# Patient Record
Sex: FEMALE | Race: WHITE | Employment: FULL TIME | URBAN - METROPOLITAN AREA
[De-identification: names, ages, dates, MRNs, and addresses within clinical notes are randomized per-mention and may not be internally consistent; named-entity substitution may affect disease eponyms.]

---

## 2017-10-09 ENCOUNTER — HOSPITAL ENCOUNTER (EMERGENCY)
Age: 37
Discharge: HOME | End: 2017-10-09
Payer: MEDICAID

## 2017-10-09 VITALS
HEART RATE: 68 BPM | TEMPERATURE: 99 F | SYSTOLIC BLOOD PRESSURE: 133 MMHG | RESPIRATION RATE: 14 BRPM | BODY MASS INDEX: 51.91 KG/M2 | HEIGHT: 63 IN | OXYGEN SATURATION: 98 % | DIASTOLIC BLOOD PRESSURE: 76 MMHG | WEIGHT: 293 LBS

## 2017-10-09 DIAGNOSIS — S83.91XA SPRAIN OF RIGHT KNEE, UNSPECIFIED LIGAMENT, INITIAL ENCOUNTER: Primary | ICD-10-CM

## 2017-10-09 DIAGNOSIS — S16.1XXA NECK STRAIN, INITIAL ENCOUNTER: ICD-10-CM

## 2017-10-09 DIAGNOSIS — G44.209 ACUTE NON INTRACTABLE TENSION-TYPE HEADACHE: ICD-10-CM

## 2017-10-09 PROCEDURE — 99283 EMERGENCY DEPT VISIT LOW MDM: CPT

## 2017-10-09 RX ORDER — IBUPROFEN 600 MG/1
600 TABLET ORAL ONCE
Status: COMPLETED | OUTPATIENT
Start: 2017-10-09 | End: 2017-10-09

## 2017-10-09 RX ORDER — CYCLOBENZAPRINE HCL 10 MG
10 TABLET ORAL 3 TIMES DAILY PRN
Qty: 15 TABLET | Refills: 0 | Status: SHIPPED | OUTPATIENT
Start: 2017-10-09 | End: 2017-10-19

## 2017-10-09 RX ORDER — IBUPROFEN 600 MG/1
600 TABLET ORAL EVERY 8 HOURS PRN
Qty: 10 TABLET | Refills: 0 | Status: ON HOLD | OUTPATIENT
Start: 2017-10-09 | End: 2020-04-17

## 2017-10-10 NOTE — ED PROVIDER NOTES
Patient Seen in: [facility] In [place]    History   Patient presents with:  Fall (musculoskeletal, neurologic)    Stated Complaint: slipped, fell 10/7, multi areas of pain to Rt side    HPI    Patient is a 42-year-old presented to the Systems    Positive for stated complaint: slipped, fell 10/7, multi areas of pain to Rt side  Other systems are as noted in HPI. Constitutional and vital signs reviewed. All other systems reviewed and negative except as noted above.     PSFH elements right knee. No tenderness over the proximal fibula on the right there is no tenderness with removal of the shoe over the right ankle, malleoli, calcaneus, or metatarsals including the fifth metatarsal.  Skin: Unremarkable without lesions or rash.   Normal Prescribed:  Discharge Medication List as of 10/9/2017 10:36 PM    START taking these medications    ibuprofen 600 MG Oral Tab  Take 1 tablet (600 mg total) by mouth every 8 (eight) hours as needed for Pain., Script printed, Disp-10 tablet, R-0    Cycloben

## 2017-10-10 NOTE — ED INITIAL ASSESSMENT (HPI)
Slipped and fell last Saturday. Now endorsing right sided pain with [place]. Didn't hit head during fall.

## 2017-12-13 ENCOUNTER — HOSPITAL ENCOUNTER (EMERGENCY)
Age: 37
Discharge: HOME | End: 2017-12-13
Attending: EMERGENCY MEDICINE
Payer: MEDICAID

## 2017-12-13 ENCOUNTER — APPOINTMENT (OUTPATIENT)
Dept: CT IMAGING | Age: 37
End: 2017-12-13
Attending: EMERGENCY MEDICINE
Payer: MEDICAID

## 2017-12-13 VITALS
HEIGHT: 64 IN | HEART RATE: 78 BPM | RESPIRATION RATE: 18 BRPM | DIASTOLIC BLOOD PRESSURE: 68 MMHG | BODY MASS INDEX: 50.02 KG/M2 | WEIGHT: 293 LBS | SYSTOLIC BLOOD PRESSURE: 148 MMHG | TEMPERATURE: 97 F | OXYGEN SATURATION: 97 %

## 2017-12-13 DIAGNOSIS — S43.401A SPRAIN OF RIGHT SHOULDER, UNSPECIFIED SHOULDER SPRAIN TYPE, INITIAL ENCOUNTER: Primary | ICD-10-CM

## 2017-12-13 DIAGNOSIS — S83.91XA SPRAIN OF RIGHT KNEE, UNSPECIFIED LIGAMENT, INITIAL ENCOUNTER: ICD-10-CM

## 2017-12-13 DIAGNOSIS — R09.81 NASAL CONGESTION: ICD-10-CM

## 2017-12-13 DIAGNOSIS — G44.209 TENSION HEADACHE: ICD-10-CM

## 2017-12-13 PROCEDURE — 99284 EMERGENCY DEPT VISIT MOD MDM: CPT

## 2017-12-13 PROCEDURE — 70450 CT HEAD/BRAIN W/O DYE: CPT | Performed by: EMERGENCY MEDICINE

## 2017-12-13 PROCEDURE — 81025 URINE PREGNANCY TEST: CPT

## 2017-12-13 RX ORDER — FLUTICASONE PROPIONATE 50 MCG
2 SPRAY, SUSPENSION (ML) NASAL DAILY
Qty: 16 G | Refills: 0 | Status: SHIPPED | OUTPATIENT
Start: 2017-12-13 | End: 2018-01-12

## 2017-12-13 RX ORDER — TRAMADOL HYDROCHLORIDE 50 MG/1
50 TABLET ORAL EVERY 6 HOURS PRN
Qty: 20 TABLET | Refills: 0 | Status: SHIPPED | OUTPATIENT
Start: 2017-12-13 | End: 2017-12-20

## 2017-12-13 RX ORDER — NAPROXEN 500 MG/1
500 TABLET ORAL 2 TIMES DAILY PRN
Qty: 60 TABLET | Refills: 0 | Status: SHIPPED | OUTPATIENT
Start: 2017-12-13 | End: 2018-01-12

## 2017-12-14 NOTE — ED PROVIDER NOTES
Patient Seen in: THE [facility] Emergency Department In [place]    History   Patient presents with:  Fall (musculoskeletal, neurologic)    Stated Complaint: FALL    HPI    Patient is a 19-year-old female comes emergency room for evaluation of multiple complai distress, Well appearing and non-toxic, Alert and oriented X 3   HEENT: Normocephalic, atraumatic. Moist mucous membranes. Pupils equal round reactive to light accommodation, extraocular motion is intact, sclerae white, conjunctiva is pink.   Oropharynx i acute intra-cranial hemorrhage. SINUSES:  The visualized paranasal sinuses are clear. MASTOIDS:  The mastoids are clear. SKULL:  No evidence for fracture or osseous abnormality. CONCLUSION:  No acute intracranial hemorrhage.    Dictated by: Erick Barroso MD  [facility]          [place]y [place], [address] [facility]  [phone]      As needed    Mirta Newman  [address] [place] [address] [phone]      As needed

## 2017-12-14 NOTE — ED NOTES
Pt states she fell one month ago and was seen in the ED, no xrays done. Pt states she has not followed up with pmd. Pt also arrived with her bf in the ER to have mold exposure. Pt also states her lmp was light and shorter than usual and may be pregnant.  No

## 2017-12-14 NOTE — ED INITIAL ASSESSMENT (HPI)
STS HAD A FALL IN October AND HAS PAIN IN RIGHT KNEE AND AND RIGHT SHOULDER SINCE. STS WAS SEEN AT THE PED INITIALLY AFTER THE INJURY AND HAS NOT FOLLOWED UP. STEADY GAIT.  PT ALSO STS HAS HAD GENERALIZED HEADACHES AND NASAL CONGESTION X 3 MONTHS SINCE JO

## 2018-01-13 ENCOUNTER — HOSPITAL ENCOUNTER (EMERGENCY)
Age: 38
Discharge: HOME | End: 2018-01-13
Attending: EMERGENCY MEDICINE
Payer: OTHER MISCELLANEOUS

## 2018-01-13 ENCOUNTER — APPOINTMENT (OUTPATIENT)
Dept: GENERAL RADIOLOGY | Age: 38
End: 2018-01-13
Attending: EMERGENCY MEDICINE
Payer: OTHER MISCELLANEOUS

## 2018-01-13 VITALS
SYSTOLIC BLOOD PRESSURE: 140 MMHG | HEIGHT: 63 IN | OXYGEN SATURATION: 98 % | TEMPERATURE: 98 F | BODY MASS INDEX: 51.91 KG/M2 | WEIGHT: 293 LBS | RESPIRATION RATE: 18 BRPM | DIASTOLIC BLOOD PRESSURE: 89 MMHG | HEART RATE: 72 BPM

## 2018-01-13 DIAGNOSIS — S63.502A SPRAIN OF LEFT WRIST, INITIAL ENCOUNTER: Primary | ICD-10-CM

## 2018-01-13 PROCEDURE — 73110 X-RAY EXAM OF WRIST: CPT | Performed by: EMERGENCY MEDICINE

## 2018-01-13 PROCEDURE — 99283 EMERGENCY DEPT VISIT LOW MDM: CPT

## 2018-01-13 RX ORDER — TRAZODONE HYDROCHLORIDE 50 MG/1
50 TABLET ORAL NIGHTLY
COMMUNITY
End: 2019-06-04

## 2018-01-13 RX ORDER — OMEPRAZOLE 40 MG/1
40 CAPSULE, DELAYED RELEASE ORAL
COMMUNITY
End: 2020-01-23

## 2018-01-13 RX ORDER — CHLORAL HYDRATE 500 MG
1000 CAPSULE ORAL 2 TIMES DAILY
COMMUNITY
End: 2019-06-04

## 2018-01-13 RX ORDER — IBUPROFEN 600 MG/1
600 TABLET ORAL ONCE
Status: COMPLETED | OUTPATIENT
Start: 2018-01-13 | End: 2018-01-13

## 2018-01-13 RX ORDER — IBUPROFEN 600 MG/1
600 TABLET ORAL EVERY 8 HOURS PRN
Qty: 30 TABLET | Refills: 0 | Status: SHIPPED | OUTPATIENT
Start: 2018-01-13 | End: 2018-01-23

## 2018-01-13 RX ORDER — PROPRANOLOL HYDROCHLORIDE 10 MG/1
10 TABLET ORAL 2 TIMES DAILY PRN
COMMUNITY
End: 2019-06-04

## 2018-01-13 NOTE — ED NOTES
Pt's place of work, [address] [place] [address], is not contracted through [facility].
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
## 2018-02-18 ENCOUNTER — HOSPITAL ENCOUNTER (EMERGENCY)
Age: 38
Discharge: HOME | End: 2018-02-18
Payer: MEDICAID

## 2018-02-18 VITALS
HEIGHT: 64 IN | WEIGHT: 293 LBS | DIASTOLIC BLOOD PRESSURE: 88 MMHG | RESPIRATION RATE: 20 BRPM | HEART RATE: 99 BPM | BODY MASS INDEX: 50.02 KG/M2 | TEMPERATURE: 98 F | OXYGEN SATURATION: 98 % | SYSTOLIC BLOOD PRESSURE: 161 MMHG

## 2018-02-18 DIAGNOSIS — S90.812A ABRASION OF LEFT FOOT, INITIAL ENCOUNTER: ICD-10-CM

## 2018-02-18 DIAGNOSIS — J06.9 VIRAL URI WITH COUGH: Primary | ICD-10-CM

## 2018-02-18 PROCEDURE — 99282 EMERGENCY DEPT VISIT SF MDM: CPT
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
## 2018-06-17 ENCOUNTER — HOSPITAL ENCOUNTER (EMERGENCY)
Age: 38
Discharge: HOME | End: 2018-06-17
Attending: EMERGENCY MEDICINE
Payer: MEDICAID

## 2018-06-17 VITALS
HEART RATE: 108 BPM | SYSTOLIC BLOOD PRESSURE: 172 MMHG | WEIGHT: 293 LBS | RESPIRATION RATE: 22 BRPM | DIASTOLIC BLOOD PRESSURE: 88 MMHG | BODY MASS INDEX: 51.91 KG/M2 | OXYGEN SATURATION: 99 % | HEIGHT: 63 IN | TEMPERATURE: 101 F

## 2018-06-17 DIAGNOSIS — J02.0 STREP PHARYNGITIS: Primary | ICD-10-CM

## 2018-06-17 PROCEDURE — 87430 STREP A AG IA: CPT | Performed by: EMERGENCY MEDICINE

## 2018-06-17 PROCEDURE — 99283 EMERGENCY DEPT VISIT LOW MDM: CPT

## 2018-06-17 RX ORDER — ONDANSETRON 4 MG/1
4 TABLET, ORALLY DISINTEGRATING ORAL EVERY 4 HOURS PRN
Qty: 10 TABLET | Refills: 0 | Status: SHIPPED | OUTPATIENT
Start: 2018-06-17 | End: 2018-06-24

## 2018-06-17 RX ORDER — IBUPROFEN 600 MG/1
600 TABLET ORAL ONCE
Status: COMPLETED | OUTPATIENT
Start: 2018-06-17 | End: 2018-06-17

## 2018-06-17 RX ORDER — ONDANSETRON 4 MG/1
4 TABLET, ORALLY DISINTEGRATING ORAL ONCE
Status: COMPLETED | OUTPATIENT
Start: 2018-06-17 | End: 2018-06-17

## 2018-06-17 RX ORDER — AZITHROMYCIN 250 MG/1
TABLET, FILM COATED ORAL
Qty: 1 PACKAGE | Refills: 0 | Status: SHIPPED | OUTPATIENT
Start: 2018-06-17 | End: 2018-06-22

## 2018-06-17 NOTE — ED PROVIDER NOTES
Patient Seen in: THE [facility] Emergency Department In [place]    History   Patient presents with:  Fever (infectious)    Stated Complaint: fever, sore throat, nausea, body aches x 2 days    HPI    51-year-old female presents to the emergency department for is nontender. Extremities are atraumatic. Skin: No rashes or lesions. The patient has some areas of pinkness consistent with sunburn. Neuro exam: Awake, conversive and moving all 4 extremities well.     ED Course     Labs Reviewed   RAPID STREP A SCREEN

## 2018-08-24 ENCOUNTER — APPOINTMENT (OUTPATIENT)
Dept: CT IMAGING | Age: 38
End: 2018-08-24
Attending: PHYSICIAN ASSISTANT
Payer: MEDICAID

## 2018-08-24 ENCOUNTER — APPOINTMENT (OUTPATIENT)
Dept: GENERAL RADIOLOGY | Age: 38
End: 2018-08-24
Attending: PHYSICIAN ASSISTANT
Payer: MEDICAID

## 2018-08-24 ENCOUNTER — HOSPITAL ENCOUNTER (EMERGENCY)
Age: 38
Discharge: HOME | End: 2018-08-24
Attending: EMERGENCY MEDICINE
Payer: MEDICAID

## 2018-08-24 ENCOUNTER — APPOINTMENT (OUTPATIENT)
Dept: CV DIAGNOSTICS | Age: 38
End: 2018-08-24
Attending: PHYSICIAN ASSISTANT
Payer: MEDICAID

## 2018-08-24 VITALS
TEMPERATURE: 98 F | HEART RATE: 90 BPM | WEIGHT: 293 LBS | OXYGEN SATURATION: 97 % | DIASTOLIC BLOOD PRESSURE: 101 MMHG | HEIGHT: 63 IN | SYSTOLIC BLOOD PRESSURE: 166 MMHG | RESPIRATION RATE: 20 BRPM | BODY MASS INDEX: 51.91 KG/M2

## 2018-08-24 DIAGNOSIS — R06.00 DYSPNEA ON EXERTION: Primary | ICD-10-CM

## 2018-08-24 LAB
ALBUMIN SERPL-MCNC: 3.3 G/DL (ref 3.5–4.8)
ALBUMIN/GLOB SERPL: 0.7 {RATIO} (ref 1–2)
ALP LIVER SERPL-CCNC: 113 U/L (ref 37–98)
ALT SERPL-CCNC: 25 U/L (ref 14–54)
ANION GAP SERPL CALC-SCNC: 7 MMOL/L (ref 0–18)
AST SERPL-CCNC: 22 U/L (ref 15–41)
ATRIAL RATE: 75 BPM
BASOPHILS # BLD AUTO: 0.03 X10(3) UL (ref 0–0.1)
BASOPHILS NFR BLD AUTO: 0.3 %
BILIRUB SERPL-MCNC: 0.4 MG/DL (ref 0.1–2)
BILIRUB UR QL STRIP.AUTO: NEGATIVE
BUN BLD-MCNC: 16 MG/DL (ref 8–20)
BUN/CREAT SERPL: 20.8 (ref 10–20)
CALCIUM BLD-MCNC: 9.6 MG/DL (ref 8.3–10.3)
CHLORIDE SERPL-SCNC: 105 MMOL/L (ref 101–111)
CLARITY UR REFRACT.AUTO: CLEAR
CO2 SERPL-SCNC: 27 MMOL/L (ref 22–32)
COLOR UR AUTO: YELLOW
CREAT BLD-MCNC: 0.77 MG/DL (ref 0.55–1.02)
CREAT SERPL-MCNC: 0.7 MG/DL (ref 0.55–1.02)
D-DIMER: 1.53 UG/ML FEU (ref 0–0.49)
EOSINOPHIL # BLD AUTO: 0.26 X10(3) UL (ref 0–0.3)
EOSINOPHIL NFR BLD AUTO: 2.7 %
ERYTHROCYTE [DISTWIDTH] IN BLOOD BY AUTOMATED COUNT: 14.5 % (ref 11.5–16)
GLOBULIN PLAS-MCNC: 4.8 G/DL (ref 2.5–4)
GLUCOSE BLD-MCNC: 104 MG/DL (ref 70–99)
GLUCOSE BLD-MCNC: 105 MG/DL (ref 65–99)
GLUCOSE BLD-MCNC: 99 MG/DL (ref 70–99)
GLUCOSE UR STRIP.AUTO-MCNC: NEGATIVE MG/DL
HCT VFR BLD AUTO: 35.5 % (ref 34–50)
HGB BLD-MCNC: 11.3 G/DL (ref 12–16)
IMMATURE GRANULOCYTE COUNT: 0.04 X10(3) UL (ref 0–1)
IMMATURE GRANULOCYTE RATIO %: 0.4 %
ISTAT BUN: 17 MG/DL (ref 8–20)
ISTAT CHLORIDE: 102 MMOL/L (ref 101–111)
ISTAT HEMATOCRIT: 33 % (ref 34–50)
ISTAT IONIZED CALCIUM: 1.21 MMOL/L
ISTAT POTASSIUM: 3.3 MMOL/L (ref 3.6–5.1)
ISTAT SODIUM: 140 MMOL/L (ref 136–144)
ISTAT TROPONIN: <0.1 NG/ML (ref ?–0.1)
KETONES UR STRIP.AUTO-MCNC: NEGATIVE MG/DL
LEUKOCYTE ESTERASE UR QL STRIP.AUTO: NEGATIVE
LYMPHOCYTES # BLD AUTO: 2.45 X10(3) UL (ref 0.9–4)
LYMPHOCYTES NFR BLD AUTO: 25.8 %
M PROTEIN MFR SERPL ELPH: 8.1 G/DL (ref 6.1–8.3)
MCH RBC QN AUTO: 27 PG (ref 27–33.2)
MCHC RBC AUTO-ENTMCNC: 31.8 G/DL (ref 31–37)
MCV RBC AUTO: 84.7 FL (ref 81–100)
MONOCYTES # BLD AUTO: 0.73 X10(3) UL (ref 0.1–1)
MONOCYTES NFR BLD AUTO: 7.7 %
NEUTROPHIL ABS PRELIM: 6 X10 (3) UL (ref 1.3–6.7)
NEUTROPHILS # BLD AUTO: 6 X10(3) UL (ref 1.3–6.7)
NEUTROPHILS NFR BLD AUTO: 63.1 %
NITRITE UR QL STRIP.AUTO: NEGATIVE
OSMOLALITY SERPL CALC.SUM OF ELEC: 289 MOSM/KG (ref 275–295)
P AXIS: 56 DEGREES
P-R INTERVAL: 140 MS
PH UR STRIP.AUTO: 5.5 [PH] (ref 4.5–8)
PLATELET # BLD AUTO: 320 10(3)UL (ref 150–450)
POCT LOT NUMBER: NORMAL
POCT URINE PREGNANCY: NEGATIVE
POTASSIUM SERPL-SCNC: 3.4 MMOL/L (ref 3.6–5.1)
PROCEDURE CONTROL: PRESENT
PROT UR STRIP.AUTO-MCNC: NEGATIVE MG/DL
Q-T INTERVAL: 370 MS
QRS DURATION: 80 MS
QTC CALCULATION (BEZET): 413 MS
R AXIS: 55 DEGREES
RBC # BLD AUTO: 4.19 X10(6)UL (ref 3.8–5.1)
RBC UR QL AUTO: NEGATIVE
RED CELL DISTRIBUTION WIDTH-SD: 44.5 FL (ref 35.1–46.3)
SODIUM SERPL-SCNC: 139 MMOL/L (ref 136–144)
SP GR UR STRIP.AUTO: 1.02 (ref 1–1.03)
T AXIS: 44 DEGREES
TROPONIN I SERPL-MCNC: <0.046 NG/ML (ref ?–0.05)
TSI SER-ACNC: 3.91 MIU/ML (ref 0.35–5.5)
UROBILINOGEN UR STRIP.AUTO-MCNC: 0.2 MG/DL
VENTRICULAR RATE: 75 BPM
WBC # BLD AUTO: 9.5 X10(3) UL (ref 4–13)

## 2018-08-24 PROCEDURE — 70450 CT HEAD/BRAIN W/O DYE: CPT | Performed by: PHYSICIAN ASSISTANT

## 2018-08-24 PROCEDURE — 85025 COMPLETE CBC W/AUTO DIFF WBC: CPT | Performed by: PHYSICIAN ASSISTANT

## 2018-08-24 PROCEDURE — 84484 ASSAY OF TROPONIN QUANT: CPT | Performed by: PHYSICIAN ASSISTANT

## 2018-08-24 PROCEDURE — 93005 ELECTROCARDIOGRAM TRACING: CPT

## 2018-08-24 PROCEDURE — 93350 STRESS TTE ONLY: CPT | Performed by: PHYSICIAN ASSISTANT

## 2018-08-24 PROCEDURE — 80047 BASIC METABLC PNL IONIZED CA: CPT

## 2018-08-24 PROCEDURE — 99285 EMERGENCY DEPT VISIT HI MDM: CPT

## 2018-08-24 PROCEDURE — 84484 ASSAY OF TROPONIN QUANT: CPT

## 2018-08-24 PROCEDURE — 96361 HYDRATE IV INFUSION ADD-ON: CPT

## 2018-08-24 PROCEDURE — 93017 CV STRESS TEST TRACING ONLY: CPT | Performed by: PHYSICIAN ASSISTANT

## 2018-08-24 PROCEDURE — 82962 GLUCOSE BLOOD TEST: CPT

## 2018-08-24 PROCEDURE — 71046 X-RAY EXAM CHEST 2 VIEWS: CPT | Performed by: PHYSICIAN ASSISTANT

## 2018-08-24 PROCEDURE — 93018 CV STRESS TEST I&R ONLY: CPT | Performed by: PHYSICIAN ASSISTANT

## 2018-08-24 PROCEDURE — 93010 ELECTROCARDIOGRAM REPORT: CPT

## 2018-08-24 PROCEDURE — 81025 URINE PREGNANCY TEST: CPT

## 2018-08-24 PROCEDURE — 81003 URINALYSIS AUTO W/O SCOPE: CPT | Performed by: PHYSICIAN ASSISTANT

## 2018-08-24 PROCEDURE — 85378 FIBRIN DEGRADE SEMIQUANT: CPT | Performed by: PHYSICIAN ASSISTANT

## 2018-08-24 PROCEDURE — 84443 ASSAY THYROID STIM HORMONE: CPT | Performed by: PHYSICIAN ASSISTANT

## 2018-08-24 PROCEDURE — 71275 CT ANGIOGRAPHY CHEST: CPT | Performed by: PHYSICIAN ASSISTANT

## 2018-08-24 PROCEDURE — 80053 COMPREHEN METABOLIC PANEL: CPT | Performed by: PHYSICIAN ASSISTANT

## 2018-08-24 PROCEDURE — 96360 HYDRATION IV INFUSION INIT: CPT

## 2018-08-24 RX ORDER — POTASSIUM CHLORIDE 20 MEQ/1
40 TABLET, EXTENDED RELEASE ORAL ONCE
Status: COMPLETED | OUTPATIENT
Start: 2018-08-24 | End: 2018-08-24

## 2018-08-24 RX ORDER — ASPIRIN 81 MG/1
324 TABLET, CHEWABLE ORAL ONCE
Status: COMPLETED | OUTPATIENT
Start: 2018-08-24 | End: 2018-08-24

## 2018-08-24 NOTE — ED INITIAL ASSESSMENT (HPI)
Patient has multiple complaints. C/o dizziness x 1-1 1/2 weeks    C/o urinary frequency with discomfort to LRQ. Denies dysuria, hematuria. C/o SOB with exertion. +headache and nauseous today.

## 2018-08-24 NOTE — ED PROVIDER NOTES
I reviewed that chart and discussed the case. I have examined the patient and noted this is a obese female who presents with complaint of dizziness for 1-1/2 weeks,, also complained mild headache intermittently, complaints of chest pain intermittently. Josiane Spar (cpt=71046)    Result Date: 8/24/2018  PROCEDURE:  XR CHEST PA + LAT CHEST (CPT=71046)  INDICATIONS:  dizzy, h/a, sob  COMPARISON:  None. TECHNIQUE:  PA and lateral chest radiographs were obtained.   PATIENT STATED HISTORY: (As transcribed by Technologist) 8/24/2018  PROCEDURE:  CT ANGIOGRAPHY, CHEST (CPT=71275)  COMPARISON:  [facility], CTA CHEST, ABD/PLV C SET, 4/04/2013, 23:28.   INDICATIONS:  dizzy, h/a, sob  TECHNIQUE:  IV contrast-enhanced multislice CT angiography is performed through the pulmonary art

## 2018-08-24 NOTE — ED PROVIDER NOTES
Patient Seen in: [facility] Emergency Department In [place]    History   Patient presents with:  Dizziness (neurologic)  Dyspnea HENNY SOB (respiratory)    Stated Complaint: dizzy, h/a, sob    30-year-old obese  female with a history of anxiety, el atraumatic. Nose: Nose normal.   Mouth/Throat: No oropharyngeal exudate, posterior oropharyngeal edema, posterior oropharyngeal erythema or tonsillar abscesses. Eyes: Conjunctivae and EOM are normal. Pupils are equal, round, and reactive to light.    Ne ug/mL (FEU). Proceed with caution from clinical presentation.    POCT GLUCOSE - Abnormal; Notable for the following:     POC Glucose 105 (*)     All other components within normal limits   POCT ISTAT CHEM8 CARTRIDGE - Abnormal; Notable for the following No focal consolidation.      Dictated by: Nomi Dale MD on 8/24/2018 at 14:06     Approved by: Nomi Dale MD            Ct Brain Or Head (95866)    Result Date: 8/24/2018  PROCEDURE:  CT BRAIN OR HEAD (16741)  [facility] (6334 shortness of breath x 2 days. Elevated D-Dimer. CONTRAST USED:  81cc of Omnipaque 350  FINDINGS:  VASCULATURE:  No visible pulmonary arterial thrombus or attenuation. THORACIC AORTA:  No aneurysm or visible dissection.   LUNGS:  No visible pulmonary dise

## 2018-08-28 NOTE — PROGRESS NOTES
I have never seen this patient.  If she is choosing Dr. Sandra Lawrence as a PCP she needs to make an appt

## 2019-05-24 ENCOUNTER — TELEPHONE (OUTPATIENT)
Dept: FAMILY MEDICINE CLINIC | Facility: CLINIC | Age: 39
End: 2019-05-24

## 2019-06-04 ENCOUNTER — OFFICE VISIT (OUTPATIENT)
Dept: FAMILY MEDICINE CLINIC | Facility: CLINIC | Age: 39
End: 2019-06-04
Payer: COMMERCIAL

## 2019-06-04 VITALS
HEIGHT: 62.5 IN | BODY MASS INDEX: 52.57 KG/M2 | RESPIRATION RATE: 18 BRPM | HEART RATE: 70 BPM | OXYGEN SATURATION: 96 % | WEIGHT: 293 LBS | TEMPERATURE: 98 F | DIASTOLIC BLOOD PRESSURE: 80 MMHG | SYSTOLIC BLOOD PRESSURE: 134 MMHG

## 2019-06-04 DIAGNOSIS — Z13.0 SCREENING FOR DEFICIENCY ANEMIA: ICD-10-CM

## 2019-06-04 DIAGNOSIS — G47.10 HYPERSOMNOLENCE: Primary | ICD-10-CM

## 2019-06-04 DIAGNOSIS — E03.9 ACQUIRED HYPOTHYROIDISM: ICD-10-CM

## 2019-06-04 DIAGNOSIS — G47.00 INSOMNIA, UNSPECIFIED TYPE: ICD-10-CM

## 2019-06-04 DIAGNOSIS — E78.2 MIXED HYPERLIPIDEMIA: ICD-10-CM

## 2019-06-04 DIAGNOSIS — E66.01 CLASS 3 SEVERE OBESITY WITHOUT SERIOUS COMORBIDITY WITH BODY MASS INDEX (BMI) OF 45.0 TO 49.9 IN ADULT, UNSPECIFIED OBESITY TYPE (HCC): ICD-10-CM

## 2019-06-04 DIAGNOSIS — R39.15 URINARY URGENCY: ICD-10-CM

## 2019-06-04 DIAGNOSIS — Z13.1 SCREENING FOR DIABETES MELLITUS: ICD-10-CM

## 2019-06-04 PROBLEM — E66.813 CLASS 3 SEVERE OBESITY DUE TO EXCESS CALORIES WITH BODY MASS INDEX (BMI) OF 60.0 TO 69.9 IN ADULT (HCC): Status: ACTIVE | Noted: 2019-06-04

## 2019-06-04 PROBLEM — M51.36 DDD (DEGENERATIVE DISC DISEASE), LUMBAR: Status: ACTIVE | Noted: 2019-06-04

## 2019-06-04 PROBLEM — M51.369 DDD (DEGENERATIVE DISC DISEASE), LUMBAR: Status: ACTIVE | Noted: 2019-06-04

## 2019-06-04 PROBLEM — E66.813 CLASS 3 SEVERE OBESITY DUE TO EXCESS CALORIES WITH BODY MASS INDEX (BMI) OF 60.0 TO 69.9 IN ADULT: Status: ACTIVE | Noted: 2019-06-04

## 2019-06-04 PROBLEM — G43.909 MIGRAINE WITHOUT STATUS MIGRAINOSUS, NOT INTRACTABLE: Status: ACTIVE | Noted: 2019-06-04

## 2019-06-04 PROCEDURE — 99203 OFFICE O/P NEW LOW 30 MIN: CPT | Performed by: FAMILY MEDICINE

## 2019-06-04 RX ORDER — ROSUVASTATIN CALCIUM 10 MG/1
10 TABLET, COATED ORAL NIGHTLY
Qty: 30 TABLET | Refills: 3 | Status: SHIPPED | OUTPATIENT
Start: 2019-06-04 | End: 2020-01-23

## 2019-06-04 RX ORDER — RIZATRIPTAN BENZOATE 10 MG/1
10 TABLET ORAL AS NEEDED
Qty: 6 TABLET | Refills: 1 | Status: SHIPPED | OUTPATIENT
Start: 2019-06-04 | End: 2019-08-01

## 2019-06-04 RX ORDER — LEVOTHYROXINE SODIUM 175 UG/1
175 TABLET ORAL DAILY
Qty: 30 TABLET | Refills: 1 | Status: SHIPPED | OUTPATIENT
Start: 2019-06-04 | End: 2019-08-27

## 2019-06-04 NOTE — PATIENT INSTRUCTIONS
Melatonin 3 mg at bedtime, if not better after 3 days can increase to 2 pills at bedtime. Follow up in 6 weeks after labs for thyroid, lipids, sugar liver kidneys & blood count.      Sleep study ordered     Start thyroid and cholesterol medicine (rosuva

## 2019-06-04 NOTE — PROGRESS NOTES
Rancho Sarabia IS A 42year old female [address] [place] Patient presents with:  Establish Care  Migraine       History of present illness:     Hx hypothyroid. Has not been on medicine due to loss of insurance.     High cholesterol in past, was on pravastatin 20 mg, by mouth nightly. Disp: 30 tablet Rfl: 3   Rizatriptan Benzoate 10 MG Oral Tab Take 1 tablet (10 mg total) by mouth as needed for Migraine.  Disp: 6 tablet Rfl: 1   Omeprazole 40 MG Oral Capsule Delayed Release Take 40 mg by mouth 2 (two) times daily before Forced sexual activity: Not on file    Other Topics      Concerns:        Caffeine Concern: Yes          1 caff.  drink daily         Exercise: Yes          Stretching 3-5x weekly 15-30mins, Yoga 2x weekly 30-60 mins        Seat Belt: Yes        Special Die A1C; Future    Screening for deficiency anemia  -     CBC WITH DIFFERENTIAL WITH PLATELET; Future    Other orders  -     Rizatriptan Benzoate 10 MG Oral Tab; Take 1 tablet (10 mg total) by mouth as needed for Migraine.       Phit n Phat seems to be helping

## 2019-07-23 ENCOUNTER — OFFICE VISIT (OUTPATIENT)
Dept: FAMILY MEDICINE CLINIC | Facility: CLINIC | Age: 39
End: 2019-07-23
Payer: COMMERCIAL

## 2019-07-23 VITALS
BODY MASS INDEX: 64 KG/M2 | WEIGHT: 293 LBS | TEMPERATURE: 98 F | HEART RATE: 84 BPM | DIASTOLIC BLOOD PRESSURE: 80 MMHG | SYSTOLIC BLOOD PRESSURE: 124 MMHG

## 2019-07-23 DIAGNOSIS — G47.9 SLEEP DISORDER: ICD-10-CM

## 2019-07-23 DIAGNOSIS — M25.50 ARTHRALGIA, UNSPECIFIED JOINT: ICD-10-CM

## 2019-07-23 DIAGNOSIS — J03.01 RECURRENT STREPTOCOCCAL TONSILLITIS: ICD-10-CM

## 2019-07-23 DIAGNOSIS — J06.9 RECURRENT URI (UPPER RESPIRATORY INFECTION): ICD-10-CM

## 2019-07-23 DIAGNOSIS — J35.1 TONSILLAR ENLARGEMENT: Primary | ICD-10-CM

## 2019-07-23 PROCEDURE — 99214 OFFICE O/P EST MOD 30 MIN: CPT | Performed by: FAMILY MEDICINE

## 2019-07-23 RX ORDER — TRAZODONE HYDROCHLORIDE 50 MG/1
50 TABLET ORAL NIGHTLY
Qty: 30 TABLET | Refills: 1 | Status: SHIPPED | OUTPATIENT
Start: 2019-07-23 | End: 2019-12-12

## 2019-07-23 NOTE — PATIENT INSTRUCTIONS
Continue albuterol as needed for cough, wheezing, shortness of breath. Schedule sleep study soon, ideally before ENT consult. Refer ENT     Refer Dr. Moni Velez, consult after labs for autoimmune disorders. See me for lab review afterward.      Trial of

## 2019-07-23 NOTE — PROGRESS NOTES
Juliana Gutierrez IS A 42year old female [address] [place] Patient presents with:   Other: upper resp infection, labs, \"increased inflamation in joints all over my body\"       History of present illness:     Tends to frequent URI & ST,  Was ill twice in last 6 weeks, 10 tablet Rfl: 0   Rosuvastatin Calcium 10 MG Oral Tab Take 1 tablet (10 mg total) by mouth nightly.  Disp: 30 tablet Rfl: 3       Allergy:        Penicillins             HIVES    Family history:       Family History   Problem Relation Age of Onset   • Hear Exercise: Yes          Stretching 3-5x weekly 15-30mins, Yoga 2x weekly 30-60 mins        Seat Belt: Yes        Special Diet: Not Asked        Stress Concern: Not Asked        Weight Concern: Not Asked    Social History Narrative      Works as LPN.

## 2019-08-02 RX ORDER — RIZATRIPTAN BENZOATE 10 MG/1
10 TABLET ORAL AS NEEDED
Qty: 6 TABLET | Refills: 1 | Status: ON HOLD | OUTPATIENT
Start: 2019-08-02 | End: 2020-06-22

## 2019-08-02 NOTE — TELEPHONE ENCOUNTER
LOV 7/19     LAST LAB 6/19    LAST RX   Rizatriptan Benzoate 10 MG Oral Tab 6 tablet 1 6/4/2019       Next OV Visit date not found      PROTOCOL  Please advise. No protocol. If filled. Please close.    Thank You

## 2019-08-20 ENCOUNTER — MED REC SCAN ONLY (OUTPATIENT)
Dept: FAMILY MEDICINE CLINIC | Facility: CLINIC | Age: 39
End: 2019-08-20

## 2019-08-23 ENCOUNTER — OFFICE VISIT (OUTPATIENT)
Dept: SLEEP CENTER | Age: 39
End: 2019-08-23
Attending: INTERNAL MEDICINE
Payer: COMMERCIAL

## 2019-08-23 DIAGNOSIS — G47.10 HYPERSOMNOLENCE: ICD-10-CM

## 2019-08-23 DIAGNOSIS — E66.01 CLASS 3 SEVERE OBESITY WITHOUT SERIOUS COMORBIDITY WITH BODY MASS INDEX (BMI) OF 45.0 TO 49.9 IN ADULT, UNSPECIFIED OBESITY TYPE (HCC): ICD-10-CM

## 2019-08-23 PROCEDURE — 95810 POLYSOM 6/> YRS 4/> PARAM: CPT

## 2019-08-27 DIAGNOSIS — E03.9 ACQUIRED HYPOTHYROIDISM: ICD-10-CM

## 2019-08-27 LAB
ANA SCREEN, IFA: POSITIVE
C-REACTIVE PROTEIN: 18.5 MG/L
CYCLIC CITRULLINATED$PEPTIDE (CCP) AB (IGG): <16 UNITS
DNA (DS) ANTIBODY: 22 IU/ML
RHEUMATOID FACTOR: <14 IU/ML
SED RATE BY MODIFIED$WESTERGREN: 46 MM/H

## 2019-08-27 RX ORDER — LEVOTHYROXINE SODIUM 175 UG/1
TABLET ORAL
Qty: 15 TABLET | Refills: 0 | Status: SHIPPED | OUTPATIENT
Start: 2019-08-27 | End: 2020-01-23

## 2019-08-27 NOTE — TELEPHONE ENCOUNTER
LOV 6/18 New acute later.      LAST LAB 8/18    LAST RX  7/26/2019   Levothyroxine Sodium 175 MCG Oral Tab 30 tablet 1 6/4/2019         Next OV Visit date not found      PROTOCOL    Thyroid Supplements Protocol Failed8/27 5:08 AM   TSH test in past 12 month

## 2019-08-30 NOTE — PROCEDURES
[address] [address],[facility] [address]       Accredited by the [facility] of Sleep Medicine (AASM)    PATIENT'S NAME:        Neita Seat  ATTENDING PHYSICIAN:   Kristina Del Toro M.D. REFERRING PHYSICIAN:   Ely Easley M.D.   PAT hypopneic episodes, yielding an overall apnea-hypopnea index of 19. Supine AHI was 46. Lateral AHI was 11. REM AHI was 57. Oxygen saturation heather was 71%, and patient spent 5% of sleep time with oxygen levels below 90%.     PERIODIC LIMB MOVEMENTS:  PL

## 2019-09-03 PROBLEM — F43.10 PTSD (POST-TRAUMATIC STRESS DISORDER): Status: ACTIVE | Noted: 2019-09-03

## 2019-09-03 PROBLEM — G47.33 SLEEP APNEA, OBSTRUCTIVE: Status: ACTIVE | Noted: 2019-09-03

## 2019-09-03 NOTE — PATIENT INSTRUCTIONS
Start sertraline 100 mg daily 1/2 tablet daily for 7 days then 1 a day. Continuation should be determined by psychiatry after consult. Referral to behavioral health therapist with [facility], they should call within 24 hours.      [phone] is main

## 2019-09-03 NOTE — PROGRESS NOTES
Zeke Reyes IS A 42year old female [address] [place] [address] Patient presents with:  Depression  Anxiety       History of present illness:     Increased anxiety, some depression. Only past diagnoses have been depression.      9 d ago, best friend who was staying with her tablets (50 mg total) by mouth daily for 7 days, THEN 1 tablet (100 mg total) daily for 23 days.  Disp: 30 tablet Rfl: 1   LEVOTHYROXINE SODIUM 175 MCG Oral Tab TAKE 1 TABLET BY MOUTH DAILY Disp: 15 tablet Rfl: 0   Rizatriptan Benzoate 10 MG Oral Tab Take 1 activity: Not on file    Lifestyle      Physical activity:        Days per week: Not on file        Minutes per session: Not on file      Stress: Not on file    Relationships      Social connections:        Talks on phone: Not on file        Gets together: for a long time). Test results: none      Assessment & Plan:   Marisa Hendricks was seen today for depression and anxiety.     Diagnoses and all orders for this visit:    PTSD (post-traumatic stress disorder)  -      NAVIGATOR    Other orders  -     Sertraline HCl

## 2019-09-05 ENCOUNTER — OFFICE VISIT (OUTPATIENT)
Dept: FAMILY MEDICINE CLINIC | Facility: CLINIC | Age: 39
End: 2019-09-05
Payer: COMMERCIAL

## 2019-09-05 VITALS
HEART RATE: 95 BPM | WEIGHT: 293 LBS | DIASTOLIC BLOOD PRESSURE: 74 MMHG | OXYGEN SATURATION: 96 % | SYSTOLIC BLOOD PRESSURE: 108 MMHG | BODY MASS INDEX: 64 KG/M2 | RESPIRATION RATE: 24 BRPM | TEMPERATURE: 99 F

## 2019-09-05 DIAGNOSIS — J03.90 TONSILLITIS: Primary | ICD-10-CM

## 2019-09-05 LAB
CONTROL LINE PRESENT WITH A CLEAR BACKGROUND (YES/NO): YES YES/NO
STREP GRP A CUL-SCR: NEGATIVE

## 2019-09-05 PROCEDURE — 87081 CULTURE SCREEN ONLY: CPT | Performed by: FAMILY MEDICINE

## 2019-09-05 PROCEDURE — 87880 STREP A ASSAY W/OPTIC: CPT | Performed by: FAMILY MEDICINE

## 2019-09-05 PROCEDURE — 99212 OFFICE O/P EST SF 10 MIN: CPT | Performed by: FAMILY MEDICINE

## 2019-09-05 PROCEDURE — 87147 CULTURE TYPE IMMUNOLOGIC: CPT | Performed by: FAMILY MEDICINE

## 2019-09-05 RX ORDER — AZITHROMYCIN 500 MG/1
500 TABLET, FILM COATED ORAL DAILY
Qty: 5 TABLET | Refills: 0 | Status: SHIPPED | OUTPATIENT
Start: 2019-09-05 | End: 2019-09-20 | Stop reason: ALTCHOICE

## 2019-09-05 RX ORDER — PREDNISONE 20 MG/1
20 TABLET ORAL 2 TIMES DAILY
Qty: 10 TABLET | Refills: 0 | Status: SHIPPED | OUTPATIENT
Start: 2019-09-05 | End: 2019-09-10

## 2019-09-05 NOTE — PROGRESS NOTES
Rissa Tran IS A 42year old female [address] [place] Patient presents with:  Strep Throat: pt c/o enlarged tonsils, and sore throat. ..onset tuesday morning       History of present illness:     Hx history frequent strep in past, has ENT appt for tonsils & ear.  H for Pain.  Disp: 10 tablet Rfl: 0       Allergy:        Penicillins             HIVES    Family history:       Family History   Problem Relation Age of Onset   • Heart Disease Mother    • High Cholesterol Mother    • Other (DDD) Mother    • Other (lymphoma) Seat Belt: Yes        Special Diet: Not Asked        Stress Concern: Not Asked        Weight Concern: Not Asked    Social History Narrative      Works as LPN.       Review of systems:     Symptoms have worsened since 2 days ago, she did not mention it

## 2019-09-05 NOTE — PATIENT INSTRUCTIONS
Azithromycin 500 mg daily for 5 days. For sore throat, chloraseptic spray every 2-3 hours can be used for relief. You can gargle with warm salt water or with 81 mg tablet of aspirin dissolved in warm water.  Acetaminophen 500 mg with ibuprofen 200 mg ma

## 2019-09-29 ENCOUNTER — OFFICE VISIT (OUTPATIENT)
Dept: SLEEP CENTER | Age: 39
End: 2019-09-29
Attending: INTERNAL MEDICINE
Payer: COMMERCIAL

## 2019-09-29 PROCEDURE — 95811 POLYSOM 6/>YRS CPAP 4/> PARM: CPT

## 2019-10-08 NOTE — PROCEDURES
[address]       Accredited by the [facility] of [facility] Medicine (AASM)    PATIENT'S NAME:        Lisa Alert  ATTENDING PHYSICIAN:   Emanuel Burrell M.D. REFERRING PHYSICIAN:   Emanuel Burrell M.D.   PATIENT 25%.    RESPIRATORY MEASURES:  The patient was initiated on CPAP at 6 cm of water pressure and titrated up to a final pressure of 10 cm of water.   On this setting, patient was able to achieve prolonged periods of stage REM sleep, however, no REM was [employer]

## 2019-10-12 ENCOUNTER — TELEPHONE (OUTPATIENT)
Dept: INTERNAL MEDICINE CLINIC | Facility: CLINIC | Age: 39
End: 2019-10-12

## 2019-10-12 NOTE — TELEPHONE ENCOUNTER
Per ezequiel reyes: YEISON informing patient that her appt she made through [facility] is for a follow up appt.  Stated that we need time to give her the care she deserves and to call back to make a new appt during normal business hours

## 2019-12-11 ENCOUNTER — TELEPHONE (OUTPATIENT)
Dept: FAMILY MEDICINE CLINIC | Facility: CLINIC | Age: 39
End: 2019-12-11

## 2019-12-11 NOTE — TELEPHONE ENCOUNTER
[facility] for patient. Acknowledged why we couldn't see her today. Dr. Fouzia Richards would not have the time to spend with her that she needs. Encouraged to keep appt tomorrow afternoon.   Advised if she has acute symptoms or feels the need for treatment right away

## 2019-12-12 ENCOUNTER — OFFICE VISIT (OUTPATIENT)
Dept: FAMILY MEDICINE CLINIC | Facility: CLINIC | Age: 39
End: 2019-12-12
Payer: COMMERCIAL

## 2019-12-12 VITALS
HEART RATE: 80 BPM | RESPIRATION RATE: 16 BRPM | WEIGHT: 293 LBS | TEMPERATURE: 98 F | SYSTOLIC BLOOD PRESSURE: 132 MMHG | HEIGHT: 62 IN | DIASTOLIC BLOOD PRESSURE: 70 MMHG | BODY MASS INDEX: 53.92 KG/M2 | OXYGEN SATURATION: 99 %

## 2019-12-12 DIAGNOSIS — Z23 NEED FOR VACCINATION: ICD-10-CM

## 2019-12-12 PROCEDURE — 99214 OFFICE O/P EST MOD 30 MIN: CPT | Performed by: FAMILY MEDICINE

## 2019-12-12 PROCEDURE — 90471 IMMUNIZATION ADMIN: CPT | Performed by: FAMILY MEDICINE

## 2019-12-12 PROCEDURE — 90686 IIV4 VACC NO PRSV 0.5 ML IM: CPT | Performed by: FAMILY MEDICINE

## 2019-12-12 RX ORDER — DEXTROMETHORPHAN POLISTIREX 30 MG/5ML
60 SUSPENSION ORAL
COMMUNITY
Start: 2019-12-11 | End: 2019-12-18

## 2019-12-12 RX ORDER — VENLAFAXINE HYDROCHLORIDE 150 MG/1
150 CAPSULE, EXTENDED RELEASE ORAL DAILY
Qty: 30 CAPSULE | Refills: 1 | Status: SHIPPED | OUTPATIENT
Start: 2019-12-12 | End: 2020-01-23

## 2019-12-12 RX ORDER — BUPROPION HYDROCHLORIDE 150 MG/1
150 TABLET ORAL EVERY MORNING
Qty: 30 TABLET | Refills: 1 | Status: SHIPPED | OUTPATIENT
Start: 2019-12-12 | End: 2020-01-23 | Stop reason: SINTOL

## 2019-12-12 RX ORDER — VENLAFAXINE HYDROCHLORIDE 37.5 MG/1
75 CAPSULE, EXTENDED RELEASE ORAL DAILY
COMMUNITY
End: 2019-12-12

## 2019-12-12 RX ORDER — GUAIFENESIN 600 MG
TABLET, EXTENDED RELEASE 12 HR ORAL
COMMUNITY
Start: 2019-12-11 | End: 2019-12-18

## 2019-12-12 RX ORDER — ACETAMINOPHEN 500 MG
TABLET ORAL
COMMUNITY
Start: 2019-12-11 | End: 2019-12-18

## 2019-12-12 RX ORDER — DOXYCYCLINE HYCLATE 100 MG
100 TABLET ORAL
COMMUNITY
Start: 2019-12-11 | End: 2019-12-18

## 2019-12-12 NOTE — TELEPHONE ENCOUNTER
Called patient and verified she is coming in to see Dr. Clarita Jade at 4:40 pm today. States she will be here. States is doing OK right now.

## 2019-12-12 NOTE — PROGRESS NOTES
Uri Beebe IS A 42year old female [address] [facility] Patient presents with:  Depression       History of present illness:     Sees counselor. Zoloft caused tremor so she changed to venlafaxine.      Is back in her apartment, a lot didn't bother her until recently total) by mouth every morning. 30 tablet 1   • Fexofenadine HCl 180 MG Oral Tab Take 180 mg by mouth daily.      • LEVOTHYROXINE SODIUM 175 MCG Oral Tab TAKE 1 TABLET BY MOUTH DAILY 15 tablet 0   • Rizatriptan Benzoate 10 MG Oral Tab Take 1 tablet (10 mg to Relationships      Social connections:        Talks on phone: Not on file        Gets together: Not on file        Attends [employer] service: Not on file        Active member of club or organization: Not on file        Attends meetings of clubs or [place] bupropion 150 mg in morning. (should help alertness, motivation, may decrease appetite, notify if excessively restless or worse anxiety.)

## 2020-01-01 ENCOUNTER — EXTERNAL RECORD (OUTPATIENT)
Dept: OTHER | Age: 40
End: 2020-01-01

## 2020-01-22 ENCOUNTER — OFFICE VISIT (OUTPATIENT)
Dept: FAMILY MEDICINE CLINIC | Facility: CLINIC | Age: 40
End: 2020-01-22
Payer: COMMERCIAL

## 2020-01-22 VITALS
TEMPERATURE: 97 F | OXYGEN SATURATION: 99 % | WEIGHT: 293 LBS | HEART RATE: 90 BPM | BODY MASS INDEX: 53.92 KG/M2 | RESPIRATION RATE: 16 BRPM | HEIGHT: 62 IN

## 2020-01-22 DIAGNOSIS — R53.83 FATIGUE, UNSPECIFIED TYPE: ICD-10-CM

## 2020-01-22 DIAGNOSIS — Z02.9 ENCOUNTERS FOR UNSPECIFIED ADMINISTRATIVE PURPOSE: Primary | ICD-10-CM

## 2020-01-22 RX ORDER — TRIAMCINOLONE ACETONIDE 55 UG/1
SPRAY, METERED NASAL DAILY PRN
COMMUNITY

## 2020-01-22 NOTE — PROGRESS NOTES
Patient presents to walk in clinic for evaluation of fatigue. Symptoms on going for months but recently worse prompting her concern today. Today she feel achy but no localizing cough or congestion, sore throat. She denies fevers, chills, sweats.   No sob

## 2020-01-23 ENCOUNTER — OFFICE VISIT (OUTPATIENT)
Dept: FAMILY MEDICINE CLINIC | Facility: CLINIC | Age: 40
End: 2020-01-23
Payer: COMMERCIAL

## 2020-01-23 VITALS
TEMPERATURE: 98 F | HEART RATE: 88 BPM | BODY MASS INDEX: 53.92 KG/M2 | HEIGHT: 62 IN | SYSTOLIC BLOOD PRESSURE: 114 MMHG | OXYGEN SATURATION: 97 % | DIASTOLIC BLOOD PRESSURE: 62 MMHG | WEIGHT: 293 LBS | RESPIRATION RATE: 16 BRPM

## 2020-01-23 DIAGNOSIS — E66.01 MORBID OBESITY (HCC): Primary | ICD-10-CM

## 2020-01-23 DIAGNOSIS — E03.9 ACQUIRED HYPOTHYROIDISM: ICD-10-CM

## 2020-01-23 PROCEDURE — 99213 OFFICE O/P EST LOW 20 MIN: CPT | Performed by: FAMILY MEDICINE

## 2020-01-23 RX ORDER — VENLAFAXINE HYDROCHLORIDE 150 MG/1
150 CAPSULE, EXTENDED RELEASE ORAL DAILY
Qty: 30 CAPSULE | Refills: 1 | Status: SHIPPED | OUTPATIENT
Start: 2020-01-23 | End: 2020-02-15

## 2020-01-23 RX ORDER — LORAZEPAM 0.5 MG/1
0.5 TABLET ORAL 2 TIMES DAILY PRN
Qty: 40 TABLET | Refills: 0 | Status: SHIPPED | OUTPATIENT
Start: 2020-01-23 | End: 2020-04-24

## 2020-01-23 RX ORDER — VENLAFAXINE HYDROCHLORIDE 75 MG/1
75 CAPSULE, EXTENDED RELEASE ORAL DAILY
Qty: 30 CAPSULE | Refills: 1 | Status: SHIPPED | OUTPATIENT
Start: 2020-01-23 | End: 2020-02-15 | Stop reason: DRUGHIGH

## 2020-01-23 RX ORDER — LEVOTHYROXINE SODIUM 175 UG/1
175 TABLET ORAL
Qty: 30 TABLET | Refills: 0 | Status: SHIPPED | OUTPATIENT
Start: 2020-01-23 | End: 2020-03-16

## 2020-01-23 RX ORDER — OMEPRAZOLE 40 MG/1
40 CAPSULE, DELAYED RELEASE ORAL
Qty: 180 CAPSULE | Refills: 1 | Status: SHIPPED | OUTPATIENT
Start: 2020-01-23 | End: 2020-07-24

## 2020-01-23 NOTE — PROGRESS NOTES
Ayleen Torres IS A 42year old female [address] [place] Patient presents with:  Fatigue  Anxiety: causing trouble to sleep       History of present illness:     Has sleep apnea, is using CPAP. She feels her insomnia is from anxiety, PTSD which is worse.  Seeing coun (0.5 mg total) by mouth 2 (two) times daily as needed for Anxiety. 40 tablet 0   • Venlafaxine HCl  MG Oral Capsule SR 24 Hr Take 1 capsule (150 mg total) by mouth daily.  30 capsule 1   • Triamcinolone Acetonide 55 MCG/ACT Nasal Aerosol by Nasal rout Relationships      Social connections:        Talks on phone: Not on file        Gets together: Not on file        Attends [employer] service: Not on file        Active member of club or organization: Not on file        Attends meetings of clubs or [place] and anxiety. Diagnoses and all orders for this visit:    Morbid obesity ([facility] [address]  -     BARIATRICS - INTERNAL  -     OP REFERRAL TO WEIGHT LOSS CLINIC    Acquired hypothyroidism  -     Levothyroxine Sodium 175 MCG Oral Tab;  Take 1 tablet (175 mcg total) by

## 2020-01-24 NOTE — PATIENT INSTRUCTIONS
Referral to [facility] clinic and THE [facility] weight loss clinic. I am not informed on other area weight loss programs. Continue with counselor, as weight loss demands a fair amount of dedication, support and motivation for success.  6 months supervised weight los

## 2020-02-15 ENCOUNTER — OFFICE VISIT (OUTPATIENT)
Dept: FAMILY MEDICINE CLINIC | Facility: CLINIC | Age: 40
End: 2020-02-15
Payer: COMMERCIAL

## 2020-02-15 VITALS
HEIGHT: 62 IN | RESPIRATION RATE: 16 BRPM | SYSTOLIC BLOOD PRESSURE: 124 MMHG | HEART RATE: 96 BPM | TEMPERATURE: 98 F | WEIGHT: 293 LBS | BODY MASS INDEX: 53.92 KG/M2 | OXYGEN SATURATION: 98 % | DIASTOLIC BLOOD PRESSURE: 70 MMHG

## 2020-02-15 DIAGNOSIS — R53.83 OTHER FATIGUE: ICD-10-CM

## 2020-02-15 DIAGNOSIS — R10.11 RUQ PAIN: ICD-10-CM

## 2020-02-15 DIAGNOSIS — E03.9 ACQUIRED HYPOTHYROIDISM: ICD-10-CM

## 2020-02-15 DIAGNOSIS — N92.0 POLYMENORRHEA: Primary | ICD-10-CM

## 2020-02-15 DIAGNOSIS — R10.31 RLQ ABDOMINAL PAIN: ICD-10-CM

## 2020-02-15 PROCEDURE — 99214 OFFICE O/P EST MOD 30 MIN: CPT | Performed by: FAMILY MEDICINE

## 2020-02-15 RX ORDER — VENLAFAXINE HYDROCHLORIDE 150 MG/1
150 CAPSULE, EXTENDED RELEASE ORAL DAILY
Qty: 90 CAPSULE | Refills: 1 | Status: SHIPPED | OUTPATIENT
Start: 2020-02-15 | End: 2020-12-11

## 2020-02-15 NOTE — PROGRESS NOTES
Kodi Poag IS A 42year old female [address] [facility] [place] Patient presents with:  Medication Follow-Up       History of present illness:     3 menses in 4-5 weeks, 1 week, off a week, 1 week again, off 2 weeks then again heavier for 7-8 days. 1/5-10, 1/16-23, 2/6-12. Fexofenadine HCl 180 MG Oral Tab Take 180 mg by mouth daily. • Rizatriptan Benzoate 10 MG Oral Tab Take 1 tablet (10 mg total) by mouth as needed for Migraine.  6 tablet 1   • VENTOLIN  (90 Base) MCG/ACT Inhalation Aero Soln INL 2 PFS PO TID QD P violence:        Fear of current or ex partner: Not on file        Emotionally abused: Not on file        Physically abused: Not on file        Forced sexual activity: Not on file    Other Topics      Concerns:        Caffeine Concern: Yes          1 caff. citrucel or benefiber can help constipation as fiber supplement, increase fluids with it for constipation. Miralax ([id number]) 1 cap daily if not helped by fiber.     Continue 150 mg daily venlafaxine

## 2020-02-15 NOTE — PATIENT INSTRUCTIONS
Metamucil or citrucel or benefiber can help constipation as fiber supplement, increase fluids with it for constipation. Miralax ([id number]) 1 cap daily if not helped by fiber.     Continue 150 mg daily venlafaxine

## 2020-02-17 ENCOUNTER — PATIENT MESSAGE (OUTPATIENT)
Dept: FAMILY MEDICINE CLINIC | Facility: CLINIC | Age: 40
End: 2020-02-17

## 2020-02-18 RX ORDER — VENLAFAXINE HYDROCHLORIDE 75 MG/1
75 CAPSULE, EXTENDED RELEASE ORAL DAILY
Qty: 90 CAPSULE | Refills: 1 | Status: SHIPPED | OUTPATIENT
Start: 2020-02-18 | End: 2020-07-13

## 2020-02-18 NOTE — TELEPHONE ENCOUNTER
From: Rissa Tran  To: Nicho Sandoval MD  Sent: 2/17/2020 1:26 PM CST  Subject: Prescription Question    Doctor pector refilled my prescription of venlafaxine for 90 days but I was taking 7mg and 150mg tablets once a day totaling 225 mg.  She only refi

## 2020-02-19 ENCOUNTER — OFFICE VISIT (OUTPATIENT)
Dept: INTERNAL MEDICINE CLINIC | Facility: CLINIC | Age: 40
End: 2020-02-19
Payer: COMMERCIAL

## 2020-02-19 VITALS
HEART RATE: 92 BPM | SYSTOLIC BLOOD PRESSURE: 122 MMHG | BODY MASS INDEX: 52.57 KG/M2 | DIASTOLIC BLOOD PRESSURE: 82 MMHG | TEMPERATURE: 99 F | RESPIRATION RATE: 24 BRPM | WEIGHT: 293 LBS | HEIGHT: 62.5 IN

## 2020-02-19 DIAGNOSIS — F50.81 BINGE EATING DISORDER: ICD-10-CM

## 2020-02-19 DIAGNOSIS — Z51.81 THERAPEUTIC DRUG MONITORING: Primary | ICD-10-CM

## 2020-02-19 DIAGNOSIS — M25.50 POLYARTHRALGIA: ICD-10-CM

## 2020-02-19 DIAGNOSIS — R53.83 OTHER FATIGUE: ICD-10-CM

## 2020-02-19 DIAGNOSIS — E66.2 MORBID OBESITY WITH ALVEOLAR HYPOVENTILATION (HCC): ICD-10-CM

## 2020-02-19 PROCEDURE — 93000 ELECTROCARDIOGRAM COMPLETE: CPT | Performed by: INTERNAL MEDICINE

## 2020-02-19 PROCEDURE — 99204 OFFICE O/P NEW MOD 45 MIN: CPT | Performed by: INTERNAL MEDICINE

## 2020-02-19 NOTE — PROGRESS NOTES
HISTORY OF PRESENT ILLNESS  Patient presents with:  Weight Problem: ref by PCP, was in weight loss program through [facility] for surgery, taking medication -- but cannot recall the name (2018)      Saravanan Prado is a 42year old female new to our office today currently , struggling more so with pain and muscle pain --> rheuamatology   Stress level: 6-7 /10  Sleep hours and integrity: CPAP , still struggles with sleep regulation and waking up at night     MEDICAL HISTORY  PMH reviewed:   Cardiac disorders:negati changes QTc : 429 bpm   GI: rotund, no masses, HSM or tenderness  MUSCULOSKELETAL: grossly intact   NEURO: Oriented times three, full ROM of bilateral UE/LE  PSYCH: pleasant, cooperative, normal mood and affect,     Lab Results   Component Value Date    WB 0  Saline (SODIUM CHLORIDE) 0.65 % Nasal Solution, 1 spray by Nasal route., Disp: , Rfl:   Fexofenadine HCl 180 MG Oral Tab, Take 180 mg by mouth daily. , Disp: , Rfl:   Rizatriptan Benzoate 10 MG Oral Tab, Take 1 tablet (10 mg total) by mouth as needed for total) by mouth daily. PLAN  · Medication use and side effects reviewed with patient.   Medication contraindications: n/a   · Reviewed baseline ecg  · Reviewed 2/2 causes of weight gain   · Needs to complete thyroid labs and vitamin labs  · Rule out

## 2020-02-20 ENCOUNTER — TELEPHONE (OUTPATIENT)
Dept: INTERNAL MEDICINE CLINIC | Facility: CLINIC | Age: 40
End: 2020-02-20

## 2020-02-20 NOTE — TELEPHONE ENCOUNTER
Claire Chow at [facility] called because I received a denial for coverage of Vyvanse on epic   BED F50.81  Approved until 5/20/20  [place] [id number]

## 2020-02-28 ENCOUNTER — PATIENT MESSAGE (OUTPATIENT)
Dept: FAMILY MEDICINE CLINIC | Facility: CLINIC | Age: 40
End: 2020-02-28

## 2020-03-02 NOTE — TELEPHONE ENCOUNTER
From: Pastora Gaines  To: Stella Rees MD  Sent: 2/28/2020 4:19 PM CST  Subject: Non-Urgent Medical Question    Would you be able to add an order for ultrasound to the right flank side for me as well cause I've also been having pain there now.  Its radi

## 2020-03-03 NOTE — TELEPHONE ENCOUNTER
Pelvic & abdominal ultrasound both ordered, they will check kidneys as part of the abdominal ultrasound and there aren't other organs near the flanks. Don't need a separate kidney and bladder ultrasound apart from the complete abdomen.  Flank or back pain I

## 2020-03-12 DIAGNOSIS — E03.9 ACQUIRED HYPOTHYROIDISM: ICD-10-CM

## 2020-03-15 LAB
ABSOLUTE BASOPHILS: 51 CELLS/UL (ref 0–200)
ABSOLUTE EOSINOPHILS: 153 CELLS/UL (ref 15–500)
ABSOLUTE LYMPHOCYTES: 2811 CELLS/UL (ref 850–3900)
ABSOLUTE MONOCYTES: 672 CELLS/UL (ref 200–950)
ABSOLUTE NEUTROPHILS: 3614 CELLS/UL (ref 1500–7800)
BASOPHILS: 0.7 %
EOSINOPHILS: 2.1 %
HEMATOCRIT: 36.4 % (ref 35–45)
HEMOGLOBIN: 12.1 G/DL (ref 11.7–15.5)
LYMPHOCYTES: 38.5 %
MCH: 27.3 PG (ref 27–33)
MCHC: 33.2 G/DL (ref 32–36)
MCV: 82.2 FL (ref 80–100)
MONOCYTES: 9.2 %
MPV: 10.2 FL (ref 7.5–12.5)
NEUTROPHILS: 49.5 %
PLATELET COUNT: 339 THOUSAND/UL (ref 140–400)
RDW: 14.3 % (ref 11–15)
RED BLOOD CELL COUNT: 4.43 MILLION/UL (ref 3.8–5.1)
TSH W/REFLEX TO FT4: 3.13 MIU/L
WHITE BLOOD CELL COUNT: 7.3 THOUSAND/UL (ref 3.8–10.8)

## 2020-03-16 ENCOUNTER — TELEPHONE (OUTPATIENT)
Dept: INTERNAL MEDICINE CLINIC | Facility: CLINIC | Age: 40
End: 2020-03-16

## 2020-03-16 LAB
ALBUMIN/GLOBULIN RATIO: 1 (CALC) (ref 1–2.5)
ALBUMIN: 3.6 G/DL (ref 3.6–5.1)
ALKALINE PHOSPHATASE: 118 U/L (ref 31–125)
ALT: 19 U/L (ref 6–29)
AST: 25 U/L (ref 10–30)
BILIRUBIN, TOTAL: 0.3 MG/DL (ref 0.2–1.2)
BUN: 10 MG/DL (ref 7–25)
CALCIUM: 9.2 MG/DL (ref 8.6–10.2)
CARBON DIOXIDE: 28 MMOL/L (ref 20–32)
CHLORIDE: 104 MMOL/L (ref 98–110)
CREATININE: 0.71 MG/DL (ref 0.5–1.1)
EGFR IF AFRICN AM: 126 ML/MIN/1.73M2
EGFR IF NONAFRICN AM: 109 ML/MIN/1.73M2
GLOBULIN: 3.6 G/DL (CALC) (ref 1.9–3.7)
GLUCOSE: 104 MG/DL (ref 65–99)
HEMOGLOBIN A1C: 5.7 % OF TOTAL HGB
POTASSIUM: 3.9 MMOL/L (ref 3.5–5.3)
PROTEIN, TOTAL: 7.2 G/DL (ref 6.1–8.1)
SODIUM: 141 MMOL/L (ref 135–146)
VITAMIN B12: 479 PG/ML (ref 200–1100)
VITAMIN D, 25-OH, TOTAL: 5 NG/ML (ref 30–100)

## 2020-03-16 NOTE — TELEPHONE ENCOUNTER
LOV 2/20    LAST LAB 3/20    LAST RX   Levothyroxine Sodium 175 MCG Oral Tab 30 tablet 0 1/23/2020    Sig:   Take 1 tablet (175 mcg total) by mouth once daily.            Next OV Visit date not found      PROTOCOL  Thyroid Supplements Protocol Failed3/12 5:

## 2020-03-16 NOTE — TELEPHONE ENCOUNTER
Patient called left vmail would like to know if she should keep her appt as she is sick with chest conjestion,sore throat ,headache and cough    Pt would like to know if she can have an over the phone visit

## 2020-03-17 RX ORDER — LEVOTHYROXINE SODIUM 175 UG/1
TABLET ORAL
Qty: 30 TABLET | Refills: 0 | Status: ON HOLD | OUTPATIENT
Start: 2020-03-17 | End: 2020-04-17

## 2020-03-17 NOTE — TELEPHONE ENCOUNTER
I called patient to tell her to cancel - got voicemail  LM that we want to cancel her appt and can do refills without the visit.

## 2020-03-17 NOTE — TELEPHONE ENCOUNTER
Requesting Vyvanse  LOV: 2/19/20  RTC: one month  Last Relevant Labs: na  Filled: 2/19/20 #30 with 0 refills    4/14/2020  2:40 PM Yung Clifton MD [facility] [address]   4/22/2020  1:30 PM Jaskaran Mart MD [facility] [address]     Last filled 2/20/20

## 2020-03-19 ENCOUNTER — TELEPHONE (OUTPATIENT)
Dept: FAMILY MEDICINE CLINIC | Facility: CLINIC | Age: 40
End: 2020-03-19

## 2020-03-19 NOTE — TELEPHONE ENCOUNTER
Virtual/Telephone Check-In    Gwen Moraes verbally consents to a Virtual/Telephone Check-In service on 03/19/20. Patient understands and accepts financial responsibility for any deductible, co-insurance and/or co-pays associated with this service.     Du

## 2020-03-19 NOTE — TELEPHONE ENCOUNTER
Patient put herself on the scheduled for Saturday , she has the following symptoms Head congestion, some chest congestion, sore throat, headaches,very fatigued, out of breath easily    Left message for her to call back and verify symptoms and do travel scr

## 2020-03-19 NOTE — TELEPHONE ENCOUNTER
Called patient back she said she has not traveled anywere and she does not think she has been around anyone sick . Symptoms has been going on since Sunday .

## 2020-03-24 ENCOUNTER — TELEPHONE (OUTPATIENT)
Dept: INTERNAL MEDICINE CLINIC | Facility: CLINIC | Age: 40
End: 2020-03-24

## 2020-03-24 NOTE — TELEPHONE ENCOUNTER
Patient states she is returning your call to reschedule appointment she has with you. She says mychart message or text message are the best methods of communications at this time.

## 2020-03-25 ENCOUNTER — PATIENT MESSAGE (OUTPATIENT)
Dept: INTERNAL MEDICINE CLINIC | Facility: CLINIC | Age: 40
End: 2020-03-25

## 2020-04-06 ENCOUNTER — HOSPITAL ENCOUNTER (OUTPATIENT)
Dept: ULTRASOUND IMAGING | Age: 40
Discharge: HOME | End: 2020-04-06
Attending: FAMILY MEDICINE
Payer: COMMERCIAL

## 2020-04-06 DIAGNOSIS — R10.11 RUQ PAIN: ICD-10-CM

## 2020-04-06 DIAGNOSIS — N92.0 POLYMENORRHEA: ICD-10-CM

## 2020-04-06 DIAGNOSIS — R10.31 RLQ ABDOMINAL PAIN: ICD-10-CM

## 2020-04-06 PROCEDURE — 76856 US EXAM PELVIC COMPLETE: CPT | Performed by: FAMILY MEDICINE

## 2020-04-06 PROCEDURE — 76830 TRANSVAGINAL US NON-OB: CPT | Performed by: FAMILY MEDICINE

## 2020-04-06 PROCEDURE — 76700 US EXAM ABDOM COMPLETE: CPT | Performed by: FAMILY MEDICINE

## 2020-04-14 ENCOUNTER — VIRTUAL PHONE E/M (OUTPATIENT)
Dept: INTERNAL MEDICINE CLINIC | Facility: CLINIC | Age: 40
End: 2020-04-14
Payer: COMMERCIAL

## 2020-04-14 DIAGNOSIS — E66.2 MORBID OBESITY WITH ALVEOLAR HYPOVENTILATION (HCC): ICD-10-CM

## 2020-04-14 DIAGNOSIS — Z51.81 THERAPEUTIC DRUG MONITORING: Primary | ICD-10-CM

## 2020-04-14 PROCEDURE — 99213 OFFICE O/P EST LOW 20 MIN: CPT | Performed by: INTERNAL MEDICINE

## 2020-04-14 NOTE — PROGRESS NOTES
[facility] check in/follow up encounter  Virtual/Telephone Check-In    Jan Donaldson verbally consents to a Virtual/Telephone Check-In service on 04/14/20      Patient understands and accepts financial responsibility for any deductib

## 2020-04-15 ENCOUNTER — APPOINTMENT (OUTPATIENT)
Dept: CT IMAGING | Age: 40
End: 2020-04-15
Attending: STUDENT IN AN ORGANIZED HEALTH CARE EDUCATION/TRAINING PROGRAM
Payer: COMMERCIAL

## 2020-04-15 ENCOUNTER — TELEMEDICINE (OUTPATIENT)
Dept: TELEHEALTH | Age: 40
End: 2020-04-15

## 2020-04-15 ENCOUNTER — APPOINTMENT (OUTPATIENT)
Dept: GENERAL RADIOLOGY | Age: 40
End: 2020-04-15
Attending: EMERGENCY MEDICINE
Payer: COMMERCIAL

## 2020-04-15 ENCOUNTER — HOSPITAL ENCOUNTER (OUTPATIENT)
Facility: HOSPITAL | Age: 40
Setting detail: OBSERVATION
Discharge: HOME | End: 2020-04-18
Attending: STUDENT IN AN ORGANIZED HEALTH CARE EDUCATION/TRAINING PROGRAM | Admitting: HOSPITALIST
Payer: COMMERCIAL

## 2020-04-15 ENCOUNTER — TELEPHONE (OUTPATIENT)
Dept: FAMILY MEDICINE CLINIC | Facility: CLINIC | Age: 40
End: 2020-04-15

## 2020-04-15 DIAGNOSIS — R00.0 SINUS TACHYCARDIA: ICD-10-CM

## 2020-04-15 DIAGNOSIS — M54.2 NECK PAIN: ICD-10-CM

## 2020-04-15 DIAGNOSIS — Z53.21 PATIENT LEFT WITHOUT BEING SEEN: ICD-10-CM

## 2020-04-15 DIAGNOSIS — R06.00 DYSPNEA, UNSPECIFIED TYPE: Primary | ICD-10-CM

## 2020-04-15 DIAGNOSIS — J06.9 UPPER RESPIRATORY TRACT INFECTION, UNSPECIFIED TYPE: Primary | ICD-10-CM

## 2020-04-15 DIAGNOSIS — Z20.822 SUSPECTED COVID-19 VIRUS INFECTION: Primary | ICD-10-CM

## 2020-04-15 DIAGNOSIS — Z20.822 SUSPECTED COVID-19 VIRUS INFECTION: ICD-10-CM

## 2020-04-15 PROCEDURE — 72125 CT NECK SPINE W/O DYE: CPT | Performed by: STUDENT IN AN ORGANIZED HEALTH CARE EDUCATION/TRAINING PROGRAM

## 2020-04-15 PROCEDURE — 71275 CT ANGIOGRAPHY CHEST: CPT | Performed by: STUDENT IN AN ORGANIZED HEALTH CARE EDUCATION/TRAINING PROGRAM

## 2020-04-15 PROCEDURE — 71045 X-RAY EXAM CHEST 1 VIEW: CPT | Performed by: EMERGENCY MEDICINE

## 2020-04-15 RX ORDER — LORAZEPAM 2 MG/ML
INJECTION INTRAMUSCULAR
Status: DISPENSED
Start: 2020-04-15 | End: 2020-04-16

## 2020-04-15 RX ORDER — ACETAMINOPHEN 500 MG
1000 TABLET ORAL ONCE
Status: COMPLETED | OUTPATIENT
Start: 2020-04-15 | End: 2020-04-15

## 2020-04-15 RX ORDER — LORAZEPAM 2 MG/ML
0.5 INJECTION INTRAMUSCULAR ONCE
Status: COMPLETED | OUTPATIENT
Start: 2020-04-15 | End: 2020-04-15

## 2020-04-15 NOTE — PROGRESS NOTES
Pt scheduled to see me in video pool, did not complete check in, upon chart review has been already seen by BARRIE Hall. Patient \"completed\" from pool. Patient left without being seen by provider.

## 2020-04-15 NOTE — TELEPHONE ENCOUNTER
Spoke to pt through Regina Costa- she is requesting testing. Reports dry cough, runny/itchy eyes, congestion, sore throat, stiff neck/body aches, mild HA. Symptom onset 2 days ago.   Thought it was just her allergies at first but now temp is 99.0F and sh

## 2020-04-15 NOTE — PROGRESS NOTES
Pt scheduled to see me in video pool, did not complete check in, upon chart review has been already seen by BARRIE Rogel. Patient \"completed\" from pool.

## 2020-04-16 ENCOUNTER — APPOINTMENT (OUTPATIENT)
Dept: ULTRASOUND IMAGING | Facility: HOSPITAL | Age: 40
End: 2020-04-16
Attending: HOSPITALIST
Payer: COMMERCIAL

## 2020-04-16 ENCOUNTER — APPOINTMENT (OUTPATIENT)
Dept: NUCLEAR MEDICINE | Facility: HOSPITAL | Age: 40
End: 2020-04-16
Attending: HOSPITALIST
Payer: COMMERCIAL

## 2020-04-16 PROBLEM — R06.00 DYSPNEA, UNSPECIFIED TYPE: Status: ACTIVE | Noted: 2020-04-16

## 2020-04-16 PROBLEM — Z20.822 SUSPECTED COVID-19 VIRUS INFECTION: Status: ACTIVE | Noted: 2020-04-16

## 2020-04-16 PROBLEM — R06.00 DYSPNEA: Status: ACTIVE | Noted: 2020-04-16

## 2020-04-16 PROBLEM — R00.0 SINUS TACHYCARDIA: Status: ACTIVE | Noted: 2020-04-16

## 2020-04-16 PROBLEM — M54.2 NECK PAIN: Status: ACTIVE | Noted: 2020-04-16

## 2020-04-16 PROCEDURE — 99220 INITIAL OBSERVATION CARE,LEVL III: CPT | Performed by: HOSPITALIST

## 2020-04-16 PROCEDURE — 78580 LUNG PERFUSION IMAGING: CPT | Performed by: HOSPITALIST

## 2020-04-16 PROCEDURE — 93970 EXTREMITY STUDY: CPT | Performed by: HOSPITALIST

## 2020-04-16 RX ORDER — HYDROCODONE BITARTRATE AND ACETAMINOPHEN 10; 325 MG/1; MG/1
1 TABLET ORAL EVERY 4 HOURS PRN
Status: DISCONTINUED | OUTPATIENT
Start: 2020-04-16 | End: 2020-04-16

## 2020-04-16 RX ORDER — TIZANIDINE 4 MG/1
4 TABLET ORAL EVERY 6 HOURS PRN
Status: DISCONTINUED | OUTPATIENT
Start: 2020-04-16 | End: 2020-04-18

## 2020-04-16 RX ORDER — POTASSIUM CHLORIDE 20 MEQ/1
40 TABLET, EXTENDED RELEASE ORAL EVERY 4 HOURS
Status: COMPLETED | OUTPATIENT
Start: 2020-04-16 | End: 2020-04-16

## 2020-04-16 RX ORDER — ENOXAPARIN SODIUM 100 MG/ML
0.5 INJECTION SUBCUTANEOUS DAILY
Status: DISCONTINUED | OUTPATIENT
Start: 2020-04-17 | End: 2020-04-18

## 2020-04-16 RX ORDER — ENOXAPARIN SODIUM 100 MG/ML
0.5 INJECTION SUBCUTANEOUS DAILY
Status: DISCONTINUED | OUTPATIENT
Start: 2020-04-17 | End: 2020-04-16

## 2020-04-16 RX ORDER — ACETAMINOPHEN 325 MG/1
650 TABLET ORAL EVERY 6 HOURS PRN
Status: DISCONTINUED | OUTPATIENT
Start: 2020-04-16 | End: 2020-04-18

## 2020-04-16 RX ORDER — ALBUTEROL SULFATE 90 UG/1
2 AEROSOL, METERED RESPIRATORY (INHALATION) 4 TIMES DAILY PRN
Status: DISCONTINUED | OUTPATIENT
Start: 2020-04-16 | End: 2020-04-18

## 2020-04-16 RX ORDER — HEPARIN SODIUM 5000 [USP'U]/ML
80 INJECTION INTRAVENOUS; SUBCUTANEOUS ONCE
Status: COMPLETED | OUTPATIENT
Start: 2020-04-16 | End: 2020-04-16

## 2020-04-16 RX ORDER — ENOXAPARIN SODIUM 100 MG/ML
40 INJECTION SUBCUTANEOUS DAILY
Status: DISCONTINUED | OUTPATIENT
Start: 2020-04-17 | End: 2020-04-16

## 2020-04-16 RX ORDER — HYDROCODONE BITARTRATE AND ACETAMINOPHEN 10; 325 MG/1; MG/1
1 TABLET ORAL EVERY 4 HOURS PRN
Status: DISCONTINUED | OUTPATIENT
Start: 2020-04-16 | End: 2020-04-18

## 2020-04-16 RX ORDER — ONDANSETRON 2 MG/ML
4 INJECTION INTRAMUSCULAR; INTRAVENOUS ONCE
Status: COMPLETED | OUTPATIENT
Start: 2020-04-16 | End: 2020-04-16

## 2020-04-16 RX ORDER — HYDROMORPHONE HYDROCHLORIDE 1 MG/ML
0.5 INJECTION, SOLUTION INTRAMUSCULAR; INTRAVENOUS; SUBCUTANEOUS EVERY 30 MIN PRN
Status: DISCONTINUED | OUTPATIENT
Start: 2020-04-16 | End: 2020-04-16

## 2020-04-16 RX ORDER — HYDROMORPHONE HYDROCHLORIDE 1 MG/ML
0.5 INJECTION, SOLUTION INTRAMUSCULAR; INTRAVENOUS; SUBCUTANEOUS EVERY 30 MIN PRN
Status: ACTIVE | OUTPATIENT
Start: 2020-04-16 | End: 2020-04-16

## 2020-04-16 RX ORDER — LEVOTHYROXINE SODIUM 0.2 MG/1
200 TABLET ORAL
Status: DISCONTINUED | OUTPATIENT
Start: 2020-04-17 | End: 2020-04-18

## 2020-04-16 RX ORDER — HEPARIN SODIUM AND DEXTROSE 10000; 5 [USP'U]/100ML; G/100ML
18 INJECTION INTRAVENOUS ONCE
Status: COMPLETED | OUTPATIENT
Start: 2020-04-16 | End: 2020-04-16

## 2020-04-16 RX ORDER — CETIRIZINE HYDROCHLORIDE 10 MG/1
10 TABLET ORAL DAILY
Status: DISCONTINUED | OUTPATIENT
Start: 2020-04-16 | End: 2020-04-18

## 2020-04-16 RX ORDER — ONDANSETRON 2 MG/ML
4 INJECTION INTRAMUSCULAR; INTRAVENOUS EVERY 4 HOURS PRN
Status: COMPLETED | OUTPATIENT
Start: 2020-04-16 | End: 2020-04-16

## 2020-04-16 RX ORDER — ROSUVASTATIN CALCIUM 10 MG/1
10 TABLET, COATED ORAL DAILY
COMMUNITY
Start: 2020-03-24 | End: 2020-05-26

## 2020-04-16 RX ORDER — LORAZEPAM 0.5 MG/1
0.5 TABLET ORAL 2 TIMES DAILY PRN
Status: DISCONTINUED | OUTPATIENT
Start: 2020-04-16 | End: 2020-04-18

## 2020-04-16 RX ORDER — HEPARIN SODIUM AND DEXTROSE 10000; 5 [USP'U]/100ML; G/100ML
INJECTION INTRAVENOUS CONTINUOUS
Status: DISCONTINUED | OUTPATIENT
Start: 2020-04-16 | End: 2020-04-16

## 2020-04-16 RX ORDER — PANTOPRAZOLE SODIUM 20 MG/1
20 TABLET, DELAYED RELEASE ORAL
Status: DISCONTINUED | OUTPATIENT
Start: 2020-04-16 | End: 2020-04-18

## 2020-04-16 RX ORDER — VENLAFAXINE HYDROCHLORIDE 75 MG/1
150 CAPSULE, EXTENDED RELEASE ORAL DAILY
Status: DISCONTINUED | OUTPATIENT
Start: 2020-04-16 | End: 2020-04-18

## 2020-04-16 RX ORDER — HYDROCODONE BITARTRATE AND ACETAMINOPHEN 10; 325 MG/1; MG/1
2 TABLET ORAL EVERY 4 HOURS PRN
Status: DISCONTINUED | OUTPATIENT
Start: 2020-04-16 | End: 2020-04-18

## 2020-04-16 RX ORDER — SODIUM CHLORIDE 9 MG/ML
INJECTION, SOLUTION INTRAVENOUS CONTINUOUS
Status: ACTIVE | OUTPATIENT
Start: 2020-04-16 | End: 2020-04-16

## 2020-04-16 RX ORDER — VENLAFAXINE HYDROCHLORIDE 75 MG/1
75 CAPSULE, EXTENDED RELEASE ORAL DAILY
Status: DISCONTINUED | OUTPATIENT
Start: 2020-04-16 | End: 2020-04-18

## 2020-04-16 NOTE — H&P
[facility]  History and Physical     Carlos Edwards Patient Status:  Observation    3/16/1980 MRN [id number]   [facility] 5NW-A Attending Yudelka Chadwick MD   Hosp Day # 0 PCP Reanna Amaya MD     Chief Complaint: fever and cou 0  ergocalciferol 1.25 MG (41060 UT) Oral Cap, Take 1 capsule (50,000 Units total) by mouth twice a week.  With food for 12 weeks total then get lab completed, Disp: 24 capsule, Rfl: 0  Albuterol Sulfate 108 (90 Base) MCG/ACT Inhalation Aerosol PowderOmer acute distress. Alert and oriented x 3. HEENT: Normocephalic atraumatic. Moist mucous membranes. EOM-I. PERRLA. Anicteric. Neck: No lymphadenopathy. No JVD. No carotid bruits. Respiratory: Clear to auscultation bilaterally. No wheezes. No rhonchi.   Card

## 2020-04-16 NOTE — ED NOTES
Report called to [facility] for room 501.  Ambulance called for transfer to the hospital.

## 2020-04-16 NOTE — ED INITIAL ASSESSMENT (HPI)
Patient presented to the ED with neck pain, started yesterday. Pt reports body aches, sob with exertion, low grade fever, runny nose, watery eyes, sore throat, and cough for the last three days.

## 2020-04-16 NOTE — ED PROVIDER NOTES
Patient Seen in: [address] [facility] In [place]      History   Patient presents with:  Fever    Stated Complaint: fever, cough, neck pain     HPI    Patient is a 42-year-old female who presents emergency department reporting neck pain and sore cough, neck pain   Other systems are as noted in HPI. Constitutional and vital signs reviewed. All other systems reviewed and negative except as noted above.     Physical Exam     ED Triage Vitals [04/15/20 2106]   BP (!) 161/100   Pulse 115   Resp 16 BUN/CREA Ratio 8.2 (*)     Alkaline Phosphatase 115 (*)     Total Protein 8.7 (*)     Globulin  5.2 (*)     A/G Ratio 0.7 (*)     All other components within normal limits   D-DIMER - Abnormal; Notable for the following components:    D-Dimer 1.66 (*) significant tachycardia. With minimal exertion even standing and taking a couple steps patient's pulse drops into the 130s. Patient also becomes very short of breath. Pain addressed with Dilaudid and Ativan for the muscle spasm component.     Patient's

## 2020-04-16 NOTE — PLAN OF CARE
Patient is A&O x4, c/o neck pain, lungs diminished. Patient c/o neck pain and SOB, found to have a PE. Currently on Heparin drip at 18units/hr. TEsted for Covid, will await results.

## 2020-04-17 ENCOUNTER — APPOINTMENT (OUTPATIENT)
Dept: MRI IMAGING | Facility: HOSPITAL | Age: 40
End: 2020-04-17
Attending: HOSPITALIST
Payer: COMMERCIAL

## 2020-04-17 PROBLEM — Z99.89 OSA ON CPAP: Status: ACTIVE | Noted: 2019-09-03

## 2020-04-17 PROBLEM — G47.33 OSA ON CPAP: Status: ACTIVE | Noted: 2019-09-03

## 2020-04-17 PROCEDURE — 72141 MRI NECK SPINE W/O DYE: CPT | Performed by: HOSPITALIST

## 2020-04-17 PROCEDURE — 99225 SUBSEQUENT OBSERVATION CARE: CPT | Performed by: HOSPITALIST

## 2020-04-17 RX ORDER — IPRATROPIUM BROMIDE AND ALBUTEROL SULFATE 2.5; .5 MG/3ML; MG/3ML
3 SOLUTION RESPIRATORY (INHALATION) EVERY 4 HOURS PRN
Status: DISCONTINUED | OUTPATIENT
Start: 2020-04-17 | End: 2020-04-18

## 2020-04-17 RX ORDER — DEXTROAMPHETAMINE SACCHARATE, AMPHETAMINE ASPARTATE, DEXTROAMPHETAMINE SULFATE AND AMPHETAMINE SULFATE 1.25; 1.25; 1.25; 1.25 MG/1; MG/1; MG/1; MG/1
5 TABLET ORAL
Status: DISCONTINUED | OUTPATIENT
Start: 2020-04-17 | End: 2020-04-18

## 2020-04-17 RX ORDER — POLYETHYLENE GLYCOL 3350 17 G/17G
17 POWDER, FOR SOLUTION ORAL DAILY PRN
Status: DISCONTINUED | OUTPATIENT
Start: 2020-04-17 | End: 2020-04-18

## 2020-04-17 RX ORDER — LEVOTHYROXINE SODIUM 0.2 MG/1
200 TABLET ORAL
Qty: 30 TABLET | Refills: 0 | Status: SHIPPED | OUTPATIENT
Start: 2020-04-18 | End: 2020-06-26 | Stop reason: CLARIF

## 2020-04-17 RX ORDER — ROSUVASTATIN CALCIUM 10 MG/1
10 TABLET, COATED ORAL DAILY
Status: DISCONTINUED | OUTPATIENT
Start: 2020-04-17 | End: 2020-04-17

## 2020-04-17 RX ORDER — DOCUSATE SODIUM 100 MG/1
100 CAPSULE, LIQUID FILLED ORAL 2 TIMES DAILY
Status: DISCONTINUED | OUTPATIENT
Start: 2020-04-17 | End: 2020-04-18

## 2020-04-17 RX ORDER — ROSUVASTATIN CALCIUM 10 MG/1
10 TABLET, COATED ORAL NIGHTLY
Status: DISCONTINUED | OUTPATIENT
Start: 2020-04-17 | End: 2020-04-18

## 2020-04-17 NOTE — PROGRESS NOTES
[facility] Note:  Anticoagulation Weight Dose Adjustment for enoxaparin (LOVENOX)    Jamey Ac is a 47year old female who has been prescribed enoxaparin (LOVENOX) 40 mg every 24 hours.       Estimated Creatinine Clearance: 79.4 mL/min (based on SCr o

## 2020-04-17 NOTE — PLAN OF CARE
Patient transfer from [address] [place] [address]. COVID negative. Alert and oriented X4. Vital signs stable. No fever. No cough. Dyspnea on exertion noted. Room air at day time. On BLESSING-CPAP per RT upon transfer on the floor. O2 sats 94-96%. Tele SNR.  PRN Norco given for neck pa

## 2020-04-17 NOTE — RESPIRATORY THERAPY NOTE
BLESSING : EQUIPMENT USE: DAILY SUMMARY                                            SET MODE: AUTO CPAP WITH CFLEX                                          USAGE IN HOURS: 2:07                                          90

## 2020-04-17 NOTE — PROGRESS NOTES
[facility]  Progress Note     Daja Moss Patient Status:  Observation    3/16/1980 MRN [id number]   [facility] 4NW-A Attending Uriel Chaidez MD   Hosp Day # 0 PCP Chelsey Barragan MD     Chief Complaint: neck pain, sob    S: Pa input(s): PTP, INR in the last 168 hours. Recent Labs   Lab 04/15/20  2121   TROP <0.045               Imaging: Imaging data reviewed in Epic.     Medications:   • Cetirizine HCl  10 mg Oral Daily   • Pantoprazole Sodium  20 mg Oral QAM AC   • Venl

## 2020-04-17 NOTE — PLAN OF CARE
Problem: PAIN - ADULT  Goal: Verbalizes/displays adequate comfort level or patient's stated pain goal  Description  INTERVENTIONS:  - Encourage pt to monitor pain and request assistance  - Assess pain using appropriate pain scale  - Administer analgesics b METABOLIC/FLUID AND ELECTROLYTES - ADULT  Goal: Electrolytes maintained within normal limits  Description  INTERVENTIONS:  - Monitor labs and rhythm and assess patient for signs and symptoms of electrolyte imbalances  - Administer electrolyte replacement a

## 2020-04-17 NOTE — PLAN OF CARE
Assumed care at 0700  Pt AxOx4, SR on tele, RA during day, 2L when sleeping. O2 sats > 93%. No cough. Some dyspnea w/ activity  C/o persistent neck pain with any movement or pressure.  Some relief with xanaflex and norco. Norco changed to 1 or 2 tabs for be

## 2020-04-17 NOTE — CM/SW NOTE
04/17/20 0900   CM/SW Referral Data   Referral Source Social Work (self-referral)   Reason for Referral Discharge planning   Informant Patient   Patient Info   Patient's Mental Status Alert;Oriented   Patient's Home Environment House   Patient lives wit

## 2020-04-17 NOTE — PLAN OF CARE
Assumed care of patient 4/16/20 1930. Pt w/fevers at home and increasing SOB, now w/new neck pain as well. COVID negative tonight. Upon assuming care patient is alert, oriented x4.  Flat affect, depressed, anxious regarding tests and current state of health

## 2020-04-17 NOTE — PROGRESS NOTES
MD notified of patient's COVID negative status. Endorsed to RN.  2429: Received call from MD, patient ok to transfer off [facility]. Endorsed to pateint's RN.

## 2020-04-18 VITALS
WEIGHT: 293 LBS | HEART RATE: 72 BPM | TEMPERATURE: 99 F | RESPIRATION RATE: 18 BRPM | SYSTOLIC BLOOD PRESSURE: 137 MMHG | DIASTOLIC BLOOD PRESSURE: 67 MMHG | HEIGHT: 62 IN | BODY MASS INDEX: 53.92 KG/M2 | OXYGEN SATURATION: 95 %

## 2020-04-18 PROCEDURE — 99217 OBSERVATION CARE DISCHARGE: CPT | Performed by: HOSPITALIST

## 2020-04-18 RX ORDER — TIZANIDINE 4 MG/1
4 TABLET ORAL EVERY 6 HOURS PRN
Qty: 24 TABLET | Refills: 0 | Status: SHIPPED | OUTPATIENT
Start: 2020-04-18 | End: 2020-05-05

## 2020-04-18 RX ORDER — HYDROCODONE BITARTRATE AND ACETAMINOPHEN 10; 325 MG/1; MG/1
1 TABLET ORAL EVERY 4 HOURS PRN
Qty: 20 TABLET | Refills: 0 | Status: ON HOLD | OUTPATIENT
Start: 2020-04-18 | End: 2020-06-22

## 2020-04-18 NOTE — PLAN OF CARE
1915 received patient in handoff. Resting eyes closed. 2130 HS meds. C/o neck pain 7/10. Requested 1 Norco-did not want 2. 0100 Zanaflex and 1 Norco tab given. Carie pack to neck. Up to bathroom with SBA. Is slow to get up OOB. CPAP at [facility].  Has not applied it ye

## 2020-04-18 NOTE — RESPIRATORY THERAPY NOTE
BLESSING - Equipment Use Daily Summary:  · Set Mode   · Usage in hours:   · 90% Pressure (EPAP) level:   · 90% Insp Pressure (IPAP):   · AHI:   · Supplemental Oxygen:  · Comments: DID NOT USE

## 2020-04-18 NOTE — PLAN OF CARE
Problem: PAIN - ADULT  Goal: Verbalizes/displays adequate comfort level or patient's stated pain goal  Description  INTERVENTIONS:  - Encourage pt to monitor pain and request assistance  - Assess pain using appropriate pain scale  - Administer analgesics nutrition restrictions as appropriate  Outcome: Progressing  Goal: Hemodynamic stability and optimal renal function maintained  Description  INTERVENTIONS:  - Monitor labs and assess for signs and symptoms of volume excess or deficit  - Monitor intake, out

## 2020-04-18 NOTE — PROGRESS NOTES
[facility]  Progress Note     Mariam Osullivan Patient Status:  Observation    3/16/1980 MRN [id number]   [facility] 4NW-A Attending Srinath Jordan MD   Hosp Day # 0 PCP Chhaya Daniel MD     Chief Complaint: neck pain, sob    S: Pa for input(s): PTP, INR in the last 168 hours. Recent Labs   Lab 04/15/20  2121   TROP <0.045               Imaging: Imaging data reviewed in Epic.     Medications:   • amphetamine-dextroamphetamine  5 mg Oral BID AC   • Rosuvastatin Calcium  10 mg

## 2020-04-20 ENCOUNTER — E-VISIT (OUTPATIENT)
Dept: FAMILY MEDICINE CLINIC | Facility: CLINIC | Age: 40
End: 2020-04-20

## 2020-04-20 DIAGNOSIS — M54.9 ACUTE UPPER BACK PAIN: Primary | ICD-10-CM

## 2020-04-20 PROCEDURE — 99422 OL DIG E/M SVC 11-20 MIN: CPT | Performed by: NURSE PRACTITIONER

## 2020-04-20 RX ORDER — CYCLOBENZAPRINE HCL 10 MG
TABLET ORAL
Qty: 30 TABLET | Refills: 0 | Status: SHIPPED | OUTPATIENT
Start: 2020-04-20 | End: 2020-04-20 | Stop reason: CLARIF

## 2020-04-20 RX ORDER — NAPROXEN 500 MG/1
500 TABLET ORAL 2 TIMES DAILY WITH MEALS
Qty: 30 TABLET | Refills: 0 | Status: SHIPPED | OUTPATIENT
Start: 2020-04-20 | End: 2020-04-24

## 2020-04-20 NOTE — PROGRESS NOTES
Uma Hernandez is a 47year old female. HPI:   See answers to questions above.      Current Outpatient Medications   Medication Sig Dispense Refill   • cyclobenzaprine 10 MG Oral Tab Take 1/2 to 1 tablet 3 times daily as needed for back pain 30 tablet 0 Rizatriptan Benzoate 10 MG Oral Tab Take 1 tablet (10 mg total) by mouth as needed for Migraine. 6 tablet 1   • VENTOLIN  (90 Base) MCG/ACT Inhalation Aero Soln Inhale 2 puffs into the lungs 3 (three) times daily as needed.     0      Past Medical Hi

## 2020-04-20 NOTE — DISCHARGE SUMMARY
[facility]  DISCHARGE SUMMARY     Rancho Sarabia Patient Status:  Observation    3/16/1980 MRN [id number]   [facility] 4NW-A Attending No att. providers found   Hosp Day # 0 PCP Andrade Daniels MD     Date of Admission: 4/15/2020 tablet (4 mg total) by mouth every 6 (six) hours as needed.    Quantity:  24 tablet  Refills:  0        CHANGE how you take these medications      Instructions Prescription details   Levothyroxine Sodium 200 MCG Tabs  Commonly known as:  SYNTHROID  What philipp capsule  Refills:  1     Venlafaxine HCl ER 75 MG Cp24  Commonly known as:  EFFEXOR-XR      Take 1 capsule (75 mg total) by mouth daily.    Quantity:  90 capsule  Refills:  1     Ventolin  (90 Base) MCG/ACT Aers  Generic drug:  Albuterol Sulfate HFA BARIATRIC INITIAL [3071] 90 min. [address], [facility], [place] Sirisha Painter Lynne Aran, [place]    Patient Instructions:                     5/13/2020  4:00 PM  MYCHART FOLLOW UP [5278] 20 min.  [facility], Weight [facility]

## 2020-04-21 NOTE — TELEPHONE ENCOUNTER
Please advise on refills  Pt was in ER 4/15    Somehow did evisit with on demand provider yesterday    Would you like to schedule follow up visit?

## 2020-04-21 NOTE — TELEPHONE ENCOUNTER
She was just in hospital last week for shortness of breath. PE was ruled out with V/Q scan. CTA of chest was nondiagnostic. (contrast given at wrong time)  Negative dopplers for DVT rule-out. Tested covid neg.   She probably needs in-person evaluation, cou

## 2020-04-22 NOTE — TELEPHONE ENCOUNTER
Spoke to patient she says her back pain is not getting any better.      Future Appointments   Date Time Provider Cole Parra   4/24/2020  2:00 PM Liss Carroll MD EM[facility] 21 [facility]   4/28/2020  3:45 PM Oxana Boss [facility] EMG Destini Lopez   5/13/20

## 2020-04-24 ENCOUNTER — TELEMEDICINE (OUTPATIENT)
Dept: FAMILY MEDICINE CLINIC | Facility: CLINIC | Age: 40
End: 2020-04-24

## 2020-04-24 VITALS — WEIGHT: 293 LBS | TEMPERATURE: 98 F | BODY MASS INDEX: 53.92 KG/M2 | HEIGHT: 62 IN

## 2020-04-24 DIAGNOSIS — D64.9 ANEMIA, UNSPECIFIED TYPE: ICD-10-CM

## 2020-04-24 DIAGNOSIS — F41.9 ANXIETY: ICD-10-CM

## 2020-04-24 DIAGNOSIS — Z20.822 SUSPECTED COVID-19 VIRUS INFECTION: ICD-10-CM

## 2020-04-24 DIAGNOSIS — E03.9 ACQUIRED HYPOTHYROIDISM: ICD-10-CM

## 2020-04-24 DIAGNOSIS — S13.9XXA NECK SPRAIN, INITIAL ENCOUNTER: ICD-10-CM

## 2020-04-24 DIAGNOSIS — M47.892 OTHER OSTEOARTHRITIS OF SPINE, CERVICAL REGION: Primary | ICD-10-CM

## 2020-04-24 PROBLEM — K20.90 ESOPHAGITIS DETERMINED BY ENDOSCOPY: Status: ACTIVE | Noted: 2020-04-24

## 2020-04-24 PROBLEM — R06.00 DYSPNEA: Status: RESOLVED | Noted: 2020-04-16 | Resolved: 2020-04-24

## 2020-04-24 PROCEDURE — 99214 OFFICE O/P EST MOD 30 MIN: CPT | Performed by: FAMILY MEDICINE

## 2020-04-24 RX ORDER — CYCLOBENZAPRINE HCL 10 MG
TABLET ORAL
COMMUNITY
Start: 2020-04-20 | End: 2020-04-24

## 2020-04-24 RX ORDER — LORAZEPAM 0.5 MG/1
TABLET ORAL
Qty: 40 TABLET | Refills: 0 | Status: SHIPPED | OUTPATIENT
Start: 2020-04-24 | End: 2020-09-11

## 2020-04-24 NOTE — PROGRESS NOTES
Uri Beebe is a 47year old female. Please note that the following visit was completed using two-way, real-time interactive audio communication.   This has been done in good greg to provide continuity of care in the best interest of the provider-oseas daily as needed. Also has not used Zanaflex for couple of days. Has good range of motion now. Neck is still painful at times but not as stiff. Neck pain does not radiate to shoulder or upper extremities. No upper extremity numbness/tingling/weakness. (four) times daily as needed. 1 each 0   • Venlafaxine HCl ER 75 MG Oral Capsule SR 24 Hr Take 1 capsule (75 mg total) by mouth daily. 90 capsule 1   • Venlafaxine HCl  MG Oral Capsule SR 24 Hr Take 1 capsule (150 mg total) by mouth daily.  90 capsule LMP 04/08/2020   BMI 68.04 kg/m²   Wt Readings from Last 6 Encounters:  04/24/20 : (!) 372 lb (168.7 kg)  04/17/20 : (!) 374 lb 1.6 oz (169.7 kg)  04/17/20 : (!) 374 lb 1.9 oz (169.7 kg)  02/19/20 : (!) 363 lb (164.7 kg)  02/15/20 : (!) 366 lb (166 kg)  01 NSAIDs. Albuterol MDI prn helping  Good handwashing, respiratory precautions and social distancing reviewed. Advised that she should not return to work on 4/27/2020 as scheduled.   Recommend off work through next week at least.  Discussed recommendations

## 2020-04-24 NOTE — TELEPHONE ENCOUNTER
Future Appointments   Date Time Provider Cole Aida   4/24/2020  2:00 PM Lance Blizzard, MD EMG 21 EMG [facility]   4/28/2020  3:45 PM Azra Shirley DO EMGRHEUM EMG [facility]   5/13/2020  8:30 AM Zacarias Cooper RD EMGWEI EMG [facility] 75[facility]   5/13/2020  4:00

## 2020-04-28 ENCOUNTER — TELEPHONE (OUTPATIENT)
Dept: SURGERY | Facility: CLINIC | Age: 40
End: 2020-04-28

## 2020-04-28 ENCOUNTER — TELEMEDICINE (OUTPATIENT)
Dept: RHEUMATOLOGY | Facility: CLINIC | Age: 40
End: 2020-04-28

## 2020-04-28 VITALS — WEIGHT: 293 LBS | HEIGHT: 62 IN | BODY MASS INDEX: 53.92 KG/M2

## 2020-04-28 DIAGNOSIS — R79.82 ELEVATED C-REACTIVE PROTEIN (CRP): ICD-10-CM

## 2020-04-28 DIAGNOSIS — R70.0 ELEVATED SED RATE: ICD-10-CM

## 2020-04-28 DIAGNOSIS — M79.10 MYALGIA: ICD-10-CM

## 2020-04-28 DIAGNOSIS — Z86.59 HISTORY OF DEPRESSION: ICD-10-CM

## 2020-04-28 DIAGNOSIS — E66.01 MORBID OBESITY (HCC): ICD-10-CM

## 2020-04-28 DIAGNOSIS — M25.50 POLYARTHRALGIA: Primary | ICD-10-CM

## 2020-04-28 PROCEDURE — 99245 OFF/OP CONSLTJ NEW/EST HI 55: CPT | Performed by: INTERNAL MEDICINE

## 2020-04-28 NOTE — PROGRESS NOTES
[facility] Rheumatology TeleHealth Audio and Visual Visit     This was an audio/video conversation using Epic/Trly Uniq in lieu of an in-person visit due to need to limit person to person contact during the coronavirus pandemic.  The patient was within the state of sleep apnea who is suffering from diffuse pain and fatigue. At this time, it is unclear the etiology of her symptoms, I suspect that she has fibromyalgia. She does have a history of elevations in her CRP.   However studies have shown that morbid obesity c LDH  -     ALDOLASE  -     MYOSITIS ANTIBODY COMPREHENSIVE PANEL;  Future  -     SED RATE, WESTERGREN (AUTOMATED)  -     C-REACTIVE PROTEIN    Elevated sed rate  -     MODE BY IFA WITH REFLEX  -     MODE BY IFA, IGG; Future  -     CK CREATINE KINASE (NOT CREA increased about 3 months. Denies overt swelling but states she has sensation of swelling with tightness/stiffness of her hands, neck and back.      Can get discoloration of fingertips to purple/blue with cold exposure     Was recently admitted to hospit Thinks father has some sort of inflammatory skin disease.        Past Medical History:  Past Medical History:   Diagnosis Date   • Anxiety    • High cholesterol high cholesterol   • Hyperlipidemia    • Hypothyroidism    • Migraines    • Obesity    • BLESSING ( lab completed, Disp: 24 capsule, Rfl: 0  Albuterol Sulfate 108 (90 Base) MCG/ACT Inhalation Aerosol Powder, Breath Activated, Inhale 2 puffs into the lungs 4 (four) times daily as needed. , Disp: 1 each, Rfl: 0  Venlafaxine HCl ER 75 MG Oral Capsule SR 24 H joint pain, myalgias and neck pain. Skin: Negative for itching and rash. Neurological: Positive for dizziness and headaches. Negative for tingling, sensory change and weakness. Endo/Heme/Allergies: Negative for polydipsia.  Does not bruise/bleed easil and vitals reviewed. ?  Radiology review:     PROCEDURE:  MRI SPINE CERVICAL (CPT=72141)     COMPARISON:  None. INDICATIONS:  Neck pain. TECHNIQUE:  Multiplanar T1 and T2 weighted images including fat suppression sequences.   Images acquired in s ([facility] of   Radiology) NRDR ([address]) which includes the Dose Index Registry.      PATIENT STATED HISTORY:(As transcribed by Technologist)  Elevated D-Dimer, cough fever and body aches      CONTRAST USED:  100cc of Omnipa INDICATIONS:  724.2 Lumbago     TECHNIQUE:  Multiplanar T1 and T2 weighted images including fat suppression sequences. Images acquired in sagittal and axial planes. FINDINGS:    There is normal lumbar lordosis with anatomic alignment.   Vertebral b 7.81 (H) 04/15/2020    NEUTABS 3,614 03/14/2020    LYMPHABS 2,811 03/14/2020    EOSABS 153 03/14/2020    BASABS 51 03/14/2020    NEUT 49.5 03/14/2020    LYMPH 38.5 03/14/2020    MON 9.2 03/14/2020    EOS 2.1 03/14/2020    BASO 0.7 03/14/2020    NEPERCENT 6

## 2020-04-29 ENCOUNTER — TELEPHONE (OUTPATIENT)
Dept: SURGERY | Facility: CLINIC | Age: 40
End: 2020-04-29

## 2020-05-04 ENCOUNTER — PATIENT MESSAGE (OUTPATIENT)
Dept: FAMILY MEDICINE CLINIC | Facility: CLINIC | Age: 40
End: 2020-05-04

## 2020-05-05 RX ORDER — TIZANIDINE 4 MG/1
4 TABLET ORAL EVERY 6 HOURS PRN
Qty: 24 TABLET | Refills: 0 | Status: SHIPPED | OUTPATIENT
Start: 2020-05-05 | End: 2021-11-01 | Stop reason: ALTCHOICE

## 2020-05-05 NOTE — TELEPHONE ENCOUNTER
Refilled, please advise patient is her symptoms are not better in the next 1 week needs to make a follow up appointment with

## 2020-05-05 NOTE — TELEPHONE ENCOUNTER
LOV 2/15/2020    LAST LAB     LAST RX   tiZANidine HCl 4 MG Oral Tab 24 tablet 0 4/18/2020    Sig:   Take 1 tablet (4 mg total) by mouth every 6 (six) hours as needed.            Next OV   Future Appointments   Date Time Provider Cole Parra   5/13/20

## 2020-05-05 NOTE — TELEPHONE ENCOUNTER
From: Rancho Sarabia  To: Andrade Daniels MD  Sent: 5/4/2020 12:39 PM CDT  Subject: Prescription Question    May I have a refill for the tizanidine 4mg? I am still having muscle spasms in my neck and back.

## 2020-05-11 ENCOUNTER — VIRTUAL PHONE E/M (OUTPATIENT)
Dept: INTERNAL MEDICINE CLINIC | Facility: CLINIC | Age: 40
End: 2020-05-11
Payer: COMMERCIAL

## 2020-05-11 DIAGNOSIS — E66.2 MORBID OBESITY WITH ALVEOLAR HYPOVENTILATION (HCC): ICD-10-CM

## 2020-05-11 DIAGNOSIS — Z51.81 THERAPEUTIC DRUG MONITORING: Primary | ICD-10-CM

## 2020-05-11 DIAGNOSIS — F50.81 BINGE EATING DISORDER: ICD-10-CM

## 2020-05-11 PROCEDURE — 99213 OFFICE O/P EST LOW 20 MIN: CPT | Performed by: INTERNAL MEDICINE

## 2020-05-11 NOTE — PROGRESS NOTES
[facility] check in/follow up encounter  Virtual/Telephone Check-In    Kodi Lai verbally consents to a Virtual/Telephone Check-In service on 05/11/20  Continue vyvanse  Overall feels like she is in a better place  Continues to f

## 2020-05-12 ENCOUNTER — TELEPHONE (OUTPATIENT)
Dept: SURGERY | Facility: CLINIC | Age: 40
End: 2020-05-12

## 2020-05-13 ENCOUNTER — TELEPHONE (OUTPATIENT)
Dept: SURGERY | Facility: CLINIC | Age: 40
End: 2020-05-13

## 2020-05-18 ENCOUNTER — APPOINTMENT (OUTPATIENT)
Dept: CT IMAGING | Facility: HOSPITAL | Age: 40
End: 2020-05-18
Attending: EMERGENCY MEDICINE
Payer: COMMERCIAL

## 2020-05-18 ENCOUNTER — APPOINTMENT (OUTPATIENT)
Dept: GENERAL RADIOLOGY | Facility: HOSPITAL | Age: 40
End: 2020-05-18
Attending: EMERGENCY MEDICINE
Payer: COMMERCIAL

## 2020-05-18 ENCOUNTER — HOSPITAL ENCOUNTER (EMERGENCY)
Facility: HOSPITAL | Age: 40
Discharge: HOME | End: 2020-05-18
Attending: EMERGENCY MEDICINE
Payer: COMMERCIAL

## 2020-05-18 VITALS
RESPIRATION RATE: 26 BRPM | DIASTOLIC BLOOD PRESSURE: 89 MMHG | BODY MASS INDEX: 53.92 KG/M2 | TEMPERATURE: 98 F | SYSTOLIC BLOOD PRESSURE: 141 MMHG | OXYGEN SATURATION: 100 % | WEIGHT: 293 LBS | HEART RATE: 77 BPM | HEIGHT: 62 IN

## 2020-05-18 DIAGNOSIS — R06.00 DYSPNEA, UNSPECIFIED TYPE: Primary | ICD-10-CM

## 2020-05-18 DIAGNOSIS — J18.9 COMMUNITY ACQUIRED PNEUMONIA, UNSPECIFIED LATERALITY: ICD-10-CM

## 2020-05-18 PROCEDURE — 81025 URINE PREGNANCY TEST: CPT

## 2020-05-18 PROCEDURE — 85379 FIBRIN DEGRADATION QUANT: CPT | Performed by: EMERGENCY MEDICINE

## 2020-05-18 PROCEDURE — 36415 COLL VENOUS BLD VENIPUNCTURE: CPT

## 2020-05-18 PROCEDURE — 85025 COMPLETE CBC W/AUTO DIFF WBC: CPT | Performed by: EMERGENCY MEDICINE

## 2020-05-18 PROCEDURE — 93005 ELECTROCARDIOGRAM TRACING: CPT

## 2020-05-18 PROCEDURE — 93010 ELECTROCARDIOGRAM REPORT: CPT

## 2020-05-18 PROCEDURE — 99285 EMERGENCY DEPT VISIT HI MDM: CPT

## 2020-05-18 PROCEDURE — 84484 ASSAY OF TROPONIN QUANT: CPT | Performed by: EMERGENCY MEDICINE

## 2020-05-18 PROCEDURE — 71045 X-RAY EXAM CHEST 1 VIEW: CPT | Performed by: EMERGENCY MEDICINE

## 2020-05-18 PROCEDURE — 80053 COMPREHEN METABOLIC PANEL: CPT | Performed by: EMERGENCY MEDICINE

## 2020-05-18 PROCEDURE — 71275 CT ANGIOGRAPHY CHEST: CPT | Performed by: EMERGENCY MEDICINE

## 2020-05-18 RX ORDER — POTASSIUM CHLORIDE 20 MEQ/1
40 TABLET, EXTENDED RELEASE ORAL ONCE
Status: COMPLETED | OUTPATIENT
Start: 2020-05-18 | End: 2020-05-18

## 2020-05-18 RX ORDER — ALBUTEROL SULFATE 90 UG/1
2 AEROSOL, METERED RESPIRATORY (INHALATION) EVERY 4 HOURS PRN
Qty: 1 INHALER | Refills: 0 | Status: SHIPPED | OUTPATIENT
Start: 2020-05-18 | End: 2020-06-17

## 2020-05-18 RX ORDER — AZITHROMYCIN 250 MG/1
TABLET, FILM COATED ORAL
Qty: 1 PACKAGE | Refills: 0 | Status: SHIPPED | OUTPATIENT
Start: 2020-05-18 | End: 2020-05-23

## 2020-05-18 NOTE — ED INITIAL ASSESSMENT (HPI)
Pt to ed with rpts of fatigue, slightly productive cough, denies fevers, SOB upon exertion. 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  O2 at triage is 99% on RA

## 2020-05-18 NOTE — ED PROVIDER NOTES
Patient Seen in: [facility] Emergency Department      History   Patient presents with:  Dyspnea HENNY SOB    Stated Complaint: SOB    HPI    This is a 72-year-old female who presents with complaints of slightly productive cough.   The patient states sh 88   Resp 26   Temp 97.9 °F (36.6 °C)   Temp src Temporal   SpO2 99 %   O2 Device None (Room air)       Current:/89   Pulse 77   Temp 97.9 °F (36.6 °C) (Temporal)   Resp 26   Ht 157.5 cm (5' 2\")   Wt (!) 165.6 kg   SpO2 100%   BMI 66.76 kg/m² -----------         ------                     CBC W/ DIFFERENTIAL[[id number]]          Abnormal            Final result                 Please view results for these tests on the individual orders.    POCT PREGNANCY, URINE   RAINBOW DRAW B cholecystectomy. BONES:    Degenerative changes in the spine OTHER:    None  CONCLUSION:   1 No CT evidence of acute pulmonary embolism or aortic dissection.   2. Subtle ground-glass opacity in the lower lobes and right upper lobe are concerning for changes are suggestive of more vital viral syndrome with cold, cough and some productive sputum. She has a little bit of chest pain when she coughs only. This is probably related to chest wall pain. She is not in any pain if she is not coughing.   But I discusse provider.

## 2020-05-19 ENCOUNTER — TELEPHONE (OUTPATIENT)
Dept: OBGYN CLINIC | Facility: CLINIC | Age: 40
End: 2020-05-19

## 2020-05-19 NOTE — TELEPHONE ENCOUNTER
PT rescheduled  Future Appointments   Date Time Provider Cole Parra   5/21/2020 10:00 AM Mihir Rain MD EMG 21 [facility]   5/26/2020 10:00 AM Tech, Sp Pulm SPPULM  SPAL   5/28/2020  8:40 AM [facility]   6/3/2020  9:00 AM Krystyna Shirley,  EMGRHEUM EMG Lovenida Rivera   6/4/2020 10:30 AM Drew Miller, RD [facility]   7/21/2020  3:30 PM Maged Olivo MD EMG OB/GYN O EMG [facility]

## 2020-05-19 NOTE — TELEPHONE ENCOUNTER
Patient had appt to f/u on u/s that she had at THE [facility]. She was referred by primary doc. We cancelled her appt. Is it possible to do a phone visit?

## 2020-05-20 NOTE — TELEPHONE ENCOUNTER
4/29/20 @ 9:16am Spoke to Adan Logan at [facility] , #[phone] , OIJ#[phone].  They verified that patient has following benefits for below services:   DX E66.01    E66.9      [facility] ([id number])/[id number] Benefits  • Bariatric Benefits –

## 2020-05-21 ENCOUNTER — TELEMEDICINE (OUTPATIENT)
Dept: FAMILY MEDICINE CLINIC | Facility: CLINIC | Age: 40
End: 2020-05-21

## 2020-05-21 VITALS — HEART RATE: 83 BPM | TEMPERATURE: 98 F | OXYGEN SATURATION: 93 %

## 2020-05-21 DIAGNOSIS — R06.00 DYSPNEA, UNSPECIFIED TYPE: Primary | ICD-10-CM

## 2020-05-21 DIAGNOSIS — J18.9 ATYPICAL PNEUMONIA: ICD-10-CM

## 2020-05-21 DIAGNOSIS — E66.2 MORBID OBESITY WITH ALVEOLAR HYPOVENTILATION (HCC): ICD-10-CM

## 2020-05-21 PROCEDURE — 99214 OFFICE O/P EST MOD 30 MIN: CPT | Performed by: FAMILY MEDICINE

## 2020-05-21 NOTE — PATIENT INSTRUCTIONS
Continue inhaler, incentive spirometer and oxygen monitoring. Please contact Dr. Jamila Stevenson office tomorrow if you don't hear anything, I will not be able to contact them personally other than through a message today.

## 2020-05-21 NOTE — PROGRESS NOTES
Angel Zamarripa IS A 47year old female evaluated via telehealth visit FOR Patient presents with:  Shortness Of Breath  Cough  Er F/u   due to current national emergency with SARS-CoV-2 pandemic.      History of present illness:   2-way communication joe bathroom was dyspneic 3 d ago. Yesterday making breakfast, walking in living room to kitchen was dyspneic. Hx asthma in past but not this severe. No fever, had chills before admission. Sweated a bit, chest tightness and pain right side.  IS now able to Oral Tab 500 mg once followed by 250 mg daily x 4 days 1 Package 0   • Lisdexamfetamine Dimesylate (VYVANSE) 40 MG Oral Cap Take 1 capsule (40 mg total) by mouth every morning.  30 capsule 0   • tiZANidine HCl 4 MG Oral Tab Take 1 tablet (4 mg total) by rayna History   Problem Relation Age of Onset   • Hypertension Father    • Heart Disease Mother    • High Cholesterol Mother    • Other (DDD) Mother    • Other (lymphoma) Maternal Grandmother    • Other (cancer) Paternal Grandmother         liver    • Prostate C Weight Concern: Not Asked         Service: Not Asked        Blood Transfusions: Not Asked        Occupational Exposure: Not Asked        Hobby Hazards: Not Asked        Sleep Concern: Not Asked        Back Care: Not Asked        Bike Helmet: Not As 05/18/2020 101    • GFR, -American 05/18/2020 117    • AST 05/18/2020 20    • ALT 05/18/2020 35    • Alkaline Phosphatase 05/18/2020 111*   • Bilirubin, Total 05/18/2020 0.5    • Total Protein 05/18/2020 8.8*   • Albumin 05/18/2020 3.4    • Globulin

## 2020-05-23 DIAGNOSIS — R73.01 IFG (IMPAIRED FASTING GLUCOSE): Primary | ICD-10-CM

## 2020-05-23 DIAGNOSIS — E78.2 MIXED HYPERLIPIDEMIA: ICD-10-CM

## 2020-05-26 ENCOUNTER — PATIENT MESSAGE (OUTPATIENT)
Dept: FAMILY MEDICINE CLINIC | Facility: CLINIC | Age: 40
End: 2020-05-26

## 2020-05-26 RX ORDER — ROSUVASTATIN CALCIUM 10 MG/1
TABLET, COATED ORAL
Qty: 30 TABLET | Refills: 0 | Status: ON HOLD | OUTPATIENT
Start: 2020-05-26 | End: 2020-06-22

## 2020-05-26 NOTE — TELEPHONE ENCOUNTER
LOV 2/15/2020    LAST LAB 6-4-19    LAST RX 3-24-20 external    Next OV   Future Appointments   Date Time Provider Cole Parra   6/3/2020  9:00 AM Willis Orozco DO [facility] EMG Jone Mckenzie   6/4/2020 10:30 AM Domonique Odell RD [address] [facility]

## 2020-05-26 NOTE — TELEPHONE ENCOUNTER
From: Kulwant Jc  To: Mayra Carrera MD  Sent: 5/26/2020 9:29 AM CDT  Subject: Non-Urgent Medical Question    I just spoke with Dr acosta's office. They want me to get antibody testing done. Then I'll reschedule appt. With him.

## 2020-05-26 NOTE — TELEPHONE ENCOUNTER
Patient needs labs, we have not prescribed this before. Also needs labs from rheum. I just saw her for video visit post hospital. So I can probably handle rx via mychart visit after labs back.

## 2020-05-26 NOTE — TELEPHONE ENCOUNTER
Thanks for updating me. I sent him a message last week and he thinks this is not Covid. So hopefully you can get testing and some answers and effective treatment for the shortness of breath soon.

## 2020-05-27 ENCOUNTER — APPOINTMENT (OUTPATIENT)
Dept: CT IMAGING | Age: 40
End: 2020-05-27
Attending: EMERGENCY MEDICINE
Payer: COMMERCIAL

## 2020-05-27 ENCOUNTER — HOSPITAL ENCOUNTER (EMERGENCY)
Age: 40
Discharge: HOME | End: 2020-05-27
Attending: EMERGENCY MEDICINE
Payer: COMMERCIAL

## 2020-05-27 ENCOUNTER — TELEPHONE (OUTPATIENT)
Dept: FAMILY MEDICINE CLINIC | Facility: CLINIC | Age: 40
End: 2020-05-27

## 2020-05-27 VITALS
OXYGEN SATURATION: 100 % | TEMPERATURE: 98 F | HEIGHT: 62 IN | WEIGHT: 293 LBS | RESPIRATION RATE: 22 BRPM | SYSTOLIC BLOOD PRESSURE: 154 MMHG | BODY MASS INDEX: 53.92 KG/M2 | HEART RATE: 84 BPM | DIASTOLIC BLOOD PRESSURE: 94 MMHG

## 2020-05-27 DIAGNOSIS — R09.1 PLEURISY: Primary | ICD-10-CM

## 2020-05-27 PROCEDURE — 93005 ELECTROCARDIOGRAM TRACING: CPT

## 2020-05-27 PROCEDURE — 85025 COMPLETE CBC W/AUTO DIFF WBC: CPT | Performed by: EMERGENCY MEDICINE

## 2020-05-27 PROCEDURE — 93010 ELECTROCARDIOGRAM REPORT: CPT

## 2020-05-27 PROCEDURE — 99285 EMERGENCY DEPT VISIT HI MDM: CPT

## 2020-05-27 PROCEDURE — 71275 CT ANGIOGRAPHY CHEST: CPT | Performed by: EMERGENCY MEDICINE

## 2020-05-27 PROCEDURE — 80053 COMPREHEN METABOLIC PANEL: CPT | Performed by: EMERGENCY MEDICINE

## 2020-05-27 PROCEDURE — 99284 EMERGENCY DEPT VISIT MOD MDM: CPT

## 2020-05-27 PROCEDURE — 83690 ASSAY OF LIPASE: CPT | Performed by: EMERGENCY MEDICINE

## 2020-05-27 PROCEDURE — 96361 HYDRATE IV INFUSION ADD-ON: CPT

## 2020-05-27 PROCEDURE — 84484 ASSAY OF TROPONIN QUANT: CPT | Performed by: EMERGENCY MEDICINE

## 2020-05-27 PROCEDURE — 96374 THER/PROPH/DIAG INJ IV PUSH: CPT

## 2020-05-27 RX ORDER — KETOROLAC TROMETHAMINE 10 MG/1
10 TABLET, FILM COATED ORAL EVERY 6 HOURS PRN
Qty: 20 TABLET | Refills: 0 | Status: SHIPPED | OUTPATIENT
Start: 2020-05-27 | End: 2020-07-09 | Stop reason: ALTCHOICE

## 2020-05-27 RX ORDER — KETOROLAC TROMETHAMINE 30 MG/ML
30 INJECTION, SOLUTION INTRAMUSCULAR; INTRAVENOUS ONCE
Status: COMPLETED | OUTPATIENT
Start: 2020-05-27 | End: 2020-05-27

## 2020-05-27 RX ORDER — SODIUM CHLORIDE 9 MG/ML
INJECTION, SOLUTION INTRAVENOUS CONTINUOUS
Status: DISCONTINUED | OUTPATIENT
Start: 2020-05-27 | End: 2020-05-27

## 2020-05-27 NOTE — ED PROVIDER NOTES
Patient Seen in: [facility] Emergency Department In [place]      History   Patient presents with:  Dyspnea HENNY SOB    Stated Complaint: sob,  left chest pain, known pneumonia, vomiting    HPI    Patient was to the emergency department on May 18.   She was negative except as noted above.     Physical Exam     ED Triage Vitals [05/27/20 1314]   BP (!) 161/67   Pulse 90   Resp 20   Temp 97.9 °F (36.6 °C)   Temp src Oral   SpO2 97 %   O2 Device None (Room air)       Current:BP (!) 154/94   Pulse 84   Temp 97.9 ° ---------                               -----------         ------                     CBC W/ DIFFERENTIAL[[id number]]          Abnormal            Final result                 Please view results for these tests on the individual orders.    NATE MERCADO Prescribed:  Current Discharge Medication List    START taking these medications    Ketorolac Tromethamine 10 MG Oral Tab  Take 1 tablet (10 mg total) by mouth every 6 (six) hours as needed for Pain.   Qty: 20 tablet Refills: 0

## 2020-05-27 NOTE — ED INITIAL ASSESSMENT (HPI)
Pt was dx w pneumonia 10 days ago w pneumonia started on azithromycin feeling sob and pain to chest and vomited once.

## 2020-05-27 NOTE — TELEPHONE ENCOUNTER
Pt lmsg on our vm wants to schedule e.r f/u for Pleurisy, I tried to call pt back to schedule no answer no  fast busy signal unable to lmsg

## 2020-05-28 ENCOUNTER — PATIENT MESSAGE (OUTPATIENT)
Dept: FAMILY MEDICINE CLINIC | Facility: CLINIC | Age: 40
End: 2020-05-28

## 2020-05-28 ENCOUNTER — LAB ENCOUNTER (OUTPATIENT)
Dept: LAB | Facility: HOSPITAL | Age: 40
End: 2020-05-28
Attending: INTERNAL MEDICINE
Payer: COMMERCIAL

## 2020-05-28 DIAGNOSIS — E03.9 ACQUIRED HYPOTHYROIDISM: ICD-10-CM

## 2020-05-28 DIAGNOSIS — E78.2 MIXED HYPERLIPIDEMIA: ICD-10-CM

## 2020-05-28 DIAGNOSIS — M25.50 PAIN IN JOINT, MULTIPLE SITES: ICD-10-CM

## 2020-05-28 DIAGNOSIS — J18.9 PNEUMONIA DUE TO INFECTIOUS ORGANISM, UNSPECIFIED LATERALITY, UNSPECIFIED PART OF LUNG: ICD-10-CM

## 2020-05-28 DIAGNOSIS — Z13.0 SCREENING FOR DEFICIENCY ANEMIA: ICD-10-CM

## 2020-05-28 DIAGNOSIS — R70.0 ELEVATED SEDIMENTATION RATE: ICD-10-CM

## 2020-05-28 DIAGNOSIS — Z13.1 SCREENING FOR DIABETES MELLITUS: ICD-10-CM

## 2020-05-28 DIAGNOSIS — E03.9 ACQUIRED HYPOTHYROIDISM: Primary | ICD-10-CM

## 2020-05-28 DIAGNOSIS — R70.0 ELEVATED SED RATE: ICD-10-CM

## 2020-05-28 DIAGNOSIS — J18.9 UNRESOLVED PNEUMONIA: ICD-10-CM

## 2020-05-28 DIAGNOSIS — M79.10 MYALGIA: ICD-10-CM

## 2020-05-28 DIAGNOSIS — M25.50 POLYARTHRALGIA: ICD-10-CM

## 2020-05-28 DIAGNOSIS — R79.82 ELEVATED C-REACTIVE PROTEIN (CRP): Primary | ICD-10-CM

## 2020-05-28 PROCEDURE — 82085 ASSAY OF ALDOLASE: CPT

## 2020-05-28 PROCEDURE — 86235 NUCLEAR ANTIGEN ANTIBODY: CPT | Performed by: INTERNAL MEDICINE

## 2020-05-28 PROCEDURE — 86140 C-REACTIVE PROTEIN: CPT | Performed by: INTERNAL MEDICINE

## 2020-05-28 PROCEDURE — 86225 DNA ANTIBODY NATIVE: CPT | Performed by: INTERNAL MEDICINE

## 2020-05-28 PROCEDURE — 36415 COLL VENOUS BLD VENIPUNCTURE: CPT | Performed by: INTERNAL MEDICINE

## 2020-05-28 PROCEDURE — 84443 ASSAY THYROID STIM HORMONE: CPT

## 2020-05-28 PROCEDURE — 83036 HEMOGLOBIN GLYCOSYLATED A1C: CPT

## 2020-05-28 PROCEDURE — 83615 LACTATE (LD) (LDH) ENZYME: CPT | Performed by: INTERNAL MEDICINE

## 2020-05-28 PROCEDURE — 83516 IMMUNOASSAY NONANTIBODY: CPT

## 2020-05-28 PROCEDURE — 86769 SARS-COV-2 COVID-19 ANTIBODY: CPT

## 2020-05-28 PROCEDURE — 85652 RBC SED RATE AUTOMATED: CPT | Performed by: INTERNAL MEDICINE

## 2020-05-28 PROCEDURE — 85025 COMPLETE CBC W/AUTO DIFF WBC: CPT

## 2020-05-28 PROCEDURE — 80053 COMPREHEN METABOLIC PANEL: CPT

## 2020-05-28 PROCEDURE — 82550 ASSAY OF CK (CPK): CPT | Performed by: INTERNAL MEDICINE

## 2020-05-28 PROCEDURE — 86038 ANTINUCLEAR ANTIBODIES: CPT | Performed by: INTERNAL MEDICINE

## 2020-05-28 PROCEDURE — 82306 VITAMIN D 25 HYDROXY: CPT | Performed by: INTERNAL MEDICINE

## 2020-05-28 PROCEDURE — 80061 LIPID PANEL: CPT

## 2020-05-28 PROCEDURE — 86235 NUCLEAR ANTIGEN ANTIBODY: CPT

## 2020-05-28 PROCEDURE — 86038 ANTINUCLEAR ANTIBODIES: CPT

## 2020-05-28 PROCEDURE — 84439 ASSAY OF FREE THYROXINE: CPT

## 2020-05-29 ENCOUNTER — TELEPHONE (OUTPATIENT)
Dept: FAMILY MEDICINE CLINIC | Facility: CLINIC | Age: 40
End: 2020-05-29

## 2020-05-29 NOTE — TELEPHONE ENCOUNTER
Spoke with the patient  She was questioning why her TSH was so off a few months ago when in the hospital and could it have been an error. I explained that it could have been something that was going on with her that caused it to go up.      Number is bett

## 2020-05-29 NOTE — TELEPHONE ENCOUNTER
From: Ayleen Torres  To: Pastora Mariee MD  Sent: 5/28/2020 3:43 PM CDT  Subject: Test Results Question    How low does it drop if not taken for a few days? I missed a few doses this week. Also, I was only taking 175 mcg.

## 2020-06-01 RX ORDER — LEVOTHYROXINE SODIUM 0.2 MG/1
200 TABLET ORAL
Qty: 30 TABLET | Refills: 1 | Status: SHIPPED | OUTPATIENT
Start: 2020-06-01 | End: 2020-09-10

## 2020-06-01 NOTE — TELEPHONE ENCOUNTER
I sent an rx for 30 days with a refill  to your pharmacy Try to take faithfully daily for 6 weeks then go for blood test. I recommend you call ahead (we have [facility] listed as your lab) for a scheduled appointment to limit your possible exposure to covid.

## 2020-06-03 ENCOUNTER — TELEMEDICINE (OUTPATIENT)
Dept: RHEUMATOLOGY | Facility: CLINIC | Age: 40
End: 2020-06-03
Payer: COMMERCIAL

## 2020-06-03 VITALS — WEIGHT: 293 LBS | BODY MASS INDEX: 53.92 KG/M2 | HEIGHT: 62 IN

## 2020-06-03 DIAGNOSIS — M25.50 POLYARTHRALGIA: ICD-10-CM

## 2020-06-03 DIAGNOSIS — M79.10 MYALGIA: ICD-10-CM

## 2020-06-03 DIAGNOSIS — R53.1 GENERALIZED WEAKNESS: ICD-10-CM

## 2020-06-03 DIAGNOSIS — R70.0 ELEVATED SED RATE: Primary | ICD-10-CM

## 2020-06-03 DIAGNOSIS — R79.82 ELEVATED C-REACTIVE PROTEIN (CRP): ICD-10-CM

## 2020-06-03 PROCEDURE — 99213 OFFICE O/P EST LOW 20 MIN: CPT | Performed by: INTERNAL MEDICINE

## 2020-06-03 RX ORDER — PREDNISONE 1 MG/1
TABLET ORAL
Qty: 30 TABLET | Refills: 0 | Status: SHIPPED | OUTPATIENT
Start: 2020-06-03 | End: 2020-06-26 | Stop reason: CLARIF

## 2020-06-03 NOTE — PROGRESS NOTES
[facility] TeleHealth Audio and Visual Visit     This was an audio/video conversation using Doximity in lieu of an in-person visit due to need to limit person to person contact during the coronavirus pandemic.  The patient was within the [address] (CRP)  Polyarthralgia  Myalgia  Generalized weakness    Discussion:  Ms. Carlos Edwards is a 46 yo woman with complicated past medical history of obesity, depression/anxiety/PTSD, sleep apnea who is suffering from diffuse pain and fatigue.   At this time, it Tab; Take 20 mg daily x3 days, then 15 mg daily x3 days, then 10 mg daily x3 days then 5 mg daily x3 days and stop    Generalized weakness        Return in about 4 weeks (around 7/1/2020). ?   HPI   Zeke Chatterjeetaylalibra is a 47year old female with the following Also has pain in her hips and knees. Right shoulder pain intermittent.      Feels like pain changes on a day to day basis.  Expresses that she feels like she's not functioning at a 100% due to feeling unwell in general.   Can have nausea, headaches intermit bloody stools, Achilles heel pain, psoriatic lesions, spooning or pitting of the nails but + ridges. There are no symptoms of severe dry eyes or dry mouth. .  No chills, night sweats, unexpected weight loss, easy bruising bleeding, or unexplained weakness. mouth before breakfast., Disp: 30 tablet, Rfl: 1  Albuterol Sulfate  (90 Base) MCG/ACT Inhalation Aero Soln, Inhale 2 puffs into the lungs every 4 (four) hours as needed for Wheezing., Disp: 1 Inhaler, Rfl: 0  Lisdexamfetamine Dimesylate (VYVANSE) 4 pain, sore throat and tinnitus. Eyes: Negative for blurred vision, double vision, photophobia and redness. Respiratory: Positive for cough, shortness of breath and wheezing. Cardiovascular: Positive for chest pain, palpitations and leg swelling. to some difficulty but physically able to perform)  Able to walk on tip toes as well as heels    Skin: Skin is warm and dry. No rash noted. She is not diaphoretic. No erythema.    No malar rash  Some flushed appearance of fingertips diffusely, hard to visua normal  Myositis antibody panel pending  ESR 72 elevated  CRP 2.24 elevated    CRP  04/18/2020 - 5.45  04/17/2020 - 3.68  04/16/2020 - 2.42  04/15/2020 - 2.55 (N<0.3)  08/24/2019 - 18.5 (N<8.0)     04/2020   normal  Ferritin normal   COVID19 negative

## 2020-06-04 ENCOUNTER — TELEMEDICINE (OUTPATIENT)
Dept: SURGERY | Facility: CLINIC | Age: 40
End: 2020-06-04

## 2020-06-04 VITALS — BODY MASS INDEX: 53.92 KG/M2 | WEIGHT: 293 LBS | HEIGHT: 62 IN

## 2020-06-04 DIAGNOSIS — E66.01 CLASS 3 SEVERE OBESITY DUE TO EXCESS CALORIES WITH SERIOUS COMORBIDITY AND BODY MASS INDEX (BMI) OF 60.0 TO 69.9 IN ADULT (HCC): Primary | ICD-10-CM

## 2020-06-04 PROCEDURE — 97803 MED NUTRITION INDIV SUBSEQ: CPT | Performed by: DIETITIAN, REGISTERED

## 2020-06-04 NOTE — PROGRESS NOTES
[facility]  [address] [place] [address] [address] [address]  Dept: [address]: [phone]    06/04/20    Bariatric Initial Nutrition Assessment    Consultation conducted via telehealth.      Pt richardson : MFP and Slim [employer], [employer], PNP Eek    Patient's most successful weight loss attempt was [employer]. Lost 50 lbs  and kept it off 6 months. Patient has tried these medications for weight loss:  Ionamin/Adipex/Phentermine and Vyvanse    Patient has received these 21. 4 (H) 05/28/2020    CREATSERUM 0.70 05/28/2020    ANIONGAP 5 05/28/2020    GFRNAA 110 05/28/2020    GFRAA 127 05/28/2020    CA 8.9 05/28/2020    OSMOCALC 291 05/28/2020    ALKPHO 105 (H) 05/28/2020    AST 16 05/28/2020    ALT 19 05/28/2020    BILT 0.3 0 tablet, Rfl: 1  •  ergocalciferol 1.25 MG (79617 UT) Oral Cap, Take 1 capsule (50,000 Units total) by mouth once a week.  With food for 6 months total (Patient not taking: Reported on 6/3/2020 ), Disp: 24 capsule, Rfl: 0  •  Ketorolac Tromethamine 10 MG Ora mouth daily. , Disp: 90 capsule, Rfl: 1  •  Venlafaxine HCl  MG Oral Capsule SR 24 Hr, Take 1 capsule (150 mg total) by mouth daily. , Disp: 90 capsule, Rfl: 1  •  Omeprazole 40 MG Oral Capsule Delayed Release, Take 1 capsule (40 mg total) by mouth 2 ( consists of 2-3 meals, 1 snacks and 0 protein supplements.     Total Kcal: 2500+ kcal per day  Excessive in: calories, fat, simple sugars, fast foods and restaurant foods, carbs  Inadequate in:  fruits, vegetables and fiber   Trigger Foods: Sweets  Patient Handout    Goals:   1. MFP- 60g protein, <130g CHO per day  2. Taper caffeine/carbonation, 64 oz of fluids per day  3. No skipping meals  4.  Yoga and afua 2-3 days per week or increase avg step count    Monitor/Evaluate     · Food/fluid intake/choices  ·

## 2020-06-15 DIAGNOSIS — R70.0 ELEVATED SED RATE: ICD-10-CM

## 2020-06-15 DIAGNOSIS — R79.82 ELEVATED C-REACTIVE PROTEIN (CRP): Primary | ICD-10-CM

## 2020-06-15 DIAGNOSIS — R53.1 GENERALIZED WEAKNESS: ICD-10-CM

## 2020-06-15 DIAGNOSIS — M54.9 CHRONIC MIDLINE BACK PAIN, UNSPECIFIED BACK LOCATION: ICD-10-CM

## 2020-06-15 DIAGNOSIS — G89.29 CHRONIC MIDLINE BACK PAIN, UNSPECIFIED BACK LOCATION: ICD-10-CM

## 2020-06-15 RX ORDER — ERGOCALCIFEROL 1.25 MG/1
CAPSULE ORAL
Qty: 24 CAPSULE | Refills: 0 | OUTPATIENT
Start: 2020-06-15

## 2020-06-15 NOTE — TELEPHONE ENCOUNTER
Requesting Vitamin D  LOV: 5/11/20  RTC: one month  Last Relevant Labs: 5/28/20  Filled: 5/29/20 #24 with 0 refills    Future Appointments   Date Time Provider Cole Parra   7/1/2020  4:00 PM Aracelis Grossman RD [facility]   7/21/2020  8:3

## 2020-06-17 ENCOUNTER — HOSPITAL ENCOUNTER (OUTPATIENT)
Dept: GENERAL RADIOLOGY | Facility: HOSPITAL | Age: 40
Discharge: HOME | End: 2020-06-17
Attending: INTERNAL MEDICINE
Payer: COMMERCIAL

## 2020-06-17 DIAGNOSIS — R53.1 GENERALIZED WEAKNESS: ICD-10-CM

## 2020-06-17 DIAGNOSIS — R70.0 ELEVATED SED RATE: ICD-10-CM

## 2020-06-17 DIAGNOSIS — M54.9 CHRONIC MIDLINE BACK PAIN, UNSPECIFIED BACK LOCATION: ICD-10-CM

## 2020-06-17 DIAGNOSIS — R79.82 ELEVATED C-REACTIVE PROTEIN (CRP): ICD-10-CM

## 2020-06-17 DIAGNOSIS — G89.29 CHRONIC MIDLINE BACK PAIN, UNSPECIFIED BACK LOCATION: ICD-10-CM

## 2020-06-17 PROCEDURE — 72110 X-RAY EXAM L-2 SPINE 4/>VWS: CPT | Performed by: INTERNAL MEDICINE

## 2020-06-17 PROCEDURE — 72072 X-RAY EXAM THORAC SPINE 3VWS: CPT | Performed by: INTERNAL MEDICINE

## 2020-06-17 PROCEDURE — 72202 X-RAY EXAM SI JOINTS 3/> VWS: CPT | Performed by: INTERNAL MEDICINE

## 2020-06-21 ENCOUNTER — HOSPITAL ENCOUNTER (INPATIENT)
Facility: HOSPITAL | Age: 40
LOS: 2 days | Discharge: HOME | DRG: 194 | End: 2020-06-24
Attending: EMERGENCY MEDICINE | Admitting: INTERNAL MEDICINE
Payer: COMMERCIAL

## 2020-06-21 ENCOUNTER — APPOINTMENT (OUTPATIENT)
Dept: GENERAL RADIOLOGY | Facility: HOSPITAL | Age: 40
DRG: 194 | End: 2020-06-21
Attending: EMERGENCY MEDICINE
Payer: COMMERCIAL

## 2020-06-21 ENCOUNTER — APPOINTMENT (OUTPATIENT)
Dept: CT IMAGING | Facility: HOSPITAL | Age: 40
DRG: 194 | End: 2020-06-21
Attending: EMERGENCY MEDICINE
Payer: COMMERCIAL

## 2020-06-21 DIAGNOSIS — J18.9 PNEUMONIA OF LEFT LOWER LOBE DUE TO INFECTIOUS ORGANISM: Primary | ICD-10-CM

## 2020-06-21 DIAGNOSIS — R06.00 DOE (DYSPNEA ON EXERTION): ICD-10-CM

## 2020-06-21 PROCEDURE — 71045 X-RAY EXAM CHEST 1 VIEW: CPT | Performed by: EMERGENCY MEDICINE

## 2020-06-22 ENCOUNTER — APPOINTMENT (OUTPATIENT)
Dept: CT IMAGING | Facility: HOSPITAL | Age: 40
DRG: 194 | End: 2020-06-22
Attending: EMERGENCY MEDICINE
Payer: COMMERCIAL

## 2020-06-22 DIAGNOSIS — E78.2 MIXED HYPERLIPIDEMIA: ICD-10-CM

## 2020-06-22 PROBLEM — J18.9 PNEUMONIA OF LEFT LOWER LOBE DUE TO INFECTIOUS ORGANISM: Status: ACTIVE | Noted: 2020-06-22

## 2020-06-22 PROBLEM — R06.09 DOE (DYSPNEA ON EXERTION): Status: ACTIVE | Noted: 2020-06-22

## 2020-06-22 PROBLEM — J18.9 PNA (PNEUMONIA): Status: ACTIVE | Noted: 2020-06-22

## 2020-06-22 PROBLEM — R06.00 DOE (DYSPNEA ON EXERTION): Status: ACTIVE | Noted: 2020-06-22

## 2020-06-22 PROCEDURE — 99223 1ST HOSP IP/OBS HIGH 75: CPT | Performed by: INTERNAL MEDICINE

## 2020-06-22 PROCEDURE — 71260 CT THORAX DX C+: CPT | Performed by: EMERGENCY MEDICINE

## 2020-06-22 RX ORDER — LORAZEPAM 0.5 MG/1
0.5 TABLET ORAL 2 TIMES DAILY PRN
Status: DISCONTINUED | OUTPATIENT
Start: 2020-06-22 | End: 2020-06-24

## 2020-06-22 RX ORDER — LEVOTHYROXINE SODIUM 0.2 MG/1
200 TABLET ORAL
Status: DISCONTINUED | OUTPATIENT
Start: 2020-06-22 | End: 2020-06-24

## 2020-06-22 RX ORDER — DEXTROAMPHETAMINE SACCHARATE, AMPHETAMINE ASPARTATE, DEXTROAMPHETAMINE SULFATE AND AMPHETAMINE SULFATE 1.25; 1.25; 1.25; 1.25 MG/1; MG/1; MG/1; MG/1
7.5 TABLET ORAL
Status: DISCONTINUED | OUTPATIENT
Start: 2020-06-22 | End: 2020-06-24

## 2020-06-22 RX ORDER — VENLAFAXINE HYDROCHLORIDE 75 MG/1
75 CAPSULE, EXTENDED RELEASE ORAL DAILY
Status: DISCONTINUED | OUTPATIENT
Start: 2020-06-22 | End: 2020-06-24

## 2020-06-22 RX ORDER — POTASSIUM CHLORIDE 20 MEQ/1
40 TABLET, EXTENDED RELEASE ORAL EVERY 4 HOURS
Status: COMPLETED | OUTPATIENT
Start: 2020-06-22 | End: 2020-06-22

## 2020-06-22 RX ORDER — ERGOCALCIFEROL 1.25 MG/1
50000 CAPSULE ORAL WEEKLY
Status: DISCONTINUED | OUTPATIENT
Start: 2020-06-22 | End: 2020-06-22

## 2020-06-22 RX ORDER — ENOXAPARIN SODIUM 100 MG/ML
0.5 INJECTION SUBCUTANEOUS DAILY
Status: DISCONTINUED | OUTPATIENT
Start: 2020-06-22 | End: 2020-06-24

## 2020-06-22 RX ORDER — TIZANIDINE 4 MG/1
4 TABLET ORAL EVERY 6 HOURS PRN
Status: DISCONTINUED | OUTPATIENT
Start: 2020-06-22 | End: 2020-06-24

## 2020-06-22 RX ORDER — FLUTICASONE PROPIONATE 50 MCG
2 SPRAY, SUSPENSION (ML) NASAL DAILY
Status: DISCONTINUED | OUTPATIENT
Start: 2020-06-22 | End: 2020-06-24

## 2020-06-22 RX ORDER — ONDANSETRON 2 MG/ML
INJECTION INTRAMUSCULAR; INTRAVENOUS
Status: COMPLETED
Start: 2020-06-22 | End: 2020-06-22

## 2020-06-22 RX ORDER — LEVOTHYROXINE SODIUM 0.2 MG/1
200 TABLET ORAL
Status: DISCONTINUED | OUTPATIENT
Start: 2020-06-22 | End: 2020-06-22

## 2020-06-22 RX ORDER — CETIRIZINE HYDROCHLORIDE 10 MG/1
10 TABLET ORAL DAILY
Status: DISCONTINUED | OUTPATIENT
Start: 2020-06-22 | End: 2020-06-24

## 2020-06-22 RX ORDER — ROSUVASTATIN CALCIUM 10 MG/1
TABLET, COATED ORAL
Qty: 90 TABLET | Refills: 1 | Status: SHIPPED | OUTPATIENT
Start: 2020-06-22 | End: 2020-12-16

## 2020-06-22 RX ORDER — ALBUTEROL SULFATE 90 UG/1
2 AEROSOL, METERED RESPIRATORY (INHALATION) EVERY 4 HOURS PRN
Status: DISCONTINUED | OUTPATIENT
Start: 2020-06-22 | End: 2020-06-24

## 2020-06-22 RX ORDER — PANTOPRAZOLE SODIUM 40 MG/1
40 TABLET, DELAYED RELEASE ORAL
Status: DISCONTINUED | OUTPATIENT
Start: 2020-06-22 | End: 2020-06-24

## 2020-06-22 RX ORDER — ACETAMINOPHEN 325 MG/1
650 TABLET ORAL EVERY 6 HOURS PRN
Status: DISCONTINUED | OUTPATIENT
Start: 2020-06-22 | End: 2020-06-24

## 2020-06-22 RX ORDER — VENLAFAXINE HYDROCHLORIDE 75 MG/1
150 CAPSULE, EXTENDED RELEASE ORAL DAILY
Status: DISCONTINUED | OUTPATIENT
Start: 2020-06-22 | End: 2020-06-24

## 2020-06-22 RX ORDER — ONDANSETRON 2 MG/ML
4 INJECTION INTRAMUSCULAR; INTRAVENOUS EVERY 6 HOURS PRN
Status: DISCONTINUED | OUTPATIENT
Start: 2020-06-22 | End: 2020-06-24

## 2020-06-22 NOTE — PROGRESS NOTES
[facility] Pharmacy Note: Antimicrobial Weight Based Dose Adjustment for: ceftriaxone (ROCEPHIN)    Cathalene Essex is a 47year old female who has been prescribed ceftriaxone (ROCEPHIN) 1 gm x 1 dose.     Estimated Creatinine Clearance: 74.5 mL/min (based on SCr of

## 2020-06-22 NOTE — PROGRESS NOTES
[facility] Pharmacy Note:  Anticoagulation Weight Dose Adjustment for enoxaparin (LOVENOX)    Jamey Ac is a 47year old female who has been prescribed enoxaparin (LOVENOX) 40 mg every 24 hours.       Estimated Creatinine Clearance: 74.5 mL/min (based on SCr o

## 2020-06-22 NOTE — PROGRESS NOTES
NURSING ADMISSION NOTE      Patient admitted via Cart  Oriented to room. Safety precautions initiated. Bed in low position. Call light in reach. Admission navigator complete. MD at bedside. Pt alert and orieneted x4. wdl on RA.  Hx of BLESSING w/ cpap

## 2020-06-22 NOTE — H&P
[facility]                                                               History & Physical         Kulwant Jc Patient Status:  Inpatient    3/16/1980 MRN [id number]   [facility] 5NW-A Attending Toshia Muse MD   [facility] Day # 0 P smokeless tobacco. She reports current alcohol use. She reports previous drug use.     Allergies:    Penicillins             HIVES    Home Medications:  predniSONE 5 MG Oral Tab, Take 20 mg daily x3 days, then 15 mg daily x3 days, then 10 mg daily x3 days t breakfast. (Patient not taking: Reported on 6/3/2020 ), Disp: 30 tablet, Rfl: 0, Not Taking  ergocalciferol 1.25 MG (36898 UT) Oral Cap, Take 1 capsule (50,000 Units total) by mouth twice a week.  With food for 12 weeks total then get lab completed, Disp: 2 mucous membranes. EOM-I. PERRL  Neck: No lymphadenopathy. No JVD. No carotid bruits. Respiratory: Clear to auscultation bilaterally. No wheezes. No rhonchi. Cardiovascular: S1, S2.  Regular rate and rhythm. No murmurs.  Equal pulses   Abdomen: Soft, no below  IMPRESSION:  There is good opacification of the pulmonary arteries and no evidence for pulmonary embolism. No thoracic aortic aneurysm or dissection.   New small area of groundglass opacity in the medial superior left lower lobe suggests a small are

## 2020-06-22 NOTE — ED PROVIDER NOTES
Patient Seen in: [facility] Emergency Department      History   Patient presents with:  Dyspnea HENNY SOB    Stated Complaint: shortness of breath, denies fever or known contact with COVID.  Recent pneumonia*    HPI    Patient is 30-year-old female com LMP 05/25/2020   SpO2 96%   BMI 67.31 kg/m²         Physical Exam  Vitals signs and nursing note reviewed. Constitutional:       General: She is not in acute distress. Appearance: She is well-developed. She is not toxic-appearing.    HENT:      Head: components:    WBC 13.9 (*)     HGB 11.3 (*)     Neutrophil Absolute Prelim 9.69 (*)     Neutrophil Absolute 9.69 (*)     All other components within normal limits   FERRITIN - Normal   PROCALCITONIN - Normal   PRO BETA NATRIURETIC PEPTIDE - Normal   CK CR previous medical record was reviewed for the patient. Tracing on cardiac monitor and pulse oximetry was reviewed by myself. Remains sinus on the monitor. Patient had a work-up here in the emergency department.   Metabolic findings noted above that sh

## 2020-06-22 NOTE — PROGRESS NOTES
Problem:  r/o COVID    Data: Alert and oriented. Anxious at times. 2L PRN. BLESSING but no CPAP here. Tele. Lovenox. Electrolyte protocol replaced. Rule out cdiff. Patient states she had one episode of diarrhea last night. Up with stand by. IV abx.  Saline edgard

## 2020-06-22 NOTE — PROGRESS NOTES
[facility]  Progress Note     Ally Gardner Patient Status:  Inpatient    3/16/1980 MRN [id number]   [facility] 5NW-A Attending Delfina Todd MD   Hosp Day # 0 PCP Rashida Goodwin MD     Chief Complaint: Dyspnea    S: Patient ad Pantoprazole Sodium  40 mg Oral QAM AC   • Venlafaxine HCl ER  150 mg Oral Daily   • Venlafaxine HCl ER  75 mg Oral Daily   • amphetamine-dextroamphetamine  7.5 mg Oral [email or web address]   • Levothyroxine Sodium  200 mcg Oral Before breakfast   • enoxaparin  0

## 2020-06-23 ENCOUNTER — APPOINTMENT (OUTPATIENT)
Dept: ULTRASOUND IMAGING | Facility: HOSPITAL | Age: 40
DRG: 194 | End: 2020-06-23
Attending: HOSPITALIST
Payer: COMMERCIAL

## 2020-06-23 PROCEDURE — 93970 EXTREMITY STUDY: CPT | Performed by: HOSPITALIST

## 2020-06-23 PROCEDURE — 99233 SBSQ HOSP IP/OBS HIGH 50: CPT | Performed by: HOSPITALIST

## 2020-06-23 RX ORDER — POTASSIUM CHLORIDE 20 MEQ/1
40 TABLET, EXTENDED RELEASE ORAL ONCE
Status: COMPLETED | OUTPATIENT
Start: 2020-06-23 | End: 2020-06-23

## 2020-06-23 NOTE — PROGRESS NOTES
[facility]  Progress Note     Gerber Dean Patient Status:  Inpatient    3/16/1980 MRN [id number]   [facility] 5NW-A Attending Gerard Hagen MD   Hosp Day # 1 PCP Noa Calle MD     Chief Complaint: Dyspnea    S: Patient on mg/dL). No results for input(s): PTP, INR in the last 168 hours. Recent Labs   Lab 06/21/20  2309 06/22/20  0142   TROP <0.045 <0.045   CK 92  --             Imaging: Imaging data reviewed in Epic.     Medications:   • Cetirizine HCl  10 mg Oral Daily

## 2020-06-23 NOTE — PLAN OF CARE
Patient is alert and oriented x4. Anxious; prn ativan administered. Wdl on RA. Hx of joselin with cpap use at home. IS use encouraged. Pt encouraged to lie on her side as she states she is unable to prone. NS on tele, vss. On lovenox. Up standby. Voids.  No BM analgesics based on type and severity of pain and evaluate response  - Implement non-pharmacological measures as appropriate and evaluate response  - Consider cultural and social influences on pain and pain management  - Manage/alleviate anxiety  - Utilize

## 2020-06-23 NOTE — CONSULTS
INFECTIOUS DISEASE CONSULTATION    Kedar Morgan Patient Status:  Inpatient    3/16/1980 MRN [id number]   [facility] 5NW-A Attending Alexia Curling, MD   [address] Day # 1 PCP Carmen Fallon, Facility-Administered Medications:   •  iohexol (OMNIPAQUE) 350 MG/ML injection 100 mL, 100 mL, Intravenous, ONCE PRN  •  cetirizine (ZYRTEC) tab 10 mg, 10 mg, Oral, Daily  •  Fluticasone Propionate (FLONASE) 50 MCG/ACT nasal spray 2 spray, 2 spray, Each N mg total) by mouth every 6 (six) hours as needed for Pain., Disp: 20 tablet, Rfl: 0  tiZANidine HCl 4 MG Oral Tab, Take 1 tablet (4 mg total) by mouth every 6 (six) hours as needed. , Disp: 24 tablet, Rfl: 0  LORAZEPAM 0.5 MG Oral Tab, TAKE 1 TABLET(0.5 MG) Disp: 90 capsule, Rfl: 1        Review of Systems:    A comprehensive 10 point review of systems was completed. Pertinent positives and negatives noted in the the HPI. Physical Exam:  obese  General: No acute distress. Alert and oriented x 3.   Vital Not Detected 05/18/2020       Pro-Calcitonin  Recent Labs   Lab 06/21/20 2309   PCT <0.05       Cardiac  Recent Labs   Lab 06/21/20 2309 06/22/20  0142   TROP <0.045 <0.045   PBNP 57  --        Creatinine Kinase  Recent Labs   Lab 06/21/20 2309   CK 92

## 2020-06-23 NOTE — PROGRESS NOTES
covid PCR () came back negative. Dr Aniket Hauser notified. 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

## 2020-06-23 NOTE — PROGRESS NOTES
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 alert oriented. on room air.02 sats>94% on room air. but pt uses 2L02 as needed. K3.8-replaced. COVID test neg,denies pain,sob,fever,n/v or diarrhea. pt is on iv CTX and iv iron,mild anxiety,ativan

## 2020-06-23 NOTE — PROGRESS NOTES
06/23/20 5385   Provider Notification   Reason for Communication New consult   Provider Name Other (comment)  (Dr Roslyn Lennox)   Method of Communication Page   Response Waiting for response   Notification Time 2064     Dr Roslyn Lennox paged for new consult.  RN Osorio Hopkins i

## 2020-06-24 ENCOUNTER — APPOINTMENT (OUTPATIENT)
Dept: CV DIAGNOSTICS | Facility: HOSPITAL | Age: 40
DRG: 194 | End: 2020-06-24
Attending: STUDENT IN AN ORGANIZED HEALTH CARE EDUCATION/TRAINING PROGRAM
Payer: COMMERCIAL

## 2020-06-24 VITALS
WEIGHT: 293 LBS | HEIGHT: 62 IN | RESPIRATION RATE: 16 BRPM | DIASTOLIC BLOOD PRESSURE: 67 MMHG | SYSTOLIC BLOOD PRESSURE: 135 MMHG | HEART RATE: 70 BPM | BODY MASS INDEX: 53.92 KG/M2 | OXYGEN SATURATION: 97 % | TEMPERATURE: 98 F

## 2020-06-24 PROCEDURE — 93306 TTE W/DOPPLER COMPLETE: CPT | Performed by: STUDENT IN AN ORGANIZED HEALTH CARE EDUCATION/TRAINING PROGRAM

## 2020-06-24 PROCEDURE — 99239 HOSP IP/OBS DSCHRG MGMT >30: CPT | Performed by: STUDENT IN AN ORGANIZED HEALTH CARE EDUCATION/TRAINING PROGRAM

## 2020-06-24 RX ORDER — SORBITOL SOLUTION 70 %
30 SOLUTION, ORAL MISCELLANEOUS ONCE
Status: DISCONTINUED | OUTPATIENT
Start: 2020-06-24 | End: 2020-06-24

## 2020-06-24 RX ORDER — POTASSIUM CHLORIDE 20 MEQ/1
40 TABLET, EXTENDED RELEASE ORAL EVERY 4 HOURS
Status: COMPLETED | OUTPATIENT
Start: 2020-06-24 | End: 2020-06-24

## 2020-06-24 RX ORDER — SENNOSIDES 8.6 MG
8.6 TABLET ORAL 2 TIMES DAILY
Status: DISCONTINUED | OUTPATIENT
Start: 2020-06-24 | End: 2020-06-24

## 2020-06-24 NOTE — PAYOR COMM NOTE
--------------  CONTINUED STAY REVIEW    Payor: [facility]  Subscriber #:  [id number]  Authorization Number: [id number]    Admit date: 6/22/20  Admit time: 0216    Admitting Physician: Marcella Dotson MD  Attending Physician:  Mayur Castillo MD  Primary Car 0.5  --    TP 8.0 7.2  --  7.8  --          Estimated Creatinine Clearance: 90 mL/min (based on SCr of 0.67 mg/dL).     No results for input(s): PTP, INR in the last 168 hours.          Recent Labs   Lab 06/21/20  2309 06/22/20  0142   TROP <0.045 <0.045 mg     Date Action Dose Route User    6/24/2020 0914 Given 80 mg Subcutaneous (Left Lower Abdomen) Jackelin Neff RN      Fluticasone Propionate (FLONASE) 50 MCG/ACT nasal spray 2 spray     Date Action Dose Route User    6/24/2020 0751 Given 2 spray Each Na

## 2020-06-24 NOTE — PROGRESS NOTES
NURSING DISCHARGE NOTE    Discharged Home via Wheelchair. Accompanied by Support staff  Belongings Taken by patient/family. Patient assessed and ready for discharge. IV dc'd. C/D/I. Discharge instructions and follow up gone over with patient.  Work

## 2020-06-24 NOTE — PLAN OF CARE
Problem: PNA, anemia  Data: Ax04. On telemetry, NSR. , placed on 1L NC for comfort care/ patient request. 02 sats 96-98%. Denied pain. No n/v/d overnight. Afebrile. Mild cough. Ambulatory and continent. WCTM. @0400 Placed on room air in the morning. Assess pain using appropriate pain scale  - Administer analgesics based on type and severity of pain and evaluate response  - Implement non-pharmacological measures as appropriate and evaluate response  - Consider cultural and social influences on pain and

## 2020-06-24 NOTE — DISCHARGE SUMMARY
[facility] [place] HOSPITALIST  DISCHARGE SUMMARY     Daja Moss Patient Status:  Inpatient    3/16/1980 MRN [id number]   [facility] 5NW-A Attending Freeman Mills MD   [facility] Day # 2 PCP Chelsey Barragan MD     Date of Admission: 2020  Date of CONCLUSION:  Recurrent ground-glass opacities in the lung bilateral, suspicious for atypical including viral pneumonia, with other etiologies possible. Negative for acute pulmonary embolism.        •     Incidental or significant findings and recommendat capsule (40 mg total) by mouth 2 (two) times daily before meals.    Quantity:  180 capsule  Refills:  1     predniSONE 5 MG Tabs  Commonly known as:  DELTASONE      Take 20 mg daily x3 days, then 15 mg daily x3 days, then 10 mg daily x3 days then 5 mg daily Tabs         [place]    Follow-up appointment:   No follow-up provider specified.   Appointments for Next 30 Days 6/24/2020 - 7/24/2020      Date Arrival Time Visit Type Length Department Provider     7/1/2020  4:00 PM  [facility] JOSE VIDEO VISIT [5642 guarding. Neurologic: No focal neurological deficits. Musculoskeletal: Moves all extremities. Extremities: No edema.   -----------------------------------------------------------------------------------------------  PATIENT DISCHARGE INSTRUCTIONS: See e

## 2020-06-24 NOTE — PAYOR COMM NOTE
--------------  CONTINUED STAY REVIEW    Payor: [facility]  Subscriber #:  [id number]  Authorization Number: [id number]    Admit date: 6/22/20  Admit time: 0216    Admitting Physician: Wayne Warren MD  Attending Physician:  Doris Moon MD  Primary Car 0.5  --    TP 8.0 7.2  --  7.8  --          Estimated Creatinine Clearance: 90 mL/min (based on SCr of 0.67 mg/dL).     No results for input(s): PTP, INR in the last 168 hours.          Recent Labs   Lab 06/21/20  2309 06/22/20  0142   TROP <0.045 <0.045 mg     Date Action Dose Route User    6/24/2020 0914 Given 80 mg Subcutaneous (Left Lower Abdomen) Edilia Keenan, RN      Fluticasone Propionate (FLONASE) 50 MCG/ACT nasal spray 2 spray     Date Action Dose Route User    6/24/2020 3390 Given 2 spray Each Na

## 2020-06-24 NOTE — PROGRESS NOTES
[facility]  Progress Note     Ayleen Torres Patient Status:  Inpatient    3/16/1980 MRN [id number]   [facility] 5NW-A Attending Ge Gutierrez MD   Hosp Day # 2 PCP Pastora Mariee MD     Chief Complaint: Dyspnea    S: Patient of mg/dL). No results for input(s): PTP, INR in the last 168 hours. Recent Labs   Lab 06/21/20  2309 06/22/20  0142   TROP <0.045 <0.045   CK 92  --             Imaging: Imaging data reviewed in Epic.     Medications:   • Potassium Chloride ER  40 mEq Or

## 2020-06-25 ENCOUNTER — PATIENT OUTREACH (OUTPATIENT)
Dept: CASE MANAGEMENT | Age: 40
End: 2020-06-25

## 2020-06-25 DIAGNOSIS — Z02.9 ENCOUNTERS FOR UNSPECIFIED ADMINISTRATIVE PURPOSE: ICD-10-CM

## 2020-06-25 NOTE — PROGRESS NOTES
ESHA LM requesting a call back to [phone] with a condition update.  [facility] also requested patient call [facility] clinic at [phone] to confirm appointment on 6/26/2020 at 1:00 pm.

## 2020-06-25 NOTE — PAYOR COMM NOTE
--------------  DISCHARGE REVIEW    Payor: [employer] [facility]  Subscriber #:  [id number]  Authorization Number: [id number]    Admit date: 6/22/20  Admit time:  0216  Discharge Date: 6/24/2020  4:25 PM     Admitting Physician: Kayla Jett MD  Attending Physician

## 2020-06-26 ENCOUNTER — TELEMEDICINE (OUTPATIENT)
Dept: INTERNAL MEDICINE CLINIC | Facility: CLINIC | Age: 40
End: 2020-06-26

## 2020-06-26 DIAGNOSIS — G47.33 OSA ON CPAP: ICD-10-CM

## 2020-06-26 DIAGNOSIS — Z99.89 OSA ON CPAP: ICD-10-CM

## 2020-06-26 DIAGNOSIS — R79.89 ELEVATED D-DIMER: ICD-10-CM

## 2020-06-26 DIAGNOSIS — E03.9 ACQUIRED HYPOTHYROIDISM: ICD-10-CM

## 2020-06-26 DIAGNOSIS — R06.00 DOE (DYSPNEA ON EXERTION): Primary | ICD-10-CM

## 2020-06-26 DIAGNOSIS — D50.9 IRON DEFICIENCY ANEMIA, UNSPECIFIED IRON DEFICIENCY ANEMIA TYPE: ICD-10-CM

## 2020-06-26 DIAGNOSIS — K58.8 OTHER IRRITABLE BOWEL SYNDROME: ICD-10-CM

## 2020-06-26 DIAGNOSIS — E78.2 MIXED HYPERLIPIDEMIA: ICD-10-CM

## 2020-06-26 PROCEDURE — 99495 TRANSJ CARE MGMT MOD F2F 14D: CPT | Performed by: CLINICAL NURSE SPECIALIST

## 2020-06-26 RX ORDER — MULTIVIT-MIN/IRON FUM/FOLIC AC 7.5 MG-4
1 TABLET ORAL DAILY
Status: ON HOLD | COMMUNITY
End: 2020-12-22

## 2020-06-26 NOTE — PROGRESS NOTES
TRANSITIONAL CARE CLINIC PHARMACIST MEDICATION RECONCILIATION        Kendall Thurston MRN [id number]    3/16/1980 PCP Nixon Doran MD       Comments: Medication history completed by the [facility] Pharmacist with the patient on the phone reports she is not taking any Ketorolac at this time but only takes her Tizanidine at night. Reviewed inhaler technique with the patient. She states she uses a spacer with the inhaler. No questions at this time.     Reviewed all medications in detail wit

## 2020-06-26 NOTE — PROGRESS NOTES
Attempted to contact the pt for TCM without a patient call back. The patient was seen for TCM-Hospital FU with the [facility] on 6/26/2020. NCM closing encounter.

## 2020-06-26 NOTE — PROGRESS NOTES
Video Visit  [address]      Please note that the following visit was completed using two-way, real-time interactive audio and/or video communication.   This has been done in good greg to provide continuity of care in the be 20 mg daily x3 days, then 15 mg daily x3 days, then 10 mg daily x3 days then 5 mg daily x3 days and stop, Disp: 30 tablet, Rfl: 0  Albuterol Sulfate HFA (PROAIR HFA) 108 (90 Base) MCG/ACT Inhalation Aero Soln, Inhale 2 puffs into the lungs every 4 (four) h 1  Omeprazole 40 MG Oral Capsule Delayed Release, Take 1 capsule (40 mg total) by mouth 2 (two) times daily before meals. , Disp: 180 capsule, Rfl: 1  Triamcinolone Acetonide 55 MCG/ACT Nasal Aerosol, by Nasal route daily. , Disp: , Rfl:   Saline (SODIUM CHL AM    .0 06/24/2020 06:59 AM    GLU 79 06/23/2020 06:51 AM     06/23/2020 06:51 AM    K 3.5 06/24/2020 06:59 AM     06/23/2020 06:51 AM    CO2 24.0 06/23/2020 06:51 AM    BUN 7 06/23/2020 06:51 AM    CREATSERUM 0.67 06/23/2020 06:51 AM Visit:  No outpatient medications have been marked as taking for the 6/26/20 encounter (Appointment) with WILLIE Harris.     Requested Prescriptions      No prescriptions requested or ordered in this encounter         Health Maintenance:  Annual [address] [facility] ([facility] IM/FM [place])    You have been scheduled for a Virtual Telephone visit with your provider.  Please be available at your phone so that your physician can contact you, and be prepared with any questi [address] [facility] IM/ [facility], [facility] [facility] [phone]  [facility],  [address], [address] [facility]

## 2020-06-29 ENCOUNTER — TELEPHONE (OUTPATIENT)
Dept: FAMILY MEDICINE CLINIC | Facility: CLINIC | Age: 40
End: 2020-06-29

## 2020-06-29 NOTE — TELEPHONE ENCOUNTER
Aryan Grey from [facility] called wanting some information on when the patient was last here for a physical/ pap, I dont see she has been in for a while .

## 2020-06-29 NOTE — TELEPHONE ENCOUNTER
Please ask them to fax us something with what they are requesting. I am not comfortable sharing medical info to someone over the phone that I cannot confirm who they are. Thanks!

## 2020-07-01 ENCOUNTER — VIRTUAL PHONE E/M (OUTPATIENT)
Dept: FAMILY MEDICINE CLINIC | Facility: CLINIC | Age: 40
End: 2020-07-01
Payer: COMMERCIAL

## 2020-07-01 ENCOUNTER — TELEMEDICINE (OUTPATIENT)
Dept: INTERNAL MEDICINE CLINIC | Facility: CLINIC | Age: 40
End: 2020-07-01

## 2020-07-01 VITALS — HEART RATE: 82 BPM | WEIGHT: 293 LBS | BODY MASS INDEX: 66 KG/M2

## 2020-07-01 VITALS — BODY MASS INDEX: 52.57 KG/M2 | WEIGHT: 293 LBS | HEIGHT: 62.5 IN

## 2020-07-01 DIAGNOSIS — R06.00 DYSPNEA ON EXERTION: ICD-10-CM

## 2020-07-01 DIAGNOSIS — E03.9 ACQUIRED HYPOTHYROIDISM: ICD-10-CM

## 2020-07-01 DIAGNOSIS — J18.9 RECURRENT PNEUMONIA: ICD-10-CM

## 2020-07-01 DIAGNOSIS — Z99.89 OSA ON CPAP: ICD-10-CM

## 2020-07-01 DIAGNOSIS — R53.1 WEAKNESS: Primary | ICD-10-CM

## 2020-07-01 DIAGNOSIS — E66.01 CLASS 3 SEVERE OBESITY DUE TO EXCESS CALORIES WITH SERIOUS COMORBIDITY AND BODY MASS INDEX (BMI) OF 60.0 TO 69.9 IN ADULT (HCC): Primary | ICD-10-CM

## 2020-07-01 DIAGNOSIS — J18.9 ATYPICAL PNEUMONIA: ICD-10-CM

## 2020-07-01 DIAGNOSIS — F43.10 PTSD (POST-TRAUMATIC STRESS DISORDER): ICD-10-CM

## 2020-07-01 DIAGNOSIS — R79.82 ELEVATED C-REACTIVE PROTEIN (CRP): ICD-10-CM

## 2020-07-01 DIAGNOSIS — M79.10 MYALGIA: ICD-10-CM

## 2020-07-01 DIAGNOSIS — E66.2 MORBID OBESITY WITH ALVEOLAR HYPOVENTILATION (HCC): ICD-10-CM

## 2020-07-01 DIAGNOSIS — G47.33 OSA ON CPAP: ICD-10-CM

## 2020-07-01 PROCEDURE — 97803 MED NUTRITION INDIV SUBSEQ: CPT | Performed by: DIETITIAN, REGISTERED

## 2020-07-01 PROCEDURE — 99214 OFFICE O/P EST MOD 30 MIN: CPT | Performed by: FAMILY MEDICINE

## 2020-07-01 PROCEDURE — 1111F DSCHRG MED/CURRENT MED MERGE: CPT | Performed by: FAMILY MEDICINE

## 2020-07-01 NOTE — PROGRESS NOTES
Cathalene Essex IS A 47year old female HERE FOR No chief complaint on file. History of present illness:     6/21 to 6/24, hospitalized at [facility], Sunday evening until Wednesday. Has seen Dr. Merry Marie for CPAP and BLESSING.  Has not seen him for this specific levothyroxine to 200 mcg daily June 1. Needs repeat TSH mid-July. Psych: Feels ok mostly with anxiety, albuterol increases it some. (jittery). Venlafaxine working well. Only took lorazepam a few times.      Past history:     Past Medical History:   Diag capsule 0   • Venlafaxine HCl ER 75 MG Oral Capsule SR 24 Hr Take 1 capsule (75 mg total) by mouth daily. 90 capsule 1   • Venlafaxine HCl  MG Oral Capsule SR 24 Hr Take 1 capsule (150 mg total) by mouth daily.  90 capsule 1   • Omeprazole 40 MG Oral Frequency: Monthly or less        Comment: Socially       Drug use: Not Currently      Sexual activity: Not on file    Lifestyle      Physical activity:        Days per week: Not on file        Minutes per session: Not on file      Stress: Not on file past pulmonary and recent rheum consults. Echocardiogram normal, EF 70-75%  hgb 11.7    Assessment & Plan:   Diagnoses and all orders for this visit:    Weakness--generalized worsening, high CRP  & sed rate.  Rheum has not found specific etiology, recommend

## 2020-07-01 NOTE — PATIENT INSTRUCTIONS
Suggest followup with Dr. Daryn Avalos and Dr. Gage Rodriguez for pulmonary and weight loss respectively. Refer Dr. Babita Ortiz for neurology.       Mid-July TSH test.

## 2020-07-01 NOTE — PROGRESS NOTES
Anni [address]  [address] [address]  Dept: [phone]  Loc: [phone]    07/01/20      Bariatric Follow-up Nutrition Session    Consultation conducted via telehealth.      Pt ve 06/24/2020    .0 06/23/2020    .0 06/22/2020       Diabetes:    Lab Results   Component Value Date     05/28/2020    A1C 5.5 05/28/2020       Lipid Panel:  Lab Results   Component Value Date    CHOLEST 306 (H) 05/28/2020    TRIG 118 05 DAILY AS NEEDED FOR ANXIETY, Disp: 40 tablet, Rfl: 0  •  ergocalciferol 1.25 MG (15869 UT) Oral Cap, Take 1 capsule (50,000 Units total) by mouth twice a week.  With food for 12 weeks total then get lab completed, Disp: 24 capsule, Rfl: 0  •  Venlafaxine HC diet: yes, has taper down FF/dining out, stopped snacking, and has eliminated coffee from her diet  Food intolerances: None reported  Vitamin/mineral supplements: Vit D 50,000 IU/wk  Protein supplements:  Premier Protein     Activity Level: Minimal, counts

## 2020-07-09 ENCOUNTER — OFFICE VISIT (OUTPATIENT)
Dept: NEUROLOGY | Facility: CLINIC | Age: 40
End: 2020-07-09
Payer: COMMERCIAL

## 2020-07-09 VITALS
SYSTOLIC BLOOD PRESSURE: 130 MMHG | BODY MASS INDEX: 53.92 KG/M2 | HEART RATE: 82 BPM | WEIGHT: 293 LBS | RESPIRATION RATE: 16 BRPM | TEMPERATURE: 98 F | HEIGHT: 62 IN | DIASTOLIC BLOOD PRESSURE: 78 MMHG

## 2020-07-09 DIAGNOSIS — R53.83 FATIGUE, UNSPECIFIED TYPE: ICD-10-CM

## 2020-07-09 DIAGNOSIS — R53.1 WEAKNESS GENERALIZED: ICD-10-CM

## 2020-07-09 DIAGNOSIS — M79.10 MYALGIA: Primary | ICD-10-CM

## 2020-07-09 DIAGNOSIS — R06.02 SHORTNESS OF BREATH: ICD-10-CM

## 2020-07-09 PROCEDURE — 99244 OFF/OP CNSLTJ NEW/EST MOD 40: CPT | Performed by: OTHER

## 2020-07-09 NOTE — PATIENT INSTRUCTIONS
Refill policies:    • Allow 2-3 business days for refills; controlled substances may take longer.   • Contact your pharmacy at least 5 days prior to running out of medication and have them send an electronic request or submit request through the “request re Depending on your insurance carrier, approval may take 3-10 days. It is highly recommended patients contact their insurance carrier directly to determine coverage.   If test is done without insurance authorization, patient may be responsible for the entire affect the results of the test, so please read through the instructions carefully:    1. Do not put lotion on the arms or legs that are going to be examined.   If you are not sure what side or limbs are to be tested, avoid putting lotion near the spine and

## 2020-07-09 NOTE — H&P
[facility] New Patient / Consult Visit    Juliana Gutierrez is a 47year old female.                          Referring MD: Kimberly Rivera    Patient presents with:  Pain: C/O of muscle pain all over      HPI:    Juliana Gutierrez is a 47 year • Depression    • Disorder of thyroid    • High cholesterol high cholesterol   • Hyperlipidemia    • Hypothyroidism    • IBS (irritable bowel syndrome)    • Migraines    • Obesity    • BLESSING (obstructive sleep apnea) 8/23/19 PSG    AHI 19 REM AHI 57 Supine TWICE DAILY AS NEEDED FOR ANXIETY 40 tablet 0   • ergocalciferol 1.25 MG (85569 UT) Oral Cap Take 1 capsule (50,000 Units total) by mouth twice a week.  With food for 12 weeks total then get lab completed 24 capsule 0   • Venlafaxine HCl ER 75 MG Oral Capsu Pupils: equally round and reactive to light with direct and consensual responses, normal accomodation   Visual acuity: Normal              Visual fields: Normal  Oculomotor/Trochlear/Abducens:    Eye Movements: EOMI without nystagmus  Trigeminal:   Faci Gamma) Ab      Negative  Negative   Ku Antibody      Negative  Negative   EJ (Glycyl - tRNA Synthetase) Ab      Negative  Negative   SRP (Single Recognition Particle) Ab      Negative  Negative   OJ (Isoleucyl-tRNA Synthetase) Ab      Negative  Negative Synthetase) Ab      Negative     SRP (Single Recognition Particle) Ab      Negative     OJ (Isoleucyl-tRNA Synthetase) Ab      Negative     SAE1 (SUMO activating enzyme) Antibody      Negative     NXP-2 (Nuclear matrix protein-2) Ab      Negative     MDA5 Negative    TIF1-gamma Antibody      Negative    Myositis Panel Interpretive Data          Fibrillarin (U3 RNP) Ab, IgG      Negative    PM-Scl 100 Antibody, IgG      Negative    MODE PATTERN       HOMOGENEOUS (A)   MODE TITER       1:40 (H)   MODE Interpreti with other etiologies possible. Negative for acute pulmonary embolism.       IMPRESSION AND PLAN:   Ayleen Torres is a 47year old female with past medical history significant for hypertension, hyperlipidemia, hypothyroidism, morbid obesity and BLESSING, who pr ([address]),         CK CREATINE KINASE (NOT CREATININE), ALDOLASE,         MYASTHENIA GRAVIS REFLEX PANEL, ACHR BINDING         ABS, SERUM, ACHR BLOCKING ABS, SERUM,         ACETYLCHOLINE MODULATING AB        As noted above

## 2020-07-13 RX ORDER — VENLAFAXINE HYDROCHLORIDE 75 MG/1
CAPSULE, EXTENDED RELEASE ORAL
Qty: 90 CAPSULE | Refills: 1 | Status: SHIPPED | OUTPATIENT
Start: 2020-07-13 | End: 2021-12-22 | Stop reason: DRUGHIGH

## 2020-07-13 NOTE — TELEPHONE ENCOUNTER
LOV  7/1/20    LAST LAB 6/24/20    LAST RX   Venlafaxine HCl ER 75 MG Oral Capsule SR 24 Hr 90 capsule 1 2/18/2020     Sig - Route:  Take 1 capsule (75 mg total) by mouth daily. - Oral    Sent to pharmacy as: Venlafaxine HCl ER 75 MG Oral Capsule Extended

## 2020-07-15 ENCOUNTER — OFFICE VISIT (OUTPATIENT)
Dept: INTERNAL MEDICINE CLINIC | Facility: CLINIC | Age: 40
End: 2020-07-15
Payer: COMMERCIAL

## 2020-07-15 VITALS
DIASTOLIC BLOOD PRESSURE: 92 MMHG | BODY MASS INDEX: 52.57 KG/M2 | SYSTOLIC BLOOD PRESSURE: 148 MMHG | TEMPERATURE: 98 F | HEIGHT: 62.5 IN | WEIGHT: 293 LBS | RESPIRATION RATE: 18 BRPM | HEART RATE: 91 BPM

## 2020-07-15 DIAGNOSIS — E78.2 MIXED HYPERLIPIDEMIA: ICD-10-CM

## 2020-07-15 DIAGNOSIS — E66.01 CLASS 3 SEVERE OBESITY DUE TO EXCESS CALORIES WITH SERIOUS COMORBIDITY AND BODY MASS INDEX (BMI) OF 60.0 TO 69.9 IN ADULT (HCC): ICD-10-CM

## 2020-07-15 DIAGNOSIS — F50.81 BINGE EATING DISORDER: ICD-10-CM

## 2020-07-15 DIAGNOSIS — G47.33 OSA ON CPAP: ICD-10-CM

## 2020-07-15 DIAGNOSIS — Z51.81 THERAPEUTIC DRUG MONITORING: Primary | ICD-10-CM

## 2020-07-15 DIAGNOSIS — Z99.89 OSA ON CPAP: ICD-10-CM

## 2020-07-15 DIAGNOSIS — E55.9 VITAMIN D DEFICIENCY: ICD-10-CM

## 2020-07-15 DIAGNOSIS — E03.9 ACQUIRED HYPOTHYROIDISM: ICD-10-CM

## 2020-07-15 DIAGNOSIS — R03.0 ELEVATED BLOOD PRESSURE READING: ICD-10-CM

## 2020-07-15 PROCEDURE — 3077F SYST BP >= 140 MM HG: CPT | Performed by: NURSE PRACTITIONER

## 2020-07-15 PROCEDURE — 3008F BODY MASS INDEX DOCD: CPT | Performed by: NURSE PRACTITIONER

## 2020-07-15 PROCEDURE — 3080F DIAST BP >= 90 MM HG: CPT | Performed by: NURSE PRACTITIONER

## 2020-07-15 PROCEDURE — 99214 OFFICE O/P EST MOD 30 MIN: CPT | Performed by: NURSE PRACTITIONER

## 2020-07-15 NOTE — PROGRESS NOTES
HISTORY OF PRESENT ILLNESS  Patient presents with:  Weight Check: 7 pound weight gain    Shant Rain is a 47year old female here for follow up with medical weight loss program for the treatment of overweight, obesity, or morbid obesity.  Patient has gain (166.9 kg)  07/01/20 : (!) 362 lb (164.2 kg)  07/01/20 : (!) 362 lb (164.2 kg)  06/24/20 : (!) 357 lb 2.3 oz (162 kg)       REVIEW OF SYSTEMS  GENERAL: feels well otherwise  LUNGS: denies shortness of breath with exertion, no apnea  CARDIOVASCULAR: denies 250 (H) 05/28/2020    VLDL 24 05/28/2020    NONHDLC 274 (H) 05/28/2020     Lab Results   Component Value Date    VITB12 479 03/14/2020     Lab Results   Component Value Date    VITD 21.8 (L) 05/28/2020    VITD25 5 (L) 03/14/2020       VENLAFAXINE HCL ER 75 daily., Disp: , Rfl:   [DISCONTINUED] Venlafaxine HCl ER 75 MG Oral Capsule SR 24 Hr, Take 1 capsule (75 mg total) by mouth daily. , Disp: 90 capsule, Rfl: 1    No current facility-administered medications on file prior to visit.        ASSESSMENT/PLAN  (Z51

## 2020-07-16 ENCOUNTER — OFFICE VISIT (OUTPATIENT)
Dept: SURGERY | Facility: CLINIC | Age: 40
End: 2020-07-16
Payer: COMMERCIAL

## 2020-07-16 ENCOUNTER — TELEPHONE (OUTPATIENT)
Dept: FAMILY MEDICINE CLINIC | Facility: CLINIC | Age: 40
End: 2020-07-16

## 2020-07-16 ENCOUNTER — DOCUMENTATION ONLY (OUTPATIENT)
Dept: SURGERY | Facility: CLINIC | Age: 40
End: 2020-07-16

## 2020-07-16 VITALS
BODY MASS INDEX: 52.57 KG/M2 | SYSTOLIC BLOOD PRESSURE: 136 MMHG | HEIGHT: 62.5 IN | WEIGHT: 293 LBS | HEART RATE: 76 BPM | DIASTOLIC BLOOD PRESSURE: 84 MMHG

## 2020-07-16 DIAGNOSIS — E66.01 CLASS 3 SEVERE OBESITY DUE TO EXCESS CALORIES WITH BODY MASS INDEX (BMI) OF 60.0 TO 69.9 IN ADULT, UNSPECIFIED WHETHER SERIOUS COMORBIDITY PRESENT (HCC): ICD-10-CM

## 2020-07-16 DIAGNOSIS — E66.01 MORBID OBESITY DUE TO EXCESS CALORIES (HCC): Primary | ICD-10-CM

## 2020-07-16 RX ORDER — IBUPROFEN 400 MG/1
400 TABLET ORAL EVERY 6 HOURS PRN
COMMUNITY
End: 2020-10-22

## 2020-07-16 NOTE — H&P
[address], [address]  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 ROS:    Gen: No Fevers, chills, recent weight changes, night sweats  Pulm: Recent SOB and PNA - 3 hospitalizations in past few months. Slightly improved.   Last hospitalizati Monthly or less        Comment: Socially       Drug use: Not Currently    Other Topics      Concerns:        Caffeine Concern: Yes          1 caff.  drink daily         Exercise: Yes          Stretching 3-5x weekly 15-30mins, Yoga 2x weekly 30-60 mins MCG/ACT Nasal Aerosol, by Nasal route daily. , Disp: , Rfl:   •  Saline (SODIUM CHLORIDE) 0.65 % Nasal Solution, 1 spray by Nasal route., Disp: , Rfl:   •  Fexofenadine HCl 180 MG Oral Tab, Take 180 mg by mouth daily. , Disp: , Rfl:      Allergies:    Penici and I quoted about a 0.1-0.2% risk of mortality from surgery.     I discussed the long term effects of bariatric surgery with all options and stressed the importance of long term followup with our program or at least some specialty bariatric surgery program MD  7/16/2020

## 2020-07-16 NOTE — PATIENT INSTRUCTIONS
We are here to support you with weight loss, but please remember that you still need your primary care provider for your routine health maintenance.       PLAN:  Check with insurance on coverage for GLP-1   Follow up with Dr. Juju Blount tomorrow   Schedule follow ** Daily INPUT> Look at nutrition section-- \"nutrients\" and it will break down your macros for the day (ie. Protein, carbs, fibers, sugars and fats). Try to stay within these numbers daily     2.  \"7 minute workout\" to help with exercise/a

## 2020-07-16 NOTE — PROGRESS NOTES
Oriented pt to the bariatric program; provided/reviewed bariatric packet of info, time line, referrals, etc; pt agreed and verbalized understanding; pt attended seminar on 4/13/20.
N/A

## 2020-07-17 ENCOUNTER — APPOINTMENT (OUTPATIENT)
Dept: CT IMAGING | Facility: HOSPITAL | Age: 40
End: 2020-07-17
Attending: EMERGENCY MEDICINE
Payer: COMMERCIAL

## 2020-07-17 ENCOUNTER — HOSPITAL ENCOUNTER (EMERGENCY)
Facility: HOSPITAL | Age: 40
Discharge: HOME | End: 2020-07-18
Attending: EMERGENCY MEDICINE
Payer: COMMERCIAL

## 2020-07-17 ENCOUNTER — HOSPITAL ENCOUNTER (OUTPATIENT)
Age: 40
Discharge: ANOTHER HOSPITAL | End: 2020-07-17
Attending: EMERGENCY MEDICINE
Payer: COMMERCIAL

## 2020-07-17 VITALS
BODY MASS INDEX: 53.92 KG/M2 | WEIGHT: 293 LBS | HEIGHT: 62 IN | SYSTOLIC BLOOD PRESSURE: 157 MMHG | TEMPERATURE: 98 F | HEART RATE: 88 BPM | RESPIRATION RATE: 19 BRPM | DIASTOLIC BLOOD PRESSURE: 77 MMHG | OXYGEN SATURATION: 96 %

## 2020-07-17 VITALS
SYSTOLIC BLOOD PRESSURE: 169 MMHG | TEMPERATURE: 99 F | HEART RATE: 76 BPM | RESPIRATION RATE: 16 BRPM | OXYGEN SATURATION: 97 % | DIASTOLIC BLOOD PRESSURE: 104 MMHG

## 2020-07-17 DIAGNOSIS — R51.9 NONINTRACTABLE HEADACHE, UNSPECIFIED CHRONICITY PATTERN, UNSPECIFIED HEADACHE TYPE: Primary | ICD-10-CM

## 2020-07-17 DIAGNOSIS — R51.9 HEADACHE DISORDER: Primary | ICD-10-CM

## 2020-07-17 LAB — SARS-COV-2 RNA RESP QL NAA+PROBE: NOT DETECTED

## 2020-07-17 PROCEDURE — 96374 THER/PROPH/DIAG INJ IV PUSH: CPT

## 2020-07-17 PROCEDURE — 70450 CT HEAD/BRAIN W/O DYE: CPT | Performed by: EMERGENCY MEDICINE

## 2020-07-17 PROCEDURE — 99284 EMERGENCY DEPT VISIT MOD MDM: CPT

## 2020-07-17 PROCEDURE — 96361 HYDRATE IV INFUSION ADD-ON: CPT

## 2020-07-17 PROCEDURE — 99212 OFFICE O/P EST SF 10 MIN: CPT

## 2020-07-17 PROCEDURE — 96375 TX/PRO/DX INJ NEW DRUG ADDON: CPT

## 2020-07-17 RX ORDER — METOCLOPRAMIDE HYDROCHLORIDE 5 MG/ML
10 INJECTION INTRAMUSCULAR; INTRAVENOUS ONCE
Status: COMPLETED | OUTPATIENT
Start: 2020-07-17 | End: 2020-07-17

## 2020-07-17 RX ORDER — DIPHENHYDRAMINE HYDROCHLORIDE 50 MG/ML
25 INJECTION INTRAMUSCULAR; INTRAVENOUS ONCE
Status: COMPLETED | OUTPATIENT
Start: 2020-07-17 | End: 2020-07-17

## 2020-07-17 RX ORDER — ONDANSETRON 2 MG/ML
4 INJECTION INTRAMUSCULAR; INTRAVENOUS ONCE
Status: COMPLETED | OUTPATIENT
Start: 2020-07-17 | End: 2020-07-17

## 2020-07-17 RX ORDER — ACETAMINOPHEN 325 MG/1
650 TABLET ORAL ONCE
Status: COMPLETED | OUTPATIENT
Start: 2020-07-17 | End: 2020-07-17

## 2020-07-18 RX ORDER — BUTALBITAL, ACETAMINOPHEN AND CAFFEINE 50; 325; 40 MG/1; MG/1; MG/1
1-2 TABLET ORAL EVERY 6 HOURS PRN
Qty: 10 TABLET | Refills: 0 | Status: SHIPPED | OUTPATIENT
Start: 2020-07-18 | End: 2020-07-25

## 2020-07-18 NOTE — ED PROVIDER NOTES
Patient Seen in: [facility]      History   Patient presents with:  Headache    Stated Complaint: Headache/ Nausea    HPI    44-year-old white female who presents to the immediate care today for complaint of headache.   Patient st Systems    Positive for stated complaint: Headache/ Nausea  Other systems are as noted in HPI. Constitutional and vital signs reviewed. All other systems reviewed and negative except as noted above.     Physical Exam     ED Triage Vitals [07/17/20 195 see if it still remains high and if his blood pressures remains high she may need medications for intervention of hypertension. This was explained to patient she is in agreement to go to the main emergency room for further evaluation.   Patient does not wi

## 2020-07-18 NOTE — ED PROVIDER NOTES
Patient Seen in: [facility] Emergency Department      History   Patient presents with:  Headache    Stated Complaint: headache all day, elevated blood pressure at immediate care.      HPI    45-year-old female past medical history of hypertension hyp above.    Physical Exam     ED Triage Vitals [07/17/20 2058]   BP (!) 139/93   Pulse 85   Resp 18   Temp 98.2 °F (36.8 °C)   Temp src Temporal   SpO2 99 %   O2 Device None (Room air)       Current:/77   Pulse 88   Temp 98.4 °F (36.9 °C) (Temporal) result by Jeanie Montoya MD (07/17/20 22:15:08)                Narrative:    PROCEDURE:  CT BRAIN OR HEAD ([id number])     COMPARISON:  [place], CT, CT BRAIN OR HEAD ([id number]), 8/24/2018, 1:54 PM.     INDICATIONS:  head pain or injury     TECHNIQUE:  Nonco chronicity pattern, unspecified headache type  (primary encounter diagnosis)    Disposition:  Discharge  7/18/2020 12:33 am    Follow-up:  Artemio Sierra MD  [address] [facility] [address] [facility] [id number] [phone]    Call in 2 days      [facility]

## 2020-07-20 ENCOUNTER — TELEPHONE (OUTPATIENT)
Dept: FAMILY MEDICINE CLINIC | Facility: CLINIC | Age: 40
End: 2020-07-20

## 2020-07-20 ENCOUNTER — TELEPHONE (OUTPATIENT)
Dept: SURGERY | Facility: CLINIC | Age: 40
End: 2020-07-20

## 2020-07-20 ENCOUNTER — TELEPHONE (OUTPATIENT)
Dept: NEUROLOGY | Facility: CLINIC | Age: 40
End: 2020-07-20

## 2020-07-20 NOTE — TELEPHONE ENCOUNTER
[facility] is not an option as a resulting agency for these three orders. Patient can still take these orders to [facility] to have blood drawn, and they will be able to send the specimen to the appropriate lab to have the analysis done.     Orders printed and left

## 2020-07-20 NOTE — TELEPHONE ENCOUNTER
Pt will be completing labs at [address] [place] [address], tomorrow after her EMG with Dr. Ramiro Ricci. Asking if she can  copies of orders at time of visit tomorrow.   Please re-order tests that are not assigned to [facility].

## 2020-07-20 NOTE — TELEPHONE ENCOUNTER
Pt has lots of labs ordered by other doctors at [address] [place] [address], should complete those. I ordered a blood count and thyroid which are also in the other doctor's orders (Dr Alysha Wright).

## 2020-07-20 NOTE — TELEPHONE ENCOUNTER
Patient is scheduled to come in on 08/11/20 for a follow up , she was instructed by dr to get lab work done , but there is no orders placed for patient ????

## 2020-07-20 NOTE — TELEPHONE ENCOUNTER
Phone visit 7/1/20  See TE 7/16/20- [employer] message sent to pt to schedule 3 week f/u.   Future Appointments   Date Time Provider Cole Parra   8/11/2020  5:40 PM Ariane Manzanares MD [facility]     DR- would you like pt to complete any labs vickey

## 2020-07-21 ENCOUNTER — PROCEDURE VISIT (OUTPATIENT)
Dept: NEUROLOGY | Facility: CLINIC | Age: 40
End: 2020-07-21
Payer: COMMERCIAL

## 2020-07-21 ENCOUNTER — OFFICE VISIT (OUTPATIENT)
Dept: OBGYN CLINIC | Facility: CLINIC | Age: 40
End: 2020-07-21
Payer: COMMERCIAL

## 2020-07-21 ENCOUNTER — OFFICE VISIT (OUTPATIENT)
Dept: RHEUMATOLOGY | Facility: CLINIC | Age: 40
End: 2020-07-21
Payer: COMMERCIAL

## 2020-07-21 VITALS
DIASTOLIC BLOOD PRESSURE: 78 MMHG | WEIGHT: 293 LBS | TEMPERATURE: 98 F | BODY MASS INDEX: 52.57 KG/M2 | RESPIRATION RATE: 20 BRPM | HEART RATE: 84 BPM | HEIGHT: 62.5 IN | SYSTOLIC BLOOD PRESSURE: 130 MMHG

## 2020-07-21 VITALS
DIASTOLIC BLOOD PRESSURE: 92 MMHG | TEMPERATURE: 98 F | HEIGHT: 62.25 IN | SYSTOLIC BLOOD PRESSURE: 138 MMHG | BODY MASS INDEX: 53.24 KG/M2 | WEIGHT: 293 LBS

## 2020-07-21 VITALS — TEMPERATURE: 98 F

## 2020-07-21 DIAGNOSIS — N93.9 ABNORMAL UTERINE BLEEDING (AUB): ICD-10-CM

## 2020-07-21 DIAGNOSIS — R70.0 ELEVATED SED RATE: ICD-10-CM

## 2020-07-21 DIAGNOSIS — M79.7 FIBROMYALGIA: ICD-10-CM

## 2020-07-21 DIAGNOSIS — Z86.59 HISTORY OF DEPRESSION: ICD-10-CM

## 2020-07-21 DIAGNOSIS — R10.2 PELVIC PAIN: Primary | ICD-10-CM

## 2020-07-21 DIAGNOSIS — M79.10 MYALGIA: ICD-10-CM

## 2020-07-21 DIAGNOSIS — M25.50 POLYARTHRALGIA: Primary | ICD-10-CM

## 2020-07-21 DIAGNOSIS — N83.201 RIGHT OVARIAN CYST: ICD-10-CM

## 2020-07-21 DIAGNOSIS — E66.01 MORBID OBESITY (HCC): ICD-10-CM

## 2020-07-21 DIAGNOSIS — R76.8 POSITIVE ANA (ANTINUCLEAR ANTIBODY): ICD-10-CM

## 2020-07-21 DIAGNOSIS — R53.1 WEAKNESS GENERALIZED: Primary | ICD-10-CM

## 2020-07-21 DIAGNOSIS — R79.82 ELEVATED C-REACTIVE PROTEIN (CRP): ICD-10-CM

## 2020-07-21 PROCEDURE — 3075F SYST BP GE 130 - 139MM HG: CPT | Performed by: OBSTETRICS & GYNECOLOGY

## 2020-07-21 PROCEDURE — 3008F BODY MASS INDEX DOCD: CPT | Performed by: INTERNAL MEDICINE

## 2020-07-21 PROCEDURE — 99203 OFFICE O/P NEW LOW 30 MIN: CPT | Performed by: OBSTETRICS & GYNECOLOGY

## 2020-07-21 PROCEDURE — 3008F BODY MASS INDEX DOCD: CPT | Performed by: OBSTETRICS & GYNECOLOGY

## 2020-07-21 PROCEDURE — 3080F DIAST BP >= 90 MM HG: CPT | Performed by: OBSTETRICS & GYNECOLOGY

## 2020-07-21 PROCEDURE — 3075F SYST BP GE 130 - 139MM HG: CPT | Performed by: INTERNAL MEDICINE

## 2020-07-21 PROCEDURE — 95886 MUSC TEST DONE W/N TEST COMP: CPT | Performed by: OTHER

## 2020-07-21 PROCEDURE — 3078F DIAST BP <80 MM HG: CPT | Performed by: INTERNAL MEDICINE

## 2020-07-21 PROCEDURE — 99214 OFFICE O/P EST MOD 30 MIN: CPT | Performed by: INTERNAL MEDICINE

## 2020-07-21 PROCEDURE — 95910 NRV CNDJ TEST 7-8 STUDIES: CPT | Performed by: OTHER

## 2020-07-21 NOTE — PROCEDURES
[facility]  [address] [facility] [place], [address] [facility]  Ph: [phone]  FAX: [phone]        Full Name: Hi Juarez Gender: Female  Patient ID: [id number] YOB: 1980      Visit Date: 7/21/2020 11:57  Age Ankle EDB 3.96 4.0 100 6.30 14.1 Ankle - EDB 8        Fib head EDB 10.26 3.7 91.8 7.08 13.0 Fib head - Ankle 30.5 6.30 48   R Tibial - AH      Ankle AH 4.79 10.3 100 5.36 23.6 Ankle - AH 8         F  Wave      Nerve F Lat M Lat F-M Lat    ms ms ms   R P 8.  Right ulnar motor response was normal; F wave response was normal.    EMG (needle exam):  Concentric needle EMG was performed on selected muscles listed above in the table, with the following findings:  All muscles tested were normal.    Of note, evalua

## 2020-07-21 NOTE — PROGRESS NOTES
?  RHEUMATOLOGY Follow up   Date of visit: 7/21/2020  ? Patient presents with: Follow - Up: est pt, - fu - Was started on prednisone taper. LOV, symptoms seems to feel better. Still co pain to thighs and hips. Joint pain to fingers and toes.     ?  ASS spent discussing potential etiology to symptoms, treatment options and coordinating care.      ?  Plan:  Diagnoses and all orders for this visit:    Polyarthralgia  -     MODE BY IFA, IGG; Future  -     SED RATE, WESTERGREN (AUTOMATED)  -     C-REACTIVE PROT stiffness and swelling. Since that time, the toe discomfort has resolved but the finger complaints still present. Also was evaluated by Dr. Sandra De León for bariatric surgery, however pt is undecided which type of surgery she would consider.  Has plans to continu that she had early signs of arthritis of her neck. Had PE ruled out as well.      + reflux, improved with medication   + intermittent constipation but thought related to medication   + hx of plantar fasciitis, with difficulty walking in the morning.  Does w • Migraines    • Obesity    • BLESSING (obstructive sleep apnea) 8/23/19 PSG    AHI 19 REM AHI 57 Supine AHI 46 non-supine AHI 11 [place] 71%   • Pneumonia due to organism    • Shortness of breath    • Sleep apnea    • Thyroid disease      Past Surgical H Rfl: 0  LORAZEPAM 0.5 MG Oral Tab, TAKE 1 TABLET(0.5 MG) BY MOUTH TWICE DAILY AS NEEDED FOR ANXIETY, Disp: 40 tablet, Rfl: 0  ergocalciferol 1.25 MG (95796 UT) Oral Cap, Take 1 capsule (50,000 Units total) by mouth twice a week.  With food for 12 weeks tota nervous/anxious and has insomnia. PHYSICAL EXAM   Today's Vitals:  Temperature Blood Pressure Heart Rate Resp Rate SpO2   Temp: 98.1 °F (36.7 °C) BP: 130/78 Pulse: 84 Resp: 20     ?   Current Weight Height BMI BSA Pain   Wt Readings from Last 1 Encount weakness (subjective weakness reported)  Able to get up from seated position with arms crossed (did get short of breath and admits to some difficulty but physically able to perform)  Able to walk on tip toes as well as heels    Skin: Skin is warm and dry. enlarged nodes. CARDIAC:  Unremarkable. CHEST WALL:  Unremarkable. LIMITED ABDOMEN:  No acute process seen. BONES:  No acute process seen. OTHER:  None.         CONCLUSION:  Recurrent ground-glass opacities in the lung bilateral, suspici CREATSERUM 0.67 06/23/2020    ANIONGAP 7 06/23/2020    GFRNAA 112 06/23/2020    GFRAA 129 06/23/2020    CA 8.8 06/23/2020    OSMOCALC 281 06/23/2020    ALKPHO 97 06/23/2020    AST 16 06/23/2020    ALT 23 06/23/2020    BILT 0.5 06/23/2020    TP 7.8 06/23/20

## 2020-07-21 NOTE — PROGRESS NOTES
[facility]  Obstetrics and Gynecology Referral  History & Physical    CC: Patient is a new patient and referred for lower abdominal pain    Subjective:     HPI: Manju Rodrigues is a 47year old [place] female referred for lower abdominal pain.  Kristi 1 tablet by mouth daily. , Disp: , Rfl:   ROSUVASTATIN CALCIUM 10 MG Oral Tab, TAKE 1 TABLET BY MOUTH EVERY NIGHT, Disp: 90 tablet, Rfl: 1  Albuterol Sulfate HFA (PROAIR HFA) 108 (90 Base) MCG/ACT Inhalation Aero Soln, Inhale 2 puffs into the lungs every 4 Shortness of breath    • Sleep apnea    • Thyroid disease        PSH:  Past Surgical History:   Procedure Laterality Date   • REMOVAL GALLBLADDER  2013    [place]   • WISDOM TEETH REMOVED  2013       Social History:  Social History    Socioeconomic History Asked        Blood Transfusions: Not Asked        Occupational Exposure: Not Asked        Hobby Hazards: Not Asked        Sleep Concern: Not Asked        Back Care: Not Asked        Bike Helmet: Not Asked        Self-Exams: Yes    Social History Narrative 4.63 cm x 2.67 cm x 3.34 cm    Probable hemorrhagic functional cyst in the right ovary measuring 24 x 20 x 16 mm  LEFT OVARY:  4.25 cm x 2.96 cm x 2.88 cm    The left ovary appears normal in size, shape, and echogenicity.  No significant masses are identifi obesity  - pt has had consultation for gastric bypass surgery   - advised to continue weight loss management     All of the findings and plan were discussed with the patient. She notes understanding and agrees with the plan of care.   All questions were an

## 2020-07-22 LAB
CANDIDA:: NOT DETECTED
CHLAMYDIA TRACHOMATIS$RNA, TMA: NOT DETECTED
GARDNERELLA:: DETECTED
NEISSERIA GONORRHOEAE$RNA, TMA: NOT DETECTED
TRICHOMONAS:: NOT DETECTED

## 2020-07-22 RX ORDER — BUTALBITAL, ACETAMINOPHEN AND CAFFEINE 50; 325; 40 MG/1; MG/1; MG/1
1-2 TABLET ORAL PRN
COMMUNITY
Start: 2020-07-18 | End: 2020-07-25

## 2020-07-22 RX ORDER — ALBUTEROL SULFATE 90 UG/1
2 AEROSOL, METERED RESPIRATORY (INHALATION) PRN
COMMUNITY
Start: 2020-06-02

## 2020-07-22 RX ORDER — VENLAFAXINE HYDROCHLORIDE 150 MG/1
150 CAPSULE, EXTENDED RELEASE ORAL DAILY
COMMUNITY
Start: 2020-02-15

## 2020-07-22 RX ORDER — IBUPROFEN 400 MG/1
400 TABLET ORAL PRN
COMMUNITY
End: 2020-11-02

## 2020-07-22 RX ORDER — LEVOTHYROXINE SODIUM 0.2 MG/1
200 TABLET ORAL DAILY
COMMUNITY

## 2020-07-22 RX ORDER — TIZANIDINE 4 MG/1
4 TABLET ORAL PRN
COMMUNITY
Start: 2020-05-05

## 2020-07-22 RX ORDER — LORAZEPAM 0.5 MG/1
TABLET ORAL
COMMUNITY
Start: 2020-04-24

## 2020-07-22 RX ORDER — OMEPRAZOLE 40 MG/1
40 CAPSULE, DELAYED RELEASE ORAL 2 TIMES DAILY
COMMUNITY
Start: 2020-01-23

## 2020-07-22 RX ORDER — FEXOFENADINE HCL 180 MG/1
180 TABLET ORAL DAILY
COMMUNITY

## 2020-07-22 RX ORDER — VENLAFAXINE HYDROCHLORIDE 75 MG/1
CAPSULE, EXTENDED RELEASE ORAL
COMMUNITY
Start: 2020-07-13

## 2020-07-22 RX ORDER — ROSUVASTATIN CALCIUM 10 MG/1
TABLET, COATED ORAL
COMMUNITY
Start: 2019-06-04

## 2020-07-22 RX ORDER — MULTIVIT-MIN/IRON FUM/FOLIC AC 7.5 MG-4
1 TABLET ORAL DAILY
COMMUNITY

## 2020-07-22 RX ORDER — ERGOCALCIFEROL 1.25 MG/1
50000 CAPSULE ORAL
COMMUNITY
Start: 2020-03-23

## 2020-07-23 ENCOUNTER — OFFICE VISIT (OUTPATIENT)
Dept: CARDIOLOGY | Age: 40
End: 2020-07-23

## 2020-07-23 VITALS
HEIGHT: 62 IN | WEIGHT: 293 LBS | DIASTOLIC BLOOD PRESSURE: 80 MMHG | SYSTOLIC BLOOD PRESSURE: 130 MMHG | HEART RATE: 92 BPM | BODY MASS INDEX: 53.92 KG/M2

## 2020-07-23 DIAGNOSIS — R06.09 DOE (DYSPNEA ON EXERTION): Primary | ICD-10-CM

## 2020-07-23 DIAGNOSIS — E03.9 ACQUIRED HYPOTHYROIDISM: ICD-10-CM

## 2020-07-23 DIAGNOSIS — Z01.818 PREOPERATIVE CLEARANCE: ICD-10-CM

## 2020-07-23 DIAGNOSIS — G47.33 OSA ON CPAP: ICD-10-CM

## 2020-07-23 DIAGNOSIS — R00.0 SINUS TACHYCARDIA: ICD-10-CM

## 2020-07-23 DIAGNOSIS — E78.2 MIXED HYPERLIPIDEMIA: ICD-10-CM

## 2020-07-23 DIAGNOSIS — D64.9 ANEMIA, UNSPECIFIED TYPE: ICD-10-CM

## 2020-07-23 PROBLEM — E66.01 CLASS 3 SEVERE OBESITY DUE TO EXCESS CALORIES WITH BODY MASS INDEX (BMI) OF 60.0 TO 69.9 IN ADULT (CMD): Status: ACTIVE | Noted: 2019-06-04

## 2020-07-23 PROCEDURE — 93000 ELECTROCARDIOGRAM COMPLETE: CPT | Performed by: INTERNAL MEDICINE

## 2020-07-23 PROCEDURE — 99245 OFF/OP CONSLTJ NEW/EST HI 55: CPT | Performed by: INTERNAL MEDICINE

## 2020-07-23 RX ORDER — METRONIDAZOLE 500 MG/1
500 TABLET ORAL 2 TIMES DAILY
Qty: 14 TABLET | Refills: 0 | Status: SHIPPED | OUTPATIENT
Start: 2020-07-23 | End: 2020-07-30

## 2020-07-23 RX ORDER — EZETIMIBE 10 MG/1
10 TABLET ORAL DAILY
Qty: 90 TABLET | Refills: 3 | Status: SHIPPED | OUTPATIENT
Start: 2020-07-23

## 2020-07-23 SDOH — HEALTH STABILITY: PHYSICAL HEALTH: ON AVERAGE, HOW MANY MINUTES DO YOU ENGAGE IN EXERCISE AT THIS LEVEL?: 0 MIN

## 2020-07-23 SDOH — HEALTH STABILITY: PHYSICAL HEALTH: ON AVERAGE, HOW MANY DAYS PER WEEK DO YOU ENGAGE IN MODERATE TO STRENUOUS EXERCISE (LIKE A BRISK WALK)?: 0 DAYS

## 2020-07-23 ASSESSMENT — ENCOUNTER SYMPTOMS
HEMATOCHEZIA: 0
COUGH: 0
HEMOPTYSIS: 0
FEVER: 0
SUSPICIOUS LESIONS: 0
WEIGHT LOSS: 0
CHILLS: 0
ALLERGIC/IMMUNOLOGIC COMMENTS: NO NEW FOOD ALLERGIES
BRUISES/BLEEDS EASILY: 0
WEIGHT GAIN: 0

## 2020-07-23 ASSESSMENT — PATIENT HEALTH QUESTIONNAIRE - PHQ9
CLINICAL INTERPRETATION OF PHQ9 SCORE: NO FURTHER SCREENING NEEDED
SUM OF ALL RESPONSES TO PHQ9 QUESTIONS 1 AND 2: 0
CLINICAL INTERPRETATION OF PHQ2 SCORE: NO FURTHER SCREENING NEEDED
1. LITTLE INTEREST OR PLEASURE IN DOING THINGS: NOT AT ALL
SUM OF ALL RESPONSES TO PHQ9 QUESTIONS 1 AND 2: 0
2. FEELING DOWN, DEPRESSED OR HOPELESS: NOT AT ALL

## 2020-07-23 NOTE — PROGRESS NOTES
Please inform of neg GC/Chl  vaginitis panel noted for BV  Please provide Rx for flagyl PO per protocol

## 2020-07-23 NOTE — TELEPHONE ENCOUNTER
S/w patient to inform her to get labs done before OV on 8/11. She stated she would go this week or next and get them done.

## 2020-07-24 RX ORDER — OMEPRAZOLE 40 MG/1
CAPSULE, DELAYED RELEASE ORAL
Qty: 180 CAPSULE | Refills: 1 | Status: ON HOLD | OUTPATIENT
Start: 2020-07-24 | End: 2020-12-22

## 2020-07-24 NOTE — TELEPHONE ENCOUNTER
LOV 7/1/2020    LAST LAB 6.24.20    LAST RX   Omeprazole 40 MG Oral Capsule Delayed Release 180 capsule 1 1/23/2020         Next OV   Future Appointments   Date Time Provider Cole Parra   7/28/2020  7:20 AM Karley Sylvester

## 2020-07-27 ENCOUNTER — TELEPHONE (OUTPATIENT)
Dept: NEUROLOGY | Facility: CLINIC | Age: 40
End: 2020-07-27

## 2020-07-27 NOTE — TELEPHONE ENCOUNTER
[employer] message sent to patient with the below results.     ----- Message from Siva Rubio MD sent at 7/26/2020 12:40 AM CDT -----  Results noted, EMG shows mild R carpal tunnel syndrome but otherwise normal; no muscle damage / myopathy or generalized n

## 2020-07-30 ENCOUNTER — ORDER TRANSCRIPTION (OUTPATIENT)
Dept: ADMINISTRATIVE | Facility: HOSPITAL | Age: 40
End: 2020-07-30

## 2020-07-30 DIAGNOSIS — Z01.818 OTHER SPECIFIED PRE-OPERATIVE EXAMINATION: Primary | ICD-10-CM

## 2020-07-30 DIAGNOSIS — Z11.59 ENCOUNTER FOR SCREENING FOR OTHER VIRAL DISEASES: ICD-10-CM

## 2020-08-03 PROBLEM — R06.09 DOE (DYSPNEA ON EXERTION): Status: ACTIVE | Noted: 2020-04-16

## 2020-08-03 PROBLEM — R06.00 DOE (DYSPNEA ON EXERTION): Status: ACTIVE | Noted: 2020-04-16

## 2020-08-03 PROBLEM — Z01.818 PREOPERATIVE CLEARANCE: Status: ACTIVE | Noted: 2020-07-23

## 2020-08-03 NOTE — H&P (VIEW-ONLY)
Gastroenterology outpatient H&P Note    Referring physician: Dr. 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 ROS:    Gastrointestinal Conditions:  . Non-Gastrointestinal Conditions: .   GI Procedure History:  .   positive test for blood in stool.  Previous evaluation wit Appropriate mood and congruent affect; no suicidal ideation        Labs: Collected:  6/24/2020  6:59 AM Status:  Final result   Component  Ref Range & Units 6/24/20  6:59 AM   WBC  4.0 - 11.0 x10(3) uL 9.7    RBC  3.80 - 5.30 x10(6)uL 4.33    HGB  12.0 - 1 Sodium  136 - 145 mmol/L 136    Potassium  3.5 - 5.1 mmol/L 3.6    Chloride  98 - 112 mmol/L 106    CO2  21.0 - 32.0 mmol/L 25.0    Anion Gap  0 - 18 mmol/L 5    BUN  7 - 18 mg/dL 8    Creatinine  0.55 - 1.02 mg/dL 0.81    BUN/CREA Ratio  10.0 - 20.0 9.9 10:43 AM       Finalized by: Bhavani Maher MD on 4/06/2020 at 10:44 AM         Impression: This is patient with severe obesity, and who is undergoing evaluation for bariatric surgery.   She is presenting with chronic heartburn, and also mild iron deficien

## 2020-08-04 ENCOUNTER — OFFICE VISIT (OUTPATIENT)
Dept: INTERNAL MEDICINE CLINIC | Facility: CLINIC | Age: 40
End: 2020-08-04
Payer: COMMERCIAL

## 2020-08-04 VITALS
HEIGHT: 62.5 IN | BODY MASS INDEX: 52.57 KG/M2 | HEART RATE: 80 BPM | DIASTOLIC BLOOD PRESSURE: 80 MMHG | WEIGHT: 293 LBS | RESPIRATION RATE: 16 BRPM | TEMPERATURE: 97 F | SYSTOLIC BLOOD PRESSURE: 130 MMHG

## 2020-08-04 DIAGNOSIS — R73.03 PREDIABETES: ICD-10-CM

## 2020-08-04 DIAGNOSIS — E55.9 VITAMIN D DEFICIENCY: ICD-10-CM

## 2020-08-04 DIAGNOSIS — E78.2 MIXED HYPERLIPIDEMIA: ICD-10-CM

## 2020-08-04 DIAGNOSIS — F50.81 BINGE EATING DISORDER: ICD-10-CM

## 2020-08-04 DIAGNOSIS — E03.9 ACQUIRED HYPOTHYROIDISM: ICD-10-CM

## 2020-08-04 DIAGNOSIS — Z51.81 THERAPEUTIC DRUG MONITORING: Primary | ICD-10-CM

## 2020-08-04 DIAGNOSIS — E66.01 CLASS 3 SEVERE OBESITY DUE TO EXCESS CALORIES WITH SERIOUS COMORBIDITY AND BODY MASS INDEX (BMI) OF 60.0 TO 69.9 IN ADULT (HCC): ICD-10-CM

## 2020-08-04 DIAGNOSIS — G47.33 OSA ON CPAP: ICD-10-CM

## 2020-08-04 DIAGNOSIS — Z99.89 OSA ON CPAP: ICD-10-CM

## 2020-08-04 PROCEDURE — 99214 OFFICE O/P EST MOD 30 MIN: CPT | Performed by: PHYSICIAN ASSISTANT

## 2020-08-04 PROCEDURE — 3008F BODY MASS INDEX DOCD: CPT | Performed by: PHYSICIAN ASSISTANT

## 2020-08-04 PROCEDURE — 3079F DIAST BP 80-89 MM HG: CPT | Performed by: PHYSICIAN ASSISTANT

## 2020-08-04 PROCEDURE — 3075F SYST BP GE 130 - 139MM HG: CPT | Performed by: PHYSICIAN ASSISTANT

## 2020-08-04 RX ORDER — ERGOCALCIFEROL 1.25 MG/1
50000 CAPSULE ORAL WEEKLY
Qty: 4 CAPSULE | Refills: 3 | Status: SHIPPED | OUTPATIENT
Start: 2020-08-04 | End: 2020-08-11 | Stop reason: DRUGHIGH

## 2020-08-04 RX ORDER — PEN NEEDLE, DIABETIC 30 GX3/16"
1 NEEDLE, DISPOSABLE MISCELLANEOUS
Qty: 30 EACH | Refills: 0 | Status: SHIPPED | OUTPATIENT
Start: 2020-08-04 | End: 2020-10-22

## 2020-08-04 NOTE — PROGRESS NOTES
HISTORY OF PRESENT ILLNESS  Patient presents with:  Weight Check: down 5 lb, need Vit D refill still low in May       Juliana Gutierrez is a 47year old female here for follow up in medical weight loss program.   Down 5lbs  Vit D refill,   Has been tracking wi normocephalic, OP-clear, PERRL  NECK: supple,no adenopathy  LUNGS: clear to auscultation bilaterally   CARDIO: RRR without murmur  GI: good BS's,NT/ND, no masses or HSM  EXTREMITIES: no cyanosis, no clubbing, no edema    Lab Results   Component Value Date Disp: , Rfl:   VENLAFAXINE HCL ER 75 MG Oral Capsule SR 24 Hr, TAKE 1 CAPSULE(75 MG) BY MOUTH DAILY, Disp: 90 capsule, Rfl: 1  Phenylephrine-APAP-guaiFENesin (MUCINEX SINUS-MAX CONGESTION OR), Take 1 tablet by mouth 2 (two) times daily as needed (congestio disorder    Acquired hypothyroidism    Mixed hyperlipidemia    Vitamin D deficiency    BLESSING on CPAP    Prediabetes    Other orders  -     Semaglutide,0.25 or 0.5MG/DOS, (OZEMPIC, 0.25 OR 0.5 MG/DOSE,) 2 MG/1.5ML Subcutaneous Solution Pen-injector; Inject 0.

## 2020-08-05 ENCOUNTER — LAB ENCOUNTER (OUTPATIENT)
Dept: LAB | Facility: HOSPITAL | Age: 40
End: 2020-08-05
Attending: INTERNAL MEDICINE
Payer: COMMERCIAL

## 2020-08-05 DIAGNOSIS — Z01.818 OTHER SPECIFIED PRE-OPERATIVE EXAMINATION: ICD-10-CM

## 2020-08-05 DIAGNOSIS — Z11.59 ENCOUNTER FOR SCREENING FOR OTHER VIRAL DISEASES: ICD-10-CM

## 2020-08-05 RX ORDER — ERGOCALCIFEROL 1.25 MG/1
50000 CAPSULE ORAL
Qty: 24 CAPSULE | Refills: 0 | Status: CANCELLED | OUTPATIENT
Start: 2020-08-06

## 2020-08-06 ENCOUNTER — TELEPHONE (OUTPATIENT)
Dept: PHYSICAL THERAPY | Age: 40
End: 2020-08-06

## 2020-08-06 LAB
SARS-COV-2 RNA RESP QL NAA+PROBE: NOT DETECTED
SARS-COV-2 RNA SPEC QL NAA+PROBE: NOT DETECTED
SPECIMEN SOURCE: NORMAL

## 2020-08-06 NOTE — TELEPHONE ENCOUNTER
Requesting Vitamin D  LOV: 8/4/20  RTC: not noted  Last Relevant Labs: 5/28/20  Filled: 8/4/20 #4 with 3 refills    9/9/2020  6:30 PM Perla Grey, PT [facility] PHYS TH [place]   9/10/2020  2:20 PM Danielle Robles PA-C [facility]

## 2020-08-08 ENCOUNTER — HOSPITAL ENCOUNTER (OUTPATIENT)
Dept: CV DIAGNOSTICS | Facility: HOSPITAL | Age: 40
Discharge: HOME | End: 2020-08-08
Attending: INTERNAL MEDICINE
Payer: COMMERCIAL

## 2020-08-08 ENCOUNTER — HOSPITAL ENCOUNTER (OUTPATIENT)
Dept: ULTRASOUND IMAGING | Age: 40
Discharge: HOME | End: 2020-08-08
Attending: OBSTETRICS & GYNECOLOGY
Payer: COMMERCIAL

## 2020-08-08 DIAGNOSIS — R06.00 DOE (DYSPNEA ON EXERTION): ICD-10-CM

## 2020-08-08 DIAGNOSIS — Z99.89 OSA ON CPAP: ICD-10-CM

## 2020-08-08 DIAGNOSIS — D64.9 ANEMIA, UNSPECIFIED TYPE: ICD-10-CM

## 2020-08-08 DIAGNOSIS — N83.201 RIGHT OVARIAN CYST: ICD-10-CM

## 2020-08-08 DIAGNOSIS — N93.9 ABNORMAL UTERINE BLEEDING (AUB): ICD-10-CM

## 2020-08-08 DIAGNOSIS — R10.2 PELVIC PAIN: ICD-10-CM

## 2020-08-08 DIAGNOSIS — E78.2 MIXED HYPERLIPIDEMIA: ICD-10-CM

## 2020-08-08 DIAGNOSIS — R00.0 SINUS TACHYCARDIA: ICD-10-CM

## 2020-08-08 DIAGNOSIS — Z01.818 PREOPERATIVE CLEARANCE: ICD-10-CM

## 2020-08-08 DIAGNOSIS — E03.9 ACQUIRED HYPOTHYROIDISM: ICD-10-CM

## 2020-08-08 DIAGNOSIS — G47.33 OSA ON CPAP: ICD-10-CM

## 2020-08-08 PROCEDURE — 93018 CV STRESS TEST I&R ONLY: CPT | Performed by: INTERNAL MEDICINE

## 2020-08-08 PROCEDURE — 78452 HT MUSCLE IMAGE SPECT MULT: CPT | Performed by: INTERNAL MEDICINE

## 2020-08-08 PROCEDURE — 93017 CV STRESS TEST TRACING ONLY: CPT | Performed by: INTERNAL MEDICINE

## 2020-08-08 PROCEDURE — 76856 US EXAM PELVIC COMPLETE: CPT | Performed by: OBSTETRICS & GYNECOLOGY

## 2020-08-08 PROCEDURE — 76830 TRANSVAGINAL US NON-OB: CPT | Performed by: OBSTETRICS & GYNECOLOGY

## 2020-08-08 PROCEDURE — 93975 VASCULAR STUDY: CPT | Performed by: OBSTETRICS & GYNECOLOGY

## 2020-08-10 ENCOUNTER — TELEMEDICINE (OUTPATIENT)
Dept: SURGERY | Facility: CLINIC | Age: 40
End: 2020-08-10

## 2020-08-10 VITALS — HEIGHT: 62.5 IN | WEIGHT: 293 LBS | BODY MASS INDEX: 52.57 KG/M2

## 2020-08-10 DIAGNOSIS — E66.01 CLASS 3 SEVERE OBESITY DUE TO EXCESS CALORIES WITH SERIOUS COMORBIDITY AND BODY MASS INDEX (BMI) OF 60.0 TO 69.9 IN ADULT (HCC): Primary | ICD-10-CM

## 2020-08-10 PROCEDURE — 97803 MED NUTRITION INDIV SUBSEQ: CPT | Performed by: DIETITIAN, REGISTERED

## 2020-08-10 PROCEDURE — 3008F BODY MASS INDEX DOCD: CPT | Performed by: DIETITIAN, REGISTERED

## 2020-08-11 ENCOUNTER — TELEPHONE (OUTPATIENT)
Dept: RHEUMATOLOGY | Facility: CLINIC | Age: 40
End: 2020-08-11

## 2020-08-11 LAB
ANACHOICE(R) SCREEN: POSITIVE
C-REACTIVE PROTEIN: 20.7 MG/L
CREATINE KINASE, TOTAL: 57 U/L (ref 29–143)
DNA (DS) ANTIBODY: 19 IU/ML
LD: 202 U/L (ref 100–200)
SED RATE BY MODIFIED$WESTERGREN: 41 MM/H

## 2020-08-11 NOTE — TELEPHONE ENCOUNTER
Called pt with test results in detail. I discussed that I am worried about potentially lupus given the MODE positivity as well as now dsDNA positivity. Previous RN P positivity is negative.   I did discuss potentially starting Plaquenil therapy, however p Surgicel 1npg6td and [id number] given to sterile field    LOT [id number]  EXP 07/31/2023    LOT [id number]  EXP 02/29/2024 manifestations from mild disease to life threatening end organ damage, and close follow up is paramount.  -Patients with SLE also have increased risk of cardiovascular events relative to the general population, and reduction of cardiovascular risk factors

## 2020-08-11 NOTE — PROGRESS NOTES
Jamey Ac IS A 47year old female [address] [facility] Patient presents with:  Lab Results: review       History of present illness:     Had Trip [place] Saturday, no chest discomfort but had dyspnea. Arms felt heavy afterward. not yet informed of results.     Dr. Jorge Cormier for 28 days. 1 pen 0   • Insulin Pen Needle (PEN NEEDLES) 32G X 4 MM Does not apply Misc 1 each by Does not apply route every 7 days. 30 each 0   • traZODone HCl 50 MG Oral Tab      • ezetimibe 10 MG Oral Tab Take 10 mg by mouth daily.      • OMEPRAZOLE 40 Allergy:        Penicillins             HIVES    Family history:       Family History   Problem Relation Age of Onset   • Hypertension Father    • Heart Disease Mother    • High Cholesterol Mother    • Other (DDD) Mother    • Other (lymphoma) Materna Yoga 2x weekly 30-60 mins        Seat Belt: Yes        Special Diet: Not Asked        Stress Concern: Not Asked        Weight Concern: Not Asked         Service: Not Asked        Blood Transfusions: Not Asked        Occupational Exposure: Not Asked

## 2020-08-12 ENCOUNTER — TELEPHONE (OUTPATIENT)
Dept: CARDIOLOGY | Age: 40
End: 2020-08-12

## 2020-08-12 DIAGNOSIS — R94.39 ABNORMAL STRESS TEST: ICD-10-CM

## 2020-08-12 DIAGNOSIS — R06.09 DOE (DYSPNEA ON EXERTION): Primary | ICD-10-CM

## 2020-08-12 NOTE — PATIENT INSTRUCTIONS
Continue current dose of venlafaxine, consider duloxetine if venlafaxine is not recommended with hydroxychloroquine of no contraindication for duloxetine. Follow up with specialists, see me again for anxiety/update on status in 3 months.

## 2020-08-13 ENCOUNTER — TELEPHONE (OUTPATIENT)
Dept: SURGERY | Facility: CLINIC | Age: 40
End: 2020-08-13

## 2020-08-13 ENCOUNTER — OFFICE VISIT (OUTPATIENT)
Dept: SURGERY | Facility: CLINIC | Age: 40
End: 2020-08-13
Payer: COMMERCIAL

## 2020-08-13 ENCOUNTER — ORDER TRANSCRIPTION (OUTPATIENT)
Dept: ADMINISTRATIVE | Facility: HOSPITAL | Age: 40
End: 2020-08-13

## 2020-08-13 VITALS
WEIGHT: 293 LBS | DIASTOLIC BLOOD PRESSURE: 80 MMHG | BODY MASS INDEX: 52.57 KG/M2 | SYSTOLIC BLOOD PRESSURE: 130 MMHG | HEIGHT: 62.5 IN | HEART RATE: 80 BPM | OXYGEN SATURATION: 97 %

## 2020-08-13 DIAGNOSIS — R06.00 DOE (DYSPNEA ON EXERTION): Primary | ICD-10-CM

## 2020-08-13 DIAGNOSIS — E66.2 MORBID OBESITY WITH ALVEOLAR HYPOVENTILATION (HCC): Primary | ICD-10-CM

## 2020-08-13 DIAGNOSIS — R94.39 ABNORMAL STRESS TEST: ICD-10-CM

## 2020-08-13 DIAGNOSIS — E66.01 CLASS 3 SEVERE OBESITY DUE TO EXCESS CALORIES WITH BODY MASS INDEX (BMI) OF 60.0 TO 69.9 IN ADULT, UNSPECIFIED WHETHER SERIOUS COMORBIDITY PRESENT (HCC): ICD-10-CM

## 2020-08-13 LAB
% SATURATION: 14 % (CALC) (ref 16–45)
ALBUMIN/GLOBULIN RATIO: 1.2 (CALC) (ref 1–2.5)
ALBUMIN: 3.7 G/DL (ref 3.6–5.1)
ALKALINE PHOSPHATASE: 88 U/L (ref 31–125)
ALT: 16 U/L (ref 6–29)
AST: 16 U/L (ref 10–30)
BILIRUBIN, TOTAL: 0.4 MG/DL (ref 0.2–1.2)
BUN: 15 MG/DL (ref 7–25)
CALCIUM: 9.5 MG/DL (ref 8.6–10.2)
CARBON DIOXIDE: 30 MMOL/L (ref 20–32)
CHLORIDE: 104 MMOL/L (ref 98–110)
CHOL/HDLC RATIO: 3.5 (CALC)
CHOLESTEROL, TOTAL: 145 MG/DL
CREATININE: 0.71 MG/DL (ref 0.5–1.1)
EGFR IF AFRICN AM: 125 ML/MIN/1.73M2
EGFR IF NONAFRICN AM: 108 ML/MIN/1.73M2
FERRITIN: 47 NG/ML (ref 16–154)
FOLATE, SERUM: 10.1 NG/ML
GLOBULIN: 3.1 G/DL (CALC) (ref 1.9–3.7)
GLUCOSE: 109 MG/DL (ref 65–99)
HDL CHOLESTEROL: 41 MG/DL
HEMATOCRIT: 38.6 % (ref 35–45)
HEMOGLOBIN A1C: 5.3 % OF TOTAL HGB
HEMOGLOBIN: 12.4 G/DL (ref 11.7–15.5)
IRON BINDING CAPACITY: 316 MCG/DL (CALC) (ref 250–450)
IRON, TOTAL: 43 MCG/DL (ref 40–190)
LDL-CHOLESTEROL: 88 MG/DL (CALC)
MCH: 27.4 PG (ref 27–33)
MCHC: 32.1 G/DL (ref 32–36)
MCV: 85.2 FL (ref 80–100)
MPV: 10.1 FL (ref 7.5–12.5)
NON-HDL CHOLESTEROL: 104 MG/DL (CALC)
PLATELET COUNT: 303 THOUSAND/UL (ref 140–400)
POTASSIUM: 3.7 MMOL/L (ref 3.5–5.3)
PROTEIN, TOTAL: 6.8 G/DL (ref 6.1–8.1)
RDW: 14.9 % (ref 11–15)
RED BLOOD CELL COUNT: 4.53 MILLION/UL (ref 3.8–5.1)
SODIUM: 140 MMOL/L (ref 135–146)
TRIGLYCERIDES: 70 MG/DL
TSH W/REFLEX TO FT4: 0.91 MIU/L
VITAMIN B1 (THIAMINE),$BLOOD: 133 NMOL/L (ref 78–185)
VITAMIN B12: 480 PG/ML (ref 200–1100)
VITAMIN D, 25-OH, TOTAL: 78 NG/ML (ref 30–100)
WHITE BLOOD CELL COUNT: 7.5 THOUSAND/UL (ref 3.8–10.8)

## 2020-08-13 RX ORDER — MISOPROSTOL 100 UG/1
TABLET ORAL
Qty: 2 TABLET | Refills: 0 | Status: SHIPPED | OUTPATIENT
Start: 2020-08-13 | End: 2020-11-03 | Stop reason: ALTCHOICE

## 2020-08-13 RX ORDER — METOPROLOL TARTRATE 100 MG/1
TABLET ORAL
Qty: 2 TABLET | Refills: 0 | Status: SHIPPED | OUTPATIENT
Start: 2020-08-13 | End: 2020-09-04 | Stop reason: SDUPTHER

## 2020-08-13 NOTE — PROGRESS NOTES
Please inform patient that pelvic US overall reassuring   Normal endometrial lining   No adnexal masses   Recommendation for EMB due to history of AUB and risk factors for endometrial hyperplasia   Please advise patient to schedule appointment for EMB  She

## 2020-08-13 NOTE — PROGRESS NOTES
[address]  Dept: [phone]    8/13/2020    BARIATRIC EXISTING PATIENT/FOLLOW UP    HPI:  PCP: Dr. Margarito Longoria: Dr. Kristie Goodman    7 activity level.  8/11/20 - elminated coffee, stopped night snacking.  FF down to 1x/week.  Keeping food records.  Meeting protein goals.  Activity limited due to Fibromyalgia.   ok for LPD    Bar: 8/4/20 - Radha Marquez - start Ozempic.      Rheumatology Dr. Maria kg/m².     General: Alert & Oriented x3, NAD  HEENT: anicteric, EOMI, oropharynx clear  Neck: no palpable masses, trachea midlien  Chest; clear BS b/l  CV: RR  Abd:   obese, nondistended, soft, nontender, no HSM      ASSESSMENT:  Kendallcaren Thurston is a 47 year

## 2020-08-13 NOTE — PROGRESS NOTES
Patient notified of all results and recommendations. Patient verbalized understanding. Cytotec ordered. Pt states she will call back to schedule appt.

## 2020-08-19 ENCOUNTER — OFFICE VISIT (OUTPATIENT)
Dept: PHYSICAL THERAPY | Age: 40
End: 2020-08-19
Attending: INTERNAL MEDICINE
Payer: COMMERCIAL

## 2020-08-19 ENCOUNTER — PATIENT MESSAGE (OUTPATIENT)
Dept: FAMILY MEDICINE CLINIC | Facility: CLINIC | Age: 40
End: 2020-08-19

## 2020-08-19 DIAGNOSIS — M79.7 FIBROMYALGIA: ICD-10-CM

## 2020-08-19 PROCEDURE — 97110 THERAPEUTIC EXERCISES: CPT

## 2020-08-19 PROCEDURE — 97161 PT EVAL LOW COMPLEX 20 MIN: CPT

## 2020-08-19 NOTE — TELEPHONE ENCOUNTER
From: Daja Moss  To: Chelsey Barragan MD  Sent: 8/19/2020 3:48 PM CDT  Subject: Test Results Question    Should I be taking Iron supplement? If so how often and how much?

## 2020-08-19 NOTE — PROGRESS NOTES
PHYSICAL THERAPY EVALUATION:   Referring Physician: Dr. Chula Storm  Diagnosis: fibromyalgia     Date of Service: 8/19/2020     PATIENT SUMMARY   Ally Gardner is a 47year old female who presents to therapy today with recent diagnosis of fibromyalgia and wide cholesterol (high cholesterol), Hyperlipidemia, Hypothyroidism, IBS (irritable bowel syndrome), Migraines, Obesity, BLESSING (obstructive sleep apnea) (8/23/19 PSG), Pneumonia due to organism, Shortness of breath,    ASSESSMENT  Yuryvíctor Toya presents to physical therap prognosis. Pt was also provided recommendations for trying to increase steps/day by 100.    Therapeutic Neuroscience Education (TNE) provided on:  · Spinal and nerve anatomy with explanation of exit from foramen and path through body  · 3 things nerves need transfer at work    Frequency / Duration: Patient will be seen for 2 x/week or a total of 12 visits over a 90 day period.   Treatment will include: Manual Therapy, Neuromuscular Re-education, Therapeutic Exercise and Home Exercise Program instruction    Edu

## 2020-08-20 ENCOUNTER — TELEPHONE (OUTPATIENT)
Dept: SURGERY | Facility: CLINIC | Age: 40
End: 2020-08-20

## 2020-08-24 ENCOUNTER — TELEPHONE (OUTPATIENT)
Dept: PHYSICAL THERAPY | Age: 40
End: 2020-08-24

## 2020-08-24 ENCOUNTER — APPOINTMENT (OUTPATIENT)
Dept: PHYSICAL THERAPY | Age: 40
End: 2020-08-24
Attending: INTERNAL MEDICINE
Payer: COMMERCIAL

## 2020-08-25 ENCOUNTER — LAB ENCOUNTER (OUTPATIENT)
Dept: LAB | Facility: HOSPITAL | Age: 40
End: 2020-08-25
Attending: INTERNAL MEDICINE
Payer: COMMERCIAL

## 2020-08-25 ENCOUNTER — APPOINTMENT (OUTPATIENT)
Dept: LAB | Age: 40
End: 2020-08-25
Attending: INTERNAL MEDICINE
Payer: COMMERCIAL

## 2020-08-25 DIAGNOSIS — D89.89 RADIATION CHIMERA (HCC): Primary | ICD-10-CM

## 2020-08-25 DIAGNOSIS — R12 HEARTBURN: ICD-10-CM

## 2020-08-25 PROCEDURE — 36415 COLL VENOUS BLD VENIPUNCTURE: CPT

## 2020-08-26 ENCOUNTER — TELEPHONE (OUTPATIENT)
Dept: PHYSICAL THERAPY | Age: 40
End: 2020-08-26

## 2020-08-26 ENCOUNTER — OFFICE VISIT (OUTPATIENT)
Dept: PHYSICAL THERAPY | Age: 40
End: 2020-08-26
Attending: INTERNAL MEDICINE
Payer: COMMERCIAL

## 2020-08-26 NOTE — PATIENT INSTRUCTIONS
Understanding Pain 101:    1. Each muscle, joint, and ligament in your body is attached to a nerve  2. Nerves send electrical signals/messages to your brain including a “danger” message when something is not right  3.  The brain responds by sending chemical surrounding tissues  • Movement  - special exercises can help glide the nerves to make them less sensitive and overall activity improves nerve and joint/muscle mobility   • Blood - regular exercise (walking, bike riding, etc) increases blood flow to your w steps/day, and that 10,000 steps/day is a reasonable target for healthy adults  • The average American walks 3,000 to 4,000 steps a day, or roughly 1.5 to 2 miles. It's a good idea to find out how many steps a day you walk now, as your own baseline.  Then y

## 2020-08-27 LAB — SARS-COV-2 RNA RESP QL NAA+PROBE: NOT DETECTED

## 2020-08-28 ENCOUNTER — HOSPITAL ENCOUNTER (OUTPATIENT)
Facility: HOSPITAL | Age: 40
Setting detail: HOSPITAL OUTPATIENT SURGERY
Discharge: HOME | End: 2020-08-28
Attending: INTERNAL MEDICINE | Admitting: INTERNAL MEDICINE
Payer: COMMERCIAL

## 2020-08-28 ENCOUNTER — ANESTHESIA (OUTPATIENT)
Dept: ENDOSCOPY | Facility: HOSPITAL | Age: 40
End: 2020-08-28
Payer: COMMERCIAL

## 2020-08-28 ENCOUNTER — ANESTHESIA EVENT (OUTPATIENT)
Dept: ENDOSCOPY | Facility: HOSPITAL | Age: 40
End: 2020-08-28
Payer: COMMERCIAL

## 2020-08-28 VITALS
DIASTOLIC BLOOD PRESSURE: 87 MMHG | SYSTOLIC BLOOD PRESSURE: 101 MMHG | BODY MASS INDEX: 53.92 KG/M2 | WEIGHT: 293 LBS | HEIGHT: 62 IN | HEART RATE: 85 BPM | RESPIRATION RATE: 20 BRPM | TEMPERATURE: 97 F | OXYGEN SATURATION: 100 %

## 2020-08-28 DIAGNOSIS — D50.9 IRON DEFICIENCY ANEMIA, UNSPECIFIED IRON DEFICIENCY ANEMIA TYPE: ICD-10-CM

## 2020-08-28 DIAGNOSIS — R12 HEARTBURN: Primary | ICD-10-CM

## 2020-08-28 LAB
POCT URINE PREGNANCY: NEGATIVE
PROCEDURE CONTROL: YES

## 2020-08-28 PROCEDURE — 0DB68ZX EXCISION OF STOMACH, VIA NATURAL OR ARTIFICIAL OPENING ENDOSCOPIC, DIAGNOSTIC: ICD-10-PCS | Performed by: INTERNAL MEDICINE

## 2020-08-28 PROCEDURE — 88305 TISSUE EXAM BY PATHOLOGIST: CPT | Performed by: INTERNAL MEDICINE

## 2020-08-28 PROCEDURE — 0DB98ZX EXCISION OF DUODENUM, VIA NATURAL OR ARTIFICIAL OPENING ENDOSCOPIC, DIAGNOSTIC: ICD-10-PCS | Performed by: INTERNAL MEDICINE

## 2020-08-28 PROCEDURE — 0DJD8ZZ INSPECTION OF LOWER INTESTINAL TRACT, VIA NATURAL OR ARTIFICIAL OPENING ENDOSCOPIC: ICD-10-PCS | Performed by: INTERNAL MEDICINE

## 2020-08-28 PROCEDURE — 81025 URINE PREGNANCY TEST: CPT | Performed by: INTERNAL MEDICINE

## 2020-08-28 RX ORDER — LIDOCAINE HYDROCHLORIDE 10 MG/ML
INJECTION, SOLUTION EPIDURAL; INFILTRATION; INTRACAUDAL; PERINEURAL AS NEEDED
Status: DISCONTINUED | OUTPATIENT
Start: 2020-08-28 | End: 2020-08-28 | Stop reason: SURG

## 2020-08-28 RX ORDER — SODIUM CHLORIDE, SODIUM LACTATE, POTASSIUM CHLORIDE, CALCIUM CHLORIDE 600; 310; 30; 20 MG/100ML; MG/100ML; MG/100ML; MG/100ML
INJECTION, SOLUTION INTRAVENOUS CONTINUOUS
Status: DISCONTINUED | OUTPATIENT
Start: 2020-08-28 | End: 2020-08-28

## 2020-08-28 RX ORDER — HYDROMORPHONE HYDROCHLORIDE 1 MG/ML
0.4 INJECTION, SOLUTION INTRAMUSCULAR; INTRAVENOUS; SUBCUTANEOUS EVERY 5 MIN PRN
Status: DISCONTINUED | OUTPATIENT
Start: 2020-08-28 | End: 2020-08-28

## 2020-08-28 RX ORDER — NALOXONE HYDROCHLORIDE 0.4 MG/ML
80 INJECTION, SOLUTION INTRAMUSCULAR; INTRAVENOUS; SUBCUTANEOUS AS NEEDED
Status: DISCONTINUED | OUTPATIENT
Start: 2020-08-28 | End: 2020-08-28

## 2020-08-28 RX ADMIN — LIDOCAINE HYDROCHLORIDE 50 MG: 10 INJECTION, SOLUTION EPIDURAL; INFILTRATION; INTRACAUDAL; PERINEURAL at 09:17:00

## 2020-08-28 RX ADMIN — SODIUM CHLORIDE, SODIUM LACTATE, POTASSIUM CHLORIDE, CALCIUM CHLORIDE: 600; 310; 30; 20 INJECTION, SOLUTION INTRAVENOUS at 09:44:00

## 2020-08-28 NOTE — OPERATIVE REPORT
Colon operative report  Patient Name: Ayleen Torres  Procedure: Colonoscopy   Date of procedure: 8/28/2020    Indication: Iron deficiency anemia  Date of last colonoscopy: No prior examination  Attending: Yoseph Mathew M.D.   Consent: The risks, benefit righted, and air was suctioned from the colon to the best of my ability, as it was during withdrawn from the colon. The endoscope was then removed from the patient. The patient tolerated the procedure without apparent procedural complications.   The patie

## 2020-08-28 NOTE — OPERATIVE REPORT
EGD operative report  Patient Name: Garrett Cordova  Procedure: Esophagogastroduodenoscopy with cold forceps biopsy   Date of procedure: 8/28/2020    Indication: Chronic heartburn, Iron deficiency anemia  Attending: Christiano George M.D.   Consent:  The ris gastric body, antrum, fundus, cardia, and angularis were normal, without masses, polyps, ulcers, erosions, diverticula, or varices. Cold forceps biopsies were obtained from the antrum, body, and fundus, to evaluate for H.pylori.    Duodenum: The duodenal b

## 2020-08-28 NOTE — INTERVAL H&P NOTE
Pre-op Diagnosis: Heartburn [R12]  Iron deficiency anemia, unspecified iron deficiency anemia type [D50.9]    The above referenced H&P was reviewed by Margarette Alba MD on 8/28/2020, the patient was examined and no significant changes have occurred in th

## 2020-08-28 NOTE — ANESTHESIA PREPROCEDURE EVALUATION
PRE-OP EVALUATION    Patient Name: Kendall Thurston    Pre-op Diagnosis: Heartburn [R12]  Iron deficiency anemia, unspecified iron deficiency anemia type [D50.9]    Procedure(s):  BRAVO ESOPHAGOGASTRODUODENOSCOPY, COLONOSCOPY      Surgeon(s) and Role:     * K tablet, Rfl: 0  LORAZEPAM 0.5 MG Oral Tab, TAKE 1 TABLET(0.5 MG) BY MOUTH TWICE DAILY AS NEEDED FOR ANXIETY, Disp: 40 tablet, Rfl: 0  ergocalciferol 1.25 MG (17682 UT) Oral Cap, Take 1 capsule (50,000 Units total) by mouth twice a week.  With food for 12 we RDW 14.9 08/08/2020     08/08/2020     Lab Results   Component Value Date     08/08/2020    K 3.7 08/08/2020     08/08/2020    CO2 30 08/08/2020    BUN 15 08/08/2020    CREATSERUM 0.71 08/08/2020     (H) 08/08/2020    CA 9.5 08/08

## 2020-08-28 NOTE — ANESTHESIA POSTPROCEDURE EVALUATION
[address] [facility] Patient Status:  Hospital Outpatient Surgery   Age/Gender 47year old female MRN [id number]   Location [address] [place] [address]. Attending Katherine Norris MD   Hosp Day # 0 PCP Nixon Doran MD       Anesthesia Post-op

## 2020-08-31 ENCOUNTER — TELEPHONE (OUTPATIENT)
Dept: RHEUMATOLOGY | Facility: CLINIC | Age: 40
End: 2020-08-31

## 2020-08-31 ENCOUNTER — TELEPHONE (OUTPATIENT)
Dept: PHYSICAL THERAPY | Age: 40
End: 2020-08-31

## 2020-08-31 ENCOUNTER — APPOINTMENT (OUTPATIENT)
Dept: PHYSICAL THERAPY | Age: 40
End: 2020-08-31
Attending: INTERNAL MEDICINE
Payer: COMMERCIAL

## 2020-08-31 DIAGNOSIS — R79.82 CRP ELEVATED: ICD-10-CM

## 2020-08-31 DIAGNOSIS — R76.8 POSITIVE ANA (ANTINUCLEAR ANTIBODY): ICD-10-CM

## 2020-08-31 DIAGNOSIS — M32.9 SYSTEMIC LUPUS ERYTHEMATOSUS, UNSPECIFIED SLE TYPE, UNSPECIFIED ORGAN INVOLVEMENT STATUS (HCC): Primary | ICD-10-CM

## 2020-08-31 DIAGNOSIS — R76.8 DS DNA ANTIBODY POSITIVE: ICD-10-CM

## 2020-08-31 NOTE — TELEPHONE ENCOUNTER
Pt called to cancel therapy today. Had a small procedure last week and her parents are staying with her one more night. Has not been doing the HEP yet; was very fatigued all week after trying them 1x in PT office last week.  Has been trying to do a little m

## 2020-09-01 RX ORDER — HYDROXYCHLOROQUINE SULFATE 200 MG/1
TABLET, FILM COATED ORAL
Qty: 160 TABLET | Refills: 0 | Status: SHIPPED | OUTPATIENT
Start: 2020-09-01 | End: 2020-12-11

## 2020-09-01 NOTE — TELEPHONE ENCOUNTER
Called pt and discussed AVISE  MODE positive and dsDNA positive. Expressed by concern again for lupus contributing to symptoms. Recommended again that she consider starting plaquenil. She is willing to start at this time.   Would like me to discuss further w

## 2020-09-02 ENCOUNTER — TELEPHONE (OUTPATIENT)
Dept: PHYSICAL THERAPY | Age: 40
End: 2020-09-02

## 2020-09-02 ENCOUNTER — TELEPHONE (OUTPATIENT)
Dept: CARDIOLOGY | Age: 40
End: 2020-09-02

## 2020-09-02 ENCOUNTER — APPOINTMENT (OUTPATIENT)
Dept: PHYSICAL THERAPY | Age: 40
End: 2020-09-02
Attending: INTERNAL MEDICINE
Payer: COMMERCIAL

## 2020-09-02 NOTE — TELEPHONE ENCOUNTER
Pt called to cancel today's appointment because she is not feeling well. Pt reports some of it is pain, some is just generally under the weather and not sure if she is getting sick. Thinks it best if she stay home today and see how she feels in a few days.

## 2020-09-04 ENCOUNTER — TELEPHONE (OUTPATIENT)
Dept: CARDIOLOGY | Age: 40
End: 2020-09-04

## 2020-09-04 ENCOUNTER — HOSPITAL ENCOUNTER (OUTPATIENT)
Dept: CT IMAGING | Facility: HOSPITAL | Age: 40
Discharge: HOME | End: 2020-09-04
Attending: INTERNAL MEDICINE
Payer: COMMERCIAL

## 2020-09-04 VITALS — SYSTOLIC BLOOD PRESSURE: 110 MMHG | HEART RATE: 66 BPM | DIASTOLIC BLOOD PRESSURE: 87 MMHG

## 2020-09-04 DIAGNOSIS — R94.39 ABNORMAL STRESS TEST: ICD-10-CM

## 2020-09-04 DIAGNOSIS — R06.00 DOE (DYSPNEA ON EXERTION): ICD-10-CM

## 2020-09-04 PROCEDURE — 36410 VNPNXR 3YR/> PHY/QHP DX/THER: CPT

## 2020-09-04 PROCEDURE — 76937 US GUIDE VASCULAR ACCESS: CPT

## 2020-09-04 PROCEDURE — 75574 CT ANGIO HRT W/3D IMAGE: CPT | Performed by: INTERNAL MEDICINE

## 2020-09-04 RX ORDER — METOPROLOL TARTRATE 100 MG/1
TABLET ORAL
Qty: 1 TABLET | Refills: 0 | Status: SHIPPED | OUTPATIENT
Start: 2020-09-04 | End: 2020-11-02

## 2020-09-04 RX ORDER — METOPROLOL TARTRATE 5 MG/5ML
5 INJECTION INTRAVENOUS
Status: DISCONTINUED | OUTPATIENT
Start: 2020-09-04 | End: 2020-09-06

## 2020-09-04 RX ORDER — METOPROLOL TARTRATE 5 MG/5ML
INJECTION INTRAVENOUS
Status: DISCONTINUED
Start: 2020-09-04 | End: 2020-09-05

## 2020-09-04 RX ORDER — NITROGLYCERIN 0.4 MG/1
TABLET SUBLINGUAL
Status: DISCONTINUED
Start: 2020-09-04 | End: 2020-09-05

## 2020-09-04 RX ORDER — NITROGLYCERIN 0.4 MG/1
0.4 TABLET SUBLINGUAL ONCE
Status: COMPLETED | OUTPATIENT
Start: 2020-09-04 | End: 2020-09-04

## 2020-09-04 RX ORDER — METOPROLOL TARTRATE 5 MG/5ML
INJECTION INTRAVENOUS
Status: DISCONTINUED
Start: 2020-09-04 | End: 2020-09-04 | Stop reason: WASHOUT

## 2020-09-04 RX ADMIN — METOPROLOL TARTRATE 5 MG: 5 INJECTION INTRAVENOUS at 15:08:00

## 2020-09-04 RX ADMIN — NITROGLYCERIN 0.4 MG: 0.4 TABLET SUBLINGUAL at 15:25:00

## 2020-09-04 NOTE — IMAGING NOTE
Pt monitored x 30 min post CTA Gated, vss,  IV d/c'd cath intact, pressure applied and bleeding controlled. Pt ambulated to changing w/o difficulty.

## 2020-09-04 NOTE — IMAGING NOTE
Avg HR for CTA Gated = 65  Pt tolerated CTA Gated study well. Pt ambulated to holding for 30 min post-gated monitoring.

## 2020-09-07 ENCOUNTER — APPOINTMENT (OUTPATIENT)
Dept: PHYSICAL THERAPY | Age: 40
End: 2020-09-07
Attending: INTERNAL MEDICINE
Payer: COMMERCIAL

## 2020-09-09 ENCOUNTER — OFFICE VISIT (OUTPATIENT)
Dept: PHYSICAL THERAPY | Age: 40
End: 2020-09-09
Attending: INTERNAL MEDICINE
Payer: COMMERCIAL

## 2020-09-09 ENCOUNTER — TELEPHONE (OUTPATIENT)
Dept: CARDIOLOGY | Age: 40
End: 2020-09-09

## 2020-09-09 PROCEDURE — 97140 MANUAL THERAPY 1/> REGIONS: CPT

## 2020-09-09 PROCEDURE — 97112 NEUROMUSCULAR REEDUCATION: CPT

## 2020-09-09 PROCEDURE — 97110 THERAPEUTIC EXERCISES: CPT

## 2020-09-09 NOTE — PROGRESS NOTES
Dx: fibromyalgia (worst R shld, back)         Insurance (Authorized # of Visits):  PPO           Authorizing Physician: Dr. 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

## 2020-09-10 ENCOUNTER — E-ADVICE (OUTPATIENT)
Dept: CARDIOLOGY | Age: 40
End: 2020-09-10

## 2020-09-10 ENCOUNTER — OFFICE VISIT (OUTPATIENT)
Dept: INTERNAL MEDICINE CLINIC | Facility: CLINIC | Age: 40
End: 2020-09-10
Payer: COMMERCIAL

## 2020-09-10 DIAGNOSIS — Z53.29 NO-SHOW FOR APPOINTMENT: Primary | ICD-10-CM

## 2020-09-10 RX ORDER — LEVOTHYROXINE SODIUM 0.2 MG/1
TABLET ORAL
Qty: 30 TABLET | Refills: 1 | Status: SHIPPED | OUTPATIENT
Start: 2020-09-10 | End: 2020-10-26

## 2020-09-10 NOTE — PATIENT INSTRUCTIONS
Home/Work Mobility 101:    1. For most people, movement is no longer a daily requirement or choice. People sit at a desk all day, then come home to relax and watch TV  2.  Cardiovascular disease is one of the leading causes of premature death in first world 3. The brain responds by sending chemicals and sensitizing agents to the area to help tell you there has been an injury, start the healing process, and clean up the tissue  4.  An unhealthy nervous system can send too much signal to the brain, keeping unhea ? Blood - regular exercise (walking, bike riding, etc) increases blood flow to your whole body  ? Your Brain is also the most powerful source of pain relievers (endorphins, enkephalins, serotonin).  These are opiate-like chemicals that create a feeling of p

## 2020-09-11 RX ORDER — LORAZEPAM 0.5 MG/1
TABLET ORAL
Qty: 40 TABLET | Refills: 0 | Status: SHIPPED | OUTPATIENT
Start: 2020-09-11 | End: 2022-08-08

## 2020-09-11 NOTE — TELEPHONE ENCOUNTER
Thyroid Supplements Protocol Passed9/10 11:28 AM   TSH test in past 12 months    TSH value between 0.350 and 5.500 IU/ml    Appointment in past 12 or next 3 months     LOV 8/11/20     LAST LAB 8/8/20    LAST RX 6/1/20 30 with 1 refills     Next OV   Future

## 2020-09-12 RX ORDER — SEMAGLUTIDE 1.34 MG/ML
0.5 INJECTION, SOLUTION SUBCUTANEOUS WEEKLY
Qty: 1 PEN | Refills: 0 | Status: SHIPPED | OUTPATIENT
Start: 2020-09-12 | End: 2020-10-06

## 2020-09-14 ENCOUNTER — APPOINTMENT (OUTPATIENT)
Dept: PHYSICAL THERAPY | Age: 40
End: 2020-09-14
Attending: INTERNAL MEDICINE
Payer: COMMERCIAL

## 2020-09-16 ENCOUNTER — OFFICE VISIT (OUTPATIENT)
Dept: PHYSICAL THERAPY | Age: 40
End: 2020-09-16
Attending: INTERNAL MEDICINE
Payer: COMMERCIAL

## 2020-09-16 PROCEDURE — 97112 NEUROMUSCULAR REEDUCATION: CPT

## 2020-09-16 PROCEDURE — 97110 THERAPEUTIC EXERCISES: CPT

## 2020-09-16 PROCEDURE — 97140 MANUAL THERAPY 1/> REGIONS: CPT

## 2020-09-16 NOTE — PROGRESS NOTES
Dx: fibromyalgia (worst R shld, back)         Insurance (Authorized # of Visits):  PPO           Authorizing Physician: Dr. Ashley Kevin  Next MD visit: none scheduled  Fall Risk: standard         Precautions: n/a             Subjective: R arm and R leg have bee initiate lift squat to lift training  Date: 8/26/2020  TX#: 2/12 Date: 9/16/2020     TX#: 3/12 Date:                 TX#: 4/ Date:                 TX#: 5/ Date:    Tx#: 6/   Therex  6 MWT (970 ft)  seated floor to ceiling stretch x4  seated side to side str

## 2020-09-17 ENCOUNTER — TELEPHONE (OUTPATIENT)
Dept: SURGERY | Facility: CLINIC | Age: 40
End: 2020-09-17

## 2020-09-17 NOTE — TELEPHONE ENCOUNTER
Patient called,cancel appointment for today with . She is ill,i did advice for to call [facility] she is ready to reschedule.

## 2020-09-21 ENCOUNTER — OFFICE VISIT (OUTPATIENT)
Dept: PHYSICAL THERAPY | Age: 40
End: 2020-09-21
Attending: INTERNAL MEDICINE
Payer: COMMERCIAL

## 2020-09-21 PROCEDURE — 97112 NEUROMUSCULAR REEDUCATION: CPT

## 2020-09-21 PROCEDURE — 97140 MANUAL THERAPY 1/> REGIONS: CPT

## 2020-09-21 PROCEDURE — 97110 THERAPEUTIC EXERCISES: CPT

## 2020-09-21 NOTE — PROGRESS NOTES
Dx: fibromyalgia (worst R shld, back)         Insurance (Authorized # of Visits):  PPO           Authorizing Physician: Dr. Joel Rodney  Next MD visit: none scheduled  Fall Risk: standard         Precautions: n/a             Subjective: Pt reports her low back progress scapular re-ed and initiate shoulder stabilization training to address R shoulder pain  Date: 8/26/2020  TX#: 2/12 Date: 9/16/2020     TX#: 3/12 Date: 9/21/2020                TX#: 4/12 Date:                 TX#: 5/ Date:    Tx#: 6/ Therex  6 MWT stretch, seated side to side stretch, sit<>stands, heel raise with bicep curl, forward step with horizontal ABD; side step with UE horizontal ABD, retro step with retro reach    Charges: therex x1, manual x1, neuro re-ed x1       Total Timed Treatment: 50

## 2020-09-23 ENCOUNTER — OFFICE VISIT (OUTPATIENT)
Dept: PHYSICAL THERAPY | Age: 40
End: 2020-09-23
Attending: INTERNAL MEDICINE
Payer: COMMERCIAL

## 2020-09-23 PROCEDURE — 97110 THERAPEUTIC EXERCISES: CPT

## 2020-09-23 PROCEDURE — 97140 MANUAL THERAPY 1/> REGIONS: CPT

## 2020-09-23 NOTE — PROGRESS NOTES
Dx: fibromyalgia (worst R shld, back)         Insurance (Authorized # of Visits):  PPO           Authorizing Physician: Dr. Donte Child  Next MD visit: none scheduled  Fall Risk: standard         Precautions: n/a             Subjective: Felt ok the night after 9/23/2020                 TX#: 5/12 Date:    Tx#: 6/   Therex  6 MWT (970 ft)  seated floor to ceiling stretch x4  seated side to side stretch x3  Supine hip flexor stretch x2 (sore in back)  Supine piriformis stretch (unable)  Standing ITB stretch at table distraction with inferior glides x3 min  Manual Therapy  Short sitting tibiofemoral distraction 4x30s ea  Prone calf and achilles STM x5 min ea  Supine R shoulder PROM and GH distraction with inferior glides x5 min            HEP:  seated floor to ceiling

## 2020-09-24 ENCOUNTER — OFFICE VISIT (OUTPATIENT)
Dept: SURGERY | Facility: CLINIC | Age: 40
End: 2020-09-24
Payer: COMMERCIAL

## 2020-09-24 VITALS — HEIGHT: 62 IN | BODY MASS INDEX: 53.92 KG/M2 | WEIGHT: 293 LBS

## 2020-09-24 DIAGNOSIS — E66.01 CLASS 3 SEVERE OBESITY DUE TO EXCESS CALORIES WITH SERIOUS COMORBIDITY AND BODY MASS INDEX (BMI) OF 60.0 TO 69.9 IN ADULT (HCC): Primary | ICD-10-CM

## 2020-09-24 PROCEDURE — 97803 MED NUTRITION INDIV SUBSEQ: CPT | Performed by: DIETITIAN, REGISTERED

## 2020-09-24 PROCEDURE — 3008F BODY MASS INDEX DOCD: CPT | Performed by: DIETITIAN, REGISTERED

## 2020-09-24 NOTE — PROGRESS NOTES
[address] [place] [address] AND WEIGHT LOSS CLINIC  [address] [place] [id number]  Dept: [address]: [phone]    09/24/20    Bariatric Liquid Protein Nutrition Session    Pastora Gaines is a 47year old female    Asse WBC 9.7 06/24/2020    WBC 9.4 06/23/2020     Lab Results   Component Value Date    HGB 12.4 08/08/2020    HGB 11.7 (L) 06/24/2020    HGB 10.9 (L) 06/23/2020      Lab Results   Component Value Date     08/08/2020    .0 06/24/2020    .0 ezetimibe 10 MG Oral Tab, Take 10 mg by mouth daily. , Disp: , Rfl:   •  OMEPRAZOLE 40 MG Oral Capsule Delayed Release, TAKE 1 CAPSULE(40 MG) BY MOUTH TWICE DAILY BEFORE MEALS, Disp: 180 capsule, Rfl: 1  •  ibuprofen 400 MG Oral Tab, Take 400 mg by mouth ev BSN protein powder      Banana + balanced break  12 oz chicken gyro meat + 4 tbsp tzatziki      Total Kcal: ~1500 calories/day avg per MFP  Protein intake: 104 grams/day, 61g CHO per day avg  Fluid intake:  64 ounces/day    Excessive in: nothing  [place] measurements, Food/fluid intake/choices, Food intolerances, Activity level, Vitamin/mineral supplementation, Reinforce goals and Calorie/protein intake  Patient understands protein requirements?  Yes  Patient understand fluid requirements (amount and method

## 2020-09-25 ENCOUNTER — HOSPITAL ENCOUNTER (OUTPATIENT)
Dept: CT IMAGING | Facility: HOSPITAL | Age: 40
Discharge: HOME | End: 2020-09-25
Attending: INTERNAL MEDICINE
Payer: COMMERCIAL

## 2020-09-25 DIAGNOSIS — D50.9 IRON DEFICIENCY ANEMIA, UNSPECIFIED IRON DEFICIENCY ANEMIA TYPE: ICD-10-CM

## 2020-09-25 PROCEDURE — 82565 ASSAY OF CREATININE: CPT

## 2020-09-25 PROCEDURE — 74177 CT ABD & PELVIS W/CONTRAST: CPT | Performed by: INTERNAL MEDICINE

## 2020-09-26 LAB — CREAT BLD-MCNC: 0.7 MG/DL (ref 0.55–1.02)

## 2020-09-28 ENCOUNTER — APPOINTMENT (OUTPATIENT)
Dept: PHYSICAL THERAPY | Age: 40
End: 2020-09-28
Attending: INTERNAL MEDICINE
Payer: COMMERCIAL

## 2020-09-28 ENCOUNTER — TELEPHONE (OUTPATIENT)
Dept: PHYSICAL THERAPY | Age: 40
End: 2020-09-28

## 2020-09-29 ENCOUNTER — TELEPHONE (OUTPATIENT)
Dept: PHYSICAL THERAPY | Age: 40
End: 2020-09-29

## 2020-09-29 NOTE — TELEPHONE ENCOUNTER
Called to follow-up. Pt cancelled visit yesterday due to stomach issues. In total, 6 late cancellations, 1 No show, and only 5 attended visits.  Discussed clinic attendance policy and suggested decreasing frequency to 1x/week to make it more manageable for

## 2020-09-30 ENCOUNTER — APPOINTMENT (OUTPATIENT)
Dept: PHYSICAL THERAPY | Age: 40
End: 2020-09-30
Attending: INTERNAL MEDICINE
Payer: COMMERCIAL

## 2020-10-02 ENCOUNTER — TELEPHONE (OUTPATIENT)
Dept: FAMILY MEDICINE CLINIC | Facility: CLINIC | Age: 40
End: 2020-10-02

## 2020-10-02 NOTE — TELEPHONE ENCOUNTER
Called patient who states symptoms started on Wednesday. Had frequent stools, but not watery. Had bad chills yesterday, no thermometer. Beginning to have right earache HA and slight sore throat. Denies SOB. No known covid exposure or travel.  Has had mult

## 2020-10-05 ENCOUNTER — APPOINTMENT (OUTPATIENT)
Dept: PHYSICAL THERAPY | Age: 40
End: 2020-10-05
Attending: INTERNAL MEDICINE
Payer: COMMERCIAL

## 2020-10-06 RX ORDER — SEMAGLUTIDE 1.34 MG/ML
INJECTION, SOLUTION SUBCUTANEOUS
Qty: 1.5 ML | Refills: 0 | Status: SHIPPED | OUTPATIENT
Start: 2020-10-06 | End: 2020-10-22

## 2020-10-06 NOTE — TELEPHONE ENCOUNTER
Requesting ozempic 0.25/0.5 mg  LOV: 8/4/20  RTC: not noted  Last Relevant Labs: 8/8/20  Filled: 9/12/20 #1 pen with 00 refills    10/22/2020  1:40 PM Papi Brumfield MD [facility]     pended

## 2020-10-07 ENCOUNTER — OFFICE VISIT (OUTPATIENT)
Dept: PHYSICAL THERAPY | Age: 40
End: 2020-10-07
Attending: INTERNAL MEDICINE
Payer: COMMERCIAL

## 2020-10-07 PROCEDURE — 97112 NEUROMUSCULAR REEDUCATION: CPT

## 2020-10-07 PROCEDURE — 97140 MANUAL THERAPY 1/> REGIONS: CPT

## 2020-10-07 PROCEDURE — 97110 THERAPEUTIC EXERCISES: CPT

## 2020-10-07 NOTE — PROGRESS NOTES
Dx: fibromyalgia (worst R shld, back)         Insurance (Authorized # of Visits):  PPO           Authorizing Physician: Dr. Marni Sheth  Next MD visit: none scheduled  Fall Risk: standard         Precautions: n/a             Subjective: Pt reports she did not f machine without difficulty -progress  Pt will be able to carry 25 lbs >50 ft to be able to take out her garbage and improved tolerance with helping her pt transfer at work    Plan: review steps/day and lifting mechanics; progress shoulder stability with wa stance with RTB rows 2x10   Tandem on airex x30s ea (fair) Neuro Re-ed  Rock and reach FWD x10 ea (good)  SLS with cone tap x10 ea (good) Neuro Re-ed  R GH R/S 3x20s in supine 90 deg  Hooklying TA 5s x8  -with march x14  -with BKFO x10  Ball on wall R/S R

## 2020-10-08 ENCOUNTER — APPOINTMENT (OUTPATIENT)
Dept: PHYSICAL THERAPY | Age: 40
End: 2020-10-08
Attending: INTERNAL MEDICINE
Payer: COMMERCIAL

## 2020-10-12 ENCOUNTER — APPOINTMENT (OUTPATIENT)
Dept: PHYSICAL THERAPY | Age: 40
End: 2020-10-12
Attending: INTERNAL MEDICINE
Payer: COMMERCIAL

## 2020-10-14 ENCOUNTER — OFFICE VISIT (OUTPATIENT)
Dept: PHYSICAL THERAPY | Age: 40
End: 2020-10-14
Attending: INTERNAL MEDICINE
Payer: COMMERCIAL

## 2020-10-14 PROCEDURE — 97140 MANUAL THERAPY 1/> REGIONS: CPT

## 2020-10-14 PROCEDURE — 97110 THERAPEUTIC EXERCISES: CPT

## 2020-10-14 PROCEDURE — 97112 NEUROMUSCULAR REEDUCATION: CPT

## 2020-10-14 NOTE — PROGRESS NOTES
Dx: fibromyalgia (worst R shld, back)         Insurance (Authorized # of Visits):  PPO           Authorizing Physician: Dr. Mandi Doyle  Next MD visit: none scheduled  Fall Risk: standard         Precautions: n/a             Subjective: Pt reports she is really session  Date: 8/26/2020  TX#: 2/12 Date: 9/16/2020     TX#: 3/12 Date: 9/21/2020        TX#: 4/12 Date: 9/23/2020           TX#: 5/12 Date: 10/7/2020  Tx#: 6/12 Date: 10/14/2020  Tx#: 7/12   Therex  6 MWT (970 ft)  seated floor to ceiling stretch x4  seat march x14  -with BKFO x10  Ball on wall R/S R shld -frontal 2x20s  -Sagittal 2x20s Neuro Re-ed  Hooklying TA 5s x8  -with march x10  -with BKFO x12   Manual Therapy  STM prone lumbar paraspinals and QL x8 min  Supine R shoulder PROM and GH distraction with

## 2020-10-22 ENCOUNTER — DOCUMENTATION (OUTPATIENT)
Dept: CARDIOLOGY | Age: 40
End: 2020-10-22

## 2020-10-22 ENCOUNTER — OFFICE VISIT (OUTPATIENT)
Dept: INTERNAL MEDICINE CLINIC | Facility: CLINIC | Age: 40
End: 2020-10-22
Payer: COMMERCIAL

## 2020-10-22 ENCOUNTER — TELEPHONE (OUTPATIENT)
Dept: SURGERY | Facility: CLINIC | Age: 40
End: 2020-10-22

## 2020-10-22 VITALS
WEIGHT: 293 LBS | SYSTOLIC BLOOD PRESSURE: 122 MMHG | HEART RATE: 84 BPM | DIASTOLIC BLOOD PRESSURE: 78 MMHG | BODY MASS INDEX: 52.57 KG/M2 | HEIGHT: 62.5 IN | RESPIRATION RATE: 16 BRPM

## 2020-10-22 DIAGNOSIS — R73.03 PREDIABETES: ICD-10-CM

## 2020-10-22 DIAGNOSIS — E78.2 MIXED HYPERLIPIDEMIA: ICD-10-CM

## 2020-10-22 DIAGNOSIS — E03.9 ACQUIRED HYPOTHYROIDISM: ICD-10-CM

## 2020-10-22 DIAGNOSIS — Z99.89 OSA ON CPAP: ICD-10-CM

## 2020-10-22 DIAGNOSIS — F50.81 BINGE EATING DISORDER: ICD-10-CM

## 2020-10-22 DIAGNOSIS — Z51.81 THERAPEUTIC DRUG MONITORING: Primary | ICD-10-CM

## 2020-10-22 DIAGNOSIS — E55.9 VITAMIN D DEFICIENCY: ICD-10-CM

## 2020-10-22 DIAGNOSIS — E66.01 CLASS 3 SEVERE OBESITY DUE TO EXCESS CALORIES WITH SERIOUS COMORBIDITY AND BODY MASS INDEX (BMI) OF 60.0 TO 69.9 IN ADULT (HCC): ICD-10-CM

## 2020-10-22 DIAGNOSIS — G47.33 OSA ON CPAP: ICD-10-CM

## 2020-10-22 PROCEDURE — 3008F BODY MASS INDEX DOCD: CPT | Performed by: INTERNAL MEDICINE

## 2020-10-22 PROCEDURE — 3074F SYST BP LT 130 MM HG: CPT | Performed by: INTERNAL MEDICINE

## 2020-10-22 PROCEDURE — 3078F DIAST BP <80 MM HG: CPT | Performed by: INTERNAL MEDICINE

## 2020-10-22 PROCEDURE — 99214 OFFICE O/P EST MOD 30 MIN: CPT | Performed by: INTERNAL MEDICINE

## 2020-10-22 RX ORDER — SEMAGLUTIDE 1.34 MG/ML
1 INJECTION, SOLUTION SUBCUTANEOUS
Qty: 2 PEN | Refills: 1 | Status: SHIPPED | OUTPATIENT
Start: 2020-10-22 | End: 2021-01-12

## 2020-10-22 RX ORDER — PEN NEEDLE, DIABETIC 30 GX3/16"
1 NEEDLE, DISPOSABLE MISCELLANEOUS
Qty: 30 EACH | Refills: 1 | Status: SHIPPED | OUTPATIENT
Start: 2020-10-22 | End: 2021-06-22

## 2020-10-22 NOTE — PATIENT INSTRUCTIONS
Outpatient Encounter Medications as of 10/22/2020   Medication Sig Note   • Fexofenadine HCl (ALLEGRA ALLERGY CHILDRENS) 30 MG Oral Tablet Dispersible Take 2 tablets (60 mg total) by mouth 2 (two) times a day.     • OZEMPIC, 0.25 OR 0.5 MG/DOSE, 2 MG/1.5ML Postop: no change needed   • Albuterol Sulfate HFA (PROAIR HFA) 108 (90 Base) MCG/ACT Inhalation Aero Soln Inhale 2 puffs into the lungs every 4 (four) hours as needed for Wheezing or Shortness of Breath.  10/22/2020: NCN    • tiZANidine HCl 4 MG Oral Tab T

## 2020-10-22 NOTE — TELEPHONE ENCOUNTER
sdpoke to patient over phone - doing well, following protocol. Missed appt today, fell aslweep while waiting in car. Has followup wschculed for 12/3. Will tentatively schedule SLEEVE GASTRECTOMY for 12/21.      Will schedule televisit in November

## 2020-10-22 NOTE — PROGRESS NOTES
HISTORY OF PRESENT ILLNESS  Patient presents with:  Weight Check: pre-op, down 5 lb, sx 12/21/20      Christina Londono is a 47year old female here for follow up in medical weight loss program.   Down 5lbs  Vit D refill,   Has been tracking with MFP  Has been 38.6 08/08/2020    MCV 85.2 08/08/2020    MCH 27.4 08/08/2020    MCHC 32.1 08/08/2020    RDW 14.9 08/08/2020     08/08/2020    MPV 10.7 01/17/2013     Lab Results   Component Value Date     (H) 08/08/2020    BUN 15 08/08/2020    BUNCREA 10.4 tablet vaginally the morning before the procedure., Disp: 2 tablet, Rfl: 0    •  Insulin Pen Needle (PEN NEEDLES) 32G X 4 MM Does not apply Misc, 1 each by Does not apply route every 7 days. , Disp: 30 each, Rfl: 0    •  traZODone HCl 50 MG Oral Tab, as nee daily., Disp: , Rfl:     No current facility-administered medications on file prior to visit.        ASSESSMENT  Analyzed weight data:       Diagnoses and all orders for this visit:    Therapeutic drug monitoring    Class 3 severe obesity due to excess chris

## 2020-10-23 ENCOUNTER — TELEPHONE (OUTPATIENT)
Dept: OBGYN CLINIC | Facility: CLINIC | Age: 40
End: 2020-10-23

## 2020-10-23 RX ORDER — MISOPROSTOL 100 UG/1
TABLET ORAL
Qty: 2 TABLET | Refills: 0 | Status: SHIPPED | OUTPATIENT
Start: 2020-10-23 | End: 2020-11-03 | Stop reason: ALTCHOICE

## 2020-10-23 NOTE — TELEPHONE ENCOUNTER
Patient had to R/S her emb appt today with Dr. Shahana Stock because she did not take the medication. Her appt is on Nov 25th at 1:30 pm with Dr. Shahana Stock in [facility] and she need the Misoprostol 100 mcg called in to her pharmacy. Pt wants to be called when done.

## 2020-10-26 ENCOUNTER — APPOINTMENT (OUTPATIENT)
Dept: CARDIOLOGY | Age: 40
End: 2020-10-26

## 2020-10-26 RX ORDER — LEVOTHYROXINE SODIUM 0.2 MG/1
TABLET ORAL
Qty: 30 TABLET | Refills: 1 | Status: SHIPPED | OUTPATIENT
Start: 2020-10-26 | End: 2021-02-04

## 2020-10-26 NOTE — TELEPHONE ENCOUNTER
LOV     LAST LAB 8/8/20    LAST RX   LEVOTHYROXINE SODIUM 200 MCG Oral Tab 30 tablet 1 9/10/2020    Sig: Mary Perry 1 TABLET(200 MCG) BY MOUTH DAILY BEFORE BREAKFAST           Next OV   Future Appointments   Date Time Provider Cole Parra   10/28/2020  6

## 2020-10-28 ENCOUNTER — APPOINTMENT (OUTPATIENT)
Dept: PHYSICAL THERAPY | Age: 40
End: 2020-10-28
Attending: INTERNAL MEDICINE
Payer: COMMERCIAL

## 2020-10-28 ENCOUNTER — TELEPHONE (OUTPATIENT)
Dept: PHYSICAL THERAPY | Age: 40
End: 2020-10-28

## 2020-10-29 ENCOUNTER — E-ADVICE (OUTPATIENT)
Dept: CARDIOLOGY | Age: 40
End: 2020-10-29

## 2020-11-02 ENCOUNTER — V-VISIT (OUTPATIENT)
Dept: CARDIOLOGY | Age: 40
End: 2020-11-02

## 2020-11-02 VITALS — HEIGHT: 62 IN | WEIGHT: 293 LBS | BODY MASS INDEX: 53.92 KG/M2

## 2020-11-02 DIAGNOSIS — R00.0 SINUS TACHYCARDIA: ICD-10-CM

## 2020-11-02 DIAGNOSIS — R06.09 DOE (DYSPNEA ON EXERTION): ICD-10-CM

## 2020-11-02 DIAGNOSIS — Z01.818 PREOPERATIVE CLEARANCE: Primary | ICD-10-CM

## 2020-11-02 DIAGNOSIS — G47.33 OSA ON CPAP: ICD-10-CM

## 2020-11-02 DIAGNOSIS — E03.9 ACQUIRED HYPOTHYROIDISM: ICD-10-CM

## 2020-11-02 DIAGNOSIS — E78.2 MIXED HYPERLIPIDEMIA: ICD-10-CM

## 2020-11-02 PROCEDURE — 99214 OFFICE O/P EST MOD 30 MIN: CPT | Performed by: INTERNAL MEDICINE

## 2020-11-02 RX ORDER — HYDROXYCHLOROQUINE SULFATE 200 MG/1
TABLET, FILM COATED ORAL
COMMUNITY
Start: 2020-09-01

## 2020-11-02 RX ORDER — SEMAGLUTIDE 1.34 MG/ML
1 INJECTION, SOLUTION SUBCUTANEOUS
COMMUNITY
Start: 2020-10-22

## 2020-11-02 SDOH — HEALTH STABILITY: PHYSICAL HEALTH: ON AVERAGE, HOW MANY DAYS PER WEEK DO YOU ENGAGE IN MODERATE TO STRENUOUS EXERCISE (LIKE A BRISK WALK)?: 0 DAYS

## 2020-11-02 SDOH — HEALTH STABILITY: PHYSICAL HEALTH: ON AVERAGE, HOW MANY MINUTES DO YOU ENGAGE IN EXERCISE AT THIS LEVEL?: 0 MIN

## 2020-11-02 ASSESSMENT — ENCOUNTER SYMPTOMS
CHILLS: 0
ALLERGIC/IMMUNOLOGIC COMMENTS: NO NEW FOOD ALLERGIES
WEIGHT LOSS: 0
SUSPICIOUS LESIONS: 0
COUGH: 0
FEVER: 0
HEMATOCHEZIA: 0
HEMOPTYSIS: 0
WEIGHT GAIN: 0
BRUISES/BLEEDS EASILY: 0

## 2020-11-02 ASSESSMENT — PATIENT HEALTH QUESTIONNAIRE - PHQ9
CLINICAL INTERPRETATION OF PHQ9 SCORE: NO FURTHER SCREENING NEEDED
2. FEELING DOWN, DEPRESSED OR HOPELESS: NOT AT ALL
1. LITTLE INTEREST OR PLEASURE IN DOING THINGS: NOT AT ALL
CLINICAL INTERPRETATION OF PHQ2 SCORE: NO FURTHER SCREENING NEEDED
SUM OF ALL RESPONSES TO PHQ9 QUESTIONS 1 AND 2: 0
SUM OF ALL RESPONSES TO PHQ9 QUESTIONS 1 AND 2: 0

## 2020-11-03 ENCOUNTER — VIRTUAL CHECK-IN (OUTPATIENT)
Dept: SURGERY | Facility: CLINIC | Age: 40
End: 2020-11-03

## 2020-11-03 ENCOUNTER — OFFICE VISIT (OUTPATIENT)
Dept: RHEUMATOLOGY | Facility: CLINIC | Age: 40
End: 2020-11-03
Payer: COMMERCIAL

## 2020-11-03 VITALS
SYSTOLIC BLOOD PRESSURE: 114 MMHG | TEMPERATURE: 98 F | HEIGHT: 62 IN | BODY MASS INDEX: 53.92 KG/M2 | RESPIRATION RATE: 16 BRPM | DIASTOLIC BLOOD PRESSURE: 82 MMHG | HEART RATE: 60 BPM | WEIGHT: 293 LBS

## 2020-11-03 DIAGNOSIS — R79.82 ELEVATED C-REACTIVE PROTEIN (CRP): ICD-10-CM

## 2020-11-03 DIAGNOSIS — R79.82 CRP ELEVATED: ICD-10-CM

## 2020-11-03 DIAGNOSIS — Z86.39 HISTORY OF THYROID DISEASE: ICD-10-CM

## 2020-11-03 DIAGNOSIS — M79.7 FIBROMYALGIA: ICD-10-CM

## 2020-11-03 DIAGNOSIS — R70.0 ELEVATED SED RATE: ICD-10-CM

## 2020-11-03 DIAGNOSIS — E66.2 MORBID OBESITY WITH ALVEOLAR HYPOVENTILATION (HCC): Primary | ICD-10-CM

## 2020-11-03 DIAGNOSIS — R76.8 DS DNA ANTIBODY POSITIVE: ICD-10-CM

## 2020-11-03 DIAGNOSIS — M32.9 SYSTEMIC LUPUS ERYTHEMATOSUS, UNSPECIFIED SLE TYPE, UNSPECIFIED ORGAN INVOLVEMENT STATUS (HCC): Primary | ICD-10-CM

## 2020-11-03 DIAGNOSIS — M25.50 POLYARTHRALGIA: ICD-10-CM

## 2020-11-03 DIAGNOSIS — R76.8 POSITIVE ANA (ANTINUCLEAR ANTIBODY): ICD-10-CM

## 2020-11-03 DIAGNOSIS — R53.82 CHRONIC FATIGUE: ICD-10-CM

## 2020-11-03 PROCEDURE — 3079F DIAST BP 80-89 MM HG: CPT | Performed by: INTERNAL MEDICINE

## 2020-11-03 PROCEDURE — 99072 ADDL SUPL MATRL&STAF TM PHE: CPT | Performed by: INTERNAL MEDICINE

## 2020-11-03 PROCEDURE — 3074F SYST BP LT 130 MM HG: CPT | Performed by: INTERNAL MEDICINE

## 2020-11-03 PROCEDURE — 99214 OFFICE O/P EST MOD 30 MIN: CPT | Performed by: INTERNAL MEDICINE

## 2020-11-03 PROCEDURE — 3008F BODY MASS INDEX DOCD: CPT | Performed by: INTERNAL MEDICINE

## 2020-11-03 NOTE — PROGRESS NOTES
?  RHEUMATOLOGY Follow up   Date of visit: 11/3/2020  ? Patient presents with:  Joint Pain: 3 month f/u. Feeling about the same. Having a lot of fatigue and achy and sore all over.      ?  ASSESSMENT, DISCUSSION & PLAN   Assessment:  Systemic lupus eryth has already lost 10 pounds and I do recommend that she continue on her weight loss journey. She will follow-up in another 3 months or sooner as needed. Encourage patient to keep me updated with symptoms.   I will call her with the test results when av skin irritation in between the fingers itself. Has followed with surgeon and is going to move forward with gastric sleeve with plans for surgery in December.      HPI from initial consultation  referred for rheumatologic evaluation due to diffuse pain and to medication   + hx of plantar fasciitis, with difficulty walking in the morning. Does wear inserts in her shoes. No formal workup for this.    + fevers per pt more often than should be usual (as high as 99.9 or less; baseline temp per pt is 96-97)  + feel [place] 71%   • Pneumonia due to organism    • Shortness of breath    • Sleep apnea     cpap   • Thyroid disease      Past Surgical History:  Past Surgical History:   Procedure Laterality Date   • COLONOSCOPY N/A 8/28/2020    Performed by Blair Phillips daily., Disp: , Rfl:     •  OMEPRAZOLE 40 MG Oral Capsule Delayed Release, TAKE 1 CAPSULE(40 MG) BY MOUTH TWICE DAILY BEFORE MEALS, Disp: 180 capsule, Rfl: 1    •  Multiple Vitamins-Minerals (MULTI-VITAMIN/MINERALS) Oral Tab, Take 1 tablet by mouth daily. , for cough, shortness of breath and wheezing. Cardiovascular: Positive for leg swelling. Negative for chest pain and palpitations. Gastrointestinal: Positive for heartburn. Negative for abdominal pain and nausea.    Genitourinary: Negative for frequency DIPs, PIPs, MCPs, wrists, elbows, ankles, or joints of the feet. Bilateral shoulders with full ROM. Bilateral knees with medial joint line tenderness, no crepitus, no effusion.     (Previous exam)  Posterior Tender Point Exam  Occiput -/-  Trapezius +/+ provided by radiologist from [address] [place] [address]. VASCULATURE:  Negative for acute pulmonary embolism. No filling defects are seen centrally or peripherally within the pulmonary arterial system.      LUNGS/PLEURA:  Ground-glass opacities are present in t MCHC 32.1 08/08/2020    RDW 14.9 08/08/2020    NEPRELIM 6.42 06/24/2020    NEUTABS 3,614 03/14/2020    LYMPHABS 2,811 03/14/2020    EOSABS 153 03/14/2020    BASABS 51 03/14/2020    NEUT 49.5 03/14/2020    LYMPH 38.5 03/14/2020    MON 9.2 03/14/2020    EOS

## 2020-11-03 NOTE — TELEPHONE ENCOUNTER
[address] [facility]  Charles Strong  Dept: [phone]    11/3/2020    BARIATRIC EXISTING PATIENT/FOLLOW UP    HPI:  PCP: Dr. Peraza Bile: Dr. Dori Schuler    protein and meeting goals.  Minimal fast food and focusing more on cooking fresh at home.      Breakfast: protein shake + banana, protein bar. Lunch: hard boiled eggs (2), cheese snack w fruit.    Dinner: lean cuisine meals, sometimes home cooked meal - eg Hypothyroidism  HLD  GERD - BID  Vit D Deficiency  Anxiety/depression - sees therapist once/week since last August stemming from a traumatic event.    BLESSING on CPAP     Surg:   Lap leyla  [place] teeth     All: PCN - rash/hives     FHx: mother - heart dz    leave her options open for revisions in the future. This is in [place] to understanding that overall weight loss may be better with a gastric bypass.   In addition there are some concerns about her potential need for nonsteroidals other rheumatologic medi

## 2020-11-04 ENCOUNTER — OFFICE VISIT (OUTPATIENT)
Dept: PHYSICAL THERAPY | Age: 40
End: 2020-11-04
Attending: INTERNAL MEDICINE
Payer: COMMERCIAL

## 2020-11-04 PROCEDURE — 97535 SELF CARE MNGMENT TRAINING: CPT

## 2020-11-04 PROCEDURE — 97140 MANUAL THERAPY 1/> REGIONS: CPT

## 2020-11-05 NOTE — PROGRESS NOTES
Dx: fibromyalgia (worst R shld, back)         Insurance (Authorized # of Visits):  PPO           Authorizing Physician: Dr. Josie Proctor  Next MD visit: none scheduled  Fall Risk: standard         Precautions: n/a             Subjective: Cancelled therapy last w tenderness in CINTIA lateral deltoid, but points to this area as location of pain with reaching and lifting; pain and weakness with CINTIA shoulder ER and ABD - suggestive of rotator cuff involvement    Assessment: Increased time spent discussing her current fat Date: 9/16/2020     TX#: 3/12 Date: 9/21/2020        TX#: 4/12 Date: 9/23/2020           TX#: 5/12 Date: 10/7/2020  Tx#: 6/12 Date: 10/14/2020  Tx#: 7/12 Date: 11/4/2020  Tx#: 8/12   Therex  TM 1.4 mph x5 min (2 UE)   seated floor to ceiling stretch x4 distraction 3x30s ea  STM prone lumbar paraspinals and QL x5 min  Supine R shoulder PROM and GH distraction with inferior glides x3 min  Manual Therapy  Short sitting tibiofemoral distraction 4x30s ea  Prone calf and achilles STM x5 min ea  Supine R should

## 2020-11-05 NOTE — PATIENT INSTRUCTIONS
Sleep 101:     1. The amount of sleep you need is partially determined by your age. Adults need at least 7-9 hours/night. 1518 year olds require 8-10 hours/night. 113 year olds require 10-12 hours/night.    2. Proper sleep improves:  • Productivity  • Emo taking medication for sleep     For more information, questions, or help - call your PCP or visit [facility]  Do you think you may have a sleep disorder?  Talk with your doctor, and contact us at [phone] Yonathan or Joni Frost

## 2020-11-06 ENCOUNTER — TELEPHONE (OUTPATIENT)
Dept: FAMILY MEDICINE CLINIC | Facility: CLINIC | Age: 40
End: 2020-11-06

## 2020-11-06 DIAGNOSIS — Z20.822 CLOSE EXPOSURE TO COVID-19 VIRUS: Primary | ICD-10-CM

## 2020-11-06 NOTE — TELEPHONE ENCOUNTER
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 Last interaction with family member was last Friday. Patient wears mask but family member does not. States only symptom is runny nose.  Denies fever

## 2020-11-06 NOTE — TELEPHONE ENCOUNTER
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 She would like to know if Dr. Tia Cedeno would order a covid test for her.

## 2020-11-09 NOTE — TELEPHONE ENCOUNTER
Called patient and informed order for covid test was signed on Friday. Central Scheduling number given to call for test time/location. Advised we cannot order rapid tests through THE [facility]. States her symptoms started 11/5/2020.   Informed to

## 2020-11-09 NOTE — TELEPHONE ENCOUNTER
Patient called again asking if Dr. Antonio Quinonez would order the covid test.  Patient asked if test could be the rapid test as she needs if for work.

## 2020-11-10 ENCOUNTER — APPOINTMENT (OUTPATIENT)
Dept: LAB | Age: 40
End: 2020-11-10
Attending: FAMILY MEDICINE
Payer: COMMERCIAL

## 2020-11-10 DIAGNOSIS — Z20.822 CLOSE EXPOSURE TO COVID-19 VIRUS: ICD-10-CM

## 2020-11-11 ENCOUNTER — APPOINTMENT (OUTPATIENT)
Dept: PHYSICAL THERAPY | Age: 40
End: 2020-11-11
Attending: INTERNAL MEDICINE
Payer: COMMERCIAL

## 2020-11-11 ENCOUNTER — TELEPHONE (OUTPATIENT)
Dept: PHYSICAL THERAPY | Age: 40
End: 2020-11-11

## 2020-11-11 NOTE — TELEPHONE ENCOUNTER
Pt cancelled PT appointment tonight because she was exposed at work to a COVID-positive individual. 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  She was tested today but does not have the results

## 2020-11-12 ENCOUNTER — PATIENT MESSAGE (OUTPATIENT)
Dept: FAMILY MEDICINE CLINIC | Facility: CLINIC | Age: 40
End: 2020-11-12

## 2020-11-12 NOTE — TELEPHONE ENCOUNTER
From: Jalyn Fallon  To: Devan Torres MD  Sent: 11/12/2020 2:09 PM CST  Subject: Test Results Question    I have a question about SARS-CoV-2 BY PCR (COVID-19) resulted on 11/12/20 at 1:13 PM.    Nato Olvera, my work/supervisor has told me I can return once m

## 2020-11-12 NOTE — TELEPHONE ENCOUNTER
Still recommending visit to discuss? Viewed by Manju Rodrigues on 11/12/2020  2:03 PM  Written by Remigio Brasher MD on 11/12/2020  1:42 PM  covid test was negative.  THis was about 5 days after symptoms started so there is still some chance this is

## 2020-11-13 ENCOUNTER — PATIENT MESSAGE (OUTPATIENT)
Dept: FAMILY MEDICINE CLINIC | Facility: CLINIC | Age: 40
End: 2020-11-13

## 2020-11-13 NOTE — TELEPHONE ENCOUNTER
Sent to pt: IF you had possible exposure with your symptoms, please convert to video visit. We really are trying to keep anyone ill with respiratory infectious symptoms out of office for the safety of other patients and staff. Thanks for asking!     Front o

## 2020-11-17 ENCOUNTER — TELEMEDICINE (OUTPATIENT)
Dept: FAMILY MEDICINE CLINIC | Facility: CLINIC | Age: 40
End: 2020-11-17
Payer: COMMERCIAL

## 2020-11-17 DIAGNOSIS — R51.9 ACUTE NONINTRACTABLE HEADACHE, UNSPECIFIED HEADACHE TYPE: ICD-10-CM

## 2020-11-17 DIAGNOSIS — R19.7 DIARRHEA, UNSPECIFIED TYPE: ICD-10-CM

## 2020-11-17 DIAGNOSIS — R11.0 NAUSEA: Primary | ICD-10-CM

## 2020-11-17 PROCEDURE — 99213 OFFICE O/P EST LOW 20 MIN: CPT | Performed by: FAMILY MEDICINE

## 2020-11-17 NOTE — PROGRESS NOTES
Uir Beebe IS A 47year old female evaluated via telehealth visit FOR Patient presents with:  Test Results: covid negative   Diarrhea  Nausea   due to current national emergency with SARS-CoV-2 pandemic.      History of present illness:   2-way communica increased ozempic to 1 mg weekly at beginning of NOvember.      Past history:     Past Medical History:   Diagnosis Date   • Anxiety    • Back problem     DDD   • Blood disorder     anemia   • Depression    • Disorder of thyroid    • High cholesterol high c MG) BY MOUTH TWICE DAILY BEFORE MEALS 180 capsule 1   • VENLAFAXINE HCL ER 75 MG Oral Capsule SR 24 Hr TAKE 1 CAPSULE(75 MG) BY MOUTH DAILY 90 capsule 1   • Multiple Vitamins-Minerals (MULTI-VITAMIN/MINERALS) Oral Tab Take 1 tablet by mouth daily.      • RO on file        Inability: Not on file      Transportation needs        Medical: Not on file        Non-medical: Not on file    Tobacco Use      Smoking status: Never Smoker      Smokeless tobacco: Never Used    Substance and Sexual Activity      Alcohol us hydroxychloroquine & ozempic to ask for a trial of 1-2 days off or decreased dose to see if nausea could be caused by those. Test results reviewed:   Covid test negative last week Thursday.     Assessment & Plan:   Efrain Nunes was seen today for test results,

## 2020-11-18 NOTE — PATIENT INSTRUCTIONS
I entered the order for Covid testing again.  Call Central Scheduling at [phone] to set up this test.     Also for the nausea you are having, please check with the doctors prescribing the hydroxchloroquine and the ozempic to see if you can lower dose o

## 2020-11-24 ENCOUNTER — TELEPHONE (OUTPATIENT)
Dept: OBGYN CLINIC | Facility: CLINIC | Age: 40
End: 2020-11-24

## 2020-11-24 NOTE — TELEPHONE ENCOUNTER
Patient has an appt tomorrow at 1:30 for emb with Dr. Natalie Sanchez in [facility] and nurse advise that we should R/S her appt because of covid symptoms on 11/17, pt insist to speak to a nurse and didn't to cancel her appt tomorrow

## 2020-11-30 NOTE — TELEPHONE ENCOUNTER
From: Radha Bright RD  To: Ally Gardner  Sent: 3/25/2020 9:39 AM CDT  Subject: Reschedule    The earliest I can do is Wednesday May 13th at 8:30 am. I know you have an appt with Dr. Lindy Kirby later that day, so let me know if you'd like a different day or

## 2020-11-30 NOTE — PROGRESS NOTES
Leopoldo Highman / Cassandra Milton / Lorena De La Cruz / Renate Torres / Poppy Garcia / [facility] - [phone]  Answering Service [phone]      HPI:  PCP: Dr. Heath Galeano: [facility]. [facility]     7/16/20    [phone] [facility]  8/13/2 surgery. logging on a regular basis - usually between 1200-1400kcal/day.   Focusing more on protein and meeting goals.  Minimal fast food and focusing more on cooking fresh at home.       Breakfast: protein shake + banana, protein bar. Lunch: hard boiled e Bx.    + for BV, on course of Flagyl.     Pulm: Dr. Daryn Avalos 8/4/20 - ok for surgery.    Cards: Dr. Jillian Plata 7/27/20 - needs stress test, if normal, ok for surgery     8/8/20 - Stress mild anterior reversible defect.   8/13/20 - needs CTA of coronary arteries postoperatively. I discussed the nature of the laparoscopic sleeve gastrectomy. The patient has been counseled about the different area surgery operations and has chosen the sleeve gastrectomy as their procedure of choice.   I discussed the laparoscopic a consider therapeutic chemoprophylaxis.

## 2020-12-01 ENCOUNTER — PATIENT MESSAGE (OUTPATIENT)
Dept: INTERNAL MEDICINE CLINIC | Facility: CLINIC | Age: 40
End: 2020-12-01

## 2020-12-02 ENCOUNTER — PATIENT MESSAGE (OUTPATIENT)
Dept: INTERNAL MEDICINE CLINIC | Facility: CLINIC | Age: 40
End: 2020-12-02

## 2020-12-02 ENCOUNTER — TELEPHONE (OUTPATIENT)
Dept: PHYSICAL THERAPY | Age: 40
End: 2020-12-02

## 2020-12-03 ENCOUNTER — TELEMEDICINE (OUTPATIENT)
Dept: SURGERY | Facility: CLINIC | Age: 40
End: 2020-12-03

## 2020-12-03 ENCOUNTER — DOCUMENTATION ONLY (OUTPATIENT)
Dept: SURGERY | Facility: CLINIC | Age: 40
End: 2020-12-03

## 2020-12-03 ENCOUNTER — OFFICE VISIT (OUTPATIENT)
Dept: SURGERY | Facility: CLINIC | Age: 40
End: 2020-12-03
Payer: COMMERCIAL

## 2020-12-03 VITALS
OXYGEN SATURATION: 100 % | WEIGHT: 293 LBS | HEART RATE: 84 BPM | BODY MASS INDEX: 52.57 KG/M2 | HEIGHT: 62.5 IN | DIASTOLIC BLOOD PRESSURE: 80 MMHG | SYSTOLIC BLOOD PRESSURE: 132 MMHG

## 2020-12-03 VITALS — BODY MASS INDEX: 52.57 KG/M2 | WEIGHT: 293 LBS | HEIGHT: 62.5 IN

## 2020-12-03 DIAGNOSIS — E66.01 CLASS 3 SEVERE OBESITY DUE TO EXCESS CALORIES WITH BODY MASS INDEX (BMI) OF 60.0 TO 69.9 IN ADULT, UNSPECIFIED WHETHER SERIOUS COMORBIDITY PRESENT (HCC): Primary | ICD-10-CM

## 2020-12-03 DIAGNOSIS — E66.01 CLASS 3 SEVERE OBESITY DUE TO EXCESS CALORIES WITH SERIOUS COMORBIDITY AND BODY MASS INDEX (BMI) OF 60.0 TO 69.9 IN ADULT (HCC): Primary | ICD-10-CM

## 2020-12-03 PROCEDURE — 3008F BODY MASS INDEX DOCD: CPT | Performed by: DIETITIAN, REGISTERED

## 2020-12-03 PROCEDURE — 97803 MED NUTRITION INDIV SUBSEQ: CPT | Performed by: DIETITIAN, REGISTERED

## 2020-12-03 NOTE — PROGRESS NOTES
Provided/reviewed ERAS protocol; instructed pt on CHG bath and Ensure Pre-surgery Clear drink; pt agreed and verbalized understanding.

## 2020-12-03 NOTE — PROGRESS NOTES
Anni [address] [address]  Dept: [phone]  Loc: [phone]    12/03/20      Bariatric Follow-up Nutrition Session    Consultation conducted via telehealth.      Pt ve 06/24/2020    HGB 10.9 (L) 06/23/2020      Lab Results   Component Value Date     08/08/2020    .0 06/24/2020    .0 06/23/2020       Diabetes:    Lab Results   Component Value Date     05/28/2020    A1C 5.3 08/08/2020       Lipi MG Oral Capsule SR 24 Hr, TAKE 1 CAPSULE(75 MG) BY MOUTH DAILY, Disp: 90 capsule, Rfl: 1  •  Multiple Vitamins-Minerals (MULTI-VITAMIN/MINERALS) Oral Tab, Take 1 tablet by mouth daily. , Disp: , Rfl:   •  ROSUVASTATIN CALCIUM 10 MG Oral Tab, TAKE 1 TABLET B cheese roll up with pickles, tuna packets w/ Whisp crackers  Roast beef or turkey deli meat w/ cheese wraps and pickles w/ steamed veg or mashed potatoes or Lean Cuisine or Evol meals or Eating Well meals, taco meat w/ riced cauliflower + [employer] chips

## 2020-12-03 NOTE — TELEPHONE ENCOUNTER
Left message to confirm PT appointment for 12/7/20.  Requested call if she is unable to make that appointment and/or wants to discuss rehab POC

## 2020-12-07 ENCOUNTER — TELEPHONE (OUTPATIENT)
Dept: PHYSICAL THERAPY | Age: 40
End: 2020-12-07

## 2020-12-07 ENCOUNTER — APPOINTMENT (OUTPATIENT)
Dept: PHYSICAL THERAPY | Age: 40
End: 2020-12-07
Attending: INTERNAL MEDICINE
Payer: COMMERCIAL

## 2020-12-07 NOTE — TELEPHONE ENCOUNTER
Pt called to discuss today's PT visit. Notes that she is on a liquid only diet prior to an upcoming procedure. Reports that she will not have much energy for tonight's therapy appointment.  Wondering if she should hold therapy until after her procedure on 1

## 2020-12-09 ENCOUNTER — OFFICE VISIT (OUTPATIENT)
Dept: OBGYN CLINIC | Facility: CLINIC | Age: 40
End: 2020-12-09
Payer: COMMERCIAL

## 2020-12-09 VITALS
HEIGHT: 62.5 IN | BODY MASS INDEX: 52.57 KG/M2 | WEIGHT: 293 LBS | DIASTOLIC BLOOD PRESSURE: 80 MMHG | HEART RATE: 76 BPM | SYSTOLIC BLOOD PRESSURE: 134 MMHG

## 2020-12-09 DIAGNOSIS — Z01.818 PRE-PROCEDURAL EXAMINATION: Primary | ICD-10-CM

## 2020-12-09 DIAGNOSIS — R93.89 THICKENED ENDOMETRIUM: ICD-10-CM

## 2020-12-09 DIAGNOSIS — N92.0 MENORRHAGIA WITH REGULAR CYCLE: ICD-10-CM

## 2020-12-09 PROCEDURE — 88305 TISSUE EXAM BY PATHOLOGIST: CPT | Performed by: OBSTETRICS & GYNECOLOGY

## 2020-12-09 PROCEDURE — 58100 BIOPSY OF UTERUS LINING: CPT | Performed by: OBSTETRICS & GYNECOLOGY

## 2020-12-09 PROCEDURE — 3079F DIAST BP 80-89 MM HG: CPT | Performed by: OBSTETRICS & GYNECOLOGY

## 2020-12-09 PROCEDURE — 81025 URINE PREGNANCY TEST: CPT | Performed by: OBSTETRICS & GYNECOLOGY

## 2020-12-09 PROCEDURE — 3008F BODY MASS INDEX DOCD: CPT | Performed by: OBSTETRICS & GYNECOLOGY

## 2020-12-09 PROCEDURE — 3075F SYST BP GE 130 - 139MM HG: CPT | Performed by: OBSTETRICS & GYNECOLOGY

## 2020-12-09 NOTE — PROCEDURES
Endometrial Biopsy Procedure Note    Indication and Diagnosis: Menorrhagia, thickened endometrium    Procedure Details    Urine pregnancy test negative.   The risks (including infection, bleeding, pain, and uterine perforation) and benefits of the procedure

## 2020-12-10 DIAGNOSIS — R76.8 POSITIVE ANA (ANTINUCLEAR ANTIBODY): ICD-10-CM

## 2020-12-10 DIAGNOSIS — R79.82 CRP ELEVATED: ICD-10-CM

## 2020-12-10 DIAGNOSIS — R76.8 DS DNA ANTIBODY POSITIVE: ICD-10-CM

## 2020-12-10 DIAGNOSIS — M32.9 SYSTEMIC LUPUS ERYTHEMATOSUS, UNSPECIFIED SLE TYPE, UNSPECIFIED ORGAN INVOLVEMENT STATUS (HCC): ICD-10-CM

## 2020-12-11 RX ORDER — HYDROXYCHLOROQUINE SULFATE 200 MG/1
TABLET, FILM COATED ORAL
Qty: 160 TABLET | Refills: 0 | Status: SHIPPED | OUTPATIENT
Start: 2020-12-11 | End: 2021-02-24

## 2020-12-11 RX ORDER — VENLAFAXINE HYDROCHLORIDE 150 MG/1
CAPSULE, EXTENDED RELEASE ORAL
Qty: 30 CAPSULE | Refills: 0 | Status: ON HOLD | OUTPATIENT
Start: 2020-12-11 | End: 2020-12-22

## 2020-12-11 NOTE — TELEPHONE ENCOUNTER
Name from pharmacy: VENLAFAXINE 150MG ER CAPSULES          Will file in chart as: VENLAFAXINE HCL  MG Oral Capsule SR 24 Hr    Sig: TAKE 1 CAPSULE(150 MG) BY MOUTH DAILY    Disp:  30 capsule    Refills:  0 (Pharmacy requested: Not specified)    Start

## 2020-12-16 DIAGNOSIS — E78.2 MIXED HYPERLIPIDEMIA: ICD-10-CM

## 2020-12-16 RX ORDER — ROSUVASTATIN CALCIUM 10 MG/1
TABLET, COATED ORAL
Qty: 90 TABLET | Refills: 0 | Status: SHIPPED | OUTPATIENT
Start: 2020-12-16 | End: 2021-03-08

## 2020-12-16 NOTE — TELEPHONE ENCOUNTER
LOV 11/17/2020    LAST LAB 8/8/2020    LAST RX   ROSUVASTATIN CALCIUM 10 MG Oral Tab 90 tablet 1 6/22/2020     Sig: TAKE 1 TABLET BY MOUTH EVERY NIGHT          Next OV   Future Appointments   Date Time Provider Cole Parra   12/19/2020 12:00 PM MARIANGEL ROPER

## 2020-12-19 ENCOUNTER — LAB ENCOUNTER (OUTPATIENT)
Dept: LAB | Age: 40
End: 2020-12-19
Attending: SURGERY
Payer: COMMERCIAL

## 2020-12-19 DIAGNOSIS — R76.8 POSITIVE ANA (ANTINUCLEAR ANTIBODY): ICD-10-CM

## 2020-12-19 DIAGNOSIS — M32.9 SYSTEMIC LUPUS ERYTHEMATOSUS, UNSPECIFIED SLE TYPE, UNSPECIFIED ORGAN INVOLVEMENT STATUS (HCC): ICD-10-CM

## 2020-12-19 DIAGNOSIS — R53.82 CHRONIC FATIGUE: ICD-10-CM

## 2020-12-19 DIAGNOSIS — R79.82 CRP ELEVATED: ICD-10-CM

## 2020-12-19 DIAGNOSIS — Z86.39 HISTORY OF THYROID DISEASE: ICD-10-CM

## 2020-12-19 DIAGNOSIS — Z01.818 PREOP TESTING: ICD-10-CM

## 2020-12-19 DIAGNOSIS — R76.8 DS DNA ANTIBODY POSITIVE: ICD-10-CM

## 2020-12-19 PROCEDURE — 86901 BLOOD TYPING SEROLOGIC RH(D): CPT

## 2020-12-19 PROCEDURE — 81001 URINALYSIS AUTO W/SCOPE: CPT | Performed by: INTERNAL MEDICINE

## 2020-12-19 PROCEDURE — 84443 ASSAY THYROID STIM HORMONE: CPT

## 2020-12-19 PROCEDURE — 86256 FLUORESCENT ANTIBODY TITER: CPT

## 2020-12-19 PROCEDURE — 86038 ANTINUCLEAR ANTIBODIES: CPT

## 2020-12-19 PROCEDURE — 86850 RBC ANTIBODY SCREEN: CPT

## 2020-12-19 PROCEDURE — 86900 BLOOD TYPING SEROLOGIC ABO: CPT

## 2020-12-19 PROCEDURE — 86160 COMPLEMENT ANTIGEN: CPT | Performed by: INTERNAL MEDICINE

## 2020-12-19 PROCEDURE — 84439 ASSAY OF FREE THYROXINE: CPT

## 2020-12-19 PROCEDURE — 85652 RBC SED RATE AUTOMATED: CPT | Performed by: INTERNAL MEDICINE

## 2020-12-19 PROCEDURE — 86140 C-REACTIVE PROTEIN: CPT | Performed by: INTERNAL MEDICINE

## 2020-12-19 PROCEDURE — 36415 COLL VENOUS BLD VENIPUNCTURE: CPT | Performed by: INTERNAL MEDICINE

## 2020-12-19 PROCEDURE — 80048 BASIC METABOLIC PNL TOTAL CA: CPT

## 2020-12-19 NOTE — PAT NURSING NOTE
Pt. Missed noon appt. For lab and covid test.  Pt. Notified and states she forgot. [facility] notified and states pt. Can still come today and she needs to get there before they close at 1545. Pt.  Informed and states she would leave right away to

## 2020-12-21 ENCOUNTER — ANESTHESIA EVENT (OUTPATIENT)
Dept: SURGERY | Facility: HOSPITAL | Age: 40
DRG: 621 | End: 2020-12-21
Payer: COMMERCIAL

## 2020-12-21 ENCOUNTER — DOCUMENTATION ONLY (OUTPATIENT)
Dept: SURGERY | Facility: CLINIC | Age: 40
End: 2020-12-21

## 2020-12-21 ENCOUNTER — HOSPITAL ENCOUNTER (INPATIENT)
Facility: HOSPITAL | Age: 40
LOS: 1 days | Discharge: HOME | DRG: 621 | End: 2020-12-22
Attending: SURGERY | Admitting: SURGERY
Payer: COMMERCIAL

## 2020-12-21 ENCOUNTER — ANESTHESIA (OUTPATIENT)
Dept: SURGERY | Facility: HOSPITAL | Age: 40
DRG: 621 | End: 2020-12-21
Payer: COMMERCIAL

## 2020-12-21 DIAGNOSIS — G47.33 OSA ON CPAP: ICD-10-CM

## 2020-12-21 DIAGNOSIS — E78.00 ELEVATED CHOLESTEROL: ICD-10-CM

## 2020-12-21 DIAGNOSIS — Z99.89 OSA ON CPAP: ICD-10-CM

## 2020-12-21 DIAGNOSIS — Z01.818 PREOP TESTING: Primary | ICD-10-CM

## 2020-12-21 DIAGNOSIS — E66.01 CLASS 3 SEVERE OBESITY DUE TO EXCESS CALORIES WITH BODY MASS INDEX (BMI) OF 60.0 TO 69.9 IN ADULT, UNSPECIFIED WHETHER SERIOUS COMORBIDITY PRESENT (HCC): ICD-10-CM

## 2020-12-21 PROBLEM — J45.909 ASTHMA (HCC): Chronic | Status: ACTIVE | Noted: 2020-12-21

## 2020-12-21 PROBLEM — J45.909 ASTHMA: Chronic | Status: ACTIVE | Noted: 2020-12-21

## 2020-12-21 PROCEDURE — 0DB64Z3 EXCISION OF STOMACH, PERCUTANEOUS ENDOSCOPIC APPROACH, VERTICAL: ICD-10-PCS | Performed by: SURGERY

## 2020-12-21 PROCEDURE — 99232 SBSQ HOSP IP/OBS MODERATE 35: CPT | Performed by: HOSPITALIST

## 2020-12-21 PROCEDURE — 3E0T3BZ INTRODUCTION OF ANESTHETIC AGENT INTO PERIPHERAL NERVES AND PLEXI, PERCUTANEOUS APPROACH: ICD-10-PCS | Performed by: SURGERY

## 2020-12-21 RX ORDER — FAMOTIDINE 20 MG/1
20 TABLET ORAL ONCE
Status: COMPLETED | OUTPATIENT
Start: 2020-12-21 | End: 2020-12-21

## 2020-12-21 RX ORDER — DEXAMETHASONE SODIUM PHOSPHATE 4 MG/ML
VIAL (ML) INJECTION AS NEEDED
Status: DISCONTINUED | OUTPATIENT
Start: 2020-12-21 | End: 2020-12-21 | Stop reason: SURG

## 2020-12-21 RX ORDER — SODIUM CHLORIDE, SODIUM LACTATE, POTASSIUM CHLORIDE, CALCIUM CHLORIDE 600; 310; 30; 20 MG/100ML; MG/100ML; MG/100ML; MG/100ML
INJECTION, SOLUTION INTRAVENOUS CONTINUOUS
Status: DISCONTINUED | OUTPATIENT
Start: 2020-12-21 | End: 2020-12-22

## 2020-12-21 RX ORDER — ALBUTEROL SULFATE 90 UG/1
2 AEROSOL, METERED RESPIRATORY (INHALATION) EVERY 4 HOURS PRN
Status: DISCONTINUED | OUTPATIENT
Start: 2020-12-21 | End: 2020-12-22

## 2020-12-21 RX ORDER — ONDANSETRON 2 MG/ML
4 INJECTION INTRAMUSCULAR; INTRAVENOUS EVERY 6 HOURS PRN
Status: DISCONTINUED | OUTPATIENT
Start: 2020-12-21 | End: 2020-12-22

## 2020-12-21 RX ORDER — EZETIMIBE 10 MG/1
10 TABLET ORAL DAILY
Status: DISCONTINUED | OUTPATIENT
Start: 2020-12-22 | End: 2020-12-22

## 2020-12-21 RX ORDER — PANTOPRAZOLE SODIUM 40 MG/1
40 TABLET, DELAYED RELEASE ORAL
Status: DISCONTINUED | OUTPATIENT
Start: 2020-12-22 | End: 2020-12-21

## 2020-12-21 RX ORDER — HYDROMORPHONE HYDROCHLORIDE 1 MG/ML
0.2 INJECTION, SOLUTION INTRAMUSCULAR; INTRAVENOUS; SUBCUTANEOUS EVERY 5 MIN PRN
Status: DISCONTINUED | OUTPATIENT
Start: 2020-12-21 | End: 2020-12-21 | Stop reason: HOSPADM

## 2020-12-21 RX ORDER — VENLAFAXINE HYDROCHLORIDE 150 MG/1
150 CAPSULE, EXTENDED RELEASE ORAL DAILY
Status: DISCONTINUED | OUTPATIENT
Start: 2020-12-22 | End: 2020-12-21

## 2020-12-21 RX ORDER — GABAPENTIN 300 MG/1
300 CAPSULE ORAL ONCE
Status: COMPLETED | OUTPATIENT
Start: 2020-12-21 | End: 2020-12-21

## 2020-12-21 RX ORDER — CELECOXIB 200 MG/1
400 CAPSULE ORAL ONCE
Status: COMPLETED | OUTPATIENT
Start: 2020-12-21 | End: 2020-12-21

## 2020-12-21 RX ORDER — HALOPERIDOL 5 MG/ML
0.25 INJECTION INTRAMUSCULAR ONCE AS NEEDED
Status: DISCONTINUED | OUTPATIENT
Start: 2020-12-21 | End: 2020-12-21 | Stop reason: HOSPADM

## 2020-12-21 RX ORDER — ACETAMINOPHEN 500 MG
1000 TABLET ORAL ONCE
Status: DISCONTINUED | OUTPATIENT
Start: 2020-12-21 | End: 2020-12-21

## 2020-12-21 RX ORDER — ONDANSETRON 2 MG/ML
4 INJECTION INTRAMUSCULAR; INTRAVENOUS ONCE AS NEEDED
Status: COMPLETED | OUTPATIENT
Start: 2020-12-21 | End: 2020-12-21

## 2020-12-21 RX ORDER — HYDROMORPHONE HYDROCHLORIDE 1 MG/ML
0.6 INJECTION, SOLUTION INTRAMUSCULAR; INTRAVENOUS; SUBCUTANEOUS EVERY 5 MIN PRN
Status: DISCONTINUED | OUTPATIENT
Start: 2020-12-21 | End: 2020-12-21 | Stop reason: HOSPADM

## 2020-12-21 RX ORDER — HYDROXYCHLOROQUINE SULFATE 200 MG/1
200 TABLET, FILM COATED ORAL 2 TIMES DAILY
Status: DISCONTINUED | OUTPATIENT
Start: 2020-12-22 | End: 2020-12-22

## 2020-12-21 RX ORDER — HYDROMORPHONE HYDROCHLORIDE 1 MG/ML
0.4 INJECTION, SOLUTION INTRAMUSCULAR; INTRAVENOUS; SUBCUTANEOUS EVERY 5 MIN PRN
Status: DISCONTINUED | OUTPATIENT
Start: 2020-12-21 | End: 2020-12-21 | Stop reason: HOSPADM

## 2020-12-21 RX ORDER — HEPARIN SODIUM 5000 [USP'U]/ML
5000 INJECTION, SOLUTION INTRAVENOUS; SUBCUTANEOUS ONCE
Status: COMPLETED | OUTPATIENT
Start: 2020-12-21 | End: 2020-12-21

## 2020-12-21 RX ORDER — LIDOCAINE HYDROCHLORIDE 10 MG/ML
INJECTION, SOLUTION EPIDURAL; INFILTRATION; INTRACAUDAL; PERINEURAL AS NEEDED
Status: DISCONTINUED | OUTPATIENT
Start: 2020-12-21 | End: 2020-12-21 | Stop reason: SURG

## 2020-12-21 RX ORDER — GLYCOPYRROLATE 0.2 MG/ML
INJECTION, SOLUTION INTRAMUSCULAR; INTRAVENOUS AS NEEDED
Status: DISCONTINUED | OUTPATIENT
Start: 2020-12-21 | End: 2020-12-21 | Stop reason: SURG

## 2020-12-21 RX ORDER — BUPIVACAINE HYDROCHLORIDE 2.5 MG/ML
INJECTION, SOLUTION EPIDURAL; INFILTRATION; INTRACAUDAL AS NEEDED
Status: DISCONTINUED | OUTPATIENT
Start: 2020-12-21 | End: 2020-12-21 | Stop reason: HOSPADM

## 2020-12-21 RX ORDER — HYDROCODONE BITARTRATE AND ACETAMINOPHEN 5; 325 MG/1; MG/1
2 TABLET ORAL AS NEEDED
Status: DISCONTINUED | OUTPATIENT
Start: 2020-12-21 | End: 2020-12-21 | Stop reason: HOSPADM

## 2020-12-21 RX ORDER — KETOROLAC TROMETHAMINE 30 MG/ML
30 INJECTION, SOLUTION INTRAMUSCULAR; INTRAVENOUS EVERY 6 HOURS
Status: DISCONTINUED | OUTPATIENT
Start: 2020-12-21 | End: 2020-12-22

## 2020-12-21 RX ORDER — PHENYLEPHRINE HCL 10 MG/ML
VIAL (ML) INJECTION AS NEEDED
Status: DISCONTINUED | OUTPATIENT
Start: 2020-12-21 | End: 2020-12-21 | Stop reason: SURG

## 2020-12-21 RX ORDER — LORAZEPAM 0.5 MG/1
0.5 TABLET ORAL 2 TIMES DAILY PRN
Status: DISCONTINUED | OUTPATIENT
Start: 2020-12-22 | End: 2020-12-22

## 2020-12-21 RX ORDER — SCOLOPAMINE TRANSDERMAL SYSTEM 1 MG/1
1 PATCH, EXTENDED RELEASE TRANSDERMAL
Status: DISCONTINUED | OUTPATIENT
Start: 2020-12-21 | End: 2020-12-24 | Stop reason: HOSPADM

## 2020-12-21 RX ORDER — METOCLOPRAMIDE 10 MG/1
10 TABLET ORAL ONCE
Status: DISCONTINUED | OUTPATIENT
Start: 2020-12-21 | End: 2020-12-21 | Stop reason: HOSPADM

## 2020-12-21 RX ORDER — TIZANIDINE 4 MG/1
4 TABLET ORAL EVERY 6 HOURS PRN
Status: DISCONTINUED | OUTPATIENT
Start: 2020-12-22 | End: 2020-12-22

## 2020-12-21 RX ORDER — HYDROCODONE BITARTRATE AND ACETAMINOPHEN 5; 325 MG/1; MG/1
1 TABLET ORAL AS NEEDED
Status: DISCONTINUED | OUTPATIENT
Start: 2020-12-21 | End: 2020-12-21 | Stop reason: HOSPADM

## 2020-12-21 RX ORDER — VENLAFAXINE HYDROCHLORIDE 37.5 MG/1
75 CAPSULE, EXTENDED RELEASE ORAL DAILY
Status: DISCONTINUED | OUTPATIENT
Start: 2020-12-22 | End: 2020-12-21

## 2020-12-21 RX ORDER — PROCHLORPERAZINE EDISYLATE 5 MG/ML
5 INJECTION INTRAMUSCULAR; INTRAVENOUS ONCE AS NEEDED
Status: COMPLETED | OUTPATIENT
Start: 2020-12-21 | End: 2020-12-21

## 2020-12-21 RX ORDER — CLINDAMYCIN PHOSPHATE 150 MG/ML
INJECTION, SOLUTION INTRAVENOUS AS NEEDED
Status: DISCONTINUED | OUTPATIENT
Start: 2020-12-21 | End: 2020-12-21 | Stop reason: SURG

## 2020-12-21 RX ORDER — ROSUVASTATIN CALCIUM 10 MG/1
10 TABLET, COATED ORAL NIGHTLY
Status: DISCONTINUED | OUTPATIENT
Start: 2020-12-22 | End: 2020-12-22

## 2020-12-21 RX ORDER — SODIUM CHLORIDE, SODIUM LACTATE, POTASSIUM CHLORIDE, CALCIUM CHLORIDE 600; 310; 30; 20 MG/100ML; MG/100ML; MG/100ML; MG/100ML
INJECTION, SOLUTION INTRAVENOUS CONTINUOUS
Status: DISCONTINUED | OUTPATIENT
Start: 2020-12-21 | End: 2020-12-21 | Stop reason: HOSPADM

## 2020-12-21 RX ORDER — ROCURONIUM BROMIDE 10 MG/ML
INJECTION, SOLUTION INTRAVENOUS AS NEEDED
Status: DISCONTINUED | OUTPATIENT
Start: 2020-12-21 | End: 2020-12-21 | Stop reason: SURG

## 2020-12-21 RX ORDER — LEVOTHYROXINE SODIUM 0.1 MG/1
200 TABLET ORAL
Status: DISCONTINUED | OUTPATIENT
Start: 2020-12-22 | End: 2020-12-22

## 2020-12-21 RX ORDER — NALOXONE HYDROCHLORIDE 0.4 MG/ML
80 INJECTION, SOLUTION INTRAMUSCULAR; INTRAVENOUS; SUBCUTANEOUS AS NEEDED
Status: DISCONTINUED | OUTPATIENT
Start: 2020-12-21 | End: 2020-12-21 | Stop reason: HOSPADM

## 2020-12-21 RX ORDER — CLINDAMYCIN PHOSPHATE 900 MG/50ML
900 INJECTION INTRAVENOUS ONCE
Status: DISCONTINUED | OUTPATIENT
Start: 2020-12-21 | End: 2020-12-21 | Stop reason: HOSPADM

## 2020-12-21 RX ORDER — PANTOPRAZOLE SODIUM 40 MG/1
40 TABLET, DELAYED RELEASE ORAL
Status: DISCONTINUED | OUTPATIENT
Start: 2020-12-22 | End: 2020-12-22

## 2020-12-21 RX ORDER — MORPHINE SULFATE 4 MG/ML
2 INJECTION, SOLUTION INTRAMUSCULAR; INTRAVENOUS EVERY 10 MIN PRN
Status: DISCONTINUED | OUTPATIENT
Start: 2020-12-21 | End: 2020-12-21 | Stop reason: HOSPADM

## 2020-12-21 RX ORDER — ONDANSETRON 2 MG/ML
INJECTION INTRAMUSCULAR; INTRAVENOUS AS NEEDED
Status: DISCONTINUED | OUTPATIENT
Start: 2020-12-21 | End: 2020-12-21 | Stop reason: SURG

## 2020-12-21 RX ORDER — MORPHINE SULFATE 10 MG/ML
6 INJECTION, SOLUTION INTRAMUSCULAR; INTRAVENOUS EVERY 10 MIN PRN
Status: DISCONTINUED | OUTPATIENT
Start: 2020-12-21 | End: 2020-12-21 | Stop reason: HOSPADM

## 2020-12-21 RX ORDER — MORPHINE SULFATE 4 MG/ML
4 INJECTION, SOLUTION INTRAMUSCULAR; INTRAVENOUS EVERY 10 MIN PRN
Status: DISCONTINUED | OUTPATIENT
Start: 2020-12-21 | End: 2020-12-21 | Stop reason: HOSPADM

## 2020-12-21 RX ORDER — KETOROLAC TROMETHAMINE 15 MG/ML
15 INJECTION, SOLUTION INTRAMUSCULAR; INTRAVENOUS EVERY 6 HOURS
Status: DISCONTINUED | OUTPATIENT
Start: 2020-12-21 | End: 2020-12-22

## 2020-12-21 RX ORDER — HEPARIN SODIUM 5000 [USP'U]/ML
5000 INJECTION, SOLUTION INTRAVENOUS; SUBCUTANEOUS EVERY 8 HOURS SCHEDULED
Status: DISCONTINUED | OUTPATIENT
Start: 2020-12-21 | End: 2020-12-22

## 2020-12-21 RX ORDER — ACETAMINOPHEN 500 MG
1000 TABLET ORAL ONCE
Status: COMPLETED | OUTPATIENT
Start: 2020-12-21 | End: 2020-12-21

## 2020-12-21 RX ADMIN — DEXAMETHASONE SODIUM PHOSPHATE 4 MG: 4 MG/ML VIAL (ML) INJECTION at 08:53:00

## 2020-12-21 RX ADMIN — SODIUM CHLORIDE, SODIUM LACTATE, POTASSIUM CHLORIDE, CALCIUM CHLORIDE: 600; 310; 30; 20 INJECTION, SOLUTION INTRAVENOUS at 09:33:00

## 2020-12-21 RX ADMIN — GLYCOPYRROLATE 0.2 MG: 0.2 INJECTION, SOLUTION INTRAMUSCULAR; INTRAVENOUS at 08:51:00

## 2020-12-21 RX ADMIN — ONDANSETRON 4 MG: 2 INJECTION INTRAMUSCULAR; INTRAVENOUS at 08:53:00

## 2020-12-21 RX ADMIN — LIDOCAINE HYDROCHLORIDE 25 MG: 10 INJECTION, SOLUTION EPIDURAL; INFILTRATION; INTRACAUDAL; PERINEURAL at 08:57:00

## 2020-12-21 RX ADMIN — CLINDAMYCIN PHOSPHATE 900 MG: 150 INJECTION, SOLUTION INTRAVENOUS at 08:51:00

## 2020-12-21 RX ADMIN — LIDOCAINE HYDROCHLORIDE 25 MG: 10 INJECTION, SOLUTION EPIDURAL; INFILTRATION; INTRACAUDAL; PERINEURAL at 08:53:00

## 2020-12-21 RX ADMIN — ROCURONIUM BROMIDE 5 MG: 10 INJECTION, SOLUTION INTRAVENOUS at 08:57:00

## 2020-12-21 RX ADMIN — SODIUM CHLORIDE, SODIUM LACTATE, POTASSIUM CHLORIDE, CALCIUM CHLORIDE: 600; 310; 30; 20 INJECTION, SOLUTION INTRAVENOUS at 09:41:00

## 2020-12-21 RX ADMIN — ROCURONIUM BROMIDE 25 MG: 10 INJECTION, SOLUTION INTRAVENOUS at 09:03:00

## 2020-12-21 RX ADMIN — ROCURONIUM BROMIDE 5 MG: 10 INJECTION, SOLUTION INTRAVENOUS at 09:25:00

## 2020-12-21 RX ADMIN — PHENYLEPHRINE HCL 100 MCG: 10 MG/ML VIAL (ML) INJECTION at 09:16:00

## 2020-12-21 RX ADMIN — PHENYLEPHRINE HCL 100 MCG: 10 MG/ML VIAL (ML) INJECTION at 09:28:00

## 2020-12-21 NOTE — ANESTHESIA PROCEDURE NOTES
Airway  Urgency: Elective      General Information and Staff    Patient location during procedure: OR  Anesthesiologist: Messi Reza MD  Resident/CRNA: Saúl Albarran CRNA  Performed: CRNA     Indications and Patient Condition  Indications for

## 2020-12-21 NOTE — ANESTHESIA POSTPROCEDURE EVALUATION
Patient: Gerber Moran    Procedure Summary     Date: 12/21/20 Room / Location: [address] [place] [address] MAIN OR 01 / [facility] 2[facility] MAIN OR    Anesthesia Start: 7377 Anesthesia Stop: 5898    Procedure: BARIATRIC SLEEVE GASTRECTOMY LAPAROSCOPIC (N/A ) Diagnosis: ( )    Surgeons: Twin Andres

## 2020-12-21 NOTE — PROGRESS NOTES
Provided pt with bariatric discharge instructions; reinforced the importance of fluids with a goal of 48-64 ozs/day. Instructed pt on how to care for incisions, activities allowed/avoid; meds to take/avoid.  Recommended pt wait to take Bariatric Chewable Vi

## 2020-12-21 NOTE — PLAN OF CARE
Problem: Patient Centered Care  Goal: Patient preferences are identified and integrated in the patient's plan of care  Description: Interventions:  - What would you like us to know as we care for you?   - Provide timely, complete, and accurate informatio oral hygiene regimen  - Implement oral medicated treatments as ordered  Outcome: Progressing     Problem: MUSCULOSKELETAL - ADULT  Goal: Return mobility to safest level of function  Description: INTERVENTIONS:  - Assess patient stability and activity cathleen request assistance  - Assess pain using appropriate pain scale  - Administer analgesics based on type and severity of pain and evaluate response  - Implement non-pharmacological measures as appropriate and evaluate response  - Consider cultural and social of patient/family/discharge partner  - Complete POLST form as appropriate  - Assess patient's ability to be responsible for managing their own health  - Refer to Case Management Department for coordinating discharge planning if the patient needs post-hospi

## 2020-12-21 NOTE — RESPIRATORY THERAPY NOTE
Hasmukh Alexander BLESSING ASSESSMENT:    Pt does have a previous diagnosis of BLESSING. Pt does routinely use a CPAP device at home.  This pt is suspected to be at high risk for BLESSING a  CPAP INITIATION:    Pt to be placed on CPAP: yes  Pt refused: no  CPAP settings: CPAP at 5 cm H2O/

## 2020-12-21 NOTE — H&P
Please see office visit from 12/3 as current H&P. Doing well otherwise - tolerated preop liquid diet well. No issues w abdominal pain. Endometrial bx completed - path benign. To OR for sleeve gastrectomy.

## 2020-12-21 NOTE — ANESTHESIA PREPROCEDURE EVALUATION
Anesthesia PreOp Note    HPI:     Pastora Gaines is a 47year old female who presents for preoperative consultation requested by: Carol Hector MD    Date of Surgery: 12/21/2020    Procedure(s):  BARIATRIC SLEEVE GASTRECTOMY LAPAROSCOPIC  Indication: morbid lumbar         Date Noted: 06/04/2019      Acquired hypothyroidism         Date Noted: 06/04/2019      Class 3 severe obesity due to excess calories with body mass index (BMI) of 60.0 to 69.9 in adult [facility])         Date Noted: 06/04/2019      Migraine with Dispersible, Take 2 tablets (60 mg total) by mouth 2 (two) times a day., Disp: 120 tablet, Rfl: 2, 12/20/2020 at Unknown time    •  LORAZEPAM 0.5 MG Oral Tab, TAKE 1 TABLET(0.5 MG) BY MOUTH TWICE DAILY AS NEEDED FOR ANXIETY, Disp: 40 tablet, Rfl: 0, Past W at Unknown time    •  Triamcinolone Acetonide 55 MCG/ACT Nasal Aerosol, by Nasal route daily. , Disp: , Rfl:         •  lactated ringers infusion, , Intravenous, Continuous, Shawnee Vazquez MD, Last Rate: 20 mL/hr at 12/21/20 0800    •  Metoclopramide HCl (RE Relationship status: Not on file      Intimate partner violence        Fear of current or ex partner: Not on file        Emotionally abused: Not on file        Physically abused: Not on file        Forced sexual activity: Not on file    Other Topics      C attacks,  depression,       GI/Hepatic/Renal    (+) GERD,     Endo/Other    Abdominal  - normal exam               Anesthesia Plan:   ASA:  3  Plan:   General  Airway:  ETT  Post-op Pain Management: IV analgesics  Informed Consent Plan and Risks Discussed

## 2020-12-21 NOTE — PROGRESS NOTES
[facility]    Progress Note    Leonila Dillon Patient Status:  Inpatient    3/16/1980 MRN [id number]   Location [address] UNIT Attending Monika Reynoso MD   Hosp Day # 0 PCP Seema Jimenez MD        Sub HEMODYNAMIC STATUS. Hyperlipidemia  CONT HOME MEDS. BLESSING (obstructive sleep apnea)  BLESSING PROTOCOL, MONITOR. Asthma  CONT HOME MEDS.              Results:     Lab Results   Component Value Date    WBC 7.5 08/08/2020    HGB 12.4 08/08/2020

## 2020-12-21 NOTE — BH NUTRITION NOTE
Bariatric Inpatient Nutrition Note      Angel Zamarripa is a 47year old female.     Procedure: Sleeve gastrectomy  Surgery Date: 12/21/2020  Medical Diagnosis: Morbid obesity    Height:  Ht Readings from Last 1 Encounters:  12/17/20 : 5' 2\" (1.575 m) (Porcine) 5000 UNIT/ML injection 5,000 Units, 5,000 Units, Subcutaneous, Once    •  [COMPLETED] gabapentin (NEURONTIN) cap 300 mg, 300 mg, Oral, Once    •  [COMPLETED] celecoxib (CeleBREX) cap 400 mg, 400 mg, Oral, Once    •  [COMPLETED] ondansetron HCl (Z Recommendations/Goals: Reviewed bariatric diet stages, fluid intake goals and chart, Continue bariatric diet stage 1 upon discharge from hospital, Follow discharge instructions , Progress through bariatric diet stages as directed and as tolerated, Star

## 2020-12-21 NOTE — OPERATIVE REPORT
Betty Watkins / Marielos Cuevas / Francisco Chatterjee / Pebbles Valadez / Carrie Lema / Akhil Liner - [phone]  Answering Service [phone]        Date: 12/21/20  Preop Diagnosis: Morbid Obesity secondary to excess calories   Postop Teri Hutchison proceed. Operative Summary: Patient was brought to the operating room and placed under general anesthesia. Perioperative antibiotics and heparin were given and was secured to the operating room table. Prepped and draped in a sterile fashion.   An Optiv Again we started about 6 cm proximal to the pylorus. Along our sleeve, we ensured that we did not narrow the sleeve at the incisura and that we did not cause any twisting or corkscrewing of the sleeve along its path.   We completed our sleeve with 2 fires

## 2020-12-22 VITALS
DIASTOLIC BLOOD PRESSURE: 86 MMHG | HEIGHT: 62 IN | HEART RATE: 55 BPM | SYSTOLIC BLOOD PRESSURE: 144 MMHG | OXYGEN SATURATION: 99 % | WEIGHT: 293 LBS | RESPIRATION RATE: 17 BRPM | TEMPERATURE: 98 F | BODY MASS INDEX: 53.92 KG/M2

## 2020-12-22 PROCEDURE — 99239 HOSP IP/OBS DSCHRG MGMT >30: CPT | Performed by: HOSPITALIST

## 2020-12-22 RX ORDER — PANTOPRAZOLE SODIUM 40 MG/1
40 TABLET, DELAYED RELEASE ORAL
Qty: 90 TABLET | Refills: 0 | Status: SHIPPED | OUTPATIENT
Start: 2020-12-23 | End: 2021-03-23 | Stop reason: ALTCHOICE

## 2020-12-22 NOTE — DISCHARGE SUMMARY
[facility]  Discharge Summary     Rancho Sarabia  : 3/16/1980    Status: Inpatient  Day #: 1    Attending: Carroll Bhardwaj MD  PCP: Andrade Daniels MD     Date of Admission:  2020  Date of Discharge:  2020     Hospital Discharge D December 23, 2020      Take 1 tablet (40 mg total) by mouth every morning before breakfast.   Quantity: 90 tablet  Refills: 0        CHANGE how you take these medications      Instructions Prescription details   Venlafaxine HCl ER 75 MG Cp24  Commonly know Quantity: 24 tablet  Refills: 0     traZODone HCl 50 MG Tabs  Commonly known as: DESYREL      as needed. Refills: 0     Triamcinolone Acetonide 55 MCG/ACT Aero  Commonly known as: NASACORT      by Nasal route daily.    Refills: 0        STOP taking these

## 2020-12-22 NOTE — PLAN OF CARE
Problem: Patient Centered Care  Goal: Patient preferences are identified and integrated in the patient's plan of care  Description: Interventions:  - What would you like us to know as we care for you?   - Provide timely, complete, and accurate informatio mucosa and hygiene practices  - Implement preventative oral hygiene regimen  - Implement oral medicated treatments as ordered  Outcome: Adequate for Discharge     Problem: MUSCULOSKELETAL - ADULT  Goal: Return mobility to safest level of function  Descript comfort level or patient's stated pain goal  Description: INTERVENTIONS:  - Encourage pt to monitor pain and request assistance  - Assess pain using appropriate pain scale  - Administer analgesics based on type and severity of pain and evaluate response  - discharge learning needs (meds, wound care, etc)  - Arrange for interpreters to assist at discharge as needed  - Consider post-discharge preferences of patient/family/discharge partner  - Complete POLST form as appropriate  - Assess patient's ability to be

## 2020-12-22 NOTE — PROGRESS NOTES
Leeanne Barr / Juve Mckinnon / Mohini Ramos / Edis Reyna / Prosper Denney / [facility] - [phone]  [facility] Service [phone]      Progress Note    Daja Moss Patient Status:  Inpatient    3/16/1980 MRN [id number] Calcium (CRESTOR) tab 10 mg, 10 mg, Oral, Nightly    •  tiZANidine HCl (ZANAFLEX) tab 4 mg, 4 mg, Oral, Q6H PRN    •  influenza vaccine split quad (FLULAVAL) ages 6 months to 64 years inj 0.5ml, 0.5 mL, Intramuscular, Prior to discharge    •  Venlafaxine H

## 2020-12-22 NOTE — PLAN OF CARE
Problem: SKIN/TISSUE INTEGRITY - ADULT  Goal: Skin integrity remains intact  Description: INTERVENTIONS  - Assess and document risk factors for pressure ulcer development  - Assess and document skin integrity  - Monitor for areas of redness and/or skin b Problem: Impaired Functional Mobility  Goal: Achieve highest/safest level of mobility/gait  Description: Interventions:  - Assess patient's functional ability and stability  - Promote increasing activity/tolerance for mobility and gait  - Educate and eng discharge resources and transportation as appropriate  - Identify discharge learning needs (meds, wound care, etc)  - Arrange for interpreters to assist at discharge as needed  - Consider post-discharge preferences of patient/family/discharge partner  - Co

## 2020-12-23 ENCOUNTER — TELEPHONE (OUTPATIENT)
Dept: SURGERY | Facility: CLINIC | Age: 40
End: 2020-12-23

## 2020-12-28 PROBLEM — Z98.84 S/P LAPAROSCOPIC SLEEVE GASTRECTOMY: Status: ACTIVE | Noted: 2020-12-28

## 2020-12-28 NOTE — PROGRESS NOTES
[address], [facility] [address]  [address] [facility] [address]  Dept: [phone]    12/28/2020    BARIATRIC SURGERY PATIENT/FOLLOW UP    HPI:  Ayleen Brian is a 47year old-year old female controlled and improving. Fluid intake a bit low - encouraged to scale back on pureed and vitamins as she gets a better handle on drinking closer to 60-70oz/day and more consistency. Should start logging on a regular basis.    Advance diet per protocol

## 2020-12-31 ENCOUNTER — DOCUMENTATION ONLY (OUTPATIENT)
Dept: SURGERY | Facility: CLINIC | Age: 40
End: 2020-12-31

## 2020-12-31 ENCOUNTER — OFFICE VISIT (OUTPATIENT)
Dept: SURGERY | Facility: CLINIC | Age: 40
End: 2020-12-31
Payer: COMMERCIAL

## 2020-12-31 VITALS
DIASTOLIC BLOOD PRESSURE: 91 MMHG | RESPIRATION RATE: 16 BRPM | BODY MASS INDEX: 53.92 KG/M2 | SYSTOLIC BLOOD PRESSURE: 135 MMHG | OXYGEN SATURATION: 99 % | WEIGHT: 293 LBS | HEART RATE: 110 BPM | HEIGHT: 62 IN

## 2020-12-31 DIAGNOSIS — Z98.84 S/P LAPAROSCOPIC SLEEVE GASTRECTOMY: Primary | ICD-10-CM

## 2020-12-31 NOTE — PROGRESS NOTES
Provided/reviewed bariatric post-op orientation and HELP card; instructed pt on importance of fluids, aim for 64 ozs/day; vits, taking Bariatric Choice 4x/day; meds to take/avoid; activities allowed/avoid; signs and symptoms of concern; instructed pt to ke

## 2021-01-07 ENCOUNTER — TELEPHONE (OUTPATIENT)
Dept: PHYSICAL THERAPY | Age: 41
End: 2021-01-07

## 2021-01-07 NOTE — TELEPHONE ENCOUNTER
Called to confirm appointment on 1/11/20 to resume skilled PT; pt confirms she is still interested and will keep the appointment

## 2021-01-11 ENCOUNTER — OFFICE VISIT (OUTPATIENT)
Dept: PHYSICAL THERAPY | Age: 41
End: 2021-01-11
Attending: INTERNAL MEDICINE
Payer: COMMERCIAL

## 2021-01-11 PROCEDURE — 97140 MANUAL THERAPY 1/> REGIONS: CPT

## 2021-01-11 PROCEDURE — 97112 NEUROMUSCULAR REEDUCATION: CPT

## 2021-01-11 PROCEDURE — 97110 THERAPEUTIC EXERCISES: CPT

## 2021-01-11 NOTE — PROGRESS NOTES
PT RE-EVALUATION   Dx: fibromyalgia (worst R shld, back)         Insurance (Authorized # of Visits):  PPO           Authorizing Physician: Dr. Godfrey Aldridge       Fall Risk: standard         Precautions: n/a             Subjective: [place] was last seen in PT on 1 4+/5*  Abduction: R 4+/5*; L 4+/5*  ER: R 5/5; L 4+/5  IR: R 5/5; L 5/5 Rhomboids: R4/5*, L 4/5*  Lats: R 4-/5*; L 4-/5   Low trap: R 3/5*; L 3/5* Flexion: R 5/5; L 5/5  Extension: R 4/5; L 4/5  Abduction: R 4/5; L 4/5  ER: R 4+/5; L 5/5       Palpation: m options and has agreed to actively participate in planning and for this course of care. Thank you for your referral. If you have any questions, please contact me at Dept: [phone].     Sincerely,  Electronically signed by therapist: Sulaiman Harman glides x15 ea  Radial N glides x15 ea  Slump flossing 2x10 ea with DF  BOSU mini-lunges no UE   -FWD x10 ea  airex TA with slow march no UE x20 (good)  airex stance with RTB rows 2x10   Tandem on airex x30s ea (fair) Neuro Re-ed  Rock and reach FWD x10 ea stretch,    Charges: manual x1, therex x1, neuro re-ed x1 Total Timed Treatment: 45 min  Total Treatment Time: 45 min

## 2021-01-12 ENCOUNTER — OFFICE VISIT (OUTPATIENT)
Dept: INTERNAL MEDICINE CLINIC | Facility: CLINIC | Age: 41
End: 2021-01-12
Payer: COMMERCIAL

## 2021-01-12 VITALS
BODY MASS INDEX: 52.57 KG/M2 | RESPIRATION RATE: 16 BRPM | SYSTOLIC BLOOD PRESSURE: 122 MMHG | HEIGHT: 62.5 IN | WEIGHT: 293 LBS | HEART RATE: 80 BPM | DIASTOLIC BLOOD PRESSURE: 78 MMHG

## 2021-01-12 DIAGNOSIS — F50.81 BINGE EATING DISORDER: ICD-10-CM

## 2021-01-12 DIAGNOSIS — E66.01 CLASS 3 SEVERE OBESITY DUE TO EXCESS CALORIES WITH SERIOUS COMORBIDITY AND BODY MASS INDEX (BMI) OF 60.0 TO 69.9 IN ADULT (HCC): ICD-10-CM

## 2021-01-12 DIAGNOSIS — G47.33 OSA (OBSTRUCTIVE SLEEP APNEA): ICD-10-CM

## 2021-01-12 DIAGNOSIS — Z51.81 THERAPEUTIC DRUG MONITORING: Primary | ICD-10-CM

## 2021-01-12 DIAGNOSIS — R73.03 PREDIABETES: ICD-10-CM

## 2021-01-12 DIAGNOSIS — Z98.84 S/P LAPAROSCOPIC SLEEVE GASTRECTOMY: ICD-10-CM

## 2021-01-12 PROCEDURE — 3078F DIAST BP <80 MM HG: CPT | Performed by: INTERNAL MEDICINE

## 2021-01-12 PROCEDURE — 3074F SYST BP LT 130 MM HG: CPT | Performed by: INTERNAL MEDICINE

## 2021-01-12 PROCEDURE — 99214 OFFICE O/P EST MOD 30 MIN: CPT | Performed by: INTERNAL MEDICINE

## 2021-01-12 PROCEDURE — 3008F BODY MASS INDEX DOCD: CPT | Performed by: INTERNAL MEDICINE

## 2021-01-12 NOTE — PROGRESS NOTES
HISTORY OF PRESENT ILLNESS  Patient presents with:  Weight Check: down 37 lb, sx 20      Cathalene Essex is a 47year old female here for follow up in medical weight loss program.     VS20   Struggling with hydration   Currently on pureed diet 233.0 12/22/2020    MPV 10.7 01/17/2013     Lab Results   Component Value Date     (H) 12/22/2020    BUN 6 (L) 12/22/2020    BUNCREA 8.8 (L) 12/22/2020    CREATSERUM 0.68 12/22/2020    ANIONGAP 5 12/22/2020    GFRNAA 111 12/22/2020    GFRAA 128 12/2 Disp: 40 tablet, Rfl: 0    •  traZODone HCl 50 MG Oral Tab, as needed. , Disp: , Rfl:     •  ezetimibe 10 MG Oral Tab, Take 10 mg by mouth daily. , Disp: , Rfl:     •  VENLAFAXINE HCL ER 75 MG Oral Capsule SR 24 Hr, TAKE 1 CAPSULE(75 MG) BY MOUTH DAILY, Jaziel Ayala success. See patient instruction below for more details.   · Discussed strategies to overcome barriers to successful weight loss and weight maintenance  · FITTE: ACSM recommendations (150-300 minutes/ week in active weight loss)   · Weight Loss consent to t

## 2021-01-13 ENCOUNTER — OFFICE VISIT (OUTPATIENT)
Dept: PHYSICAL THERAPY | Age: 41
End: 2021-01-13
Attending: INTERNAL MEDICINE
Payer: COMMERCIAL

## 2021-01-13 PROCEDURE — 97140 MANUAL THERAPY 1/> REGIONS: CPT

## 2021-01-13 PROCEDURE — 97110 THERAPEUTIC EXERCISES: CPT

## 2021-01-13 NOTE — PROGRESS NOTES
Dx: fibromyalgia (worst R shld, back)         Insurance (Authorized # of Visits):  PPO           Authorizing Physician: Dr. 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

## 2021-01-18 ENCOUNTER — OFFICE VISIT (OUTPATIENT)
Dept: PHYSICAL THERAPY | Age: 41
End: 2021-01-18
Attending: INTERNAL MEDICINE
Payer: COMMERCIAL

## 2021-01-18 PROCEDURE — 97110 THERAPEUTIC EXERCISES: CPT

## 2021-01-18 PROCEDURE — 97140 MANUAL THERAPY 1/> REGIONS: CPT

## 2021-01-18 NOTE — PROGRESS NOTES
Dx: fibromyalgia (worst R shld, back)         Insurance (Authorized # of Visits):  PPO           Authorizing Physician: Dr. 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

## 2021-01-20 ENCOUNTER — OFFICE VISIT (OUTPATIENT)
Dept: PHYSICAL THERAPY | Age: 41
End: 2021-01-20
Attending: INTERNAL MEDICINE
Payer: COMMERCIAL

## 2021-01-20 PROCEDURE — 97112 NEUROMUSCULAR REEDUCATION: CPT

## 2021-01-20 PROCEDURE — 97140 MANUAL THERAPY 1/> REGIONS: CPT

## 2021-01-20 PROCEDURE — 97110 THERAPEUTIC EXERCISES: CPT

## 2021-01-21 NOTE — PROGRESS NOTES
Dx: fibromyalgia (worst R shld, back)         Insurance (Authorized # of Visits):  PPO           Authorizing Physician: Dr. 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

## 2021-01-22 ENCOUNTER — TELEPHONE (OUTPATIENT)
Dept: INTERNAL MEDICINE CLINIC | Facility: CLINIC | Age: 41
End: 2021-01-22

## 2021-01-22 DIAGNOSIS — F50.81 BINGE EATING DISORDER: ICD-10-CM

## 2021-01-22 DIAGNOSIS — Z99.89 OSA ON CPAP: ICD-10-CM

## 2021-01-22 DIAGNOSIS — E66.2 MORBID OBESITY WITH ALVEOLAR HYPOVENTILATION (HCC): Primary | ICD-10-CM

## 2021-01-22 DIAGNOSIS — G47.33 OSA ON CPAP: ICD-10-CM

## 2021-01-22 DIAGNOSIS — E78.2 MIXED HYPERLIPIDEMIA: ICD-10-CM

## 2021-01-22 DIAGNOSIS — R73.03 PREDIABETES: ICD-10-CM

## 2021-01-22 DIAGNOSIS — E66.01 CLASS 3 SEVERE OBESITY DUE TO EXCESS CALORIES WITH SERIOUS COMORBIDITY AND BODY MASS INDEX (BMI) OF 60.0 TO 69.9 IN ADULT (HCC): ICD-10-CM

## 2021-01-22 NOTE — TELEPHONE ENCOUNTER
Fernanda Keith RN             Wenceslao Michael,     Can you please enter a Dietitian referral for patient who is seeing [facility] on Monday? Please include E78.5 & Z98.84 dx in addition to E66.01 dx.       DONE

## 2021-01-25 ENCOUNTER — TELEPHONE (OUTPATIENT)
Dept: PHYSICAL THERAPY | Facility: HOSPITAL | Age: 41
End: 2021-01-25

## 2021-01-25 ENCOUNTER — APPOINTMENT (OUTPATIENT)
Dept: PHYSICAL THERAPY | Age: 41
End: 2021-01-25
Attending: INTERNAL MEDICINE
Payer: COMMERCIAL

## 2021-01-25 RX ORDER — VENLAFAXINE HYDROCHLORIDE 150 MG/1
CAPSULE, EXTENDED RELEASE ORAL
Qty: 30 CAPSULE | Refills: 1 | OUTPATIENT
Start: 2021-01-25

## 2021-01-27 ENCOUNTER — OFFICE VISIT (OUTPATIENT)
Dept: PHYSICAL THERAPY | Age: 41
End: 2021-01-27
Attending: INTERNAL MEDICINE
Payer: COMMERCIAL

## 2021-01-27 PROCEDURE — 97140 MANUAL THERAPY 1/> REGIONS: CPT

## 2021-01-27 PROCEDURE — 97110 THERAPEUTIC EXERCISES: CPT

## 2021-01-28 NOTE — PROGRESS NOTES
Dx: fibromyalgia (worst R shld, back)         Insurance (Authorized # of Visits):  PPO           Authorizing Physician: Dr. Josie Proctor       Fall Risk: standard         Precautions: n/a           20 min late  Subjective: Tired today; fell asleep in the car bk new visits since re-eval)  Pt will demonstrate decreased neural sensitivity by ability to tolerate sitting >45 minutes with <5/10 pain to accomplish work tasks - MET; starts to get uncomfortable and stiff at 45 min, sore with 1 hour   Pt will demonstrate i 1x10  YTB low trap 1x10   YTB shld ER 1x10 ea    Squat to lift 15# crate knees to chest x8       Neuro Re-ed  Hooklying TA 5s with march x10  RTB multifidi punch x10 ea Neuro Re-ed  Standing TA with tandem stance x30s ea (good)  -slow march x40 Neuro Re-ed

## 2021-02-01 ENCOUNTER — OFFICE VISIT (OUTPATIENT)
Dept: PHYSICAL THERAPY | Age: 41
End: 2021-02-01
Attending: INTERNAL MEDICINE
Payer: COMMERCIAL

## 2021-02-01 PROCEDURE — 97140 MANUAL THERAPY 1/> REGIONS: CPT

## 2021-02-01 PROCEDURE — 97110 THERAPEUTIC EXERCISES: CPT

## 2021-02-01 NOTE — PROGRESS NOTES
Dx: fibromyalgia (worst R shld, back)         Insurance (Authorized # of Visits):  PPO           Authorizing Physician: Dr. Chula Storm       Fall Risk: standard         Precautions: n/a           15 min late - traffic/train  Subjective: R side by hip and cathy improvement in tolerance for squat to lift and carry; still with some fatigue at 15 lbs. At end of session, steps up to 4,200 - personal high since staring PT.      Goals: (to be met in 18 total visits -10 new visits since re-eval)  Pt will demonstrate decr trap 1x10   YTB shld ER 1x10 ea    Squat to lift 15# crate knees to chest x8    Therex  Pullies x2 min flex    RTB rows 2x8   RTB low trap 1x10   RTB shld ER 2x8 ea  YTB scaption with horizontal ABD 9D12 (clicking R shld)   Wall push-up plus 1x12 (no pain,

## 2021-02-03 ENCOUNTER — OFFICE VISIT (OUTPATIENT)
Dept: PHYSICAL THERAPY | Age: 41
End: 2021-02-03
Attending: INTERNAL MEDICINE
Payer: COMMERCIAL

## 2021-02-03 PROCEDURE — 97140 MANUAL THERAPY 1/> REGIONS: CPT

## 2021-02-03 PROCEDURE — 97110 THERAPEUTIC EXERCISES: CPT

## 2021-02-03 NOTE — PROGRESS NOTES
Dx: fibromyalgia (worst R shld, back)         Insurance (Authorized # of Visits):  PPO           Authorizing Physician: Dr. Bernadette Gonzales       Fall Risk: standard         Precautions: n/a             Subjective: R back/hip has been feeling better since last visi (therapist out of office). Continued [facility] shoulder stability training with new addition of R/S on wall. Good response/no pain with activity. Reported LAD continues to provide relief for low back/hips at end of session.  Good understanding for updated HEP ea  -ABD 1x10 ea  -ext 1x10 ea   YTB rows 1x10  YTB low trap 1x10   YTB shld ER 1x10 ea    Squat to lift 15# crate knees to chest x8    Therex  Pullies x2 min flex    RTB rows 2x8   RTB low trap 1x10   RTB shld ER 2x8 ea  YTB scaption with horizontal ABD 1 with stretch hold 2x30s  Prone STM R>L QL, multifidi, paraspinals, gluts/piriformis x5 min   HEP:   Hooklying TA with march, RTB rows, YTB low trap, YTB shoulder ER, RTB multifidi punches; Updated with wall push-up plus, YTB side step and A/P v-step, L2 ab

## 2021-02-04 RX ORDER — LEVOTHYROXINE SODIUM 0.2 MG/1
TABLET ORAL
Qty: 30 TABLET | Refills: 1 | Status: SHIPPED | OUTPATIENT
Start: 2021-02-04 | End: 2021-03-08

## 2021-02-04 NOTE — TELEPHONE ENCOUNTER
LOV 8/11/20     LAST LAB   Ref Range & Units 12/19/20  3:35 PM   Free T4   0.8 - 1.7 ng/dL 1.4          LAST RX   LEVOTHYROXINE SODIUM 200 MCG Oral Tab 30 tablet 1 10/26/2020     Sig: TAKE 1 TABLET(200 MCG) BY MOUTH DAILY           Next OV   Future Appoint

## 2021-02-08 NOTE — PROGRESS NOTES
Zev Gomez / Harriet Reece / Mariana Abraham / Pillo Shaw / Varsha Rangel / Bhavani Show - [phone]  Answering Service [phone]           HPI:  Suni De Paz is a 47year [de-identified] old female who presents sp sleeve gastrecto pain, neck pain, weakness           PHYSICAL EXAM:  Vital Signs:  LMP 11/30/2020 (Exact Date)   BMI:  There is no height or weight on file to calculate BMI.     General: Alert & Oriented x3, NAD  HEENT: anicteric, EOMI, oropharynx clear  Neck: no palpable

## 2021-02-10 ENCOUNTER — TELEMEDICINE (OUTPATIENT)
Dept: INTERNAL MEDICINE CLINIC | Facility: CLINIC | Age: 41
End: 2021-02-10

## 2021-02-10 VITALS — HEIGHT: 62.5 IN | WEIGHT: 293 LBS | BODY MASS INDEX: 52.57 KG/M2

## 2021-02-10 DIAGNOSIS — E66.01 CLASS 3 SEVERE OBESITY DUE TO EXCESS CALORIES WITH SERIOUS COMORBIDITY AND BODY MASS INDEX (BMI) OF 50.0 TO 59.9 IN ADULT (HCC): Primary | ICD-10-CM

## 2021-02-10 PROCEDURE — 3008F BODY MASS INDEX DOCD: CPT | Performed by: DIETITIAN, REGISTERED

## 2021-02-10 PROCEDURE — 97803 MED NUTRITION INDIV SUBSEQ: CPT | Performed by: DIETITIAN, REGISTERED

## 2021-02-10 NOTE — PROGRESS NOTES
Anni 29  [address] [address]  Dept: [phone]  Loc: [phone]    02/10/21      Bariatric Follow-up Nutrition Session    Consultation conducted via telehealth.      Pt ve 12/22/2020    HGB 12.4 08/08/2020    HGB 11.7 (L) 06/24/2020      Lab Results   Component Value Date    .0 12/22/2020     08/08/2020    .0 06/24/2020       Diabetes:    Lab Results   Component Value Date     05/28/2020    A1C 5. into the lungs every 4 (four) hours as needed for Wheezing or Shortness of Breath., Disp: 1 Inhaler, Rfl: 3  •  tiZANidine HCl 4 MG Oral Tab, Take 1 tablet (4 mg total) by mouth every 6 (six) hours as needed. , Disp: 24 tablet, Rfl: 0  •  Triamcinolone Acet protein needs with no issues. Fluids are a work in progress and tolerance seems to be improving by the day. The chewable MVs are causing n/v- rec pt switch to capsules once cleared to do so.  Activity is going well w/ PT 1-2x per week and working with a tra

## 2021-02-11 ENCOUNTER — OFFICE VISIT (OUTPATIENT)
Dept: SURGERY | Facility: CLINIC | Age: 41
End: 2021-02-11
Payer: COMMERCIAL

## 2021-02-11 VITALS
OXYGEN SATURATION: 98 % | SYSTOLIC BLOOD PRESSURE: 131 MMHG | BODY MASS INDEX: 53.92 KG/M2 | WEIGHT: 293 LBS | HEART RATE: 74 BPM | HEIGHT: 62 IN | DIASTOLIC BLOOD PRESSURE: 80 MMHG

## 2021-02-11 DIAGNOSIS — Z98.84 S/P LAPAROSCOPIC SLEEVE GASTRECTOMY: ICD-10-CM

## 2021-02-11 DIAGNOSIS — E66.01 MORBID OBESITY WITH BMI OF 60.0-69.9, ADULT (HCC): Primary | ICD-10-CM

## 2021-02-17 ENCOUNTER — TELEPHONE (OUTPATIENT)
Dept: PHYSICAL THERAPY | Age: 41
End: 2021-02-17

## 2021-02-17 ENCOUNTER — APPOINTMENT (OUTPATIENT)
Dept: PHYSICAL THERAPY | Age: 41
End: 2021-02-17
Attending: INTERNAL MEDICINE
Payer: COMMERCIAL

## 2021-02-18 NOTE — TELEPHONE ENCOUNTER
Pt called to cancel PT; got out of work late and unable to make it in time. Asked therapist for opinion on her working with a  over the weekend. PT highly encouraged. Recommended low weights and low reps to start, to limit DOMS.  Recomme

## 2021-02-24 ENCOUNTER — OFFICE VISIT (OUTPATIENT)
Dept: RHEUMATOLOGY | Facility: CLINIC | Age: 41
End: 2021-02-24
Payer: COMMERCIAL

## 2021-02-24 ENCOUNTER — OFFICE VISIT (OUTPATIENT)
Dept: PHYSICAL THERAPY | Age: 41
End: 2021-02-24
Attending: INTERNAL MEDICINE
Payer: COMMERCIAL

## 2021-02-24 VITALS
TEMPERATURE: 98 F | WEIGHT: 293 LBS | SYSTOLIC BLOOD PRESSURE: 128 MMHG | HEART RATE: 84 BPM | BODY MASS INDEX: 53.92 KG/M2 | DIASTOLIC BLOOD PRESSURE: 78 MMHG | RESPIRATION RATE: 16 BRPM | HEIGHT: 62 IN

## 2021-02-24 DIAGNOSIS — M32.9 SYSTEMIC LUPUS ERYTHEMATOSUS, UNSPECIFIED SLE TYPE, UNSPECIFIED ORGAN INVOLVEMENT STATUS (HCC): Primary | ICD-10-CM

## 2021-02-24 DIAGNOSIS — R79.82 CRP ELEVATED: ICD-10-CM

## 2021-02-24 DIAGNOSIS — R76.8 RHEUMATOID FACTOR POSITIVE: ICD-10-CM

## 2021-02-24 DIAGNOSIS — R76.8 POSITIVE ANA (ANTINUCLEAR ANTIBODY): ICD-10-CM

## 2021-02-24 DIAGNOSIS — L20.82 FLEXURAL ECZEMA: ICD-10-CM

## 2021-02-24 DIAGNOSIS — R76.8 DS DNA ANTIBODY POSITIVE: ICD-10-CM

## 2021-02-24 DIAGNOSIS — M79.7 FIBROMYALGIA: ICD-10-CM

## 2021-02-24 PROCEDURE — 3078F DIAST BP <80 MM HG: CPT | Performed by: INTERNAL MEDICINE

## 2021-02-24 PROCEDURE — 97112 NEUROMUSCULAR REEDUCATION: CPT

## 2021-02-24 PROCEDURE — 3008F BODY MASS INDEX DOCD: CPT | Performed by: INTERNAL MEDICINE

## 2021-02-24 PROCEDURE — 97140 MANUAL THERAPY 1/> REGIONS: CPT

## 2021-02-24 PROCEDURE — 99214 OFFICE O/P EST MOD 30 MIN: CPT | Performed by: INTERNAL MEDICINE

## 2021-02-24 PROCEDURE — 3074F SYST BP LT 130 MM HG: CPT | Performed by: INTERNAL MEDICINE

## 2021-02-24 PROCEDURE — 97110 THERAPEUTIC EXERCISES: CPT

## 2021-02-24 RX ORDER — HYDROXYCHLOROQUINE SULFATE 200 MG/1
200 TABLET, FILM COATED ORAL 2 TIMES DAILY
Qty: 180 TABLET | Refills: 1 | Status: SHIPPED | OUTPATIENT
Start: 2021-02-24 | End: 2021-09-03

## 2021-02-24 NOTE — PROGRESS NOTES
?  RHEUMATOLOGY Follow up   Date of visit: 2/24/2021  ? Patient presents with:  SLE: 4 month f/u. Feeling better. Last week had a lot of swelling in feet. Still having shoulder and leg pain. Having some rashes on insides of elbows and on arms.  had weigh continue with her weight loss journey. She was told to consider going to ER for evaluation of the left leg swelling or discuss with her PCP. Pt declined going to ER now.  Discussed risk of possible DVT and potential worsened outcome if not identified early MODE, DIRECT, REFLEX TO 9 ENAS  -     COMPLEMENT C3, SERUM  -     COMPLEMENT C4, SERUM  -     SED RATE, WESTERGREN (AUTOMATED)  -     C-REACTIVE PROTEIN  -     RHEUMATOID ARTHRITIS FACTOR  -     URINALYSIS, ROUTINE  -     Hydroxychloroquine Sulfate 200 M Left worse than the right, feels it going up the leg. Denies calf pain. Has had ddimer elevation. Has noticed slight rash over her arms bilaterally, and on the inside of the elbows. States itchy but not painful.  Denies scarring from previous similar rash out as well.      + reflux, improved with medication   + intermittent constipation but thought related to medication   + hx of plantar fasciitis, with difficulty walking in the morning. Does wear inserts in her shoes. No formal workup for this.    + fevers syndrome)    • Migraines    • Obesity    • BLESSING (obstructive sleep apnea) 8/23/19 PSG    AHI 19 REM AHI 57 Supine AHI 46 non-supine AHI 11 [place] 71%   • Osteoarthritis    • Pneumonia due to organism    • Shortness of breath    • Sleep apnea     cpap MG) BY MOUTH TWICE DAILY AS NEEDED FOR ANXIETY, Disp: 40 tablet, Rfl: 0    •  traZODone HCl 50 MG Oral Tab, as needed. , Disp: , Rfl:     •  ezetimibe 10 MG Oral Tab, Take 10 mg by mouth daily. , Disp: , Rfl:     •  VENLAFAXINE HCL ER 75 MG Oral Capsule SR Psychiatric/Behavioral: Negative for depression. The patient has insomnia. The patient is not nervous/anxious.       PHYSICAL EXAM   Today's Vitals:  Temperature Blood Pressure Heart Rate Resp Rate SpO2   Temp: 97.7 °F (36.5 °C) BP: 128/78 Pulse: 84 Resp: cranial nerve deficit.    Previous exam:  Able to keep arms elevated for at least 5 seconds without overt weakness (subjective weakness reported)  Able to get up from seated position with arms crossed (did get short of breath and admits to some difficulty b recent CT scan of the chest from 5/18/2020. THORACIC AORTA:  No thoracic aortic aneurysm. MEDIASTINUM/LAURA:  No pathologically enlarged nodes. CARDIAC:  Unremarkable. CHEST WALL:  Unremarkable. LIMITED ABDOMEN:  No acute process seen. BAABSO 0.02 12/22/2020     Lab Results   Component Value Date     (H) 12/22/2020    BUN 6 (L) 12/22/2020    BUNCREA 8.8 (L) 12/22/2020    CREATSERUM 0.68 12/22/2020    ANIONGAP 5 12/22/2020    GFRNAA 111 12/22/2020    GFRAA 128 12/22/2020    CA 9.3

## 2021-02-25 NOTE — PROGRESS NOTES
Dx: fibromyalgia (worst R shld, back)         Insurance (Authorized # of Visits):  PPO           Authorizing Physician: Dr. Joel Rodney       Fall Risk: standard         Precautions: n/a             Subjective: Tired today.  Missed last week because she got out capsular stretching and end range PROM. Reports the most tenderness/sxs reproduction with teres minor palpation and resisted ER. Focused on open chain ER strengthening and stability training - but following pt had increased difficulty elevating arms.  Good squats x10   SB scapular slides x10 Therex  UBE L1 retro x3 min      RTB rows 1x10  RTB low trap 1x10   RTB shld ER 1x10 ea  RTB horizontal ABD 1x10  Standing slant board calf stretch 1 min x2 ea    Squat to lift 10# crate knees to chest x10   Clams 1x10 e shoulder PROM and biceps STM x2 min ea Manual Therapy  myobuddy calves, gluts, lumbar and thoracic paraspinals, scapular mm x6 min  Prone PA Gr II T2-L5 2x20s ea Manual Therapy  R GH distraction and posterior/inferior glides x3 min  Bicep STM R x3 min  PRO

## 2021-03-01 ENCOUNTER — E-VISIT (OUTPATIENT)
Dept: TELEHEALTH | Age: 41
End: 2021-03-01

## 2021-03-01 DIAGNOSIS — H60.501 ACUTE OTITIS EXTERNA OF RIGHT EAR, UNSPECIFIED TYPE: Primary | ICD-10-CM

## 2021-03-01 PROCEDURE — 99421 OL DIG E/M SVC 5-10 MIN: CPT | Performed by: NURSE PRACTITIONER

## 2021-03-01 NOTE — PATIENT INSTRUCTIONS
External Ear Infection (Adult)    External otitis (also called “swimmer’s ear”) is an infection in the ear canal. It's often caused by bacteria or fungus. It can occur a few days after water gets trapped in the ear canal (from swimming or bathing).  It ca When to seek medical advice  Call your healthcare provider right away if any of these occur:   · Ear pain becomes worse or doesn’t improve after 3 days of treatment  · Redness or swelling of the outer ear occurs or gets worse  · Headache  · Fever of 100.4º

## 2021-03-03 ENCOUNTER — OFFICE VISIT (OUTPATIENT)
Dept: PHYSICAL THERAPY | Age: 41
End: 2021-03-03
Attending: INTERNAL MEDICINE
Payer: COMMERCIAL

## 2021-03-03 PROCEDURE — 97140 MANUAL THERAPY 1/> REGIONS: CPT

## 2021-03-03 PROCEDURE — 97110 THERAPEUTIC EXERCISES: CPT

## 2021-03-04 NOTE — PROGRESS NOTES
Dx: fibromyalgia (worst R shld, back)         Insurance (Authorized # of Visits):  PPO           Authorizing Physician: Dr. Finnegan Covert       Fall Risk: standard         Precautions: n/a             15 min late (coming from work)  Subjective: R shoulder was sor shoulder pain (teres minor tendinopathy) and maximize manual therapy to reduce lumbar pain following heavy workout a few days ago.  Due to newness of training sessions and possible needs to continue to modify/adjust based on her tolerance and fibromyalgia, chest x8    Therex  Pullies x2 min flex    RTB rows 2x8   RTB low trap 1x10   RTB shld ER 2x8 ea  YTB scaption with horizontal ABD 7K00 (clicking R shld)   Wall push-up plus 1x12 (no pain, mild weakness)   Wall sleeper stretch 2x30s ea   YTB A/P vstep x40 Manual Therapy  LAD 2x30s R  lumbar gapping sidelying Gr IV mob  2x30s ea; cavitation R  R GH distraction and posterior/inferior glides x3 min with PROM all planes x5 min (feels good)  Teres minor STM  R x2 min Manual Therapy  lumbar gapping sidelying Gr I

## 2021-03-06 DIAGNOSIS — E78.2 MIXED HYPERLIPIDEMIA: ICD-10-CM

## 2021-03-08 RX ORDER — LEVOTHYROXINE SODIUM 0.2 MG/1
TABLET ORAL
Qty: 90 TABLET | Refills: 0 | Status: SHIPPED | OUTPATIENT
Start: 2021-03-08 | End: 2021-12-22

## 2021-03-08 RX ORDER — ROSUVASTATIN CALCIUM 10 MG/1
TABLET, COATED ORAL
Qty: 90 TABLET | Refills: 0 | Status: SHIPPED | OUTPATIENT
Start: 2021-03-08 | End: 2021-06-04

## 2021-03-08 NOTE — TELEPHONE ENCOUNTER
LOV      LAST LAB 8/8/2020      LAST RX   LEVOTHYROXINE SODIUM 200 MCG Oral Tab 30 tablet 1 2/4/2021         Next OV   Future Appointments   Date Time Provider Cole Parra   3/23/2021  2:40 PM Frankie Ramírez MD [facility]   3/24/2021  4:00 PM G

## 2021-03-15 NOTE — PROGRESS NOTES
Lesli Herr / Fernando White / Jaclyn Rico / Ivone Joseph / Ramos Bright / Katie Jacobs - [phone]  Answering Service [phone]      HPI:  Jatin De Paz is a 47year [de-identified] old female who presents sp sleeve gastrectomy 12 sees therapist once/week since last August stemming from a traumatic event.    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        Rev endoscopy at some point. Discussed the potential long-term concerns for chronic PPI use and chronic acid reflux and we will continue to address this through future visits.      Hypothyroidism - labs due  HLD - labs due  GERD - BID  Vit D Deficiency - labs

## 2021-03-18 ENCOUNTER — OFFICE VISIT (OUTPATIENT)
Dept: SURGERY | Facility: CLINIC | Age: 41
End: 2021-03-18
Payer: COMMERCIAL

## 2021-03-18 VITALS
HEIGHT: 62 IN | DIASTOLIC BLOOD PRESSURE: 74 MMHG | BODY MASS INDEX: 53.92 KG/M2 | HEART RATE: 78 BPM | SYSTOLIC BLOOD PRESSURE: 126 MMHG | WEIGHT: 293 LBS | OXYGEN SATURATION: 99 %

## 2021-03-18 DIAGNOSIS — Z98.84 S/P LAPAROSCOPIC SLEEVE GASTRECTOMY: Primary | ICD-10-CM

## 2021-03-18 DIAGNOSIS — E66.01 MORBID OBESITY WITH BMI OF 60.0-69.9, ADULT (HCC): ICD-10-CM

## 2021-03-23 ENCOUNTER — OFFICE VISIT (OUTPATIENT)
Dept: INTERNAL MEDICINE CLINIC | Facility: CLINIC | Age: 41
End: 2021-03-23
Payer: COMMERCIAL

## 2021-03-23 VITALS
WEIGHT: 293 LBS | DIASTOLIC BLOOD PRESSURE: 60 MMHG | RESPIRATION RATE: 16 BRPM | SYSTOLIC BLOOD PRESSURE: 100 MMHG | HEIGHT: 62 IN | OXYGEN SATURATION: 97 % | BODY MASS INDEX: 53.92 KG/M2 | HEART RATE: 67 BPM

## 2021-03-23 DIAGNOSIS — D50.9 IRON DEFICIENCY ANEMIA, UNSPECIFIED IRON DEFICIENCY ANEMIA TYPE: ICD-10-CM

## 2021-03-23 DIAGNOSIS — F50.81 BINGE EATING DISORDER: ICD-10-CM

## 2021-03-23 DIAGNOSIS — E66.01 CLASS 3 SEVERE OBESITY DUE TO EXCESS CALORIES WITH SERIOUS COMORBIDITY AND BODY MASS INDEX (BMI) OF 60.0 TO 69.9 IN ADULT (HCC): ICD-10-CM

## 2021-03-23 DIAGNOSIS — G47.33 OSA ON CPAP: ICD-10-CM

## 2021-03-23 DIAGNOSIS — Z51.81 THERAPEUTIC DRUG MONITORING: Primary | ICD-10-CM

## 2021-03-23 DIAGNOSIS — E78.2 MIXED HYPERLIPIDEMIA: ICD-10-CM

## 2021-03-23 DIAGNOSIS — Z99.89 OSA ON CPAP: ICD-10-CM

## 2021-03-23 PROCEDURE — 3008F BODY MASS INDEX DOCD: CPT | Performed by: INTERNAL MEDICINE

## 2021-03-23 PROCEDURE — 3074F SYST BP LT 130 MM HG: CPT | Performed by: INTERNAL MEDICINE

## 2021-03-23 PROCEDURE — 3078F DIAST BP <80 MM HG: CPT | Performed by: INTERNAL MEDICINE

## 2021-03-23 PROCEDURE — 99214 OFFICE O/P EST MOD 30 MIN: CPT | Performed by: INTERNAL MEDICINE

## 2021-03-23 RX ORDER — OMEPRAZOLE 40 MG/1
40 CAPSULE, DELAYED RELEASE ORAL 2 TIMES DAILY
COMMUNITY
End: 2021-11-05

## 2021-03-23 NOTE — PROGRESS NOTES
HISTORY OF PRESENT ILLNESS  Patient presents with:  Weight Check: down 23 lbs      Luis Polanco is a 46year old female here for follow up in medical weight loss program.     VS20   Struggling with hydration and protein intake throughout the day. 12/22/2020    .0 12/22/2020    MPV 10.7 01/17/2013     Lab Results   Component Value Date     (H) 12/22/2020    BUN 6 (L) 12/22/2020    BUNCREA 8.8 (L) 12/22/2020    CREATSERUM 0.68 12/22/2020    ANIONGAP 5 12/22/2020    GFRNAA 111 12/22/2020 Disp: , Rfl:   ezetimibe 10 MG Oral Tab, Take 10 mg by mouth daily. , Disp: , Rfl:   VENLAFAXINE HCL ER 75 MG Oral Capsule SR 24 Hr, TAKE 1 CAPSULE(75 MG) BY MOUTH DAILY, Disp: 90 capsule, Rfl: 1  Albuterol Sulfate HFA (PROAIR HFA) 108 (90 Base) MCG/ACT Inh comprehensive weight loss plan including attention to nutrition, exercise and behavior/stress management for success. See patient instruction below for more details.   · Discussed strategies to overcome barriers to successful weight loss and weight maintena

## 2021-03-24 ENCOUNTER — OFFICE VISIT (OUTPATIENT)
Dept: PHYSICAL THERAPY | Age: 41
End: 2021-03-24

## 2021-03-26 ENCOUNTER — TELEPHONE (OUTPATIENT)
Dept: INTERNAL MEDICINE CLINIC | Facility: CLINIC | Age: 41
End: 2021-03-26

## 2021-03-26 RX ORDER — ERGOCALCIFEROL 1.25 MG/1
50000 CAPSULE ORAL WEEKLY
Qty: 4 CAPSULE | Refills: 5 | OUTPATIENT
Start: 2021-03-26

## 2021-03-26 NOTE — TELEPHONE ENCOUNTER
Requesting Vitamin D  LOV: 3/23/21  RTC: one month  Last Relevant Labs: 20 by Dr. Amaral Lamin20 #4 with 3 refills    2021  3:00 PM Denver Shadow, MD [facility]     Denied refill.

## 2021-03-29 ENCOUNTER — OFFICE VISIT (OUTPATIENT)
Dept: PHYSICAL THERAPY | Age: 41
End: 2021-03-29
Attending: INTERNAL MEDICINE
Payer: COMMERCIAL

## 2021-03-29 PROCEDURE — 97110 THERAPEUTIC EXERCISES: CPT

## 2021-03-29 PROCEDURE — 97140 MANUAL THERAPY 1/> REGIONS: CPT

## 2021-03-29 NOTE — PROGRESS NOTES
ProgressSummary  Pt has attended 18 visits in Physical Therapy.    Dx: fibromyalgia (worst R shld, back)         Insurance (Authorized # of Visits):  PPO           Authorizing Physician: Dr. Claire Ceja       Fall Risk: standard         Precautions: n/a difficulty with increased frequency of floor transfers while trying to do stretches at home or workouts with .  She continues to have mild decreased strength in CINTIA scapular mm and pain with end range R shoulder ER, though overall good shoulder AROM transfers >3x/day with <3/10 knee pain - NEW  Pt will be able to complete patient transfers crib<>wheel chair with <3/10 shoulder pain - NEW    Plan: Continue skilled Physical Therapy 1 x/week or a total of 6 additional visits over a 90 day period.  Treatme press 2x5  Clams x10 ea  Side bridge x5 ea  Therex  TM 1.7 mph x5 min  sidelying ER x10  -with manual resist 2x8  -R/S 3x20s manual perturbations  Cable 3# rows 1x10  Cable 3# extension 1x10  Therex  UBE retro x3 min   Cable 3# rows 2x10  Cable 3# extensio x2 min; with MFR/pin and stretch x3 min   R sleeper stretch 3x30s Manual Therapy  Kinesiotape R shoulder sling   LAD 2x30s ea  Lateral hip glides 3x20s ea  R GH distraction wtih PROM flexion and ABD x2 min  Teres minor STM  R x2 min; with MFR/pin and stret

## 2021-03-31 ENCOUNTER — TELEPHONE (OUTPATIENT)
Dept: SURGERY | Facility: CLINIC | Age: 41
End: 2021-03-31

## 2021-03-31 NOTE — TELEPHONE ENCOUNTER
Appointment audit/chart review phone call per protocol.       Per records, patient has appointments as follows:  Surgeon: 3/18/2021  Dietitian: 2/10/2021 - scheduled for 4/12/2021  Bariatrician: 3/23/2021  Requested a call back to inform office if pt has be

## 2021-04-05 ENCOUNTER — APPOINTMENT (OUTPATIENT)
Dept: PHYSICAL THERAPY | Age: 41
End: 2021-04-05
Attending: INTERNAL MEDICINE
Payer: COMMERCIAL

## 2021-04-05 RX ORDER — LEVOTHYROXINE SODIUM 0.2 MG/1
TABLET ORAL
Qty: 90 TABLET | Refills: 0 | OUTPATIENT
Start: 2021-04-05

## 2021-04-05 NOTE — TELEPHONE ENCOUNTER
LOV    LAST LAB    LAST RX   LEVOTHYROXINE SODIUM 200 MCG Oral Tab 90 tablet 0 3/8/2021         Next OV   Future Appointments   Date Time Provider Cole Parra   4/5/2021  5:45 PM Unknown Javier,  [address] [place]   4/6/2021 10:45 AM Merritt Morales,

## 2021-04-06 ENCOUNTER — OFFICE VISIT (OUTPATIENT)
Dept: OBGYN CLINIC | Facility: CLINIC | Age: 41
End: 2021-04-06
Payer: COMMERCIAL

## 2021-04-06 ENCOUNTER — TELEPHONE (OUTPATIENT)
Dept: INTERNAL MEDICINE CLINIC | Facility: CLINIC | Age: 41
End: 2021-04-06

## 2021-04-06 VITALS
BODY MASS INDEX: 53.67 KG/M2 | WEIGHT: 291.63 LBS | HEIGHT: 62 IN | DIASTOLIC BLOOD PRESSURE: 82 MMHG | SYSTOLIC BLOOD PRESSURE: 126 MMHG

## 2021-04-06 DIAGNOSIS — Z31.69 ENCOUNTER FOR PRECONCEPTION CONSULTATION: ICD-10-CM

## 2021-04-06 DIAGNOSIS — N92.0 MENORRHAGIA WITH REGULAR CYCLE: ICD-10-CM

## 2021-04-06 DIAGNOSIS — E66.01 MORBID OBESITY WITH BMI OF 50.0-59.9, ADULT (HCC): ICD-10-CM

## 2021-04-06 DIAGNOSIS — N93.9 ABNORMAL UTERINE BLEEDING (AUB): Primary | ICD-10-CM

## 2021-04-06 DIAGNOSIS — Z31.89 ENCOUNTER FOR FERTILITY PLANNING: ICD-10-CM

## 2021-04-06 PROBLEM — N83.201 RIGHT OVARIAN CYST: Status: RESOLVED | Noted: 2020-07-21 | Resolved: 2021-04-06

## 2021-04-06 PROCEDURE — 3074F SYST BP LT 130 MM HG: CPT | Performed by: OBSTETRICS & GYNECOLOGY

## 2021-04-06 PROCEDURE — 3008F BODY MASS INDEX DOCD: CPT | Performed by: OBSTETRICS & GYNECOLOGY

## 2021-04-06 PROCEDURE — 3079F DIAST BP 80-89 MM HG: CPT | Performed by: OBSTETRICS & GYNECOLOGY

## 2021-04-06 PROCEDURE — 99215 OFFICE O/P EST HI 40 MIN: CPT | Performed by: OBSTETRICS & GYNECOLOGY

## 2021-04-06 RX ORDER — CLOTRIMAZOLE AND BETAMETHASONE DIPROPIONATE 10; .64 MG/G; MG/G
1 CREAM TOPICAL 2 TIMES DAILY PRN
Qty: 60 G | Refills: 3 | Status: SHIPPED | OUTPATIENT
Start: 2021-04-06

## 2021-04-06 NOTE — PROGRESS NOTES
[facility]  Obstetrics and Gynecology  Follow Up Progress Note    Subjective:     Daja Moss is a 46year old Imani Lipa female who was last seen in office 12/2020 and presents with c/o fertility discussion.  The patient reports she would like to BREAKFAST, Disp: 90 tablet, Rfl: 0  Hydroxychloroquine Sulfate 200 MG Oral Tab, Take 1 tablet (200 mg total) by mouth 2 (two) times daily. , Disp: 180 tablet, Rfl: 1  Insulin Pen Needle (PEN NEEDLES) 32G X 4 MM Does not apply Misc, 1 each by Does not apply Yvette Gutierrez MD at [address] [place] [address] OR   • COLONOSCOPY N/A 8/28/2020    Performed by Dora Clement MD at [facility] ENDOSCOPY   • ESOPHAGOGASTRODUODENOSCOPY (EGD) N/A 8/28/2020    Performed by Dora Clement MD at [facility] ENDOSCOPY   • OTHER SURGICAL HISTORY  12/21/202 List:     Hyperlipidemia     Mixed hyperlipidemia     DDD (degenerative disc disease), lumbar     Acquired hypothyroidism     Morbid obesity with BMI of 50.0-59.9, adult ([facility] [address]     Migraine without status migrainosus, not intractable     BLESSING (obstructive sl risk medications and encouraged to discuss with PCP regarding alternatives when she is ready to try for pregnancy   Abnormal uterine bleeding  -With heavy menstrual bleeding and irregular cycles  -Endometrial biopsy reviewed and discussed with patient  -Pe

## 2021-04-06 NOTE — PATIENT INSTRUCTIONS
Reproductive Endocrinology and Infertility Specialist ([place]):  Infertility     1) Dr. Dorina Pryor, IVF1   [address] [address] [address]  [phone]    2) Dr. Jhonny Arenas   Phone: [phone]  Address: [facility] [address], [address]  [place]

## 2021-04-12 ENCOUNTER — TELEMEDICINE (OUTPATIENT)
Dept: INTERNAL MEDICINE CLINIC | Facility: CLINIC | Age: 41
End: 2021-04-12

## 2021-04-12 ENCOUNTER — APPOINTMENT (OUTPATIENT)
Dept: PHYSICAL THERAPY | Age: 41
End: 2021-04-12
Attending: INTERNAL MEDICINE
Payer: COMMERCIAL

## 2021-04-12 VITALS — BODY MASS INDEX: 53.67 KG/M2 | WEIGHT: 291.63 LBS | HEIGHT: 62 IN

## 2021-04-12 DIAGNOSIS — E66.01 CLASS 3 SEVERE OBESITY DUE TO EXCESS CALORIES WITH SERIOUS COMORBIDITY AND BODY MASS INDEX (BMI) OF 50.0 TO 59.9 IN ADULT (HCC): Primary | ICD-10-CM

## 2021-04-12 PROCEDURE — 97803 MED NUTRITION INDIV SUBSEQ: CPT | Performed by: DIETITIAN, REGISTERED

## 2021-04-12 PROCEDURE — 3008F BODY MASS INDEX DOCD: CPT | Performed by: DIETITIAN, REGISTERED

## 2021-04-12 NOTE — PROGRESS NOTES
Anni 29  [address] [address]  Dept: [phone]  Loc: [phone]    04/12/21      Bariatric Follow-up Nutrition Session    Consultation conducted via telehealth.      Pt ve 12/22/2020    HGB 12.4 08/08/2020    HGB 11.7 (L) 06/24/2020      Lab Results   Component Value Date    .0 12/22/2020     08/08/2020    .0 06/24/2020       Diabetes:    Lab Results   Component Value Date     05/28/2020    A1C 5. Reported on 4/6/2021 ), Disp: , Rfl:   •  VENLAFAXINE HCL ER 75 MG Oral Capsule SR 24 Hr, TAKE 1 CAPSULE(75 MG) BY MOUTH DAILY, Disp: 90 capsule, Rfl: 1  •  Albuterol Sulfate HFA (PROAIR HFA) 108 (90 Base) MCG/ACT Inhalation Aero Soln, Inhale 2 puffs into nausea  Vitamin/mineral supplements:  Bariatric Choice capsule + B12 complex + calcium 500 mg BID, not consistent- reminded to get bloodwork done  Protein supplements:  [employer], [employer], [employer]     Activity Level: PT 2x per week and additional

## 2021-04-19 ENCOUNTER — APPOINTMENT (OUTPATIENT)
Dept: PHYSICAL THERAPY | Age: 41
End: 2021-04-19
Attending: INTERNAL MEDICINE
Payer: COMMERCIAL

## 2021-04-20 ENCOUNTER — TELEPHONE (OUTPATIENT)
Dept: PHYSICAL THERAPY | Age: 41
End: 2021-04-20

## 2021-04-20 NOTE — TELEPHONE ENCOUNTER
Pt did not show to therapy last night. Called to follow-up and inform pt that 2 no show in a row could result in DC from PT.  Left message last night and pt returned call this am. She reports that she sent a text message last week to inform of cancel (text

## 2021-04-25 ENCOUNTER — LAB ENCOUNTER (OUTPATIENT)
Dept: LAB | Facility: HOSPITAL | Age: 41
End: 2021-04-25
Attending: INTERNAL MEDICINE
Payer: COMMERCIAL

## 2021-04-25 DIAGNOSIS — Z98.84 BARIATRIC SURGERY STATUS: ICD-10-CM

## 2021-04-25 DIAGNOSIS — R76.8 POSITIVE ANA (ANTINUCLEAR ANTIBODY): ICD-10-CM

## 2021-04-25 DIAGNOSIS — R79.82 ELEVATED C-REACTIVE PROTEIN (CRP): ICD-10-CM

## 2021-04-25 DIAGNOSIS — R70.0 ELEVATED SED RATE: ICD-10-CM

## 2021-04-25 DIAGNOSIS — E66.01 MORBID OBESITY WITH BMI OF 60.0-69.9, ADULT (HCC): Primary | ICD-10-CM

## 2021-04-25 DIAGNOSIS — M25.50 POLYARTHRALGIA: ICD-10-CM

## 2021-04-25 PROCEDURE — 86038 ANTINUCLEAR ANTIBODIES: CPT

## 2021-04-25 PROCEDURE — 86160 COMPLEMENT ANTIGEN: CPT | Performed by: INTERNAL MEDICINE

## 2021-04-25 PROCEDURE — 86225 DNA ANTIBODY NATIVE: CPT | Performed by: INTERNAL MEDICINE

## 2021-04-25 PROCEDURE — 81001 URINALYSIS AUTO W/SCOPE: CPT | Performed by: INTERNAL MEDICINE

## 2021-04-25 PROCEDURE — 84425 ASSAY OF VITAMIN B-1: CPT

## 2021-04-25 PROCEDURE — 86038 ANTINUCLEAR ANTIBODIES: CPT | Performed by: INTERNAL MEDICINE

## 2021-04-25 PROCEDURE — 86235 NUCLEAR ANTIGEN ANTIBODY: CPT | Performed by: INTERNAL MEDICINE

## 2021-04-25 PROCEDURE — 86431 RHEUMATOID FACTOR QUANT: CPT | Performed by: INTERNAL MEDICINE

## 2021-04-25 PROCEDURE — 85652 RBC SED RATE AUTOMATED: CPT | Performed by: INTERNAL MEDICINE

## 2021-04-25 PROCEDURE — 86140 C-REACTIVE PROTEIN: CPT | Performed by: INTERNAL MEDICINE

## 2021-04-26 ENCOUNTER — TELEPHONE (OUTPATIENT)
Dept: RHEUMATOLOGY | Facility: CLINIC | Age: 41
End: 2021-04-26

## 2021-04-26 ENCOUNTER — APPOINTMENT (OUTPATIENT)
Dept: PHYSICAL THERAPY | Age: 41
End: 2021-04-26
Attending: INTERNAL MEDICINE
Payer: COMMERCIAL

## 2021-04-26 ENCOUNTER — OFFICE VISIT (OUTPATIENT)
Dept: PHYSICAL THERAPY | Age: 41
End: 2021-04-26
Attending: INTERNAL MEDICINE
Payer: COMMERCIAL

## 2021-04-26 DIAGNOSIS — R80.9 PROTEINURIA, UNSPECIFIED TYPE: Primary | ICD-10-CM

## 2021-04-26 PROCEDURE — 97110 THERAPEUTIC EXERCISES: CPT

## 2021-04-26 PROCEDURE — 97140 MANUAL THERAPY 1/> REGIONS: CPT

## 2021-04-26 NOTE — TELEPHONE ENCOUNTER
Hello! Can you see if patient is currently menstruating? A small amount of protein and blood in her urine. Her inflammation markers (especially CRP) are better overall. Her ESR is only mildly elevated (factoring in her age).      The repeat MODE is still pen

## 2021-04-26 NOTE — PATIENT INSTRUCTIONS
Exercise Tracker        Week 1 Sun Mon Ross Pressman Fri Sat weight   workout 25-30 min           Fatigue Level (0-10)           Average Pain (0-10)           Steps/day                                 Week 2 Sun Mon Tue Wed Thur Fri Sat weight   workout 25

## 2021-04-26 NOTE — PROGRESS NOTES
Dx: fibromyalgia (worst R shld, back)         Insurance (Authorized # of Visits):  PPO           Authorizing Physician: Dr. Delfin Dee       Fall Risk: standard         Precautions: n/a             Subjective Has not been feeling well lately.  Having some PTSD to be able to wash her hair and put away dishes with </=3/10 shoulder pain -good progress  Pt will be able to complete floor transfers >3x/day with <3/10 knee pain -  Pt will be able to complete patient transfers crib<>wheel chair with <3/10 shoulder pain gapping sidelying Gr IV mob with stretch hold 2x30s  Prone STM R>L QL, multifidi, paraspinals, gluts/piriformis x5 min Manual Therapy  LAD 2x30s R  lumbar gapping sidelying Gr IV mob  2x30s ea; cavitation R  R GH distraction and posterior/inferior glides x

## 2021-04-27 NOTE — TELEPHONE ENCOUNTER
Phoned pt, explained to pt blood in urine- pt denies recent menstruation. Has had co right sided pain. Denies flank pain, burning or frequent urination. Notified of lab results per Viola Pineda. Pt voiced understanding.

## 2021-04-27 NOTE — TELEPHONE ENCOUNTER
Called pt, no UTI symptoms. Not menstruating, some RBC and WBCs and low grade proteniuria. Will repeat with protein quantification. Pt aware of plan.

## 2021-04-29 ENCOUNTER — OFFICE VISIT (OUTPATIENT)
Dept: SURGERY | Facility: CLINIC | Age: 41
End: 2021-04-29
Payer: COMMERCIAL

## 2021-04-29 VITALS — BODY MASS INDEX: 53.4 KG/M2 | HEIGHT: 62 IN | WEIGHT: 290.19 LBS

## 2021-04-29 DIAGNOSIS — E66.01 CLASS 3 SEVERE OBESITY DUE TO EXCESS CALORIES WITH SERIOUS COMORBIDITY AND BODY MASS INDEX (BMI) OF 50.0 TO 59.9 IN ADULT (HCC): Primary | ICD-10-CM

## 2021-04-29 PROCEDURE — 3008F BODY MASS INDEX DOCD: CPT | Performed by: DIETITIAN, REGISTERED

## 2021-04-29 PROCEDURE — 0358T BIA WHOLE BODY: CPT | Performed by: DIETITIAN, REGISTERED

## 2021-04-29 NOTE — PROGRESS NOTES
Body Composition Analysis #1     1. Weight 290.2 lbs     2. BMI 53.1     3. BMR 1632 kcal     4. Percent body fat 55.6% (Goal <30%)     5. Visceral fat level 20 (Goal <10)     6. ECW/TBW 0.399     7.  SMM (skeletal muscle mass) 70.8 lbs                  Alie

## 2021-05-03 ENCOUNTER — APPOINTMENT (OUTPATIENT)
Dept: PHYSICAL THERAPY | Age: 41
End: 2021-05-03
Attending: INTERNAL MEDICINE
Payer: COMMERCIAL

## 2021-05-05 ENCOUNTER — OFFICE VISIT (OUTPATIENT)
Dept: PHYSICAL THERAPY | Age: 41
End: 2021-05-05
Attending: INTERNAL MEDICINE
Payer: COMMERCIAL

## 2021-05-05 PROCEDURE — 97140 MANUAL THERAPY 1/> REGIONS: CPT

## 2021-05-05 PROCEDURE — 97110 THERAPEUTIC EXERCISES: CPT

## 2021-05-05 NOTE — PROGRESS NOTES
Dx: fibromyalgia (worst R shld, back)         Insurance (Authorized # of Visits):  PPO           Authorizing Physician: Dr. Faizan Kee       Fall Risk: standard         Precautions: n/a             Subjective Stretching/manual therapy last session felt good; s will demonstrate 4+/5 CINTIA shoulder and scapular strength to be able to wash her hair and put away dishes with </=3/10 shoulder pain -good progress  Pt will be able to complete floor transfers >3x/day with <3/10 knee pain -  Pt will be able to complete paulette Neuro Re-ed  CKC R/S on wall sagittal x20s R/L  -frontal x20s R/L  hooklying L2 abs x5 ea   Neuro Re-ed  hooklying L2 abs x5 ea  Elevated dead bug 3x6 (sore shld)  TA with SLR x10 ea   hooklying alt oblique toe reach x10  - Neuro Re-ed  CKC stability for - Modalities  Ice x10 min R shoulder - - - -   HEP:   Hooklying TA with march, RTB rows, YTB low trap, YTB shoulder ER, RTB multifidi punches; Updated with wall push-up plus, YTB side step and A/P v-step, L2 abs  Mobility work following training sessions

## 2021-05-10 ENCOUNTER — OFFICE VISIT (OUTPATIENT)
Dept: PHYSICAL THERAPY | Age: 41
End: 2021-05-10
Attending: INTERNAL MEDICINE
Payer: COMMERCIAL

## 2021-05-10 ENCOUNTER — APPOINTMENT (OUTPATIENT)
Dept: PHYSICAL THERAPY | Age: 41
End: 2021-05-10
Attending: INTERNAL MEDICINE
Payer: COMMERCIAL

## 2021-05-10 PROCEDURE — 97140 MANUAL THERAPY 1/> REGIONS: CPT

## 2021-05-10 PROCEDURE — 97110 THERAPEUTIC EXERCISES: CPT

## 2021-05-10 NOTE — PROGRESS NOTES
Dx: fibromyalgia (worst R shld, back)         Insurance (Authorized # of Visits):  PPO           Authorizing Physician: Dr. Freeman Escamilla       Fall Risk: standard         Precautions: n/a             Subjective Stretching/manual therapy helped the hip a little, -good progress  Pt will be able to complete floor transfers >3x/day with <3/10 knee pain - MET  Pt will be able to complete patient transfers crib<>wheel chair with <3/10 shoulder pain -    Plan: assess pain levels and review/update HEP  Date: 2/24/2021  T 2x30s R  lumbar gapping sidelying Gr IV mob  2x30s ea; cavitation R  R GH distraction and posterior/inferior glides x3 min with PROM all planes x5 min (feels good)  Teres minor STM  R x2 min Manual Therapy  lumbar gapping sidelying Gr IV mob  2x30s ea  R G Total Timed Treatment: 40 min  Total Treatment Time: 40 min

## 2021-05-17 ENCOUNTER — APPOINTMENT (OUTPATIENT)
Dept: PHYSICAL THERAPY | Age: 41
End: 2021-05-17
Attending: INTERNAL MEDICINE
Payer: COMMERCIAL

## 2021-05-17 ENCOUNTER — OFFICE VISIT (OUTPATIENT)
Dept: PHYSICAL THERAPY | Age: 41
End: 2021-05-17
Attending: INTERNAL MEDICINE
Payer: COMMERCIAL

## 2021-05-17 PROCEDURE — 97110 THERAPEUTIC EXERCISES: CPT

## 2021-05-17 PROCEDURE — 97140 MANUAL THERAPY 1/> REGIONS: CPT

## 2021-05-17 NOTE — PROGRESS NOTES
Dx: fibromyalgia (worst R shld, back)         Insurance (Authorized # of Visits):  PPO           Authorizing Physician: Dr. Chula Storm       Fall Risk: standard         Precautions: n/a           10 min late  Subjective Has been having a pretty good week.  R hi in 24 total visits)  Pt will demonstrate improved activity tolerance and overall mobility by ambulating >8,000 steps/day  -progressing  Pt will demonstrate 4+/5 CINTIA shoulder and scapular strength to be able to wash her hair and put away dishes with </=3/10 (harder R)  2# DB curl to OH press x10   - Neuro Re-ed  CKC stability for elbow and GH: table plank with shoulder taps 2x10 ea (harder R CKC) - Neuro Re-ed  CKC stability for elbow and GH: table plank with shoulder taps 2x10 ea (harder R CKC) - Neuro Re-ed min R/L   Previous HEP:   Hooklying TA with march, RTB rows, YTB low trap, YTB shoulder ER, RTB multifidi punches; Updated with wall push-up plus, L2 abs,  sleeper stretch R, standing piriformis stretch thigh on table; QL child's pose stretch central/R/L

## 2021-05-18 ENCOUNTER — APPOINTMENT (OUTPATIENT)
Dept: PHYSICAL THERAPY | Age: 41
End: 2021-05-18
Attending: INTERNAL MEDICINE
Payer: COMMERCIAL

## 2021-05-24 ENCOUNTER — OFFICE VISIT (OUTPATIENT)
Dept: PHYSICAL THERAPY | Age: 41
End: 2021-05-24
Attending: INTERNAL MEDICINE
Payer: COMMERCIAL

## 2021-05-24 PROCEDURE — 97140 MANUAL THERAPY 1/> REGIONS: CPT

## 2021-05-24 NOTE — PROGRESS NOTES
Sherron  Pt has attended 23 visits in Physical Therapy.    Dx: fibromyalgia (worst R shld, back)         Insurance (Authorized # of Visits):  PPO           Authorizing Physician: Dr. Charmayne Harvey       Fall Risk: standard         Precautions: n/a steps/day  -progressing  Pt will demonstrate 4+/5 CINTIA shoulder and scapular strength to be able to wash her hair and put away dishes with </=3/10 shoulder pain -MET  Pt will be able to complete floor transfers >3x/day with <3/10 knee pain - MET  Pt will be Long axis Distraction 3x15s L (with/wo belt - felt mostly in ankle)  Long axis rotation L x30s   LTR lumbar mobs Gr III with inferior glide x2 min R/L  Scapular STM and UT stretch x2 min ea  Manual Therapy  Kinesiotape R shoulder sling   R GH distraction

## 2021-06-04 DIAGNOSIS — E78.2 MIXED HYPERLIPIDEMIA: ICD-10-CM

## 2021-06-04 RX ORDER — ROSUVASTATIN CALCIUM 10 MG/1
TABLET, COATED ORAL
Qty: 90 TABLET | Refills: 0 | Status: SHIPPED | OUTPATIENT
Start: 2021-06-04

## 2021-06-04 NOTE — TELEPHONE ENCOUNTER
Cholesterol Medication Protocol [id number]/04/2021 05:44 AM   ALT < 80 Protocol Details    ALT resulted within past year     Lipid panel within past 12 months     Appointment within past 12 or next 3 months      LOV 11/17/20    LAST LAB  8/8/20    LAST RX  3

## 2021-06-07 ENCOUNTER — APPOINTMENT (OUTPATIENT)
Dept: PHYSICAL THERAPY | Age: 41
End: 2021-06-07
Attending: INTERNAL MEDICINE
Payer: COMMERCIAL

## 2021-06-22 ENCOUNTER — OFFICE VISIT (OUTPATIENT)
Dept: INTERNAL MEDICINE CLINIC | Facility: CLINIC | Age: 41
End: 2021-06-22
Payer: COMMERCIAL

## 2021-06-22 ENCOUNTER — LAB ENCOUNTER (OUTPATIENT)
Dept: LAB | Age: 41
End: 2021-06-22
Attending: INTERNAL MEDICINE
Payer: COMMERCIAL

## 2021-06-22 VITALS
RESPIRATION RATE: 16 BRPM | DIASTOLIC BLOOD PRESSURE: 78 MMHG | HEIGHT: 62.5 IN | HEART RATE: 78 BPM | SYSTOLIC BLOOD PRESSURE: 124 MMHG | WEIGHT: 276 LBS | BODY MASS INDEX: 49.52 KG/M2

## 2021-06-22 DIAGNOSIS — E66.01 CLASS 3 SEVERE OBESITY DUE TO EXCESS CALORIES WITH SERIOUS COMORBIDITY AND BODY MASS INDEX (BMI) OF 60.0 TO 69.9 IN ADULT (HCC): ICD-10-CM

## 2021-06-22 DIAGNOSIS — Z99.89 OSA ON CPAP: ICD-10-CM

## 2021-06-22 DIAGNOSIS — R53.83 OTHER FATIGUE: ICD-10-CM

## 2021-06-22 DIAGNOSIS — G47.33 OSA ON CPAP: ICD-10-CM

## 2021-06-22 DIAGNOSIS — R80.9 PROTEINURIA, UNSPECIFIED TYPE: ICD-10-CM

## 2021-06-22 DIAGNOSIS — R73.03 PREDIABETES: ICD-10-CM

## 2021-06-22 DIAGNOSIS — E78.2 MIXED HYPERLIPIDEMIA: ICD-10-CM

## 2021-06-22 DIAGNOSIS — Z51.81 THERAPEUTIC DRUG MONITORING: ICD-10-CM

## 2021-06-22 DIAGNOSIS — Z51.81 THERAPEUTIC DRUG MONITORING: Primary | ICD-10-CM

## 2021-06-22 PROCEDURE — 82306 VITAMIN D 25 HYDROXY: CPT | Performed by: INTERNAL MEDICINE

## 2021-06-22 PROCEDURE — 84425 ASSAY OF VITAMIN B-1: CPT | Performed by: INTERNAL MEDICINE

## 2021-06-22 PROCEDURE — 80061 LIPID PANEL: CPT | Performed by: INTERNAL MEDICINE

## 2021-06-22 PROCEDURE — 3074F SYST BP LT 130 MM HG: CPT | Performed by: INTERNAL MEDICINE

## 2021-06-22 PROCEDURE — 83550 IRON BINDING TEST: CPT | Performed by: INTERNAL MEDICINE

## 2021-06-22 PROCEDURE — 83540 ASSAY OF IRON: CPT | Performed by: INTERNAL MEDICINE

## 2021-06-22 PROCEDURE — 3008F BODY MASS INDEX DOCD: CPT | Performed by: INTERNAL MEDICINE

## 2021-06-22 PROCEDURE — 84439 ASSAY OF FREE THYROXINE: CPT | Performed by: INTERNAL MEDICINE

## 2021-06-22 PROCEDURE — 82746 ASSAY OF FOLIC ACID SERUM: CPT | Performed by: INTERNAL MEDICINE

## 2021-06-22 PROCEDURE — 99214 OFFICE O/P EST MOD 30 MIN: CPT | Performed by: INTERNAL MEDICINE

## 2021-06-22 PROCEDURE — 83036 HEMOGLOBIN GLYCOSYLATED A1C: CPT | Performed by: INTERNAL MEDICINE

## 2021-06-22 PROCEDURE — 81001 URINALYSIS AUTO W/SCOPE: CPT | Performed by: INTERNAL MEDICINE

## 2021-06-22 PROCEDURE — 82570 ASSAY OF URINE CREATININE: CPT | Performed by: INTERNAL MEDICINE

## 2021-06-22 PROCEDURE — 80050 GENERAL HEALTH PANEL: CPT | Performed by: INTERNAL MEDICINE

## 2021-06-22 PROCEDURE — 82607 VITAMIN B-12: CPT | Performed by: INTERNAL MEDICINE

## 2021-06-22 PROCEDURE — 3078F DIAST BP <80 MM HG: CPT | Performed by: INTERNAL MEDICINE

## 2021-06-22 PROCEDURE — 82728 ASSAY OF FERRITIN: CPT | Performed by: INTERNAL MEDICINE

## 2021-06-22 PROCEDURE — 84156 ASSAY OF PROTEIN URINE: CPT | Performed by: INTERNAL MEDICINE

## 2021-06-22 NOTE — PROGRESS NOTES
HISTORY OF PRESENT ILLNESS  Patient presents with:  Weight Check: down 23 lb      Ayleen Torres is a 46year old female here for follow up in medical weight loss program.       Preop Weight:    367. 8    66.2                         DOS:12/21/20 - SLEEVE GA 04/25/2021    .0 04/25/2021    MPV 10.7 01/17/2013     Lab Results   Component Value Date    GLU 83 04/25/2021    BUN 9 04/25/2021    BUNCREA 18.8 04/25/2021    CREATSERUM 0.48 (L) 04/25/2021    ANIONGAP 3 04/25/2021    GFRNAA 124 04/25/2021    GFRA TAKE 1 TABLET(0.5 MG) BY MOUTH TWICE DAILY AS NEEDED FOR ANXIETY, Disp: 40 tablet, Rfl: 0  traZODone HCl 50 MG Oral Tab, as needed. , Disp: , Rfl:   ezetimibe 10 MG Oral Tab, Take 10 mg by mouth daily.  (Patient not taking: Reported on 4/6/2021 ), Disp: , Future  -     VITAMIN D, 25-HYDROXY; Future  -     IRON AND TIBC; Future  -     FERRITIN; Future  -     LIPID PANEL; Future  -     TSH+FREE T4; Future  -     VITAMIN B1 (THIAMINE), BLOOD; Future    BLESSING on CPAP  -     COMP METABOLIC PANEL (14);  Future  - There are no Patient Instructions on file for this visit. Return in about 8 weeks (around 8/17/2021). Patient verbalizes understanding.

## 2021-06-30 ENCOUNTER — TELEPHONE (OUTPATIENT)
Dept: SURGERY | Facility: CLINIC | Age: 41
End: 2021-06-30

## 2021-07-02 ENCOUNTER — OFFICE VISIT (OUTPATIENT)
Dept: OBGYN CLINIC | Facility: CLINIC | Age: 41
End: 2021-07-02
Payer: COMMERCIAL

## 2021-07-02 VITALS
SYSTOLIC BLOOD PRESSURE: 116 MMHG | HEART RATE: 65 BPM | DIASTOLIC BLOOD PRESSURE: 74 MMHG | WEIGHT: 274.63 LBS | BODY MASS INDEX: 49.27 KG/M2 | HEIGHT: 62.5 IN

## 2021-07-02 DIAGNOSIS — Z12.31 ENCOUNTER FOR SCREENING MAMMOGRAM FOR BREAST CANCER: ICD-10-CM

## 2021-07-02 DIAGNOSIS — Z12.4 ENCOUNTER FOR SCREENING FOR CERVICAL CANCER: ICD-10-CM

## 2021-07-02 DIAGNOSIS — Z01.419 WELL WOMAN EXAM WITH ROUTINE GYNECOLOGICAL EXAM: Primary | ICD-10-CM

## 2021-07-02 PROCEDURE — 3074F SYST BP LT 130 MM HG: CPT | Performed by: OBSTETRICS & GYNECOLOGY

## 2021-07-02 PROCEDURE — 99395 PREV VISIT EST AGE 18-39: CPT | Performed by: OBSTETRICS & GYNECOLOGY

## 2021-07-02 PROCEDURE — 3078F DIAST BP <80 MM HG: CPT | Performed by: OBSTETRICS & GYNECOLOGY

## 2021-07-02 PROCEDURE — 87624 HPV HI-RISK TYP POOLED RSLT: CPT | Performed by: OBSTETRICS & GYNECOLOGY

## 2021-07-02 PROCEDURE — 3008F BODY MASS INDEX DOCD: CPT | Performed by: OBSTETRICS & GYNECOLOGY

## 2021-07-02 NOTE — PROGRESS NOTES
[address]  Obstetrics and Gynecology  History & Physical    CC: Patient presents for a well woman exam     Subjective:     HPI: Daja Moss is a 46year old [facility] female here for a well women exam. Patient reports doing well. No complaints. Hr, TAKE 1 CAPSULE(75 MG) BY MOUTH DAILY, Disp: 90 capsule, Rfl: 1  Albuterol Sulfate HFA (PROAIR HFA) 108 (90 Base) MCG/ACT Inhalation Aero Soln, Inhale 2 puffs into the lungs every 4 (four) hours as needed for Wheezing or Shortness of Breath., Disp: 1 In Single      Spouse name: Not on file      Number of children: Not on file      Years of education: Not on file      Highest education level: Not on file    Occupational History      Not on file    Tobacco Use      Smoking status: Never Smoker      Smokeles Abused:       Patient feels unsafe or threatened?: denies    Abuse: denies physical, sexual or mental.     Family History:  Family History   Problem Relation Age of Onset   • Hypertension Father    • Heart Disease Mother    • High Cholesterol Mother    • O Hyperlipidemia     Mixed hyperlipidemia     DDD (degenerative disc disease), lumbar     Acquired hypothyroidism     Morbid obesity with BMI of 50.0-59.9, adult (HCC)     Migraine without status migrainosus, not intractable     BLESSING (obstructive sleep apnea)

## 2021-07-08 ENCOUNTER — TELEMEDICINE (OUTPATIENT)
Dept: SURGERY | Facility: CLINIC | Age: 41
End: 2021-07-08

## 2021-07-08 VITALS — HEIGHT: 62.5 IN | BODY MASS INDEX: 49.27 KG/M2 | WEIGHT: 274.63 LBS

## 2021-07-08 DIAGNOSIS — E66.01 CLASS 3 SEVERE OBESITY DUE TO EXCESS CALORIES WITH SERIOUS COMORBIDITY AND BODY MASS INDEX (BMI) OF 45.0 TO 49.9 IN ADULT (HCC): Primary | ICD-10-CM

## 2021-07-08 PROCEDURE — 97803 MED NUTRITION INDIV SUBSEQ: CPT | Performed by: DIETITIAN, REGISTERED

## 2021-07-08 PROCEDURE — 3008F BODY MASS INDEX DOCD: CPT | Performed by: DIETITIAN, REGISTERED

## 2021-07-08 NOTE — PROGRESS NOTES
Anni 29  [address] [address]  Dept: [phone]  Loc: [phone]    07/8/21      Bariatric Follow-up Nutrition Session    Consultation conducted via telehealth.      Pt richardson 13.0 04/25/2021    HGB 12.5 12/22/2020      Lab Results   Component Value Date    .0 06/22/2021    .0 04/25/2021    .0 12/22/2020       Diabetes:    Lab Results   Component Value Date     06/22/2021    A1C 5.4 06/22/2021       L needed. , Disp: , Rfl:   •  ezetimibe 10 MG Oral Tab, Take 10 mg by mouth daily.  (Patient not taking: Reported on 4/6/2021 ), Disp: , Rfl:   •  VENLAFAXINE HCL ER 75 MG Oral Capsule SR 24 Hr, TAKE 1 CAPSULE(75 MG) BY MOUTH DAILY, Disp: 90 capsule, Rfl: 1 portions, keeping consistent food records, prioritizing protein, and limiting starch intake    Food intolerances:  MVs- nausea. Taking zofran prn.   Vitamin/mineral supplements: MVs still make her nauseous, wants to try Patch MD- reminded to get bloodwork d

## 2021-07-09 LAB — HPV I/H RISK 1 DNA SPEC QL NAA+PROBE: NEGATIVE

## 2021-07-12 LAB
LAST PAP RESULT: NORMAL
PAP HISTORY (OTHER THAN LAST PAP): NORMAL

## 2021-07-15 NOTE — PROGRESS NOTES
Patient in call back messages. She desires mychart message or detailed message on VM.  Mychart message sent per pt request. Hold for f/u

## 2021-08-05 ENCOUNTER — OFFICE VISIT (OUTPATIENT)
Dept: SURGERY | Facility: CLINIC | Age: 41
End: 2021-08-05
Payer: COMMERCIAL

## 2021-08-05 VITALS
SYSTOLIC BLOOD PRESSURE: 108 MMHG | HEIGHT: 62 IN | HEART RATE: 64 BPM | DIASTOLIC BLOOD PRESSURE: 64 MMHG | OXYGEN SATURATION: 97 % | BODY MASS INDEX: 50.42 KG/M2 | WEIGHT: 274 LBS

## 2021-08-05 DIAGNOSIS — E66.01 MORBID OBESITY WITH BMI OF 50.0-59.9, ADULT (HCC): Primary | ICD-10-CM

## 2021-08-05 RX ORDER — OMEPRAZOLE 40 MG/1
40 CAPSULE, DELAYED RELEASE ORAL 2 TIMES DAILY
Qty: 60 CAPSULE | Refills: 11 | Status: SHIPPED | OUTPATIENT
Start: 2021-08-05 | End: 2021-11-01 | Stop reason: ALTCHOICE

## 2021-08-05 NOTE — PROGRESS NOTES
Arletta Schlatter / Yovanny Todd / Diana Hughes / Patricio Meigs / Natalie Doan / Michelle Flannery - [phone]  Answering Service [phone]        HPI:  Marquis De Paz is a 47year [de-identified] old female who presents sp sleeve gastrectomy No clear pattern or exacerbating or relieving factors. Does not appear to be vomiting, and no severe pains, just to fairly frequent discomfort that seems difficult for me to exactly put a handle on.     Just started exercising a bit more, but occcasionally pain, palpitaitons  GI: Negative except HPI  : No hematuria or dysuria  Heme: No history of bleeding or bruising  MS: No back pain, neck pain, weakness     08/05/21  1118   BP: 108/64   Pulse: 64     Wt Readings from Last 6 Encounters:  08/05/21 : 274 lb

## 2021-09-01 ENCOUNTER — LAB ENCOUNTER (OUTPATIENT)
Dept: LAB | Facility: HOSPITAL | Age: 41
End: 2021-09-01
Attending: SURGERY
Payer: COMMERCIAL

## 2021-09-01 DIAGNOSIS — E66.01 MORBID OBESITY WITH BMI OF 50.0-59.9, ADULT (HCC): ICD-10-CM

## 2021-09-02 LAB — SARS-COV-2 RNA RESP QL NAA+PROBE: NOT DETECTED

## 2021-09-03 ENCOUNTER — PATIENT MESSAGE (OUTPATIENT)
Dept: RHEUMATOLOGY | Facility: CLINIC | Age: 41
End: 2021-09-03

## 2021-09-03 ENCOUNTER — OFFICE VISIT (OUTPATIENT)
Dept: RHEUMATOLOGY | Facility: CLINIC | Age: 41
End: 2021-09-03
Payer: COMMERCIAL

## 2021-09-03 VITALS
DIASTOLIC BLOOD PRESSURE: 60 MMHG | HEIGHT: 62 IN | BODY MASS INDEX: 48.4 KG/M2 | OXYGEN SATURATION: 97 % | HEART RATE: 74 BPM | RESPIRATION RATE: 16 BRPM | TEMPERATURE: 98 F | WEIGHT: 263 LBS | SYSTOLIC BLOOD PRESSURE: 114 MMHG

## 2021-09-03 DIAGNOSIS — R76.8 DS DNA ANTIBODY POSITIVE: ICD-10-CM

## 2021-09-03 DIAGNOSIS — M54.41 CHRONIC MIDLINE LOW BACK PAIN WITH BILATERAL SCIATICA: ICD-10-CM

## 2021-09-03 DIAGNOSIS — M79.7 FIBROMYALGIA: ICD-10-CM

## 2021-09-03 DIAGNOSIS — M25.50 POLYARTHRALGIA: ICD-10-CM

## 2021-09-03 DIAGNOSIS — M51.36 DDD (DEGENERATIVE DISC DISEASE), LUMBAR: Primary | ICD-10-CM

## 2021-09-03 DIAGNOSIS — M54.42 CHRONIC MIDLINE LOW BACK PAIN WITH BILATERAL SCIATICA: ICD-10-CM

## 2021-09-03 DIAGNOSIS — R76.8 POSITIVE ANA (ANTINUCLEAR ANTIBODY): ICD-10-CM

## 2021-09-03 DIAGNOSIS — M32.9 SYSTEMIC LUPUS ERYTHEMATOSUS, UNSPECIFIED SLE TYPE, UNSPECIFIED ORGAN INVOLVEMENT STATUS (HCC): Primary | ICD-10-CM

## 2021-09-03 DIAGNOSIS — L73.2 HIDRADENITIS: ICD-10-CM

## 2021-09-03 DIAGNOSIS — G89.29 CHRONIC MIDLINE LOW BACK PAIN WITH BILATERAL SCIATICA: ICD-10-CM

## 2021-09-03 DIAGNOSIS — R80.9 PROTEINURIA, UNSPECIFIED TYPE: ICD-10-CM

## 2021-09-03 DIAGNOSIS — R79.82 CRP ELEVATED: ICD-10-CM

## 2021-09-03 PROCEDURE — 3008F BODY MASS INDEX DOCD: CPT | Performed by: INTERNAL MEDICINE

## 2021-09-03 PROCEDURE — 3078F DIAST BP <80 MM HG: CPT | Performed by: INTERNAL MEDICINE

## 2021-09-03 PROCEDURE — 99214 OFFICE O/P EST MOD 30 MIN: CPT | Performed by: INTERNAL MEDICINE

## 2021-09-03 PROCEDURE — 3074F SYST BP LT 130 MM HG: CPT | Performed by: INTERNAL MEDICINE

## 2021-09-03 RX ORDER — HYDROXYCHLOROQUINE SULFATE 200 MG/1
200 TABLET, FILM COATED ORAL DAILY
Qty: 90 TABLET | Refills: 0 | Status: SHIPPED | OUTPATIENT
Start: 2021-09-03

## 2021-09-03 NOTE — PROGRESS NOTES
?  RHEUMATOLOGY Follow up   Date of visit: 09/03/2021  ? Patient presents with:  SLE: 6 month f/u. Feeling pretty good. Every once in awhile will feel \"flu-like\". Overall doing better.       ASSESSMENT, DISCUSSION & PLAN   Assessment:  Systemic lupus e consider discontinuing altogether and monitoring off medications. I do not see a reason to step up immunosuppression at this time given lack of over synovitis or inflammation on exam.   I encouraged her to continue with her weight loss journey.   Of note, s RATIO, RANDOM; Future  -     Hydroxychloroquine Sulfate 200 MG Oral Tab; Take 1 tablet (200 mg total) by mouth daily. Proteinuria, unspecified type  -     URINALYSIS, ROUTINE; Future  -     URINE PROTEIN/CREATININE RATIO, RANDOM;  Future    CRP elevated some left leg swelling, stable not worse. Denies any recent skin rashes.   Slightly dry mouth  Denies dry eyes    She has had some worsened hidradenitis supprativa with some of her weight loss and excess skin rubbing,    HPI from initial consultation  ref medication   + intermittent constipation but thought related to medication   + hx of plantar fasciitis, with difficulty walking in the morning. Does wear inserts in her shoes. No formal workup for this.    + fevers per pt more often than should be usual (as BLESSING (obstructive sleep apnea) 8/23/19 PSG    AHI 19 REM AHI 57 Supine AHI 46 non-supine AHI 11 [place] 71%   • Osteoarthritis    • Pneumonia due to organism    • Shortness of breath    • Sleep apnea     cpap   • Thyroid disease      Past Surgical Histor Take 40 mg by mouth 2 (two) times a day., Disp: , Rfl:   LEVOTHYROXINE SODIUM 200 MCG Oral Tab, TAKE 1 TABLET(200 MCG) BY MOUTH DAILY BEFORE BREAKFAST, Disp: 90 tablet, Rfl: 0  LORAZEPAM 0.5 MG Oral Tab, TAKE 1 TABLET(0.5 MG) BY MOUTH TWICE DAILY AS NEEDED for back pain, joint pain, myalgias and neck pain. Skin: Positive for rash. Negative for itching. Neurological: Negative for dizziness, tingling, seizures, weakness and headaches. Endo/Heme/Allergies: Negative for polydipsia.  Does not bruise/bleed ea joint line tenderness, no crepitus, no effusion.     (Previous exam)  Posterior Tender Point Exam  Occiput -/-  Trapezius +/+  Supraspinatus +/+  Gluteal deferred   Greater trochanter +/+  Anterior Tender Point Exam:  Low cervical +/+  Second rib +/+  Later VASCULATURE:  Negative for acute pulmonary embolism. No filling defects are seen centrally or peripherally within the pulmonary arterial system.      LUNGS/PLEURA:  Ground-glass opacities are present in the superior segment left lower lobe, left upper lo NEPRELIM 3.45 06/22/2021    NEUTABS 3,614 03/14/2020    LYMPHABS 2,811 03/14/2020    EOSABS 153 03/14/2020    BASABS 51 03/14/2020    NEUT 49.5 03/14/2020    LYMPH 38.5 03/14/2020    MON 9.2 03/14/2020    EOS 2.1 03/14/2020    BASO 0.7 03/14/2020    NEPERC

## 2021-09-08 ENCOUNTER — LAB ENCOUNTER (OUTPATIENT)
Dept: LAB | Facility: HOSPITAL | Age: 41
End: 2021-09-08
Attending: SURGERY
Payer: COMMERCIAL

## 2021-09-08 DIAGNOSIS — Z01.818 PREOP EXAMINATION: ICD-10-CM

## 2021-09-08 DIAGNOSIS — Z11.59 ENCOUNTER FOR SCREENING FOR OTHER VIRAL DISEASES: ICD-10-CM

## 2021-09-09 LAB — SARS-COV-2 RNA RESP QL NAA+PROBE: NOT DETECTED

## 2021-09-11 ENCOUNTER — HOSPITAL ENCOUNTER (OUTPATIENT)
Dept: GENERAL RADIOLOGY | Facility: HOSPITAL | Age: 41
Discharge: HOME | End: 2021-09-11
Attending: SURGERY
Payer: COMMERCIAL

## 2021-09-11 DIAGNOSIS — E66.01 MORBID OBESITY WITH BMI OF 50.0-59.9, ADULT (HCC): ICD-10-CM

## 2021-09-11 PROCEDURE — 74240 X-RAY XM UPR GI TRC 1CNTRST: CPT | Performed by: SURGERY

## 2021-11-01 RX ORDER — MULTIVIT-MIN/IRON/FOLIC ACID/K 18-600-40
CAPSULE ORAL
COMMUNITY

## 2021-11-01 RX ORDER — VENLAFAXINE 100 MG/1
150 TABLET ORAL DAILY
COMMUNITY
End: 2021-12-22 | Stop reason: DRUGHIGH

## 2021-11-01 RX ORDER — FEXOFENADINE HCL 180 MG/1
180 TABLET ORAL DAILY
COMMUNITY

## 2021-11-01 RX ORDER — PYRIDOXINE HCL (VITAMIN B6) 100 MG
TABLET ORAL
COMMUNITY

## 2021-11-03 ENCOUNTER — LAB ENCOUNTER (OUTPATIENT)
Dept: LAB | Facility: HOSPITAL | Age: 41
End: 2021-11-03
Attending: INTERNAL MEDICINE
Payer: COMMERCIAL

## 2021-11-03 DIAGNOSIS — R79.82 CRP ELEVATED: ICD-10-CM

## 2021-11-03 DIAGNOSIS — M25.50 POLYARTHRALGIA: ICD-10-CM

## 2021-11-03 DIAGNOSIS — R53.83 OTHER FATIGUE: ICD-10-CM

## 2021-11-03 DIAGNOSIS — D50.9 IRON DEFICIENCY ANEMIA, UNSPECIFIED IRON DEFICIENCY ANEMIA TYPE: ICD-10-CM

## 2021-11-03 DIAGNOSIS — R76.8 POSITIVE ANA (ANTINUCLEAR ANTIBODY): ICD-10-CM

## 2021-11-03 DIAGNOSIS — M32.9 SYSTEMIC LUPUS ERYTHEMATOSUS, UNSPECIFIED SLE TYPE, UNSPECIFIED ORGAN INVOLVEMENT STATUS (HCC): ICD-10-CM

## 2021-11-03 DIAGNOSIS — R80.9 PROTEINURIA, UNSPECIFIED TYPE: ICD-10-CM

## 2021-11-03 DIAGNOSIS — Z51.81 THERAPEUTIC DRUG MONITORING: ICD-10-CM

## 2021-11-03 PROCEDURE — 86160 COMPLEMENT ANTIGEN: CPT

## 2021-11-03 PROCEDURE — 82570 ASSAY OF URINE CREATININE: CPT

## 2021-11-03 PROCEDURE — 86140 C-REACTIVE PROTEIN: CPT

## 2021-11-03 PROCEDURE — 86038 ANTINUCLEAR ANTIBODIES: CPT

## 2021-11-03 PROCEDURE — 84439 ASSAY OF FREE THYROXINE: CPT

## 2021-11-03 PROCEDURE — 86235 NUCLEAR ANTIGEN ANTIBODY: CPT

## 2021-11-03 PROCEDURE — 81001 URINALYSIS AUTO W/SCOPE: CPT

## 2021-11-03 PROCEDURE — 84443 ASSAY THYROID STIM HORMONE: CPT

## 2021-11-03 PROCEDURE — 85652 RBC SED RATE AUTOMATED: CPT

## 2021-11-03 PROCEDURE — 86225 DNA ANTIBODY NATIVE: CPT

## 2021-11-03 PROCEDURE — 84156 ASSAY OF PROTEIN URINE: CPT

## 2021-11-03 PROCEDURE — 36415 COLL VENOUS BLD VENIPUNCTURE: CPT

## 2021-11-05 ENCOUNTER — HOSPITAL ENCOUNTER (OUTPATIENT)
Facility: HOSPITAL | Age: 41
Setting detail: HOSPITAL OUTPATIENT SURGERY
Discharge: HOME | End: 2021-11-05
Attending: INTERNAL MEDICINE | Admitting: INTERNAL MEDICINE
Payer: COMMERCIAL

## 2021-11-05 ENCOUNTER — ANESTHESIA (OUTPATIENT)
Dept: ENDOSCOPY | Facility: HOSPITAL | Age: 41
End: 2021-11-05
Payer: COMMERCIAL

## 2021-11-05 ENCOUNTER — ANESTHESIA EVENT (OUTPATIENT)
Dept: ENDOSCOPY | Facility: HOSPITAL | Age: 41
End: 2021-11-05
Payer: COMMERCIAL

## 2021-11-05 VITALS
DIASTOLIC BLOOD PRESSURE: 75 MMHG | OXYGEN SATURATION: 100 % | TEMPERATURE: 97 F | HEIGHT: 62 IN | HEART RATE: 50 BPM | BODY MASS INDEX: 46.01 KG/M2 | SYSTOLIC BLOOD PRESSURE: 126 MMHG | WEIGHT: 250 LBS | RESPIRATION RATE: 20 BRPM

## 2021-11-05 DIAGNOSIS — D50.9 IRON DEFICIENCY ANEMIA, UNSPECIFIED IRON DEFICIENCY ANEMIA TYPE: Primary | ICD-10-CM

## 2021-11-05 DIAGNOSIS — R12 HEARTBURN: ICD-10-CM

## 2021-11-05 PROCEDURE — 81025 URINE PREGNANCY TEST: CPT

## 2021-11-05 PROCEDURE — 0DB98ZX EXCISION OF DUODENUM, VIA NATURAL OR ARTIFICIAL OPENING ENDOSCOPIC, DIAGNOSTIC: ICD-10-PCS | Performed by: INTERNAL MEDICINE

## 2021-11-05 PROCEDURE — 88305 TISSUE EXAM BY PATHOLOGIST: CPT | Performed by: INTERNAL MEDICINE

## 2021-11-05 PROCEDURE — 0DB78ZX EXCISION OF STOMACH, PYLORUS, VIA NATURAL OR ARTIFICIAL OPENING ENDOSCOPIC, DIAGNOSTIC: ICD-10-PCS | Performed by: INTERNAL MEDICINE

## 2021-11-05 PROCEDURE — 0DB68ZX EXCISION OF STOMACH, VIA NATURAL OR ARTIFICIAL OPENING ENDOSCOPIC, DIAGNOSTIC: ICD-10-PCS | Performed by: INTERNAL MEDICINE

## 2021-11-05 RX ORDER — LIDOCAINE HYDROCHLORIDE 10 MG/ML
INJECTION, SOLUTION EPIDURAL; INFILTRATION; INTRACAUDAL; PERINEURAL AS NEEDED
Status: DISCONTINUED | OUTPATIENT
Start: 2021-11-05 | End: 2021-11-05 | Stop reason: SURG

## 2021-11-05 RX ORDER — SODIUM CHLORIDE, SODIUM LACTATE, POTASSIUM CHLORIDE, CALCIUM CHLORIDE 600; 310; 30; 20 MG/100ML; MG/100ML; MG/100ML; MG/100ML
INJECTION, SOLUTION INTRAVENOUS CONTINUOUS
Status: DISCONTINUED | OUTPATIENT
Start: 2021-11-05 | End: 2021-11-05

## 2021-11-05 RX ORDER — NALOXONE HYDROCHLORIDE 0.4 MG/ML
80 INJECTION, SOLUTION INTRAMUSCULAR; INTRAVENOUS; SUBCUTANEOUS AS NEEDED
Status: DISCONTINUED | OUTPATIENT
Start: 2021-11-05 | End: 2021-11-05

## 2021-11-05 RX ORDER — ESOMEPRAZOLE MAGNESIUM 40 MG/1
40 FOR SUSPENSION ORAL
Qty: 60 EACH | Refills: 3 | Status: SHIPPED | OUTPATIENT
Start: 2021-11-05 | End: 2021-12-05

## 2021-11-05 RX ADMIN — SODIUM CHLORIDE, SODIUM LACTATE, POTASSIUM CHLORIDE, CALCIUM CHLORIDE: 600; 310; 30; 20 INJECTION, SOLUTION INTRAVENOUS at 08:28:00

## 2021-11-05 RX ADMIN — LIDOCAINE HYDROCHLORIDE 100 MG: 10 INJECTION, SOLUTION EPIDURAL; INFILTRATION; INTRACAUDAL; PERINEURAL at 08:10:00

## 2021-11-05 RX ADMIN — SODIUM CHLORIDE, SODIUM LACTATE, POTASSIUM CHLORIDE, CALCIUM CHLORIDE: 600; 310; 30; 20 INJECTION, SOLUTION INTRAVENOUS at 08:10:00

## 2021-11-05 NOTE — OPERATIVE REPORT
EGD operative report  Patient Name: Garrett Cordova  Procedure: Esophagogastroduodenoscopy with cold forceps biopsy   Date of procedure: 11/5/2021    Indication: Heartburn, nausea, anemia  Attending: Christiano George M.D.   Consent:  The risks, benefits, an body, antrum, fundus, cardia, and angularis were normal, without masses, polyps, ulcers, erosions, diverticula, or varices. A sleeve gastrectomy was evident. Cold forceps biopsies were obtained from the antrum, body, and fundus, to evaluate for H.pylori.

## 2021-11-05 NOTE — ANESTHESIA POSTPROCEDURE EVALUATION
[facility] Patient Status:  Hospital Outpatient Surgery   Age/Gender 46year old female MRN [id number]   Location [id number] [place] [address] Attending Mariana Cherry MD   Hosp Day # 0 PCP Elida Terry MD       Anesth

## 2021-11-05 NOTE — ANESTHESIA PREPROCEDURE EVALUATION
PRE-OP EVALUATION    Patient Name: Angel Zamarripa    Admit Diagnosis: Heartburn [R12]  Iron deficiency anemia, unspecified iron deficiency anemia type [D50.9]    Pre-op Diagnosis: Heartburn [R12]  Iron deficiency anemia, unspecified iron deficiency anemia t TABLET(200 MCG) BY MOUTH DAILY BEFORE BREAKFAST, Disp: 90 tablet, Rfl: 0, 11/4/2021 at Unknown time  ezetimibe 10 MG Oral Tab, Take 10 mg by mouth daily.   , Disp: , Rfl: , 11/4/2021 at Unknown time  VENLAFAXINE HCL ER 75 MG Oral Capsule SR 24 Hr, TAKE 1 CA SURGICAL BARIATRICS - INTERNAL  12/21/2020   • WISDOM TEETH REMOVED  2013     Social History    Tobacco Use      Smoking status: Never Smoker      Smokeless tobacco: Never Used    Alcohol use: Yes      Comment: 1-2 a month      Drug use: Unknown     Availa

## 2021-11-05 NOTE — H&P
[address] Patient Status:  Hospital Outpatient Surgery    3/16/1980 MRN [id number]   Location [id number] [place] [address] Attending Alondra Colón MD   Hosp Day # 0 PCP Katie Miranda MD positive test for blood stool, heartburn/indigestion/reflux, belching, difficulty swallowing, irregular bowel habits, painful swallowing, diarrhea, abdominal pain, constipation, nausea, incontinence of stool, vomiting, black stools, get full quickly at [facility] lymph nodes. Allergy: Denies medication allergy, latex/rubber allergy, anaphylactic or other reaction to anesthesia, food allergy. Eyes: Denies blurred/double vision, eye disease, glasses or contacts, glaucoma.   ENT: Denies nose or gums bleeding, mouth

## 2021-12-10 ENCOUNTER — OFFICE VISIT (OUTPATIENT)
Dept: RHEUMATOLOGY | Facility: CLINIC | Age: 41
End: 2021-12-10
Payer: COMMERCIAL

## 2021-12-10 ENCOUNTER — LAB ENCOUNTER (OUTPATIENT)
Dept: LAB | Facility: HOSPITAL | Age: 41
End: 2021-12-10
Attending: INTERNAL MEDICINE
Payer: COMMERCIAL

## 2021-12-10 VITALS
RESPIRATION RATE: 16 BRPM | SYSTOLIC BLOOD PRESSURE: 104 MMHG | WEIGHT: 262 LBS | DIASTOLIC BLOOD PRESSURE: 60 MMHG | BODY MASS INDEX: 48.21 KG/M2 | HEART RATE: 54 BPM | OXYGEN SATURATION: 96 % | HEIGHT: 62 IN

## 2021-12-10 DIAGNOSIS — M32.9 SYSTEMIC LUPUS ERYTHEMATOSUS, UNSPECIFIED SLE TYPE, UNSPECIFIED ORGAN INVOLVEMENT STATUS (HCC): Primary | ICD-10-CM

## 2021-12-10 DIAGNOSIS — M54.41 CHRONIC MIDLINE LOW BACK PAIN WITH BILATERAL SCIATICA: ICD-10-CM

## 2021-12-10 DIAGNOSIS — M54.42 CHRONIC MIDLINE LOW BACK PAIN WITH BILATERAL SCIATICA: ICD-10-CM

## 2021-12-10 DIAGNOSIS — M25.50 POLYARTHRALGIA: ICD-10-CM

## 2021-12-10 DIAGNOSIS — R76.8 POSITIVE ANA (ANTINUCLEAR ANTIBODY): ICD-10-CM

## 2021-12-10 DIAGNOSIS — M32.9 SYSTEMIC LUPUS ERYTHEMATOSUS, UNSPECIFIED SLE TYPE, UNSPECIFIED ORGAN INVOLVEMENT STATUS (HCC): ICD-10-CM

## 2021-12-10 DIAGNOSIS — R76.8 SMITH ANTIBODY POSITIVE: ICD-10-CM

## 2021-12-10 DIAGNOSIS — M79.7 FIBROMYALGIA: ICD-10-CM

## 2021-12-10 DIAGNOSIS — G89.29 CHRONIC MIDLINE LOW BACK PAIN WITH BILATERAL SCIATICA: ICD-10-CM

## 2021-12-10 DIAGNOSIS — L73.2 HIDRADENITIS: ICD-10-CM

## 2021-12-10 PROCEDURE — 86140 C-REACTIVE PROTEIN: CPT

## 2021-12-10 PROCEDURE — 85652 RBC SED RATE AUTOMATED: CPT

## 2021-12-10 PROCEDURE — 86160 COMPLEMENT ANTIGEN: CPT

## 2021-12-10 PROCEDURE — 3078F DIAST BP <80 MM HG: CPT | Performed by: INTERNAL MEDICINE

## 2021-12-10 PROCEDURE — 36415 COLL VENOUS BLD VENIPUNCTURE: CPT

## 2021-12-10 PROCEDURE — 3074F SYST BP LT 130 MM HG: CPT | Performed by: INTERNAL MEDICINE

## 2021-12-10 PROCEDURE — 3008F BODY MASS INDEX DOCD: CPT | Performed by: INTERNAL MEDICINE

## 2021-12-10 PROCEDURE — 99215 OFFICE O/P EST HI 40 MIN: CPT | Performed by: INTERNAL MEDICINE

## 2021-12-10 RX ORDER — DOXYCYCLINE HYCLATE 100 MG
100 TABLET ORAL 2 TIMES DAILY
Qty: 28 TABLET | Refills: 0 | Status: SHIPPED | OUTPATIENT
Start: 2021-12-10 | End: 2021-12-24

## 2021-12-10 RX ORDER — CYCLOBENZAPRINE HCL 5 MG
5 TABLET ORAL 2 TIMES DAILY
Qty: 14 TABLET | Refills: 0 | Status: SHIPPED | OUTPATIENT
Start: 2021-12-10 | End: 2021-12-17

## 2021-12-10 RX ORDER — METHYLPREDNISOLONE 4 MG/1
TABLET ORAL
Qty: 1 EACH | Refills: 0 | Status: SHIPPED | OUTPATIENT
Start: 2021-12-10

## 2021-12-10 NOTE — PROGRESS NOTES
?  RHEUMATOLOGY Follow up   Date of visit: 12/10/2021  ? Patient presents with:  SLE: 3 month f/u. Feeling alright for awhile and stopped taking the plaquenil. Started getting flu like feeling again. Back and hip pain. Off plaquenil about 3 months.  No n plaquenil, but unclear if related to plaquenil vs activity level and possible injury. Will have pt start steroid taper as well as nightly muscle relaxant and see how she does.  After stopping plaquenil, she still has portillo ab positivity so she may still r Future  -     COMPLEMENT C3, SERUM; Future  -     COMPLEMENT C4, SERUM; Future    Hidradenitis  -     Doxycycline Hyclate 100 MG Oral Tab; Take 1 tablet (100 mg total) by mouth 2 (two) times daily for 14 days.     Espinoza antibody positive  -     SED RATE, WE to get into dermatology. HPI from initial consultation  referred for rheumatologic evaluation due to diffuse pain and fatigue.      States she has been suffering for awhile. States she reports a generalized fatigue and overall not well feeling.  She did for this.    + fevers per pt more often than should be usual (as high as 99.9 or less; baseline temp per pt is 96-97)  + feels lymph node enlargement in her neck frequently  + frequent sinus infections without epistaxis   + recent numbness/tingling in finge • Thyroid disease      Past Surgical History:  Past Surgical History:   Procedure Laterality Date   • COLONOSCOPY N/A 8/28/2020    Procedure: COLONOSCOPY;  Surgeon: Carlie Laws MD;  Location: [address] [place] [address]   • OTHER SURGICAL HISTORY  12/21/2020    g UNIT/GM External Powder, Apply 1 Application topically 4 (four) times daily. , Disp: 1 Bottle, Rfl: 0  clotrimazole-betamethasone 1-0.05 % External Cream, Apply 1 Application topically 2 (two) times daily as needed (rash). , Disp: 60 g, Rfl: 3  LEVOTHYROXINE Endo/Heme/Allergies: Negative for polydipsia. Does not bruise/bleed easily. Psychiatric/Behavioral: Negative for depression. The patient has insomnia. The patient is not nervous/anxious.       PHYSICAL EXAM   Today's Vitals:  Temperature Blood Pressure +/+  Lateral epicondyle +/+  Knee +/+  14 tender points positive   Lymphadenopathy:      Cervical: No cervical adenopathy. Skin:     General: Skin is warm and dry. Findings: No erythema or rash.       Comments: No periungal erythema   HS lesions note the superior segment left lower lobe, left upper lobe, basal segments left lower lobe, minimal and subtle in the basal right lower lobe with sparing of the right upper lobe and middle lobe of this   process. No pleural effusion or pneumothorax.   These philipp 06/22/2021    MOABSO 0.75 06/22/2021    EOABSO 0.42 06/22/2021    BAABSO 0.06 06/22/2021     Lab Results   Component Value Date    GLU 86 06/22/2021    BUN 17 06/22/2021    BUNCREA 27.0 (H) 06/22/2021    CREATSERUM 0.63 06/22/2021    ANIONGAP 4 06/22/2021

## 2021-12-22 ENCOUNTER — TELEPHONE (OUTPATIENT)
Dept: FAMILY MEDICINE CLINIC | Facility: CLINIC | Age: 41
End: 2021-12-22

## 2021-12-22 ENCOUNTER — HOSPITAL ENCOUNTER (OUTPATIENT)
Dept: GENERAL RADIOLOGY | Facility: HOSPITAL | Age: 41
Discharge: HOME | End: 2021-12-22
Attending: INTERNAL MEDICINE
Payer: COMMERCIAL

## 2021-12-22 DIAGNOSIS — E03.9 ACQUIRED HYPOTHYROIDISM: Primary | ICD-10-CM

## 2021-12-22 DIAGNOSIS — G89.29 CHRONIC MIDLINE LOW BACK PAIN WITH BILATERAL SCIATICA: ICD-10-CM

## 2021-12-22 DIAGNOSIS — M51.36 DDD (DEGENERATIVE DISC DISEASE), LUMBAR: ICD-10-CM

## 2021-12-22 DIAGNOSIS — M54.41 CHRONIC MIDLINE LOW BACK PAIN WITH BILATERAL SCIATICA: ICD-10-CM

## 2021-12-22 DIAGNOSIS — M54.42 CHRONIC MIDLINE LOW BACK PAIN WITH BILATERAL SCIATICA: ICD-10-CM

## 2021-12-22 PROCEDURE — 72202 X-RAY EXAM SI JOINTS 3/> VWS: CPT | Performed by: INTERNAL MEDICINE

## 2021-12-22 PROCEDURE — 72110 X-RAY EXAM L-2 SPINE 4/>VWS: CPT | Performed by: INTERNAL MEDICINE

## 2021-12-22 RX ORDER — VENLAFAXINE HYDROCHLORIDE 150 MG/1
150 CAPSULE, EXTENDED RELEASE ORAL DAILY
Qty: 90 CAPSULE | Refills: 0 | Status: SHIPPED | OUTPATIENT
Start: 2021-12-22

## 2021-12-22 RX ORDER — CYCLOBENZAPRINE HCL 10 MG
10 TABLET ORAL NIGHTLY
Qty: 30 TABLET | Refills: 0 | Status: SHIPPED | OUTPATIENT
Start: 2021-12-22 | End: 2022-01-21

## 2021-12-22 RX ORDER — LEVOTHYROXINE SODIUM 0.2 MG/1
200 TABLET ORAL
Qty: 90 TABLET | Refills: 0 | Status: SHIPPED | OUTPATIENT
Start: 2021-12-22

## 2021-12-22 NOTE — TELEPHONE ENCOUNTER
[phone]  Called pt to inform per Dr. Brayan Sterling refilled both meds as requested for 90 day supply to [address] listed. Thyroid function test ordered for recheck in 6 weeks. Confirmed CPE as scheduled on 2/11/22. No further questions or concerns.  Pt ve

## 2021-12-22 NOTE — TELEPHONE ENCOUNTER
LOV 8/11/20    Last refill  LEVOTHYROXINE SODIUM 200 MCG Oral Tab 90 tablet 0 3/8/2021    Sig:   TAKE 1 TABLET(200 MCG) BY MOUTH DAILY BEFORE BREAKFAST       VENLAFAXINE HCL  MG Oral Capsule SR 24 Hr (Discontinued) 30 capsule 0 12/11/2020 12/22/2020

## 2021-12-23 ENCOUNTER — TELEPHONE (OUTPATIENT)
Dept: RHEUMATOLOGY | Facility: CLINIC | Age: 41
End: 2021-12-23

## 2022-01-06 ENCOUNTER — ORDER TRANSCRIPTION (OUTPATIENT)
Dept: PHYSICAL THERAPY | Facility: HOSPITAL | Age: 42
End: 2022-01-06

## 2022-01-06 DIAGNOSIS — G89.29 CHRONIC MIDLINE LOW BACK PAIN WITH BILATERAL SCIATICA: ICD-10-CM

## 2022-01-06 DIAGNOSIS — M54.42 CHRONIC MIDLINE LOW BACK PAIN WITH BILATERAL SCIATICA: ICD-10-CM

## 2022-01-06 DIAGNOSIS — M54.41 CHRONIC MIDLINE LOW BACK PAIN WITH BILATERAL SCIATICA: ICD-10-CM

## 2022-01-06 DIAGNOSIS — M51.36 DDD (DEGENERATIVE DISC DISEASE), LUMBAR: Primary | ICD-10-CM

## 2022-01-14 ENCOUNTER — HOSPITAL ENCOUNTER (OUTPATIENT)
Dept: MRI IMAGING | Age: 42
Discharge: HOME | End: 2022-01-14
Attending: ANESTHESIOLOGY
Payer: COMMERCIAL

## 2022-01-14 DIAGNOSIS — M51.9 LUMBAR DISC DISEASE: ICD-10-CM

## 2022-01-14 DIAGNOSIS — M47.816 LUMBAR FACET JOINT SYNDROME: ICD-10-CM

## 2022-01-14 PROCEDURE — 72148 MRI LUMBAR SPINE W/O DYE: CPT | Performed by: ANESTHESIOLOGY

## 2022-01-21 ENCOUNTER — TELEPHONE (OUTPATIENT)
Dept: PHYSICAL THERAPY | Facility: HOSPITAL | Age: 42
End: 2022-01-21

## 2022-01-24 ENCOUNTER — TELEPHONE (OUTPATIENT)
Dept: FAMILY MEDICINE CLINIC | Facility: CLINIC | Age: 42
End: 2022-01-24

## 2022-01-24 NOTE — TELEPHONE ENCOUNTER
Subject: Appointment scheduled from [place] [id number]       Appointment For: Goldie Huggins ([id number])   Visit Type: France Ackerman ([id number])      1/25/2022    8:00 AM  20 mins. Messi Palomino MD     [facility] [address] [place]      Patient Comments:   I’m not natali

## 2022-01-24 NOTE — TELEPHONE ENCOUNTER
[facility] requesting return call. Advised per Dr. Patricia Castañeda, she will not be able to see her tomorrow morning for the problem listed in appointment request.  Mely Choudhury she should go to [facility] for evaluation of this problem.   If she is intending to have Dr. Kiki Rivera

## 2022-01-26 NOTE — PROGRESS NOTES
Appointment audit/chart review phone call per protocol. Per records, patient has appointments as follows:  Surgeon needs 1 year appt f/u. Dietician needs 1 year appt f/u  Bariatrician as needed.      LVM, sent letter to remind pt to schedule appts wit

## 2022-01-27 ENCOUNTER — APPOINTMENT (OUTPATIENT)
Dept: PHYSICAL THERAPY | Age: 42
End: 2022-01-27
Attending: INTERNAL MEDICINE
Payer: COMMERCIAL

## 2022-02-11 ENCOUNTER — HOSPITAL ENCOUNTER (OUTPATIENT)
Dept: MAMMOGRAPHY | Age: 42
Discharge: HOME | End: 2022-02-11
Attending: OBSTETRICS & GYNECOLOGY
Payer: COMMERCIAL

## 2022-02-11 ENCOUNTER — OFFICE VISIT (OUTPATIENT)
Dept: FAMILY MEDICINE CLINIC | Facility: CLINIC | Age: 42
End: 2022-02-11
Payer: COMMERCIAL

## 2022-02-11 VITALS
HEIGHT: 62.5 IN | OXYGEN SATURATION: 98 % | BODY MASS INDEX: 43.6 KG/M2 | RESPIRATION RATE: 18 BRPM | SYSTOLIC BLOOD PRESSURE: 120 MMHG | WEIGHT: 243 LBS | HEART RATE: 78 BPM | TEMPERATURE: 97 F | DIASTOLIC BLOOD PRESSURE: 72 MMHG

## 2022-02-11 DIAGNOSIS — K21.00 GASTROESOPHAGEAL REFLUX DISEASE WITH ESOPHAGITIS WITHOUT HEMORRHAGE: ICD-10-CM

## 2022-02-11 DIAGNOSIS — Z98.84 S/P LAPAROSCOPIC SLEEVE GASTRECTOMY: ICD-10-CM

## 2022-02-11 DIAGNOSIS — Z12.31 ENCOUNTER FOR SCREENING MAMMOGRAM FOR BREAST CANCER: ICD-10-CM

## 2022-02-11 DIAGNOSIS — E03.9 ACQUIRED HYPOTHYROIDISM: ICD-10-CM

## 2022-02-11 DIAGNOSIS — G47.33 OSA (OBSTRUCTIVE SLEEP APNEA): ICD-10-CM

## 2022-02-11 DIAGNOSIS — J45.20 MILD INTERMITTENT ASTHMA WITHOUT COMPLICATION: Chronic | ICD-10-CM

## 2022-02-11 DIAGNOSIS — E66.01 MORBID OBESITY WITH BMI OF 50.0-59.9, ADULT (HCC): ICD-10-CM

## 2022-02-11 DIAGNOSIS — E78.2 MIXED HYPERLIPIDEMIA: ICD-10-CM

## 2022-02-11 DIAGNOSIS — Z00.00 ROUTINE GENERAL MEDICAL EXAMINATION AT A HEALTH CARE FACILITY: Primary | ICD-10-CM

## 2022-02-11 DIAGNOSIS — F43.10 PTSD (POST-TRAUMATIC STRESS DISORDER): ICD-10-CM

## 2022-02-11 PROBLEM — R06.00 DYSPNEA, UNSPECIFIED TYPE: Status: RESOLVED | Noted: 2020-04-16 | Resolved: 2022-02-11

## 2022-02-11 PROBLEM — Z01.818 PREOP TESTING: Status: RESOLVED | Noted: 2020-12-21 | Resolved: 2022-02-11

## 2022-02-11 PROBLEM — R79.89 ELEVATED D-DIMER: Status: RESOLVED | Noted: 2020-06-26 | Resolved: 2022-02-11

## 2022-02-11 PROBLEM — R00.0 SINUS TACHYCARDIA: Status: RESOLVED | Noted: 2020-04-16 | Resolved: 2022-02-11

## 2022-02-11 PROBLEM — J18.9 PNEUMONIA OF LEFT LOWER LOBE DUE TO INFECTIOUS ORGANISM: Status: RESOLVED | Noted: 2020-06-22 | Resolved: 2022-02-11

## 2022-02-11 PROBLEM — J03.90 TONSILLITIS: Status: RESOLVED | Noted: 2019-09-05 | Resolved: 2022-02-11

## 2022-02-11 PROBLEM — Z01.818 PREOPERATIVE CLEARANCE: Status: RESOLVED | Noted: 2020-07-23 | Resolved: 2022-02-11

## 2022-02-11 PROBLEM — J18.9 PNA (PNEUMONIA): Status: RESOLVED | Noted: 2020-06-22 | Resolved: 2022-02-11

## 2022-02-11 PROBLEM — R06.09 DOE (DYSPNEA ON EXERTION): Status: RESOLVED | Noted: 2020-04-16 | Resolved: 2022-02-11

## 2022-02-11 PROBLEM — R06.00 DOE (DYSPNEA ON EXERTION): Status: RESOLVED | Noted: 2020-04-16 | Resolved: 2022-02-11

## 2022-02-11 PROBLEM — Z20.822 SUSPECTED COVID-19 VIRUS INFECTION: Status: RESOLVED | Noted: 2020-04-16 | Resolved: 2022-02-11

## 2022-02-11 PROBLEM — J18.9 ATYPICAL PNEUMONIA: Status: RESOLVED | Noted: 2020-05-21 | Resolved: 2022-02-11

## 2022-02-11 PROCEDURE — 99395 PREV VISIT EST AGE 18-39: CPT | Performed by: FAMILY MEDICINE

## 2022-02-11 PROCEDURE — 3078F DIAST BP <80 MM HG: CPT | Performed by: FAMILY MEDICINE

## 2022-02-11 PROCEDURE — 77067 SCR MAMMO BI INCL CAD: CPT | Performed by: OBSTETRICS & GYNECOLOGY

## 2022-02-11 PROCEDURE — 3074F SYST BP LT 130 MM HG: CPT | Performed by: FAMILY MEDICINE

## 2022-02-11 PROCEDURE — 99214 OFFICE O/P EST MOD 30 MIN: CPT | Performed by: FAMILY MEDICINE

## 2022-02-11 PROCEDURE — 77063 BREAST TOMOSYNTHESIS BI: CPT | Performed by: OBSTETRICS & GYNECOLOGY

## 2022-02-11 PROCEDURE — 3008F BODY MASS INDEX DOCD: CPT | Performed by: FAMILY MEDICINE

## 2022-02-11 RX ORDER — LEVOTHYROXINE SODIUM 0.2 MG/1
200 TABLET ORAL
Qty: 90 TABLET | Refills: 3 | Status: SHIPPED | OUTPATIENT
Start: 2022-02-11

## 2022-02-11 RX ORDER — EZETIMIBE 10 MG/1
10 TABLET ORAL DAILY
Qty: 90 TABLET | Refills: 1 | Status: SHIPPED | OUTPATIENT
Start: 2022-02-11

## 2022-02-11 RX ORDER — VENLAFAXINE HYDROCHLORIDE 150 MG/1
150 CAPSULE, EXTENDED RELEASE ORAL DAILY
Qty: 90 CAPSULE | Refills: 1 | Status: SHIPPED | OUTPATIENT
Start: 2022-02-11

## 2022-02-11 RX ORDER — OMEPRAZOLE 40 MG/1
40 CAPSULE, DELAYED RELEASE ORAL DAILY
Qty: 90 CAPSULE | Refills: 1 | Status: SHIPPED | OUTPATIENT
Start: 2022-02-11 | End: 2022-05-12

## 2022-02-11 RX ORDER — ROSUVASTATIN CALCIUM 10 MG/1
10 TABLET, COATED ORAL NIGHTLY
Qty: 90 TABLET | Refills: 1 | Status: SHIPPED | OUTPATIENT
Start: 2022-02-11 | End: 2022-05-12

## 2022-02-14 ENCOUNTER — OFFICE VISIT (OUTPATIENT)
Dept: INTERNAL MEDICINE CLINIC | Facility: CLINIC | Age: 42
End: 2022-02-14
Payer: COMMERCIAL

## 2022-02-14 VITALS — WEIGHT: 248 LBS | BODY MASS INDEX: 44.5 KG/M2 | HEIGHT: 62.5 IN

## 2022-02-14 DIAGNOSIS — E66.01 CLASS 3 SEVERE OBESITY WITH BODY MASS INDEX (BMI) OF 40.0 TO 44.9 IN ADULT, UNSPECIFIED OBESITY TYPE, UNSPECIFIED WHETHER SERIOUS COMORBIDITY PRESENT (HCC): ICD-10-CM

## 2022-02-14 DIAGNOSIS — E66.2 MORBID OBESITY WITH ALVEOLAR HYPOVENTILATION (HCC): ICD-10-CM

## 2022-02-14 DIAGNOSIS — R73.03 PREDIABETES: ICD-10-CM

## 2022-02-14 DIAGNOSIS — Z98.84 S/P LAPAROSCOPIC SLEEVE GASTRECTOMY: ICD-10-CM

## 2022-02-14 DIAGNOSIS — Z51.81 THERAPEUTIC DRUG MONITORING: Primary | ICD-10-CM

## 2022-02-14 DIAGNOSIS — F50.81 BINGE EATING DISORDER: ICD-10-CM

## 2022-02-14 DIAGNOSIS — E78.2 MIXED HYPERLIPIDEMIA: ICD-10-CM

## 2022-02-14 PROCEDURE — 3074F SYST BP LT 130 MM HG: CPT | Performed by: PHYSICIAN ASSISTANT

## 2022-02-14 PROCEDURE — 99214 OFFICE O/P EST MOD 30 MIN: CPT | Performed by: PHYSICIAN ASSISTANT

## 2022-02-14 PROCEDURE — 3078F DIAST BP <80 MM HG: CPT | Performed by: PHYSICIAN ASSISTANT

## 2022-02-14 PROCEDURE — 97803 MED NUTRITION INDIV SUBSEQ: CPT

## 2022-02-14 PROCEDURE — 3008F BODY MASS INDEX DOCD: CPT | Performed by: PHYSICIAN ASSISTANT

## 2022-02-14 PROCEDURE — 3008F BODY MASS INDEX DOCD: CPT

## 2022-02-14 NOTE — PATIENT INSTRUCTIONS
Recommendations/goals:      1. Continue to keep food records, MFP.  2.  Continue to eat 4-6 times per day. Include protein and produce. Aim for 63-78 grams of protein per day. Keep carbohydrate at 130 grams or less per day. 3.  Slow down with eating continue to practice eating separately from drinking. 4.  Do 10 minutes per day of walking. (Goal of 30 minutes per day). (8 calories per minutes of walking and 29 calories per minute of stairs). 5.  Do strength/resistance training 10 minutes 2-3 times per week. (G[employer] 18 at home exercises or Sit and Be Fit Youtube)  6. Continue to aim for 64 oz of water per day.

## 2022-02-15 ENCOUNTER — TELEPHONE (OUTPATIENT)
Dept: FAMILY MEDICINE CLINIC | Facility: CLINIC | Age: 42
End: 2022-02-15

## 2022-02-15 VITALS
HEIGHT: 62.5 IN | BODY MASS INDEX: 44.32 KG/M2 | WEIGHT: 247 LBS | SYSTOLIC BLOOD PRESSURE: 118 MMHG | DIASTOLIC BLOOD PRESSURE: 72 MMHG | HEART RATE: 88 BPM

## 2022-02-15 NOTE — TELEPHONE ENCOUNTER
Received PA request in Epic for Ezetimibe 10 MG. Per pt's insurance plan, pt needs to had a trial (medication samples/coupons/discount cards are excluded from consideration as a trial) of two preferred high intensity statins, or two statins at the maximally tolerated dose, and did not achieve LDL cholesterol goal?  PLEASE NOTE: Preferred high intensity statin agents are atorvastatin (generic Lipitor) 40 mg or 80 mg, and rosuvastatin (generic Crestor) 20 mg or 40 mg. Is the patient statin intolerant based on one of the following: inability to tolerate at least 2 statins, with at least one started at the lowest starting daily dose, demonstrated by intolerable symptoms or clinically significant biomarker changes; or, statin associated rhabdomyolysis or immune-mediated necrotizing myopathy after a trial of one statin; or, for those with a contraindication* for statin therapy including active liver disease, unexplained persistent elevation of hepatic transaminases, or pregnancy? PLEASE NOTE: *Muscle aches are not considered a contraindication to statin therapy. Please advise. Thank you.

## 2022-02-17 ENCOUNTER — TELEPHONE (OUTPATIENT)
Dept: INTERNAL MEDICINE CLINIC | Facility: CLINIC | Age: 42
End: 2022-02-17

## 2022-02-17 NOTE — TELEPHONE ENCOUNTER
Lm for pt ([id number] [address] per HIPAA) to inform f/u as we received notification from pt's insurance Ezetimibe not covered and alternative options per criteria. To call back at the office to further discuss.

## 2022-02-17 NOTE — TELEPHONE ENCOUNTER
Patient is new to me was already on crestor and ezetimibe , we can increase the dose of crestor , please let patient know.

## 2022-02-17 NOTE — TELEPHONE ENCOUNTER
Wenceslao Michael,     Can you please enter a new Dietitian referral for patient as she is seeing Samantha Glasgow on Monday next week. Reminder to include any cardiovascular risk factor dx in addition to obesity dx.         DONE

## 2022-02-22 ENCOUNTER — APPOINTMENT (OUTPATIENT)
Dept: PHYSICAL THERAPY | Age: 42
End: 2022-02-22
Attending: FAMILY MEDICINE
Payer: COMMERCIAL

## 2022-02-22 ENCOUNTER — PATIENT MESSAGE (OUTPATIENT)
Dept: FAMILY MEDICINE CLINIC | Facility: CLINIC | Age: 42
End: 2022-02-22

## 2022-02-23 ENCOUNTER — TELEPHONE (OUTPATIENT)
Dept: PHYSICAL THERAPY | Facility: HOSPITAL | Age: 42
End: 2022-02-23

## 2022-02-24 ENCOUNTER — APPOINTMENT (OUTPATIENT)
Dept: PHYSICAL THERAPY | Age: 42
End: 2022-02-24
Attending: INTERNAL MEDICINE
Payer: COMMERCIAL

## 2022-02-25 ENCOUNTER — HOSPITAL ENCOUNTER (OUTPATIENT)
Dept: MAMMOGRAPHY | Facility: HOSPITAL | Age: 42
Discharge: HOME | End: 2022-02-25
Attending: OBSTETRICS & GYNECOLOGY
Payer: COMMERCIAL

## 2022-02-25 DIAGNOSIS — R92.2 INCONCLUSIVE MAMMOGRAPHY: ICD-10-CM

## 2022-02-25 PROCEDURE — 77061 BREAST TOMOSYNTHESIS UNI: CPT | Performed by: OBSTETRICS & GYNECOLOGY

## 2022-02-25 PROCEDURE — 77065 DX MAMMO INCL CAD UNI: CPT | Performed by: OBSTETRICS & GYNECOLOGY

## 2022-02-25 PROCEDURE — 76642 ULTRASOUND BREAST LIMITED: CPT | Performed by: OBSTETRICS & GYNECOLOGY

## 2022-02-28 ENCOUNTER — TELEPHONE (OUTPATIENT)
Dept: INTERNAL MEDICINE CLINIC | Facility: CLINIC | Age: 42
End: 2022-02-28

## 2022-02-28 NOTE — TELEPHONE ENCOUNTER
Message from cachorro    \"patient is requsting that we reach out to request drug change, vyvanse is available in brand name only and her copay is greater than $300 due to her deductible. Thanks\"    Would you like to change this medication or a prior authorization?

## 2022-03-01 ENCOUNTER — OFFICE VISIT (OUTPATIENT)
Dept: PHYSICAL THERAPY | Age: 42
End: 2022-03-01
Attending: INTERNAL MEDICINE
Payer: COMMERCIAL

## 2022-03-01 ENCOUNTER — TELEPHONE (OUTPATIENT)
Dept: PHYSICAL THERAPY | Facility: HOSPITAL | Age: 42
End: 2022-03-01

## 2022-03-01 PROCEDURE — 97162 PT EVAL MOD COMPLEX 30 MIN: CPT

## 2022-03-01 PROCEDURE — 97110 THERAPEUTIC EXERCISES: CPT

## 2022-03-01 RX ORDER — DEXTROAMPHETAMINE SACCHARATE, AMPHETAMINE ASPARTATE MONOHYDRATE, DEXTROAMPHETAMINE SULFATE AND AMPHETAMINE SULFATE 2.5; 2.5; 2.5; 2.5 MG/1; MG/1; MG/1; MG/1
10 CAPSULE, EXTENDED RELEASE ORAL EVERY MORNING
Qty: 30 CAPSULE | Refills: 0 | Status: SHIPPED | OUTPATIENT
Start: 2022-03-01 | End: 2022-04-03

## 2022-03-01 NOTE — TELEPHONE ENCOUNTER
Problem: Patient Care Overview  Goal: Plan of Care Review  Outcome: Ongoing (interventions implemented as appropriate)  Plan of care discussed with patient, no new questions at this time. VSS, denies SOB, no complaint of pain. Safety precautions taken, no falls/trauma during the shift. Will continue to monitor.        Sent Adderall to pt's pharmacy

## 2022-03-02 ENCOUNTER — MED REC SCAN ONLY (OUTPATIENT)
Dept: FAMILY MEDICINE CLINIC | Facility: CLINIC | Age: 42
End: 2022-03-02

## 2022-03-03 ENCOUNTER — ORDER TRANSCRIPTION (OUTPATIENT)
Dept: PHYSICAL THERAPY | Facility: HOSPITAL | Age: 42
End: 2022-03-03

## 2022-03-03 ENCOUNTER — OFFICE VISIT (OUTPATIENT)
Dept: PHYSICAL THERAPY | Age: 42
End: 2022-03-03
Attending: INTERNAL MEDICINE
Payer: COMMERCIAL

## 2022-03-03 PROCEDURE — 97140 MANUAL THERAPY 1/> REGIONS: CPT

## 2022-03-03 PROCEDURE — 97110 THERAPEUTIC EXERCISES: CPT

## 2022-03-08 ENCOUNTER — OFFICE VISIT (OUTPATIENT)
Dept: PHYSICAL THERAPY | Age: 42
End: 2022-03-08
Attending: INTERNAL MEDICINE
Payer: COMMERCIAL

## 2022-03-08 PROCEDURE — 97140 MANUAL THERAPY 1/> REGIONS: CPT

## 2022-03-08 PROCEDURE — 97110 THERAPEUTIC EXERCISES: CPT

## 2022-03-10 ENCOUNTER — APPOINTMENT (OUTPATIENT)
Dept: PHYSICAL THERAPY | Age: 42
End: 2022-03-10
Attending: INTERNAL MEDICINE
Payer: COMMERCIAL

## 2022-03-10 ENCOUNTER — APPOINTMENT (OUTPATIENT)
Dept: PHYSICAL THERAPY | Age: 42
End: 2022-03-10
Attending: FAMILY MEDICINE
Payer: COMMERCIAL

## 2022-03-15 ENCOUNTER — APPOINTMENT (OUTPATIENT)
Dept: PHYSICAL THERAPY | Age: 42
End: 2022-03-15
Attending: INTERNAL MEDICINE
Payer: COMMERCIAL

## 2022-03-17 ENCOUNTER — OFFICE VISIT (OUTPATIENT)
Dept: PHYSICAL THERAPY | Age: 42
End: 2022-03-17
Attending: INTERNAL MEDICINE
Payer: COMMERCIAL

## 2022-03-17 PROCEDURE — 97110 THERAPEUTIC EXERCISES: CPT

## 2022-03-17 PROCEDURE — 97140 MANUAL THERAPY 1/> REGIONS: CPT

## 2022-03-22 ENCOUNTER — APPOINTMENT (OUTPATIENT)
Dept: PHYSICAL THERAPY | Age: 42
End: 2022-03-22
Attending: INTERNAL MEDICINE
Payer: COMMERCIAL

## 2022-03-24 ENCOUNTER — OFFICE VISIT (OUTPATIENT)
Dept: PHYSICAL THERAPY | Age: 42
End: 2022-03-24
Attending: INTERNAL MEDICINE
Payer: COMMERCIAL

## 2022-03-24 PROCEDURE — 97140 MANUAL THERAPY 1/> REGIONS: CPT

## 2022-03-24 PROCEDURE — 97110 THERAPEUTIC EXERCISES: CPT

## 2022-03-29 ENCOUNTER — PATIENT MESSAGE (OUTPATIENT)
Dept: INTERNAL MEDICINE CLINIC | Facility: CLINIC | Age: 42
End: 2022-03-29

## 2022-03-31 ENCOUNTER — OFFICE VISIT (OUTPATIENT)
Dept: PHYSICAL THERAPY | Age: 42
End: 2022-03-31
Attending: FAMILY MEDICINE
Payer: COMMERCIAL

## 2022-03-31 PROCEDURE — 97110 THERAPEUTIC EXERCISES: CPT

## 2022-03-31 PROCEDURE — 97140 MANUAL THERAPY 1/> REGIONS: CPT

## 2022-03-31 NOTE — PROGRESS NOTES
Pt 15 min late. Dx: DDD (degenerative disc disease), lumbar (M51.36)  Chronic midline low back pain with bilateral sciatica (M54.41,M54.42,G89.29)         Insurance (Authorized # of Visits):  Whit Mcgill, [facility] Physician: Dr. Larry Poe MD visit: none scheduled  Fall Risk: standard         Precautions: asthma,  fibromyalgia          Subjective: Pt notes having some stiffness in low back. Pt notes bracing at work when lifting but notices fatigue with abdominals. Pt notes HEP compliance. Pain: 4/10 central LBP    Objective:   Pt has increased tissue restrictions on L paraspinals compared to R. Pt consented to have Theola Part, SPT participate/observe in session      Assessment:  Pt is progressing with static abdominal stabilization but continues to have trouble maintaining TA bracing with stabilizer cuff, due to weakness. Pt notes LBP has centralized and no discomfort with R SI joint anymore. Goals:   Goals: (to be met in 10 visits)- progressing other than:   Pt will improve transversus abdominis recruitment to perform proper isometric contraction without requiring verbal or tactile cuing or valsalva to promote advancement of therex   Pt will demonstrate good understanding of proper posture and body mechanics to decrease pain and improve spinal safety with lifting. Pt will improve lumbar spine AROM rotation to >85 % B to allow increase ease with bending to reach into lower cabinets. Pt will improve B hip flex PROM to 115 degrees in order to sit for 1 hour no limits from LBP. - met for ROM only, not function  Pt will demonstrate improved core strength to be able to perform 3, 30  sec  Modified planks as needed to prolonged stand > 45 min.   Pt will be independent and compliant with comprehensive HEP to maintain progress achieved in PT       Plan: continue working on static core stabilization, quadriped hip ext, make comprehensive exercise list        Date: 3/3/2022  [place]#: 2/6 Gonzales Bernardod, 10 planned Date:     3/8/22            [place] 3/6, 10 planned Date:     3/17/22            [place]#: 4/6, 10 planned  Date:      3/24/22           [place]#: 5/6 auth, 10 planned Date: 3/31/22  [place]#: 6/8 auth, 8 planned   There ex: 30 min  Nustep L 3, 5'  Cat/camel 10x10\"  SB curls 2x10  TA bracing with alt BKFO red x10 B  LTR 2x10   thor ext in the chair 10x4 There ex: 20 min  Nustep L 3, 4'  kinesiotape  Lumbar PS w space correction across R glut/SI to L, w ed, on wear/care  Pelvic tilts x20, 1' holding center  LTR w LE on SB 2x10  Thor ext with FR supine 5x5  Standing HF stretch 30\"x1 B There ex: 40 min  Muscle energy ADD/ADB x 5   Muscle energy flex/ext x 5   Pelvic tilts x20, 1' holding center  LTR w LE on SB 2x10  BKFO B x 10   Warrior pose 30\"x1 B  kinesiotape  Lumbar PS w space correction across R glut/SI to L, w ed, on wear/care There ex: 30 min  reassessment  Muscle energy ADD/ADB x 5   Muscle energy flex/ext x 5   Pelvic tilts x20, 1' holding center  Prone hip ext x20 B There ex: 30 min  SB seated flexion rolls 10 x 2  Prone hip extension B 10 x 2   TA bracing with alt BKFO red x10 B  Quadruped alt raise arms B 20 x 2   SB sustained bridge 20 sec x 3   Lower trunk rotation B x 15  Muscle energy hip ADD/Abd x 3       Manual therapy: 15 min  Gentle STM thor, lumbar PS, QL, upper gluts Manual therapy: 10 min  Gentle STM thor, lumbar PS, QL, upper gluts Manual therapy: 6 minutes   STM gluts, sacral region, PS Manual therapy: 10 minutes   STM gluts, sacral region, PS Manual therapy: 10 minutes   STM gluts, sacral region, PS                 HEP: thor ext in chair, SB HS curls: 1x/day; Heat as needed to manage pain and soreness.     Charges: 2 there ex, 1 manual       Total Timed Treatment: 40 min  Total Treatment Time: 40 min

## 2022-04-01 ENCOUNTER — OFFICE VISIT (OUTPATIENT)
Dept: RHEUMATOLOGY | Facility: CLINIC | Age: 42
End: 2022-04-01
Payer: COMMERCIAL

## 2022-04-01 VITALS
HEART RATE: 64 BPM | RESPIRATION RATE: 16 BRPM | BODY MASS INDEX: 42.7 KG/M2 | DIASTOLIC BLOOD PRESSURE: 82 MMHG | SYSTOLIC BLOOD PRESSURE: 136 MMHG | HEIGHT: 62.5 IN | WEIGHT: 238 LBS

## 2022-04-01 DIAGNOSIS — M79.7 FIBROMYALGIA: ICD-10-CM

## 2022-04-01 DIAGNOSIS — R76.8 SMITH ANTIBODY POSITIVE: ICD-10-CM

## 2022-04-01 DIAGNOSIS — R76.8 POSITIVE ANA (ANTINUCLEAR ANTIBODY): ICD-10-CM

## 2022-04-01 DIAGNOSIS — M25.50 POLYARTHRALGIA: Primary | ICD-10-CM

## 2022-04-01 DIAGNOSIS — M54.41 CHRONIC MIDLINE LOW BACK PAIN WITH BILATERAL SCIATICA: ICD-10-CM

## 2022-04-01 DIAGNOSIS — G89.29 CHRONIC MIDLINE LOW BACK PAIN WITH BILATERAL SCIATICA: ICD-10-CM

## 2022-04-01 DIAGNOSIS — M54.42 CHRONIC MIDLINE LOW BACK PAIN WITH BILATERAL SCIATICA: ICD-10-CM

## 2022-04-01 PROCEDURE — 3079F DIAST BP 80-89 MM HG: CPT | Performed by: INTERNAL MEDICINE

## 2022-04-01 PROCEDURE — 3075F SYST BP GE 130 - 139MM HG: CPT | Performed by: INTERNAL MEDICINE

## 2022-04-01 PROCEDURE — 99214 OFFICE O/P EST MOD 30 MIN: CPT | Performed by: INTERNAL MEDICINE

## 2022-04-01 PROCEDURE — 3008F BODY MASS INDEX DOCD: CPT | Performed by: INTERNAL MEDICINE

## 2022-04-03 RX ORDER — DEXTROAMPHETAMINE SACCHARATE, AMPHETAMINE ASPARTATE MONOHYDRATE, DEXTROAMPHETAMINE SULFATE AND AMPHETAMINE SULFATE 2.5; 2.5; 2.5; 2.5 MG/1; MG/1; MG/1; MG/1
10 CAPSULE, EXTENDED RELEASE ORAL EVERY MORNING
Qty: 30 CAPSULE | Refills: 0 | Status: SHIPPED | OUTPATIENT
Start: 2022-04-03 | End: 2022-05-03

## 2022-04-05 ENCOUNTER — OFFICE VISIT (OUTPATIENT)
Dept: PHYSICAL THERAPY | Age: 42
End: 2022-04-05
Attending: FAMILY MEDICINE
Payer: COMMERCIAL

## 2022-04-05 PROCEDURE — 97140 MANUAL THERAPY 1/> REGIONS: CPT

## 2022-04-05 PROCEDURE — 97110 THERAPEUTIC EXERCISES: CPT

## 2022-04-11 ENCOUNTER — OFFICE VISIT (OUTPATIENT)
Dept: INTERNAL MEDICINE CLINIC | Facility: CLINIC | Age: 42
End: 2022-04-11
Payer: COMMERCIAL

## 2022-04-11 VITALS
HEART RATE: 66 BPM | BODY MASS INDEX: 41.98 KG/M2 | RESPIRATION RATE: 16 BRPM | SYSTOLIC BLOOD PRESSURE: 116 MMHG | DIASTOLIC BLOOD PRESSURE: 80 MMHG | WEIGHT: 234 LBS | HEIGHT: 62.5 IN

## 2022-04-11 DIAGNOSIS — E78.2 MIXED HYPERLIPIDEMIA: ICD-10-CM

## 2022-04-11 DIAGNOSIS — Z51.81 THERAPEUTIC DRUG MONITORING: Primary | ICD-10-CM

## 2022-04-11 DIAGNOSIS — Z99.89 OSA ON CPAP: ICD-10-CM

## 2022-04-11 DIAGNOSIS — F50.81 BINGE EATING DISORDER: ICD-10-CM

## 2022-04-11 DIAGNOSIS — G47.33 OSA ON CPAP: ICD-10-CM

## 2022-04-11 DIAGNOSIS — R73.03 PREDIABETES: ICD-10-CM

## 2022-04-11 DIAGNOSIS — Z98.84 S/P LAPAROSCOPIC SLEEVE GASTRECTOMY: ICD-10-CM

## 2022-04-11 DIAGNOSIS — E66.01 CLASS 3 SEVERE OBESITY WITH SERIOUS COMORBIDITY AND BODY MASS INDEX (BMI) OF 40.0 TO 44.9 IN ADULT, UNSPECIFIED OBESITY TYPE (HCC): ICD-10-CM

## 2022-04-11 PROCEDURE — 99214 OFFICE O/P EST MOD 30 MIN: CPT | Performed by: PHYSICIAN ASSISTANT

## 2022-04-11 PROCEDURE — 3079F DIAST BP 80-89 MM HG: CPT | Performed by: PHYSICIAN ASSISTANT

## 2022-04-11 PROCEDURE — 3074F SYST BP LT 130 MM HG: CPT | Performed by: PHYSICIAN ASSISTANT

## 2022-04-11 PROCEDURE — 3008F BODY MASS INDEX DOCD: CPT | Performed by: PHYSICIAN ASSISTANT

## 2022-05-03 ENCOUNTER — ORDER TRANSCRIPTION (OUTPATIENT)
Dept: PHYSICAL THERAPY | Facility: HOSPITAL | Age: 42
End: 2022-05-03

## 2022-05-03 ENCOUNTER — OFFICE VISIT (OUTPATIENT)
Dept: PHYSICAL THERAPY | Age: 42
End: 2022-05-03
Attending: FAMILY MEDICINE
Payer: COMMERCIAL

## 2022-05-03 DIAGNOSIS — M19.011 ARTHRITIS OF RIGHT SHOULDER REGION: ICD-10-CM

## 2022-05-03 PROCEDURE — 97110 THERAPEUTIC EXERCISES: CPT

## 2022-05-03 PROCEDURE — 97161 PT EVAL LOW COMPLEX 20 MIN: CPT

## 2022-05-10 ENCOUNTER — OFFICE VISIT (OUTPATIENT)
Dept: PHYSICAL THERAPY | Age: 42
End: 2022-05-10
Attending: FAMILY MEDICINE
Payer: COMMERCIAL

## 2022-05-10 PROCEDURE — 97110 THERAPEUTIC EXERCISES: CPT

## 2022-05-10 NOTE — PROGRESS NOTES
Diagnosis:   Arthritis of right shoulder region (M19.011)  PT dx: R RTC impingement    Referring Provider: Leslie Perkins  Date of Evaluation:    5/3/22    Precautions:  asthma, fibromyalgia, anxiety, depression, PTSD Next MD visit:   5/19/22  Date of Surgery: n/a   Insurance (Authorized # of Visits):   [place], no c/p (5/3-7/1) (6 visits)                Subjective: Pt reports having a fibro flare up of symptoms and R shldr pain is slightly increased since eval.     Pain: 5/10 R shldr       Objective:   Pt was 10 minutes late. Pt presented with increased tissue restrictions to R pec minor. Assessment:   After STM of R pec pt no longer presented positive for R pec minor. Pt progressed with increased scapular strengthening. Pt tolerated new exercises well in clinic. HEP was reviewed and performed well in the clinic. Session was limited due to pt fibro flare up and pt noting fatigue at onset. Goals: (to be met in 8 visits)   Pt will improve shoulder kinematics to reduce strain on RTC, so pt reports improved ability to sleep without waking due to shoulder pain   Pt will improve shoulder flexion AROM to >165 degrees to be able to reach into overhead cabinets without pain or restriction   Pt will improve shoulder strength throughout to 5/5 to improve function with carrying, 20 lb. Pt will demonstrate increased mid/low trap strength to 4-/5 to promote improved shoulder mechanics and stabilization with lifting and reaching   Pt will be independent and compliant with comprehensive HEP to maintain progress achieved in PT       Plan: progress past HEP in the clinic  Date: 5/10/2022  [place]#: 2/6 auth, 8 planned  Date:                 [place]#: 3/ Date:                 [place]#: 4/ Date:                 [place]#: 5/ Date:    [place]#: 6/   There ex: 24 min   UBE, level 1, 2 min   Kinesio tape to R shldr  SA punches 10 x 2   B rows w/ grn 10 x 2   B shldr ext w/ grn 10 x 2   No monies w/ red 10 x 2   Ball roll up wall w/ lift 10 x 2 Manual Therapy: 8 min   R pec, B upper trap, B levator               HEP: SA punches at wall, B rows resisted with green 1/daily     Charges: 2 there ex   Total Timed Treatment: 32  min  Total Treatment Time: 32 min

## 2022-05-17 ENCOUNTER — APPOINTMENT (OUTPATIENT)
Dept: PHYSICAL THERAPY | Age: 42
End: 2022-05-17
Attending: FAMILY MEDICINE
Payer: COMMERCIAL

## 2022-05-18 ENCOUNTER — LAB ENCOUNTER (OUTPATIENT)
Dept: LAB | Facility: HOSPITAL | Age: 42
End: 2022-05-18
Attending: FAMILY MEDICINE
Payer: COMMERCIAL

## 2022-05-18 DIAGNOSIS — R76.8 SMITH ANTIBODY POSITIVE: ICD-10-CM

## 2022-05-18 DIAGNOSIS — R76.8 POSITIVE ANA (ANTINUCLEAR ANTIBODY): ICD-10-CM

## 2022-05-18 DIAGNOSIS — E03.9 ACQUIRED HYPOTHYROIDISM: ICD-10-CM

## 2022-05-18 DIAGNOSIS — M79.7 FIBROMYALGIA: ICD-10-CM

## 2022-05-18 DIAGNOSIS — Z00.00 ROUTINE GENERAL MEDICAL EXAMINATION AT A HEALTH CARE FACILITY: ICD-10-CM

## 2022-05-18 DIAGNOSIS — M25.50 POLYARTHRALGIA: ICD-10-CM

## 2022-05-18 LAB
ALBUMIN SERPL-MCNC: 3.1 G/DL (ref 3.4–5)
ALBUMIN/GLOB SERPL: 0.8 {RATIO} (ref 1–2)
ALP LIVER SERPL-CCNC: 92 U/L
ALT SERPL-CCNC: 35 U/L
ANION GAP SERPL CALC-SCNC: 5 MMOL/L (ref 0–18)
AST SERPL-CCNC: 25 U/L (ref 15–37)
BASOPHILS # BLD AUTO: 0.04 X10(3) UL (ref 0–0.2)
BASOPHILS NFR BLD AUTO: 0.9 %
BILIRUB SERPL-MCNC: 0.7 MG/DL (ref 0.1–2)
BUN BLD-MCNC: 11 MG/DL (ref 7–18)
C3 SERPL-MCNC: 111 MG/DL (ref 90–180)
C4 SERPL-MCNC: 32.4 MG/DL (ref 10–40)
CALCIUM BLD-MCNC: 8.8 MG/DL (ref 8.5–10.1)
CHLORIDE SERPL-SCNC: 109 MMOL/L (ref 98–112)
CHOLEST SERPL-MCNC: 194 MG/DL (ref ?–200)
CO2 SERPL-SCNC: 28 MMOL/L (ref 21–32)
CREAT BLD-MCNC: 0.54 MG/DL
CRP SERPL-MCNC: <0.29 MG/DL (ref ?–0.3)
EOSINOPHIL # BLD AUTO: 0.53 X10(3) UL (ref 0–0.7)
EOSINOPHIL NFR BLD AUTO: 11.8 %
ERYTHROCYTE [DISTWIDTH] IN BLOOD BY AUTOMATED COUNT: 12.6 %
ERYTHROCYTE [SEDIMENTATION RATE] IN BLOOD: 27 MM/HR
FASTING PATIENT LIPID ANSWER: YES
FASTING STATUS PATIENT QL REPORTED: YES
GLOBULIN PLAS-MCNC: 4.1 G/DL (ref 2.8–4.4)
GLUCOSE BLD-MCNC: 90 MG/DL (ref 70–99)
HCT VFR BLD AUTO: 36.5 %
HDLC SERPL-MCNC: 44 MG/DL (ref 40–59)
HGB BLD-MCNC: 11.9 G/DL
IMM GRANULOCYTES # BLD AUTO: 0 X10(3) UL (ref 0–1)
IMM GRANULOCYTES NFR BLD: 0 %
LDLC SERPL CALC-MCNC: 138 MG/DL (ref ?–100)
LYMPHOCYTES # BLD AUTO: 1.76 X10(3) UL (ref 1–4)
LYMPHOCYTES NFR BLD AUTO: 39 %
MCH RBC QN AUTO: 29.3 PG (ref 26–34)
MCHC RBC AUTO-ENTMCNC: 32.6 G/DL (ref 31–37)
MCV RBC AUTO: 89.9 FL
MONOCYTES # BLD AUTO: 0.5 X10(3) UL (ref 0.1–1)
MONOCYTES NFR BLD AUTO: 11.1 %
NEUTROPHILS # BLD AUTO: 1.68 X10 (3) UL (ref 1.5–7.7)
NEUTROPHILS # BLD AUTO: 1.68 X10(3) UL (ref 1.5–7.7)
NEUTROPHILS NFR BLD AUTO: 37.2 %
NONHDLC SERPL-MCNC: 150 MG/DL (ref ?–130)
OSMOLALITY SERPL CALC.SUM OF ELEC: 293 MOSM/KG (ref 275–295)
PLATELET # BLD AUTO: 218 10(3)UL (ref 150–450)
POTASSIUM SERPL-SCNC: 3.8 MMOL/L (ref 3.5–5.1)
PROT SERPL-MCNC: 7.2 G/DL (ref 6.4–8.2)
RBC # BLD AUTO: 4.06 X10(6)UL
SODIUM SERPL-SCNC: 142 MMOL/L (ref 136–145)
T4 FREE SERPL-MCNC: 2.3 NG/DL (ref 0.8–1.7)
TRIGL SERPL-MCNC: 65 MG/DL (ref 30–149)
TSI SER-ACNC: 0.03 MIU/ML (ref 0.36–3.74)
VLDLC SERPL CALC-MCNC: 12 MG/DL (ref 0–30)
WBC # BLD AUTO: 4.5 X10(3) UL (ref 4–11)

## 2022-05-18 PROCEDURE — 85025 COMPLETE CBC W/AUTO DIFF WBC: CPT

## 2022-05-18 PROCEDURE — 86038 ANTINUCLEAR ANTIBODIES: CPT

## 2022-05-18 PROCEDURE — 36415 COLL VENOUS BLD VENIPUNCTURE: CPT

## 2022-05-18 PROCEDURE — 86140 C-REACTIVE PROTEIN: CPT

## 2022-05-18 PROCEDURE — 80053 COMPREHEN METABOLIC PANEL: CPT

## 2022-05-18 PROCEDURE — 86225 DNA ANTIBODY NATIVE: CPT

## 2022-05-18 PROCEDURE — 86235 NUCLEAR ANTIGEN ANTIBODY: CPT

## 2022-05-18 PROCEDURE — 84439 ASSAY OF FREE THYROXINE: CPT

## 2022-05-18 PROCEDURE — 86160 COMPLEMENT ANTIGEN: CPT

## 2022-05-18 PROCEDURE — 80061 LIPID PANEL: CPT

## 2022-05-18 PROCEDURE — 84443 ASSAY THYROID STIM HORMONE: CPT

## 2022-05-18 PROCEDURE — 85652 RBC SED RATE AUTOMATED: CPT

## 2022-05-19 ENCOUNTER — APPOINTMENT (OUTPATIENT)
Dept: PHYSICAL THERAPY | Age: 42
End: 2022-05-19
Attending: FAMILY MEDICINE
Payer: COMMERCIAL

## 2022-05-20 LAB — ANTI-NUCLEAR ANTIBODY (ANA), HEP-2 IGG: DETECTED

## 2022-05-23 ENCOUNTER — PATIENT MESSAGE (OUTPATIENT)
Dept: FAMILY MEDICINE CLINIC | Facility: CLINIC | Age: 42
End: 2022-05-23

## 2022-05-23 DIAGNOSIS — E03.9 ACQUIRED HYPOTHYROIDISM: Primary | ICD-10-CM

## 2022-05-23 LAB
ANA SER QL: POSITIVE
CENTROMERE AB SER-ACNC: <100 AU/ML (ref ?–100)
DSDNA AB SER-ACNC: 178 IU/ML (ref ?–100)
ENA RNP AB SER-ACNC: 220 AU/ML (ref ?–100)
ENA SCL70 AB SER-ACNC: <100 AU/ML (ref ?–100)
ENA SM AB SER-ACNC: 139 AU/ML (ref ?–100)
ENA SS-A AB SER-ACNC: <100 AU/ML (ref ?–100)
ENA SS-B AB SER-ACNC: <100 AU/ML (ref ?–100)
HISTONE AB SER-ACNC: <100 AU/ML (ref ?–100)
JO-1 AUTOAB: <100 AU/ML (ref ?–100)

## 2022-05-23 NOTE — TELEPHONE ENCOUNTER
It is okay to hold off dose adjustment if she was not on meds for 2 weeks. Should recheck TSH and Free T4 6 weeks since she has been back on med consistently.

## 2022-05-24 ENCOUNTER — APPOINTMENT (OUTPATIENT)
Dept: PHYSICAL THERAPY | Age: 42
End: 2022-05-24
Attending: FAMILY MEDICINE
Payer: COMMERCIAL

## 2022-05-24 ENCOUNTER — TELEPHONE (OUTPATIENT)
Dept: RHEUMATOLOGY | Facility: CLINIC | Age: 42
End: 2022-05-24

## 2022-05-24 NOTE — TELEPHONE ENCOUNTER
Test results on [place]. Dr. Griselda Pia I am covering Dr. Cynthia Ron. CBC normal.  Comp profile normal.  Sed rate, CRP normal.  C3-C4 normal.  MODE positive. SM positive RNP slightly positive. Patient told that she has a mixed picture but that the serious tests, sed rate, CRP, and complement levels are normal.  No sign of active inflammation. Positive MODE test along with a positive SM indicate to me past activity of disease. Something that needs to be watched. Suggest she make an appointment with Dr. Cynthia Ron to do discuss the test results further in the office. No need to intervene at this time.   Dr. Griselda Pia

## 2022-05-26 ENCOUNTER — APPOINTMENT (OUTPATIENT)
Dept: PHYSICAL THERAPY | Age: 42
End: 2022-05-26
Attending: FAMILY MEDICINE
Payer: COMMERCIAL

## 2022-05-31 ENCOUNTER — OFFICE VISIT (OUTPATIENT)
Dept: RHEUMATOLOGY | Facility: CLINIC | Age: 42
End: 2022-05-31
Payer: COMMERCIAL

## 2022-05-31 VITALS
DIASTOLIC BLOOD PRESSURE: 60 MMHG | RESPIRATION RATE: 16 BRPM | SYSTOLIC BLOOD PRESSURE: 118 MMHG | HEART RATE: 80 BPM | WEIGHT: 231 LBS | OXYGEN SATURATION: 96 % | BODY MASS INDEX: 41.45 KG/M2 | HEIGHT: 62.5 IN | TEMPERATURE: 98 F

## 2022-05-31 DIAGNOSIS — R76.8 DS DNA ANTIBODY POSITIVE: ICD-10-CM

## 2022-05-31 DIAGNOSIS — M25.50 POLYARTHRALGIA: ICD-10-CM

## 2022-05-31 DIAGNOSIS — R76.8 POSITIVE ANA (ANTINUCLEAR ANTIBODY): ICD-10-CM

## 2022-05-31 DIAGNOSIS — R76.8 SMITH ANTIBODY POSITIVE: ICD-10-CM

## 2022-05-31 DIAGNOSIS — M32.9 SYSTEMIC LUPUS ERYTHEMATOSUS, UNSPECIFIED SLE TYPE, UNSPECIFIED ORGAN INVOLVEMENT STATUS (HCC): Primary | ICD-10-CM

## 2022-05-31 DIAGNOSIS — L73.2 HIDRADENITIS: ICD-10-CM

## 2022-05-31 DIAGNOSIS — M79.7 FIBROMYALGIA: ICD-10-CM

## 2022-05-31 DIAGNOSIS — E07.9 THYROID DISEASE: ICD-10-CM

## 2022-05-31 PROCEDURE — 3074F SYST BP LT 130 MM HG: CPT | Performed by: INTERNAL MEDICINE

## 2022-05-31 PROCEDURE — 99214 OFFICE O/P EST MOD 30 MIN: CPT | Performed by: INTERNAL MEDICINE

## 2022-05-31 PROCEDURE — 3078F DIAST BP <80 MM HG: CPT | Performed by: INTERNAL MEDICINE

## 2022-05-31 PROCEDURE — 3008F BODY MASS INDEX DOCD: CPT | Performed by: INTERNAL MEDICINE

## 2022-05-31 RX ORDER — DEXTROAMPHETAMINE SACCHARATE, AMPHETAMINE ASPARTATE, DEXTROAMPHETAMINE SULFATE AND AMPHETAMINE SULFATE 2.5; 2.5; 2.5; 2.5 MG/1; MG/1; MG/1; MG/1
TABLET ORAL
COMMUNITY

## 2022-05-31 RX ORDER — DOXYCYCLINE HYCLATE 100 MG/1
100 CAPSULE ORAL 2 TIMES DAILY
COMMUNITY

## 2022-05-31 RX ORDER — HYDROXYCHLOROQUINE SULFATE 200 MG/1
200 TABLET, FILM COATED ORAL 2 TIMES DAILY
Qty: 180 TABLET | Refills: 0 | Status: SHIPPED | OUTPATIENT
Start: 2022-05-31 | End: 2022-08-29

## 2022-05-31 RX ORDER — METHYLPREDNISOLONE 4 MG/1
TABLET ORAL
Qty: 1 EACH | Refills: 0 | Status: SHIPPED | OUTPATIENT
Start: 2022-05-31

## 2022-06-09 ENCOUNTER — APPOINTMENT (OUTPATIENT)
Dept: PHYSICAL THERAPY | Age: 42
End: 2022-06-09
Attending: INTERNAL MEDICINE
Payer: COMMERCIAL

## 2022-06-10 ENCOUNTER — OFFICE VISIT (OUTPATIENT)
Dept: INTERNAL MEDICINE CLINIC | Facility: CLINIC | Age: 42
End: 2022-06-10
Payer: COMMERCIAL

## 2022-06-10 VITALS
HEIGHT: 62.5 IN | HEART RATE: 62 BPM | RESPIRATION RATE: 16 BRPM | BODY MASS INDEX: 41.45 KG/M2 | WEIGHT: 231 LBS | DIASTOLIC BLOOD PRESSURE: 70 MMHG | SYSTOLIC BLOOD PRESSURE: 120 MMHG

## 2022-06-10 DIAGNOSIS — R73.03 PREDIABETES: ICD-10-CM

## 2022-06-10 DIAGNOSIS — G47.33 OSA ON CPAP: ICD-10-CM

## 2022-06-10 DIAGNOSIS — F50.81 BINGE EATING DISORDER: ICD-10-CM

## 2022-06-10 DIAGNOSIS — E66.01 CLASS 3 SEVERE OBESITY WITH SERIOUS COMORBIDITY AND BODY MASS INDEX (BMI) OF 40.0 TO 44.9 IN ADULT, UNSPECIFIED OBESITY TYPE (HCC): ICD-10-CM

## 2022-06-10 DIAGNOSIS — Z98.84 S/P LAPAROSCOPIC SLEEVE GASTRECTOMY: ICD-10-CM

## 2022-06-10 DIAGNOSIS — Z51.81 THERAPEUTIC DRUG MONITORING: Primary | ICD-10-CM

## 2022-06-10 DIAGNOSIS — M79.7 FIBROMYALGIA: ICD-10-CM

## 2022-06-10 DIAGNOSIS — E78.2 MIXED HYPERLIPIDEMIA: ICD-10-CM

## 2022-06-10 DIAGNOSIS — Z99.89 OSA ON CPAP: ICD-10-CM

## 2022-06-10 PROCEDURE — 3008F BODY MASS INDEX DOCD: CPT | Performed by: PHYSICIAN ASSISTANT

## 2022-06-10 PROCEDURE — 3074F SYST BP LT 130 MM HG: CPT | Performed by: PHYSICIAN ASSISTANT

## 2022-06-10 PROCEDURE — 99214 OFFICE O/P EST MOD 30 MIN: CPT | Performed by: PHYSICIAN ASSISTANT

## 2022-06-10 PROCEDURE — 3078F DIAST BP <80 MM HG: CPT | Performed by: PHYSICIAN ASSISTANT

## 2022-06-10 RX ORDER — DEXTROAMPHETAMINE SACCHARATE, AMPHETAMINE ASPARTATE MONOHYDRATE, DEXTROAMPHETAMINE SULFATE AND AMPHETAMINE SULFATE 2.5; 2.5; 2.5; 2.5 MG/1; MG/1; MG/1; MG/1
10 CAPSULE, EXTENDED RELEASE ORAL EVERY MORNING
Qty: 30 CAPSULE | Refills: 0 | Status: CANCELLED | OUTPATIENT
Start: 2022-06-10 | End: 2022-07-10

## 2022-06-10 RX ORDER — DEXTROAMPHETAMINE SACCHARATE, AMPHETAMINE ASPARTATE MONOHYDRATE, DEXTROAMPHETAMINE SULFATE AND AMPHETAMINE SULFATE 3.75; 3.75; 3.75; 3.75 MG/1; MG/1; MG/1; MG/1
15 CAPSULE, EXTENDED RELEASE ORAL EVERY MORNING
Qty: 30 CAPSULE | Refills: 0 | Status: SHIPPED | OUTPATIENT
Start: 2022-06-10 | End: 2022-07-10

## 2022-06-23 ENCOUNTER — OFFICE VISIT (OUTPATIENT)
Dept: PHYSICAL THERAPY | Age: 42
End: 2022-06-23
Attending: FAMILY MEDICINE
Payer: COMMERCIAL

## 2022-06-23 PROCEDURE — 97110 THERAPEUTIC EXERCISES: CPT

## 2022-06-24 ENCOUNTER — TELEPHONE (OUTPATIENT)
Dept: OBGYN CLINIC | Facility: CLINIC | Age: 42
End: 2022-06-24

## 2022-06-24 RX ORDER — SULFAMETHOXAZOLE AND TRIMETHOPRIM 800; 160 MG/1; MG/1
1 TABLET ORAL 2 TIMES DAILY
Qty: 10 TABLET | Refills: 0 | Status: SHIPPED | OUTPATIENT
Start: 2022-06-24 | End: 2022-06-29

## 2022-06-24 NOTE — TELEPHONE ENCOUNTER
Patient is having pain with urination and has brownish discharge that started today.  Please call to advise

## 2022-06-24 NOTE — TELEPHONE ENCOUNTER
s/s consistent with UTI, burning with urination, frequency and brown discharge when she wipes. Not sure if discharge is vaginal or from urethral.  Per protocol, Bactrim DS 1 tablet po bid for 5 days, #10 no refills sent to pharmacy on file. Allergies and pharmacy location verified before sending medication. Patient instructed to call if symptoms do not improve in 48 hrs, if she has any new or worsening symptoms, or if her symptoms persist after completing medication course. Patient states understanding.

## 2022-07-14 ENCOUNTER — TELEPHONE (OUTPATIENT)
Dept: RHEUMATOLOGY | Facility: CLINIC | Age: 42
End: 2022-07-14

## 2022-07-14 DIAGNOSIS — R76.8 DS DNA ANTIBODY POSITIVE: ICD-10-CM

## 2022-07-14 DIAGNOSIS — R76.8 POSITIVE ANA (ANTINUCLEAR ANTIBODY): ICD-10-CM

## 2022-07-14 DIAGNOSIS — M32.9 SYSTEMIC LUPUS ERYTHEMATOSUS, UNSPECIFIED SLE TYPE, UNSPECIFIED ORGAN INVOLVEMENT STATUS (HCC): Primary | ICD-10-CM

## 2022-07-14 DIAGNOSIS — M25.50 POLYARTHRALGIA: ICD-10-CM

## 2022-07-14 NOTE — TELEPHONE ENCOUNTER
Last note said to repeat labs before next visit. Which labs would you like to repeat?  Ill put them in if you need me to

## 2022-07-14 NOTE — TELEPHONE ENCOUNTER
Patient left message on nurse line she would like to return to taking vyvanse instead of adderall - she feels it was better and she knows it is more expensive, but would like to go back to it if allowed.   It was noted she was on 20 mg in Feb.  I left message for patient to call back to verify dose etc.      Requesting vyvanse  LOV: 6/10/22  RTC: not noted  Last Relevant Labs: na  Filled: 6/10/22 #30 with 0 refills  ADDERALL XR 15 MG    8/19/2022 10:40 AM TONIA Lopez [facility] [address]

## 2022-07-14 NOTE — TELEPHONE ENCOUNTER
Called pt and let her know that dr. Gigi Matthews put labs in the system for her to get done. If she cant get them done today, that's ok since not all of them will be resulted by her appointment. Pt verbalized understanding and said she will try to get them done today.

## 2022-07-15 ENCOUNTER — TELEMEDICINE (OUTPATIENT)
Dept: RHEUMATOLOGY | Facility: CLINIC | Age: 42
End: 2022-07-15
Payer: COMMERCIAL

## 2022-07-15 DIAGNOSIS — M79.7 FIBROMYALGIA: ICD-10-CM

## 2022-07-15 DIAGNOSIS — R79.82 CRP ELEVATED: ICD-10-CM

## 2022-07-15 DIAGNOSIS — R76.8 SMITH ANTIBODY POSITIVE: ICD-10-CM

## 2022-07-15 DIAGNOSIS — R76.8 DS DNA ANTIBODY POSITIVE: ICD-10-CM

## 2022-07-15 DIAGNOSIS — E07.9 THYROID DISEASE: ICD-10-CM

## 2022-07-15 DIAGNOSIS — M25.50 POLYARTHRALGIA: ICD-10-CM

## 2022-07-15 DIAGNOSIS — M32.9 SYSTEMIC LUPUS ERYTHEMATOSUS, UNSPECIFIED SLE TYPE, UNSPECIFIED ORGAN INVOLVEMENT STATUS (HCC): Primary | ICD-10-CM

## 2022-07-15 DIAGNOSIS — R76.8 POSITIVE ANA (ANTINUCLEAR ANTIBODY): ICD-10-CM

## 2022-07-15 RX ORDER — HYDROXYCHLOROQUINE SULFATE 200 MG/1
200 TABLET, FILM COATED ORAL 2 TIMES DAILY
Qty: 180 TABLET | Refills: 0 | Status: SHIPPED | OUTPATIENT
Start: 2022-07-15 | End: 2022-10-13

## 2022-07-16 ENCOUNTER — LAB ENCOUNTER (OUTPATIENT)
Dept: LAB | Age: 42
End: 2022-07-16
Attending: INTERNAL MEDICINE
Payer: COMMERCIAL

## 2022-07-16 DIAGNOSIS — E03.9 ACQUIRED HYPOTHYROIDISM: ICD-10-CM

## 2022-07-16 DIAGNOSIS — R76.8 POSITIVE ANA (ANTINUCLEAR ANTIBODY): ICD-10-CM

## 2022-07-16 DIAGNOSIS — M25.50 POLYARTHRALGIA: ICD-10-CM

## 2022-07-16 DIAGNOSIS — M32.9 SYSTEMIC LUPUS ERYTHEMATOSUS, UNSPECIFIED SLE TYPE, UNSPECIFIED ORGAN INVOLVEMENT STATUS (HCC): ICD-10-CM

## 2022-07-16 DIAGNOSIS — R76.8 DS DNA ANTIBODY POSITIVE: ICD-10-CM

## 2022-07-16 LAB
ALBUMIN SERPL-MCNC: 3.4 G/DL (ref 3.4–5)
ALBUMIN/GLOB SERPL: 0.8 {RATIO} (ref 1–2)
ALP LIVER SERPL-CCNC: 74 U/L
ALT SERPL-CCNC: 40 U/L
ANION GAP SERPL CALC-SCNC: 3 MMOL/L (ref 0–18)
AST SERPL-CCNC: 38 U/L (ref 15–37)
BASOPHILS # BLD AUTO: 0.04 X10(3) UL (ref 0–0.2)
BASOPHILS NFR BLD AUTO: 0.7 %
BILIRUB SERPL-MCNC: 0.5 MG/DL (ref 0.1–2)
BUN BLD-MCNC: 16 MG/DL (ref 7–18)
C3 SERPL-MCNC: 102 MG/DL (ref 90–180)
C4 SERPL-MCNC: 29.3 MG/DL (ref 10–40)
CALCIUM BLD-MCNC: 9.2 MG/DL (ref 8.5–10.1)
CHLORIDE SERPL-SCNC: 109 MMOL/L (ref 98–112)
CO2 SERPL-SCNC: 28 MMOL/L (ref 21–32)
CREAT BLD-MCNC: 0.75 MG/DL
CRP SERPL-MCNC: <0.29 MG/DL (ref ?–0.3)
EOSINOPHIL # BLD AUTO: 0.12 X10(3) UL (ref 0–0.7)
EOSINOPHIL NFR BLD AUTO: 2 %
ERYTHROCYTE [DISTWIDTH] IN BLOOD BY AUTOMATED COUNT: 12.3 %
ERYTHROCYTE [SEDIMENTATION RATE] IN BLOOD: 24 MM/HR
FASTING STATUS PATIENT QL REPORTED: NO
GLOBULIN PLAS-MCNC: 4.1 G/DL (ref 2.8–4.4)
GLUCOSE BLD-MCNC: 93 MG/DL (ref 70–99)
HCT VFR BLD AUTO: 41.1 %
HGB BLD-MCNC: 12.8 G/DL
IMM GRANULOCYTES # BLD AUTO: 0.02 X10(3) UL (ref 0–1)
IMM GRANULOCYTES NFR BLD: 0.3 %
LYMPHOCYTES # BLD AUTO: 2.5 X10(3) UL (ref 1–4)
LYMPHOCYTES NFR BLD AUTO: 40.8 %
MCH RBC QN AUTO: 28.8 PG (ref 26–34)
MCHC RBC AUTO-ENTMCNC: 31.1 G/DL (ref 31–37)
MCV RBC AUTO: 92.6 FL
MONOCYTES # BLD AUTO: 0.56 X10(3) UL (ref 0.1–1)
MONOCYTES NFR BLD AUTO: 9.1 %
NEUTROPHILS # BLD AUTO: 2.89 X10 (3) UL (ref 1.5–7.7)
NEUTROPHILS # BLD AUTO: 2.89 X10(3) UL (ref 1.5–7.7)
NEUTROPHILS NFR BLD AUTO: 47.1 %
OSMOLALITY SERPL CALC.SUM OF ELEC: 291 MOSM/KG (ref 275–295)
PLATELET # BLD AUTO: 281 10(3)UL (ref 150–450)
POTASSIUM SERPL-SCNC: 3.4 MMOL/L (ref 3.5–5.1)
PROT SERPL-MCNC: 7.5 G/DL (ref 6.4–8.2)
RBC # BLD AUTO: 4.44 X10(6)UL
SODIUM SERPL-SCNC: 140 MMOL/L (ref 136–145)
T4 FREE SERPL-MCNC: 0.7 NG/DL (ref 0.8–1.7)
TSI SER-ACNC: 19.3 MIU/ML (ref 0.36–3.74)
WBC # BLD AUTO: 6.1 X10(3) UL (ref 4–11)

## 2022-07-16 PROCEDURE — 36415 COLL VENOUS BLD VENIPUNCTURE: CPT

## 2022-07-16 PROCEDURE — 86038 ANTINUCLEAR ANTIBODIES: CPT

## 2022-07-16 PROCEDURE — 85025 COMPLETE CBC W/AUTO DIFF WBC: CPT

## 2022-07-16 PROCEDURE — 86160 COMPLEMENT ANTIGEN: CPT

## 2022-07-16 PROCEDURE — 80053 COMPREHEN METABOLIC PANEL: CPT

## 2022-07-16 PROCEDURE — 86225 DNA ANTIBODY NATIVE: CPT

## 2022-07-16 PROCEDURE — 84439 ASSAY OF FREE THYROXINE: CPT

## 2022-07-16 PROCEDURE — 86140 C-REACTIVE PROTEIN: CPT

## 2022-07-16 PROCEDURE — 86235 NUCLEAR ANTIGEN ANTIBODY: CPT

## 2022-07-16 PROCEDURE — 85652 RBC SED RATE AUTOMATED: CPT

## 2022-07-16 PROCEDURE — 84443 ASSAY THYROID STIM HORMONE: CPT

## 2022-07-17 ENCOUNTER — PATIENT MESSAGE (OUTPATIENT)
Dept: FAMILY MEDICINE CLINIC | Facility: CLINIC | Age: 42
End: 2022-07-17

## 2022-07-17 DIAGNOSIS — E03.9 ACQUIRED HYPOTHYROIDISM: ICD-10-CM

## 2022-07-18 ENCOUNTER — PATIENT MESSAGE (OUTPATIENT)
Dept: FAMILY MEDICINE CLINIC | Facility: CLINIC | Age: 42
End: 2022-07-18

## 2022-07-18 RX ORDER — LEVOTHYROXINE SODIUM 0.2 MG/1
200 TABLET ORAL
Qty: 90 TABLET | Refills: 3 | Status: SHIPPED | OUTPATIENT
Start: 2022-07-18

## 2022-07-19 ENCOUNTER — PATIENT MESSAGE (OUTPATIENT)
Dept: FAMILY MEDICINE CLINIC | Facility: CLINIC | Age: 42
End: 2022-07-19

## 2022-07-19 DIAGNOSIS — E03.9 ACQUIRED HYPOTHYROIDISM: ICD-10-CM

## 2022-07-19 RX ORDER — LEVOTHYROXINE SODIUM 0.2 MG/1
200 TABLET ORAL
Qty: 90 TABLET | Refills: 3 | Status: SHIPPED | OUTPATIENT
Start: 2022-07-19

## 2022-07-21 LAB — ANTI-NUCLEAR ANTIBODY (ANA), HEP-2 IGG: DETECTED

## 2022-07-21 NOTE — TELEPHONE ENCOUNTER
Printed script at [email or web address],[facility] office. Per denisse calling patient to see if she would like to still  this prescription.  LM for patient to return call to office

## 2022-07-22 LAB — ANA SER QL: POSITIVE

## 2022-07-25 LAB
CENTROMERE AB SER-ACNC: <100 AU/ML (ref ?–100)
DSDNA AB SER-ACNC: 101 IU/ML (ref ?–100)
ENA RNP AB SER-ACNC: <100 AU/ML (ref ?–100)
ENA SCL70 AB SER-ACNC: <100 AU/ML (ref ?–100)
ENA SM AB SER-ACNC: <100 AU/ML (ref ?–100)
ENA SS-A AB SER-ACNC: <100 AU/ML (ref ?–100)
ENA SS-B AB SER-ACNC: <100 AU/ML (ref ?–100)
HISTONE AB SER-ACNC: <100 AU/ML (ref ?–100)
JO-1 AUTOAB: <100 AU/ML (ref ?–100)

## 2022-07-29 ENCOUNTER — TELEPHONE (OUTPATIENT)
Dept: PERINATAL CARE | Facility: HOSPITAL | Age: 42
End: 2022-07-29

## 2022-08-07 ENCOUNTER — PATIENT MESSAGE (OUTPATIENT)
Dept: FAMILY MEDICINE CLINIC | Facility: CLINIC | Age: 42
End: 2022-08-07

## 2022-08-09 RX ORDER — LORAZEPAM 0.5 MG/1
0.5 TABLET ORAL 2 TIMES DAILY PRN
Qty: 40 TABLET | Refills: 0 | Status: SHIPPED | OUTPATIENT
Start: 2022-08-09

## 2022-08-12 ENCOUNTER — OFFICE VISIT (OUTPATIENT)
Dept: FAMILY MEDICINE CLINIC | Facility: CLINIC | Age: 42
End: 2022-08-12
Payer: COMMERCIAL

## 2022-08-12 VITALS
BODY MASS INDEX: 41.87 KG/M2 | TEMPERATURE: 98 F | SYSTOLIC BLOOD PRESSURE: 118 MMHG | HEART RATE: 58 BPM | HEIGHT: 62.5 IN | RESPIRATION RATE: 16 BRPM | WEIGHT: 233.38 LBS | DIASTOLIC BLOOD PRESSURE: 74 MMHG | OXYGEN SATURATION: 98 %

## 2022-08-12 DIAGNOSIS — F43.10 PTSD (POST-TRAUMATIC STRESS DISORDER): ICD-10-CM

## 2022-08-12 DIAGNOSIS — E55.9 VITAMIN D DEFICIENCY: ICD-10-CM

## 2022-08-12 DIAGNOSIS — E78.2 MIXED HYPERLIPIDEMIA: Primary | ICD-10-CM

## 2022-08-12 DIAGNOSIS — K21.00 GASTROESOPHAGEAL REFLUX DISEASE WITH ESOPHAGITIS WITHOUT HEMORRHAGE: ICD-10-CM

## 2022-08-12 DIAGNOSIS — E03.9 ACQUIRED HYPOTHYROIDISM: ICD-10-CM

## 2022-08-12 PROCEDURE — 3078F DIAST BP <80 MM HG: CPT | Performed by: FAMILY MEDICINE

## 2022-08-12 PROCEDURE — 3074F SYST BP LT 130 MM HG: CPT | Performed by: FAMILY MEDICINE

## 2022-08-12 PROCEDURE — 99214 OFFICE O/P EST MOD 30 MIN: CPT | Performed by: FAMILY MEDICINE

## 2022-08-12 PROCEDURE — 3008F BODY MASS INDEX DOCD: CPT | Performed by: FAMILY MEDICINE

## 2022-08-12 RX ORDER — EZETIMIBE 10 MG/1
10 TABLET ORAL DAILY
Qty: 90 TABLET | Refills: 1 | Status: SHIPPED | OUTPATIENT
Start: 2022-08-12

## 2022-08-12 RX ORDER — OMEPRAZOLE 40 MG/1
CAPSULE, DELAYED RELEASE ORAL
COMMUNITY
Start: 2022-06-06

## 2022-08-12 RX ORDER — OMEPRAZOLE 40 MG/1
40 CAPSULE, DELAYED RELEASE ORAL DAILY
Qty: 90 CAPSULE | Refills: 1 | Status: SHIPPED | OUTPATIENT
Start: 2022-08-12 | End: 2022-11-10

## 2022-08-12 RX ORDER — CHOLECALCIFEROL (VITAMIN D3) 1250 MCG
1 CAPSULE ORAL WEEKLY
COMMUNITY
Start: 2022-07-26

## 2022-08-12 RX ORDER — CLINDAMYCIN PHOSPHATE 10 UG/ML
LOTION TOPICAL
COMMUNITY
Start: 2022-06-06

## 2022-08-12 RX ORDER — VENLAFAXINE HYDROCHLORIDE 150 MG/1
150 CAPSULE, EXTENDED RELEASE ORAL DAILY
Qty: 90 CAPSULE | Refills: 1 | Status: SHIPPED | OUTPATIENT
Start: 2022-08-12

## 2022-08-12 RX ORDER — ROSUVASTATIN CALCIUM 10 MG/1
10 TABLET, COATED ORAL NIGHTLY
Qty: 90 TABLET | Refills: 1 | Status: SHIPPED | OUTPATIENT
Start: 2022-08-12 | End: 2022-11-10

## 2022-08-22 ENCOUNTER — OFFICE VISIT (OUTPATIENT)
Dept: OBGYN CLINIC | Facility: CLINIC | Age: 42
End: 2022-08-22
Payer: COMMERCIAL

## 2022-08-22 VITALS
DIASTOLIC BLOOD PRESSURE: 72 MMHG | BODY MASS INDEX: 40.93 KG/M2 | HEART RATE: 75 BPM | WEIGHT: 228.13 LBS | SYSTOLIC BLOOD PRESSURE: 118 MMHG | HEIGHT: 62.5 IN

## 2022-08-22 DIAGNOSIS — Z12.31 ENCOUNTER FOR SCREENING MAMMOGRAM FOR MALIGNANT NEOPLASM OF BREAST: ICD-10-CM

## 2022-08-22 DIAGNOSIS — E66.01 CLASS 3 SEVERE OBESITY WITHOUT SERIOUS COMORBIDITY WITH BODY MASS INDEX (BMI) OF 40.0 TO 44.9 IN ADULT, UNSPECIFIED OBESITY TYPE (HCC): ICD-10-CM

## 2022-08-22 DIAGNOSIS — Z12.4 SCREENING FOR CERVICAL CANCER: ICD-10-CM

## 2022-08-22 DIAGNOSIS — Z01.419 WELL WOMAN EXAM WITH ROUTINE GYNECOLOGICAL EXAM: Primary | ICD-10-CM

## 2022-08-22 DIAGNOSIS — Z11.3 ROUTINE SCREENING FOR STI (SEXUALLY TRANSMITTED INFECTION): ICD-10-CM

## 2022-08-22 DIAGNOSIS — O09.519 HIGH-RISK PREGNANCY, PRIMIGRAVIDA OF ADVANCED MATERNAL AGE: ICD-10-CM

## 2022-08-22 DIAGNOSIS — Z31.69 ENCOUNTER FOR PRECONCEPTION CONSULTATION: ICD-10-CM

## 2022-08-22 PROCEDURE — 87491 CHLMYD TRACH DNA AMP PROBE: CPT | Performed by: OBSTETRICS & GYNECOLOGY

## 2022-08-22 PROCEDURE — 87591 N.GONORRHOEAE DNA AMP PROB: CPT | Performed by: OBSTETRICS & GYNECOLOGY

## 2022-08-23 LAB
C TRACH DNA SPEC QL NAA+PROBE: NEGATIVE
N GONORRHOEA DNA SPEC QL NAA+PROBE: NEGATIVE

## 2022-09-06 LAB
HPV I/H RISK 1 DNA SPEC QL NAA+PROBE: NEGATIVE
LAST PAP RESULT: NORMAL
PAP HISTORY (OTHER THAN LAST PAP): NORMAL

## 2022-09-14 ENCOUNTER — EKG ENCOUNTER (OUTPATIENT)
Dept: LAB | Facility: HOSPITAL | Age: 42
End: 2022-09-14
Attending: FAMILY MEDICINE
Payer: COMMERCIAL

## 2022-09-14 ENCOUNTER — APPOINTMENT (OUTPATIENT)
Dept: CV DIAGNOSTICS | Facility: HOSPITAL | Age: 42
End: 2022-09-14
Attending: FAMILY MEDICINE
Payer: COMMERCIAL

## 2022-09-14 DIAGNOSIS — E55.9 VITAMIN D DEFICIENCY: ICD-10-CM

## 2022-09-14 DIAGNOSIS — E03.9 ACQUIRED HYPOTHYROIDISM: ICD-10-CM

## 2022-09-14 DIAGNOSIS — Z01.810 PRE-OPERATIVE CARDIOVASCULAR EXAMINATION: Primary | ICD-10-CM

## 2022-09-14 DIAGNOSIS — E78.2 MIXED HYPERLIPIDEMIA: ICD-10-CM

## 2022-09-14 LAB
ANION GAP SERPL CALC-SCNC: 2 MMOL/L (ref 0–18)
ATRIAL RATE: 63 BPM
BUN BLD-MCNC: 16 MG/DL (ref 7–18)
CALCIUM BLD-MCNC: 9 MG/DL (ref 8.5–10.1)
CHLORIDE SERPL-SCNC: 110 MMOL/L (ref 98–112)
CO2 SERPL-SCNC: 29 MMOL/L (ref 21–32)
CREAT BLD-MCNC: 0.58 MG/DL
FASTING STATUS PATIENT QL REPORTED: NO
GFR SERPLBLD BASED ON 1.73 SQ M-ARVRAT: 117 ML/MIN/1.73M2 (ref 60–?)
GLUCOSE BLD-MCNC: 93 MG/DL (ref 70–99)
OSMOLALITY SERPL CALC.SUM OF ELEC: 293 MOSM/KG (ref 275–295)
P AXIS: 16 DEGREES
P-R INTERVAL: 132 MS
POTASSIUM SERPL-SCNC: 3.6 MMOL/L (ref 3.5–5.1)
Q-T INTERVAL: 416 MS
QRS DURATION: 90 MS
QTC CALCULATION (BEZET): 425 MS
R AXIS: 41 DEGREES
SODIUM SERPL-SCNC: 141 MMOL/L (ref 136–145)
T AXIS: 54 DEGREES
T4 FREE SERPL-MCNC: 1.5 NG/DL (ref 0.8–1.7)
TSI SER-ACNC: 0.08 MIU/ML (ref 0.36–3.74)
VENTRICULAR RATE: 63 BPM
VIT D+METAB SERPL-MCNC: 31.9 NG/ML (ref 30–100)

## 2022-09-14 PROCEDURE — 84439 ASSAY OF FREE THYROXINE: CPT

## 2022-09-14 PROCEDURE — 93010 ELECTROCARDIOGRAM REPORT: CPT | Performed by: INTERNAL MEDICINE

## 2022-09-14 PROCEDURE — 36415 COLL VENOUS BLD VENIPUNCTURE: CPT

## 2022-09-14 PROCEDURE — 84443 ASSAY THYROID STIM HORMONE: CPT

## 2022-09-14 PROCEDURE — 93005 ELECTROCARDIOGRAM TRACING: CPT

## 2022-09-14 PROCEDURE — 82306 VITAMIN D 25 HYDROXY: CPT

## 2022-09-14 PROCEDURE — 80048 BASIC METABOLIC PNL TOTAL CA: CPT

## 2022-10-02 NOTE — PATIENT INSTRUCTIONS
Recheck 6 weeks.      Venlafaxine 150 mg at night, (for depression & anxiety & PTSD) and bupropion 150 mg in morning. (should help alertness, motivation, may decrease appetite, notify if excessively restless or worse anxiety.)
HA/complains of pain/discomfort

## 2022-10-07 ENCOUNTER — OFFICE VISIT (OUTPATIENT)
Dept: RHEUMATOLOGY | Facility: CLINIC | Age: 42
End: 2022-10-07
Payer: COMMERCIAL

## 2022-10-07 VITALS
SYSTOLIC BLOOD PRESSURE: 122 MMHG | OXYGEN SATURATION: 97 % | DIASTOLIC BLOOD PRESSURE: 70 MMHG | BODY MASS INDEX: 42.69 KG/M2 | WEIGHT: 232 LBS | HEIGHT: 62 IN | RESPIRATION RATE: 16 BRPM | HEART RATE: 78 BPM | TEMPERATURE: 98 F

## 2022-10-07 DIAGNOSIS — M25.50 POLYARTHRALGIA: ICD-10-CM

## 2022-10-07 DIAGNOSIS — R76.8 SMITH ANTIBODY POSITIVE: ICD-10-CM

## 2022-10-07 DIAGNOSIS — R76.8 POSITIVE ANA (ANTINUCLEAR ANTIBODY): ICD-10-CM

## 2022-10-07 DIAGNOSIS — Z79.899 LONG-TERM USE OF HYDROXYCHLOROQUINE: ICD-10-CM

## 2022-10-07 DIAGNOSIS — M32.9 SYSTEMIC LUPUS ERYTHEMATOSUS, UNSPECIFIED SLE TYPE, UNSPECIFIED ORGAN INVOLVEMENT STATUS (HCC): Primary | ICD-10-CM

## 2022-10-07 DIAGNOSIS — L73.2 HIDRADENITIS: ICD-10-CM

## 2022-10-07 DIAGNOSIS — M79.7 FIBROMYALGIA: ICD-10-CM

## 2022-10-07 DIAGNOSIS — R76.8 DS DNA ANTIBODY POSITIVE: ICD-10-CM

## 2022-10-07 PROCEDURE — 3078F DIAST BP <80 MM HG: CPT | Performed by: INTERNAL MEDICINE

## 2022-10-07 PROCEDURE — 3074F SYST BP LT 130 MM HG: CPT | Performed by: INTERNAL MEDICINE

## 2022-10-07 PROCEDURE — 99214 OFFICE O/P EST MOD 30 MIN: CPT | Performed by: INTERNAL MEDICINE

## 2022-10-07 PROCEDURE — 3008F BODY MASS INDEX DOCD: CPT | Performed by: INTERNAL MEDICINE

## 2022-10-12 ENCOUNTER — TELEPHONE (OUTPATIENT)
Dept: INTERNAL MEDICINE CLINIC | Facility: CLINIC | Age: 42
End: 2022-10-12

## 2022-10-12 ENCOUNTER — PATIENT MESSAGE (OUTPATIENT)
Dept: INTERNAL MEDICINE CLINIC | Facility: CLINIC | Age: 42
End: 2022-10-12

## 2022-10-12 NOTE — TELEPHONE ENCOUNTER
Request for PA for [facility]  Patient is not diabetic and needs to use coupon.   My chart sent

## 2022-11-02 RX ORDER — TIRZEPATIDE 2.5 MG/.5ML
INJECTION, SOLUTION SUBCUTANEOUS
Qty: 2 ML | Refills: 0 | OUTPATIENT
Start: 2022-11-02

## 2022-11-02 NOTE — TELEPHONE ENCOUNTER
Requesting mounjaro  LOV: 6/10/22  RTC: not noted  Last Relevant Labs: 7/20/22  Filled: 10/9/22 #2ml with 0 refills Mounjaro 2.5 mg    Patient was asked to schedule with last fill and never has = denied

## 2022-11-03 NOTE — TELEPHONE ENCOUNTER
Requesting Mounjaro increase  LOV: 4/11/22  RTC: not noted  Last Relevant Labs: 7/20/22  Filled: 10/9/22 #2ml with 0 refills  2.5 mg    Future Appointments   Date Time Provider Cole Parra   1/20/2023 10:40 AM Anthony Pantoja PA-C [facility] [address]   1/20/2023  3:30 PM Azra Shirley DO [facility]   2/17/2023  3:00 PM Kassandra Carpenter MD [facility] [address]

## 2022-11-04 RX ORDER — TIRZEPATIDE 5 MG/.5ML
5 INJECTION, SOLUTION SUBCUTANEOUS WEEKLY
Qty: 2 ML | Refills: 0 | Status: SHIPPED | OUTPATIENT
Start: 2022-11-04

## 2022-11-29 NOTE — PAYOR COMM NOTE
Noted    --------------  ADMISSION REVIEW     Payor: [employer] [facility]  Subscriber #:  [id number]  Authorization Number: [id number]    Admit date: 6/22/20  Admit time: 0216       Admitting Physician: Fransisco Bhagat MD  Attending Physician:  Ava Peck MD  Primary Car Physical Exam  Vitals signs and nursing note reviewed. Constitutional:       General: She is not in acute distress. Appearance: She is well-developed. She is not toxic-appearing. HENT:      Head: Normocephalic and atraumatic.    Eyes:      G Neutrophil Absolute Prelim 9.69 (*)     Neutrophil Absolute 9.69 (*)     All other components within normal limits   FERRITIN - Normal   PROCALCITONIN - Normal   PRO BETA NATRIURETIC PEPTIDE - Normal   CK CREATINE KINASE (NOT CREATININE) - Normal   TROPONI patient. Tracing on cardiac monitor and pulse oximetry was reviewed by myself. Remains sinus on the monitor. Patient had a work-up here in the emergency department.   Metabolic findings noted above that she does have slight elevation inflammatory mar April but worse this Thursday. Shortness of breath became worse moderate in intensity  and came to the emergency room. Dry cough. Denies any fever or chills. Denies any associated nausea vomiting. Denies any abdominal pain.   Denies any constipation or 06/22/20  0142   TROP <0.045 <0.045   CK 92  --      Imaging:    XR CHEST AP PORTABLE  (CPT=71045)     TECHNIQUE:  AP chest radiograph was obtained.      COMPARISON:  [facility] , XR, XR CHEST AP PORTABLE  (CPT=71045), 5/18/2020, 8:30 AM.     INDICATIONS:  shor DAY:  acetaminophen (TYLENOL) tab 650 mg     Date Action Dose Route User    6/22/2020 1738 Given 650 mg Oral Giovani Lanier RN      Albuterol Sulfate  (90 Base) MCG/ACT inhaler 2 puff     Date Action Dose Route User    6/22/2020 2047 Given 2 puff I Date Action Dose Route User    6/23/2020 0830 Given 40 mEq Oral Puthusseril, Thompson Cote, JERRI      Venlafaxine HCl ER (EFFEXOR-XR) 24 hr cap 150 mg     Date Action Dose Route User    6/23/2020 0806 Given 150 mg Oral Puthusseril, Thompson Cote, JERRI      Venlafaxine HC smoked. She has never used smokeless tobacco. She reports current alcohol use.  She reports previous drug use.     Allergies:     Penicillins             HIVES     Medications:     Current Facility-Administered Medications:   •  iohexol (OMNIPAQUE) 350 MG/M Inhaler, Rfl: 3  Levothyroxine Sodium 200 MCG Oral Tab, Take 1 tablet (200 mcg total) by mouth before breakfast., Disp: 30 tablet, Rfl: 1  Ketorolac Tromethamine 10 MG Oral Tab, Take 1 tablet (10 mg total) by mouth every 6 (six) hours as needed for Pain. , 75 MG Oral Capsule SR 24 Hr, Take 1 capsule (75 mg total) by mouth daily. , Disp: 90 capsule, Rfl: 1  Venlafaxine HCl  MG Oral Capsule SR 24 Hr, Take 1 capsule (150 mg total) by mouth daily. , Disp: 90 capsule, Rfl: 1           Review of Systems:     A Collection Time: 06/21/20 11:09 PM   Result Value Ref Range     Blood Culture Result No Growth 1 Day N/A         COVID-19 Lab Results     COVID-19        Lab Results   Component Value Date     COVID19 Not Detected 06/22/2020     COVID19 Not Detected 06/21/ [facility] pending, HIV screen  Venous dopplers.  Follow off abx        Rafael Lopez MD  [facility]  [phone]      Electronically signed by Norm Garcia MD at 6/23/2020 10:37 AM       06/23/20 0509 — 68 24 114/57 100 % 3

## 2022-12-05 NOTE — TELEPHONE ENCOUNTER
Requesting Mounjaro  LOV: 6/10/22  RTC: not noted  Last Relevant Labs: 7/20/22  Filled: 11/4/22 #2ml with 0 refills  mounjaro 5 mg    Future Appointments   Date Time Provider Cole Parra   12/19/2022  7:40 AM Cassidy Kaba PA-C [facility] [address]

## 2022-12-06 RX ORDER — TIRZEPATIDE 5 MG/.5ML
INJECTION, SOLUTION SUBCUTANEOUS
Qty: 2 ML | Refills: 0 | Status: SHIPPED | OUTPATIENT
Start: 2022-12-06

## 2023-01-09 RX ORDER — TIRZEPATIDE 7.5 MG/.5ML
7.5 INJECTION, SOLUTION SUBCUTANEOUS WEEKLY
Qty: 2 ML | Refills: 0 | Status: SHIPPED | OUTPATIENT
Start: 2023-01-09

## 2023-01-20 ENCOUNTER — OFFICE VISIT (OUTPATIENT)
Dept: RHEUMATOLOGY | Facility: CLINIC | Age: 43
End: 2023-01-20
Payer: COMMERCIAL

## 2023-01-20 VITALS
SYSTOLIC BLOOD PRESSURE: 116 MMHG | WEIGHT: 218 LBS | OXYGEN SATURATION: 100 % | BODY MASS INDEX: 40.12 KG/M2 | DIASTOLIC BLOOD PRESSURE: 58 MMHG | HEIGHT: 62 IN | RESPIRATION RATE: 16 BRPM | TEMPERATURE: 98 F | HEART RATE: 52 BPM

## 2023-01-20 DIAGNOSIS — R76.8 POSITIVE ANA (ANTINUCLEAR ANTIBODY): ICD-10-CM

## 2023-01-20 DIAGNOSIS — R76.8 DS DNA ANTIBODY POSITIVE: ICD-10-CM

## 2023-01-20 DIAGNOSIS — U09.9 POST-COVID CHRONIC FATIGUE: ICD-10-CM

## 2023-01-20 DIAGNOSIS — M32.9 SYSTEMIC LUPUS ERYTHEMATOSUS, UNSPECIFIED SLE TYPE, UNSPECIFIED ORGAN INVOLVEMENT STATUS (HCC): Primary | ICD-10-CM

## 2023-01-20 DIAGNOSIS — R01.1 HEART MURMUR: ICD-10-CM

## 2023-01-20 DIAGNOSIS — Z79.899 LONG-TERM USE OF HYDROXYCHLOROQUINE: ICD-10-CM

## 2023-01-20 DIAGNOSIS — Z98.84 S/P LAPAROSCOPIC SLEEVE GASTRECTOMY: ICD-10-CM

## 2023-01-20 DIAGNOSIS — G93.32 POST-COVID CHRONIC FATIGUE: ICD-10-CM

## 2023-01-20 DIAGNOSIS — E07.9 THYROID DISEASE: ICD-10-CM

## 2023-01-20 DIAGNOSIS — M79.7 FIBROMYALGIA: ICD-10-CM

## 2023-01-20 DIAGNOSIS — R53.82 CHRONIC FATIGUE: ICD-10-CM

## 2023-01-20 DIAGNOSIS — E55.9 VITAMIN D DEFICIENCY: ICD-10-CM

## 2023-01-20 RX ORDER — LEVOTHYROXINE SODIUM 175 UG/1
175 TABLET ORAL
COMMUNITY

## 2023-01-20 RX ORDER — ROSUVASTATIN CALCIUM 10 MG/1
10 TABLET, COATED ORAL NIGHTLY
COMMUNITY

## 2023-01-20 RX ORDER — TRAZODONE HYDROCHLORIDE 50 MG/1
50 TABLET ORAL NIGHTLY
COMMUNITY

## 2023-01-20 RX ORDER — HYDROXYCHLOROQUINE SULFATE 200 MG/1
200 TABLET, FILM COATED ORAL DAILY
COMMUNITY

## 2023-01-21 ENCOUNTER — LAB ENCOUNTER (OUTPATIENT)
Dept: LAB | Facility: HOSPITAL | Age: 43
End: 2023-01-21
Attending: INTERNAL MEDICINE
Payer: COMMERCIAL

## 2023-01-21 DIAGNOSIS — E55.9 VITAMIN D DEFICIENCY: ICD-10-CM

## 2023-01-21 DIAGNOSIS — R76.8 DS DNA ANTIBODY POSITIVE: ICD-10-CM

## 2023-01-21 DIAGNOSIS — R76.8 POSITIVE ANA (ANTINUCLEAR ANTIBODY): ICD-10-CM

## 2023-01-21 DIAGNOSIS — Z98.84 S/P LAPAROSCOPIC SLEEVE GASTRECTOMY: ICD-10-CM

## 2023-01-21 DIAGNOSIS — M32.9 SYSTEMIC LUPUS ERYTHEMATOSUS, UNSPECIFIED SLE TYPE, UNSPECIFIED ORGAN INVOLVEMENT STATUS (HCC): ICD-10-CM

## 2023-01-21 DIAGNOSIS — R53.82 CHRONIC FATIGUE: ICD-10-CM

## 2023-01-21 DIAGNOSIS — E07.9 THYROID DISEASE: ICD-10-CM

## 2023-01-21 DIAGNOSIS — E78.2 MIXED HYPERLIPIDEMIA: ICD-10-CM

## 2023-01-21 DIAGNOSIS — N97.9 PRIMARY FEMALE INFERTILITY: Primary | ICD-10-CM

## 2023-01-21 DIAGNOSIS — R76.8 SMITH ANTIBODY POSITIVE: ICD-10-CM

## 2023-01-21 LAB
ALBUMIN SERPL-MCNC: 3.4 G/DL (ref 3.4–5)
ALBUMIN/GLOB SERPL: 1 {RATIO} (ref 1–2)
ALP LIVER SERPL-CCNC: 75 U/L
ALT SERPL-CCNC: 42 U/L
ANION GAP SERPL CALC-SCNC: 1 MMOL/L (ref 0–18)
AST SERPL-CCNC: 36 U/L (ref 15–37)
BASOPHILS # BLD AUTO: 0.05 X10(3) UL (ref 0–0.2)
BASOPHILS NFR BLD AUTO: 1 %
BILIRUB SERPL-MCNC: 0.6 MG/DL (ref 0.1–2)
BUN BLD-MCNC: 14 MG/DL (ref 7–18)
C3 SERPL-MCNC: 101 MG/DL (ref 90–180)
C4 SERPL-MCNC: 31.3 MG/DL (ref 10–40)
CALCIUM BLD-MCNC: 9.2 MG/DL (ref 8.5–10.1)
CHLORIDE SERPL-SCNC: 111 MMOL/L (ref 98–112)
CHOLEST SERPL-MCNC: 141 MG/DL (ref ?–200)
CO2 SERPL-SCNC: 30 MMOL/L (ref 21–32)
CREAT BLD-MCNC: 0.6 MG/DL
CRP SERPL-MCNC: <0.29 MG/DL (ref ?–0.3)
DEPRECATED HBV CORE AB SER IA-ACNC: 7.4 NG/ML
EOSINOPHIL # BLD AUTO: 0.19 X10(3) UL (ref 0–0.7)
EOSINOPHIL NFR BLD AUTO: 3.7 %
ERYTHROCYTE [DISTWIDTH] IN BLOOD BY AUTOMATED COUNT: 13.4 %
ERYTHROCYTE [SEDIMENTATION RATE] IN BLOOD: 36 MM/HR
FASTING PATIENT LIPID ANSWER: YES
FASTING STATUS PATIENT QL REPORTED: YES
GFR SERPLBLD BASED ON 1.73 SQ M-ARVRAT: 116 ML/MIN/1.73M2 (ref 60–?)
GLOBULIN PLAS-MCNC: 3.5 G/DL (ref 2.8–4.4)
GLUCOSE BLD-MCNC: 89 MG/DL (ref 70–99)
HCT VFR BLD AUTO: 35.6 %
HDLC SERPL-MCNC: 50 MG/DL (ref 40–59)
HGB BLD-MCNC: 11.4 G/DL
IMM GRANULOCYTES # BLD AUTO: 0.02 X10(3) UL (ref 0–1)
IMM GRANULOCYTES NFR BLD: 0.4 %
IRON SATN MFR SERPL: 9 %
IRON SERPL-MCNC: 43 UG/DL
LDLC SERPL CALC-MCNC: 82 MG/DL (ref ?–100)
LYMPHOCYTES # BLD AUTO: 2.46 X10(3) UL (ref 1–4)
LYMPHOCYTES NFR BLD AUTO: 47.3 %
MCH RBC QN AUTO: 27.9 PG (ref 26–34)
MCHC RBC AUTO-ENTMCNC: 32 G/DL (ref 31–37)
MCV RBC AUTO: 87.3 FL
MONOCYTES # BLD AUTO: 0.44 X10(3) UL (ref 0.1–1)
MONOCYTES NFR BLD AUTO: 8.5 %
NEUTROPHILS # BLD AUTO: 2.04 X10 (3) UL (ref 1.5–7.7)
NEUTROPHILS # BLD AUTO: 2.04 X10(3) UL (ref 1.5–7.7)
NEUTROPHILS NFR BLD AUTO: 39.1 %
NONHDLC SERPL-MCNC: 91 MG/DL (ref ?–130)
OSMOLALITY SERPL CALC.SUM OF ELEC: 294 MOSM/KG (ref 275–295)
PLATELET # BLD AUTO: 230 10(3)UL (ref 150–450)
POTASSIUM SERPL-SCNC: 3.8 MMOL/L (ref 3.5–5.1)
PROT SERPL-MCNC: 6.9 G/DL (ref 6.4–8.2)
RBC # BLD AUTO: 4.08 X10(6)UL
SODIUM SERPL-SCNC: 142 MMOL/L (ref 136–145)
T4 FREE SERPL-MCNC: 1.4 NG/DL (ref 0.8–1.7)
TIBC SERPL-MCNC: 477 UG/DL (ref 240–450)
TRANSFERRIN SERPL-MCNC: 320 MG/DL (ref 200–360)
TRIGL SERPL-MCNC: 34 MG/DL (ref 30–149)
TSI SER-ACNC: 0.49 MIU/ML (ref 0.36–3.74)
VIT B12 SERPL-MCNC: 404 PG/ML (ref 193–986)
VIT D+METAB SERPL-MCNC: 38.3 NG/ML (ref 30–100)
VLDLC SERPL CALC-MCNC: 5 MG/DL (ref 0–30)
WBC # BLD AUTO: 5.2 X10(3) UL (ref 4–11)

## 2023-01-21 PROCEDURE — 84443 ASSAY THYROID STIM HORMONE: CPT

## 2023-01-21 PROCEDURE — 36415 COLL VENOUS BLD VENIPUNCTURE: CPT

## 2023-01-21 PROCEDURE — 85025 COMPLETE CBC W/AUTO DIFF WBC: CPT

## 2023-01-21 PROCEDURE — 80053 COMPREHEN METABOLIC PANEL: CPT

## 2023-01-21 PROCEDURE — 86235 NUCLEAR ANTIGEN ANTIBODY: CPT

## 2023-01-21 PROCEDURE — 84439 ASSAY OF FREE THYROXINE: CPT

## 2023-01-21 PROCEDURE — 82728 ASSAY OF FERRITIN: CPT

## 2023-01-21 PROCEDURE — 83540 ASSAY OF IRON: CPT

## 2023-01-21 PROCEDURE — 83550 IRON BINDING TEST: CPT

## 2023-01-21 PROCEDURE — 86256 FLUORESCENT ANTIBODY TITER: CPT

## 2023-01-21 PROCEDURE — 86140 C-REACTIVE PROTEIN: CPT

## 2023-01-21 PROCEDURE — 80061 LIPID PANEL: CPT

## 2023-01-21 PROCEDURE — 85652 RBC SED RATE AUTOMATED: CPT

## 2023-01-21 PROCEDURE — 86160 COMPLEMENT ANTIGEN: CPT

## 2023-01-21 PROCEDURE — 82306 VITAMIN D 25 HYDROXY: CPT

## 2023-01-21 PROCEDURE — 82607 VITAMIN B-12: CPT

## 2023-01-23 ENCOUNTER — PATIENT MESSAGE (OUTPATIENT)
Dept: RHEUMATOLOGY | Facility: CLINIC | Age: 43
End: 2023-01-23

## 2023-01-23 LAB
ENA RNP IGG SER IA-ACNC: 0.5 U/ML
ENA SM IGG SER IA-ACNC: <0.7 U/ML
U1 SNRNP IGG SER IA-ACNC: 1.4 U/ML

## 2023-01-25 ENCOUNTER — PATIENT MESSAGE (OUTPATIENT)
Dept: FAMILY MEDICINE CLINIC | Facility: CLINIC | Age: 43
End: 2023-01-25

## 2023-02-10 ENCOUNTER — OFFICE VISIT (OUTPATIENT)
Dept: FAMILY MEDICINE CLINIC | Facility: CLINIC | Age: 43
End: 2023-02-10
Payer: COMMERCIAL

## 2023-02-10 ENCOUNTER — OFFICE VISIT (OUTPATIENT)
Dept: INTERNAL MEDICINE CLINIC | Facility: CLINIC | Age: 43
End: 2023-02-10
Payer: COMMERCIAL

## 2023-02-10 VITALS
OXYGEN SATURATION: 98 % | TEMPERATURE: 98 F | SYSTOLIC BLOOD PRESSURE: 110 MMHG | WEIGHT: 213 LBS | BODY MASS INDEX: 38.22 KG/M2 | HEIGHT: 62.5 IN | HEART RATE: 88 BPM | DIASTOLIC BLOOD PRESSURE: 78 MMHG | RESPIRATION RATE: 18 BRPM

## 2023-02-10 VITALS
BODY MASS INDEX: 38.22 KG/M2 | DIASTOLIC BLOOD PRESSURE: 74 MMHG | RESPIRATION RATE: 16 BRPM | OXYGEN SATURATION: 98 % | SYSTOLIC BLOOD PRESSURE: 132 MMHG | HEART RATE: 78 BPM | HEIGHT: 62.5 IN | WEIGHT: 213 LBS

## 2023-02-10 DIAGNOSIS — R73.03 PREDIABETES: ICD-10-CM

## 2023-02-10 DIAGNOSIS — E78.2 MIXED HYPERLIPIDEMIA: ICD-10-CM

## 2023-02-10 DIAGNOSIS — E66.01 CLASS 2 SEVERE OBESITY WITH SERIOUS COMORBIDITY AND BODY MASS INDEX (BMI) OF 38.0 TO 38.9 IN ADULT, UNSPECIFIED OBESITY TYPE (HCC): ICD-10-CM

## 2023-02-10 DIAGNOSIS — Z51.81 THERAPEUTIC DRUG MONITORING: Primary | ICD-10-CM

## 2023-02-10 DIAGNOSIS — M79.7 FIBROMYALGIA: ICD-10-CM

## 2023-02-10 DIAGNOSIS — J45.20 MILD INTERMITTENT ASTHMA WITHOUT COMPLICATION: Chronic | ICD-10-CM

## 2023-02-10 DIAGNOSIS — M32.8 OTHER FORMS OF SYSTEMIC LUPUS ERYTHEMATOSUS, UNSPECIFIED ORGAN INVOLVEMENT STATUS (HCC): ICD-10-CM

## 2023-02-10 DIAGNOSIS — Z98.84 S/P LAPAROSCOPIC SLEEVE GASTRECTOMY: ICD-10-CM

## 2023-02-10 DIAGNOSIS — D50.0 IRON DEFICIENCY ANEMIA DUE TO CHRONIC BLOOD LOSS: ICD-10-CM

## 2023-02-10 DIAGNOSIS — R01.1 HEART MURMUR: Primary | ICD-10-CM

## 2023-02-10 DIAGNOSIS — N92.0 MENORRHAGIA WITH REGULAR CYCLE: ICD-10-CM

## 2023-02-10 DIAGNOSIS — F50.81 BINGE EATING DISORDER: ICD-10-CM

## 2023-02-10 PROCEDURE — 3008F BODY MASS INDEX DOCD: CPT | Performed by: PHYSICIAN ASSISTANT

## 2023-02-10 PROCEDURE — 3078F DIAST BP <80 MM HG: CPT | Performed by: FAMILY MEDICINE

## 2023-02-10 PROCEDURE — 3075F SYST BP GE 130 - 139MM HG: CPT | Performed by: PHYSICIAN ASSISTANT

## 2023-02-10 PROCEDURE — 3078F DIAST BP <80 MM HG: CPT | Performed by: PHYSICIAN ASSISTANT

## 2023-02-10 PROCEDURE — 3008F BODY MASS INDEX DOCD: CPT | Performed by: FAMILY MEDICINE

## 2023-02-10 PROCEDURE — 99214 OFFICE O/P EST MOD 30 MIN: CPT | Performed by: FAMILY MEDICINE

## 2023-02-10 PROCEDURE — 99214 OFFICE O/P EST MOD 30 MIN: CPT | Performed by: PHYSICIAN ASSISTANT

## 2023-02-10 PROCEDURE — 3074F SYST BP LT 130 MM HG: CPT | Performed by: FAMILY MEDICINE

## 2023-02-10 RX ORDER — TIRZEPATIDE 10 MG/.5ML
10 INJECTION, SOLUTION SUBCUTANEOUS WEEKLY
Qty: 2 ML | Refills: 1 | Status: SHIPPED | OUTPATIENT
Start: 2023-02-10 | End: 2023-02-10

## 2023-02-10 RX ORDER — TIRZEPATIDE 10 MG/.5ML
10 INJECTION, SOLUTION SUBCUTANEOUS WEEKLY
Qty: 2 ML | Refills: 1 | Status: SHIPPED | OUTPATIENT
Start: 2023-02-10

## 2023-02-15 ENCOUNTER — PATIENT MESSAGE (OUTPATIENT)
Dept: FAMILY MEDICINE CLINIC | Facility: CLINIC | Age: 43
End: 2023-02-15

## 2023-02-15 NOTE — TELEPHONE ENCOUNTER
Ok to cancel appointment on 2/17/23?     Future Appointments   Date Time Provider Cole Aida   2/17/2023  3:00 PM Liliya Lara MD [facility]   2/21/2023  2:00 PM Becky Jacobo MD [facility] HEM ONC [place]   7/7/2023  2:30 PM Thompson Villarreal,  EMGRHEUMHBSN EMG [place]

## 2023-02-21 ENCOUNTER — OFFICE VISIT (OUTPATIENT)
Dept: HEMATOLOGY/ONCOLOGY | Facility: HOSPITAL | Age: 43
End: 2023-02-21
Attending: INTERNAL MEDICINE
Payer: COMMERCIAL

## 2023-02-21 VITALS
BODY MASS INDEX: 37.59 KG/M2 | TEMPERATURE: 98 F | RESPIRATION RATE: 18 BRPM | SYSTOLIC BLOOD PRESSURE: 117 MMHG | HEIGHT: 62.52 IN | OXYGEN SATURATION: 98 % | WEIGHT: 209.5 LBS | DIASTOLIC BLOOD PRESSURE: 75 MMHG | HEART RATE: 67 BPM

## 2023-02-21 DIAGNOSIS — D50.9 IRON DEFICIENCY ANEMIA, UNSPECIFIED IRON DEFICIENCY ANEMIA TYPE: Primary | ICD-10-CM

## 2023-02-21 PROCEDURE — 99245 OFF/OP CONSLTJ NEW/EST HI 55: CPT | Performed by: INTERNAL MEDICINE

## 2023-02-21 NOTE — PROGRESS NOTES
Patient presents with:  Consult    New patient consult result referred by [facility] regarding anemia. Patient c/o feeling tired and dizzy on and off she does have lupus and fibromyaglia so hard to determine if this is the cause.  Last menstrual normal 11/2023  Recent labs done 1/21    Education Record    Learner:  Patient    Disease / Diagnosis: consult     Barriers / Limitations:  None   Comments:    Method:  Brief focused   Comments:    General Topics:  Plan of care reviewed   Comments:    Outcome:  Shows understanding   Comments:    monoferric infusion pending insurance approval Please make sure she saw portal msg about +ct    rx sent; needs 3 wks cosme appt; needs to abstain from intercourse until she and partner have both been treated for 2 wks    Will also do hiv/rpr when she comes in for her appt

## 2023-02-23 ENCOUNTER — TELEPHONE (OUTPATIENT)
Dept: PERINATAL CARE | Facility: HOSPITAL | Age: 43
End: 2023-02-23

## 2023-02-28 ENCOUNTER — TELEPHONE (OUTPATIENT)
Dept: PERINATAL CARE | Facility: HOSPITAL | Age: 43
End: 2023-02-28

## 2023-03-03 ENCOUNTER — OFFICE VISIT (OUTPATIENT)
Dept: HEMATOLOGY/ONCOLOGY | Age: 43
End: 2023-03-03
Attending: INTERNAL MEDICINE
Payer: COMMERCIAL

## 2023-03-03 VITALS
SYSTOLIC BLOOD PRESSURE: 97 MMHG | OXYGEN SATURATION: 99 % | HEART RATE: 60 BPM | TEMPERATURE: 97 F | RESPIRATION RATE: 16 BRPM | DIASTOLIC BLOOD PRESSURE: 60 MMHG

## 2023-03-03 DIAGNOSIS — D50.9 IRON DEFICIENCY ANEMIA, UNSPECIFIED IRON DEFICIENCY ANEMIA TYPE: Primary | ICD-10-CM

## 2023-03-03 PROCEDURE — 96365 THER/PROPH/DIAG IV INF INIT: CPT

## 2023-03-10 ENCOUNTER — HOSPITAL ENCOUNTER (EMERGENCY)
Age: 43
Discharge: HOME | End: 2023-03-10
Attending: EMERGENCY MEDICINE
Payer: COMMERCIAL

## 2023-03-10 ENCOUNTER — APPOINTMENT (OUTPATIENT)
Dept: CT IMAGING | Age: 43
End: 2023-03-10
Attending: EMERGENCY MEDICINE
Payer: COMMERCIAL

## 2023-03-10 VITALS
HEIGHT: 62.5 IN | TEMPERATURE: 98 F | WEIGHT: 200 LBS | RESPIRATION RATE: 17 BRPM | SYSTOLIC BLOOD PRESSURE: 90 MMHG | BODY MASS INDEX: 35.88 KG/M2 | OXYGEN SATURATION: 99 % | DIASTOLIC BLOOD PRESSURE: 54 MMHG | HEART RATE: 70 BPM

## 2023-03-10 DIAGNOSIS — R06.00 DYSPNEA, UNSPECIFIED TYPE: Primary | ICD-10-CM

## 2023-03-10 LAB
ALBUMIN SERPL-MCNC: 3.5 G/DL (ref 3.4–5)
ALBUMIN/GLOB SERPL: 0.7 {RATIO} (ref 1–2)
ALP LIVER SERPL-CCNC: 94 U/L
ALT SERPL-CCNC: 65 U/L
ANION GAP SERPL CALC-SCNC: 6 MMOL/L (ref 0–18)
AST SERPL-CCNC: 61 U/L (ref 15–37)
ATRIAL RATE: 93 BPM
BASOPHILS # BLD AUTO: 0.04 X10(3) UL (ref 0–0.2)
BASOPHILS NFR BLD AUTO: 0.5 %
BILIRUB SERPL-MCNC: 0.5 MG/DL (ref 0.1–2)
BUN BLD-MCNC: 18 MG/DL (ref 7–18)
CALCIUM BLD-MCNC: 9.1 MG/DL (ref 8.5–10.1)
CHLORIDE SERPL-SCNC: 108 MMOL/L (ref 98–112)
CO2 SERPL-SCNC: 26 MMOL/L (ref 21–32)
CREAT BLD-MCNC: 0.65 MG/DL
D DIMER PPP FEU-MCNC: 2.48 UG/ML FEU (ref ?–0.5)
EOSINOPHIL # BLD AUTO: 0.59 X10(3) UL (ref 0–0.7)
EOSINOPHIL NFR BLD AUTO: 7.7 %
ERYTHROCYTE [DISTWIDTH] IN BLOOD BY AUTOMATED COUNT: 13.2 %
GFR SERPLBLD BASED ON 1.73 SQ M-ARVRAT: 114 ML/MIN/1.73M2 (ref 60–?)
GLOBULIN PLAS-MCNC: 5 G/DL (ref 2.8–4.4)
GLUCOSE BLD-MCNC: 93 MG/DL (ref 70–99)
HCG SERPL QL: NEGATIVE
HCT VFR BLD AUTO: 43.3 %
HETEROPH AB SER QL: NEGATIVE
HGB BLD-MCNC: 14.4 G/DL
IMM GRANULOCYTES # BLD AUTO: 0.02 X10(3) UL (ref 0–1)
IMM GRANULOCYTES NFR BLD: 0.3 %
LYMPHOCYTES # BLD AUTO: 2.48 X10(3) UL (ref 1–4)
LYMPHOCYTES NFR BLD AUTO: 32.2 %
MCH RBC QN AUTO: 28.3 PG (ref 26–34)
MCHC RBC AUTO-ENTMCNC: 33.3 G/DL (ref 31–37)
MCV RBC AUTO: 85.1 FL
MONOCYTES # BLD AUTO: 0.91 X10(3) UL (ref 0.1–1)
MONOCYTES NFR BLD AUTO: 11.8 %
NEUTROPHILS # BLD AUTO: 3.66 X10 (3) UL (ref 1.5–7.7)
NEUTROPHILS # BLD AUTO: 3.66 X10(3) UL (ref 1.5–7.7)
NEUTROPHILS NFR BLD AUTO: 47.5 %
NT-PROBNP SERPL-MCNC: 8 PG/ML (ref ?–125)
OSMOLALITY SERPL CALC.SUM OF ELEC: 292 MOSM/KG (ref 275–295)
P AXIS: 64 DEGREES
P-R INTERVAL: 130 MS
PLATELET # BLD AUTO: 265 10(3)UL (ref 150–450)
POTASSIUM SERPL-SCNC: 3.3 MMOL/L (ref 3.5–5.1)
PROT SERPL-MCNC: 8.5 G/DL (ref 6.4–8.2)
Q-T INTERVAL: 354 MS
QRS DURATION: 90 MS
QTC CALCULATION (BEZET): 440 MS
R AXIS: 40 DEGREES
RBC # BLD AUTO: 5.09 X10(6)UL
SODIUM SERPL-SCNC: 140 MMOL/L (ref 136–145)
T AXIS: 31 DEGREES
TROPONIN I HIGH SENSITIVITY: 32 NG/L
VENTRICULAR RATE: 93 BPM
WBC # BLD AUTO: 7.7 X10(3) UL (ref 4–11)

## 2023-03-10 PROCEDURE — 96361 HYDRATE IV INFUSION ADD-ON: CPT

## 2023-03-10 PROCEDURE — 71260 CT THORAX DX C+: CPT | Performed by: EMERGENCY MEDICINE

## 2023-03-10 PROCEDURE — 96360 HYDRATION IV INFUSION INIT: CPT

## 2023-03-10 PROCEDURE — 86403 PARTICLE AGGLUT ANTBDY SCRN: CPT | Performed by: EMERGENCY MEDICINE

## 2023-03-10 PROCEDURE — 86665 EPSTEIN-BARR CAPSID VCA: CPT | Performed by: EMERGENCY MEDICINE

## 2023-03-10 PROCEDURE — 86664 EPSTEIN-BARR NUCLEAR ANTIGEN: CPT | Performed by: EMERGENCY MEDICINE

## 2023-03-10 PROCEDURE — 99284 EMERGENCY DEPT VISIT MOD MDM: CPT

## 2023-03-10 PROCEDURE — 93010 ELECTROCARDIOGRAM REPORT: CPT

## 2023-03-10 PROCEDURE — 84703 CHORIONIC GONADOTROPIN ASSAY: CPT | Performed by: EMERGENCY MEDICINE

## 2023-03-10 PROCEDURE — 85379 FIBRIN DEGRADATION QUANT: CPT | Performed by: EMERGENCY MEDICINE

## 2023-03-10 PROCEDURE — 93005 ELECTROCARDIOGRAM TRACING: CPT

## 2023-03-10 PROCEDURE — 80053 COMPREHEN METABOLIC PANEL: CPT | Performed by: EMERGENCY MEDICINE

## 2023-03-10 PROCEDURE — 84484 ASSAY OF TROPONIN QUANT: CPT | Performed by: EMERGENCY MEDICINE

## 2023-03-10 PROCEDURE — 83880 ASSAY OF NATRIURETIC PEPTIDE: CPT | Performed by: EMERGENCY MEDICINE

## 2023-03-10 PROCEDURE — 85025 COMPLETE CBC W/AUTO DIFF WBC: CPT | Performed by: EMERGENCY MEDICINE

## 2023-03-10 RX ORDER — POTASSIUM CHLORIDE 20 MEQ/1
40 TABLET, EXTENDED RELEASE ORAL ONCE
Status: COMPLETED | OUTPATIENT
Start: 2023-03-10 | End: 2023-03-10

## 2023-03-10 NOTE — ED INITIAL ASSESSMENT (HPI)
Patient arrived ED with complaint of shortness of breath that began last night. Patient reports medical history asthma and she has been using inhaler without any effect.

## 2023-03-10 NOTE — DISCHARGE INSTRUCTIONS
Follow-up with your primary care doctor within the next week for reassessment. Return for new or worsening pain, difficulty breathing, or any concerning symptoms.

## 2023-03-13 ENCOUNTER — TELEPHONE (OUTPATIENT)
Dept: FAMILY MEDICINE CLINIC | Facility: CLINIC | Age: 43
End: 2023-03-13

## 2023-03-13 LAB
EBV NA IGG SER QL IA: POSITIVE
EBV VCA IGG SER QL IA: POSITIVE
EBV VCA IGM SER QL IA: NEGATIVE

## 2023-03-13 NOTE — TELEPHONE ENCOUNTER
Please see message and advise if VV is ok.       Patient sent a [facility] regarding labs that has been forwarded to Dr. Sean De Luna to address

## 2023-03-13 NOTE — TELEPHONE ENCOUNTER
Pt. Scheduled for 3/22/23 for ER f/u and Requesting a video visit with provider for sooner. Pt. Also asking to review labs. Advised offered first available appt. Is ok to schedule video visit?

## 2023-03-14 NOTE — TELEPHONE ENCOUNTER
She was seen in ER for SOB and palpitations and needs to be an in office visit, cannot be a video visit.

## 2023-03-22 ENCOUNTER — TELEPHONE (OUTPATIENT)
Dept: FAMILY MEDICINE CLINIC | Facility: CLINIC | Age: 43
End: 2023-03-22

## 2023-05-03 ENCOUNTER — PATIENT MESSAGE (OUTPATIENT)
Dept: HEMATOLOGY/ONCOLOGY | Facility: HOSPITAL | Age: 43
End: 2023-05-03

## 2023-05-03 DIAGNOSIS — D50.9 IRON DEFICIENCY ANEMIA, UNSPECIFIED IRON DEFICIENCY ANEMIA TYPE: Primary | ICD-10-CM

## 2023-05-03 NOTE — TELEPHONE ENCOUNTER
From: Constantino Milton  To: Lauren Kate MD  Sent: 5/3/2023 2:34 PM CDT  Subject: Lab work follow up    Hello, I had an Iron infusion done in March and I wanted to see if I can get an order to schedule the lab work to check my iron again?

## 2023-05-27 ENCOUNTER — LAB ENCOUNTER (OUTPATIENT)
Dept: LAB | Facility: HOSPITAL | Age: 43
End: 2023-05-27
Attending: INTERNAL MEDICINE
Payer: COMMERCIAL

## 2023-05-27 DIAGNOSIS — D50.9 IRON DEFICIENCY ANEMIA, UNSPECIFIED IRON DEFICIENCY ANEMIA TYPE: ICD-10-CM

## 2023-05-27 LAB
BASOPHILS # BLD AUTO: 0.04 X10(3) UL (ref 0–0.2)
BASOPHILS NFR BLD AUTO: 0.6 %
DEPRECATED HBV CORE AB SER IA-ACNC: 142.2 NG/ML
EOSINOPHIL # BLD AUTO: 0.25 X10(3) UL (ref 0–0.7)
EOSINOPHIL NFR BLD AUTO: 3.5 %
ERYTHROCYTE [DISTWIDTH] IN BLOOD BY AUTOMATED COUNT: 12.2 %
HCT VFR BLD AUTO: 41.4 %
HGB BLD-MCNC: 13.5 G/DL
IMM GRANULOCYTES # BLD AUTO: 0.02 X10(3) UL (ref 0–1)
IMM GRANULOCYTES NFR BLD: 0.3 %
IRON SATN MFR SERPL: 25 %
IRON SERPL-MCNC: 83 UG/DL
LYMPHOCYTES # BLD AUTO: 2.93 X10(3) UL (ref 1–4)
LYMPHOCYTES NFR BLD AUTO: 41.3 %
MCH RBC QN AUTO: 29.5 PG (ref 26–34)
MCHC RBC AUTO-ENTMCNC: 32.6 G/DL (ref 31–37)
MCV RBC AUTO: 90.6 FL
MONOCYTES # BLD AUTO: 0.65 X10(3) UL (ref 0.1–1)
MONOCYTES NFR BLD AUTO: 9.2 %
NEUTROPHILS # BLD AUTO: 3.2 X10 (3) UL (ref 1.5–7.7)
NEUTROPHILS # BLD AUTO: 3.2 X10(3) UL (ref 1.5–7.7)
NEUTROPHILS NFR BLD AUTO: 45.1 %
PLATELET # BLD AUTO: 211 10(3)UL (ref 150–450)
RBC # BLD AUTO: 4.57 X10(6)UL
TIBC SERPL-MCNC: 332 UG/DL (ref 240–450)
TRANSFERRIN SERPL-MCNC: 223 MG/DL (ref 200–360)
WBC # BLD AUTO: 7.1 X10(3) UL (ref 4–11)

## 2023-05-30 RX ORDER — TIRZEPATIDE 10 MG/.5ML
10 INJECTION, SOLUTION SUBCUTANEOUS WEEKLY
Qty: 2 ML | Refills: 1 | OUTPATIENT
Start: 2023-05-30

## 2023-05-31 RX ORDER — TIRZEPATIDE 10 MG/.5ML
10 INJECTION, SOLUTION SUBCUTANEOUS WEEKLY
Qty: 2 ML | Refills: 1 | Status: SHIPPED | OUTPATIENT
Start: 2023-05-31

## 2023-06-05 ENCOUNTER — TELEPHONE (OUTPATIENT)
Dept: HEMATOLOGY/ONCOLOGY | Facility: HOSPITAL | Age: 43
End: 2023-06-05

## 2023-06-09 ENCOUNTER — TELEPHONE (OUTPATIENT)
Dept: RHEUMATOLOGY | Facility: CLINIC | Age: 43
End: 2023-06-09

## 2023-06-09 ENCOUNTER — PATIENT MESSAGE (OUTPATIENT)
Dept: FAMILY MEDICINE CLINIC | Facility: CLINIC | Age: 43
End: 2023-06-09

## 2023-06-09 DIAGNOSIS — M54.41 CHRONIC MIDLINE LOW BACK PAIN WITH BILATERAL SCIATICA: ICD-10-CM

## 2023-06-09 DIAGNOSIS — M79.7 FIBROMYALGIA: Primary | ICD-10-CM

## 2023-06-09 DIAGNOSIS — M54.42 CHRONIC MIDLINE LOW BACK PAIN WITH BILATERAL SCIATICA: ICD-10-CM

## 2023-06-09 DIAGNOSIS — G89.29 CHRONIC MIDLINE LOW BACK PAIN WITH BILATERAL SCIATICA: ICD-10-CM

## 2023-06-09 RX ORDER — CYCLOBENZAPRINE HCL 10 MG
10 TABLET ORAL NIGHTLY PRN
Qty: 30 TABLET | Refills: 0 | Status: SHIPPED | OUTPATIENT
Start: 2023-06-09

## 2023-06-09 RX ORDER — LEVOTHYROXINE SODIUM 175 UG/1
TABLET ORAL
Qty: 60 TABLET | Refills: 0 | OUTPATIENT
Start: 2023-06-09

## 2023-06-09 NOTE — TELEPHONE ENCOUNTER
Pharmacy requesting refill of Cyclobenzaprine 10mg tab.  Medication last prescribed 12/22/21  Future Appointments   Date Time Provider Cole Aida   7/7/2023  2:30 PM Asa Giraldo DO EMGRHEUBSN EM[facility] [place]   8/11/2023  9:30 AM Jose Espinal MD [facility] HEM [address]   9/15/2023 12:30 PM Laura Solo MD EMG OB/GYN P EMG [address]     LOV 1/20/23  RTO in 3 mo

## 2023-06-09 NOTE — TELEPHONE ENCOUNTER
LVM advising pt Dr. Chantell Tarango refilled her prescription for Cyclobenzaprine and that she has an OV on 7/7/23

## 2023-06-12 ENCOUNTER — TELEPHONE (OUTPATIENT)
Dept: FAMILY MEDICINE CLINIC | Facility: CLINIC | Age: 43
End: 2023-06-12

## 2023-06-12 ENCOUNTER — PATIENT MESSAGE (OUTPATIENT)
Dept: FAMILY MEDICINE CLINIC | Facility: CLINIC | Age: 43
End: 2023-06-12

## 2023-06-12 RX ORDER — LEVOTHYROXINE SODIUM 175 UG/1
175 TABLET ORAL
Qty: 90 TABLET | Refills: 2 | Status: SHIPPED | OUTPATIENT
Start: 2023-06-12 | End: 2023-06-12

## 2023-06-12 RX ORDER — LEVOTHYROXINE SODIUM 175 UG/1
175 TABLET ORAL
Qty: 90 TABLET | Refills: 2 | Status: SHIPPED | OUTPATIENT
Start: 2023-06-12

## 2023-06-12 NOTE — TELEPHONE ENCOUNTER
From: Radha Granger  To:  Terra Dale MD  Sent: 6/12/2023 3:51 PM CDT  Subject: Prescription refill    Please send prescription refill for levothyroxine to [address] on [address] in [place]

## 2023-06-12 NOTE — TELEPHONE ENCOUNTER
From: Josemanuel Sheikh  To: Fabiola Montes MD  Sent: 6/9/2023 6:30 PM CDT  Subject: Prescription, refill, denial    Hello, I just received information from my pharmacy saying that my request for the refill for my level thyroxine was denied and I was just wondering why because I have not gotten any messages or any notification from the doctor or any of her nurses as to why so if somebody could please message me on here or send me a text message or give me a call and leave me a voicemail letting me know what I need to do to get that refill done. I would really appreciate it.  Thank you

## 2023-06-15 RX ORDER — LEVOTHYROXINE SODIUM 175 UG/1
175 TABLET ORAL
Refills: 0 | OUTPATIENT
Start: 2023-06-15

## 2023-06-20 ENCOUNTER — PATIENT MESSAGE (OUTPATIENT)
Dept: RHEUMATOLOGY | Facility: CLINIC | Age: 43
End: 2023-06-20

## 2023-06-20 DIAGNOSIS — E55.9 VITAMIN D DEFICIENCY: ICD-10-CM

## 2023-06-20 DIAGNOSIS — E07.9 THYROID DISEASE: ICD-10-CM

## 2023-06-20 DIAGNOSIS — R53.82 CHRONIC FATIGUE: ICD-10-CM

## 2023-06-20 DIAGNOSIS — M32.9 SYSTEMIC LUPUS ERYTHEMATOSUS, UNSPECIFIED SLE TYPE, UNSPECIFIED ORGAN INVOLVEMENT STATUS (HCC): Primary | ICD-10-CM

## 2023-06-20 DIAGNOSIS — R79.82 CRP ELEVATED: ICD-10-CM

## 2023-06-20 DIAGNOSIS — M79.7 FIBROMYALGIA: ICD-10-CM

## 2023-06-22 ENCOUNTER — LAB ENCOUNTER (OUTPATIENT)
Dept: LAB | Facility: HOSPITAL | Age: 43
End: 2023-06-22
Attending: INTERNAL MEDICINE
Payer: COMMERCIAL

## 2023-06-22 DIAGNOSIS — R53.82 CHRONIC FATIGUE: ICD-10-CM

## 2023-06-22 DIAGNOSIS — E55.9 VITAMIN D DEFICIENCY: ICD-10-CM

## 2023-06-22 DIAGNOSIS — R79.82 CRP ELEVATED: ICD-10-CM

## 2023-06-22 DIAGNOSIS — E07.9 THYROID DISEASE: ICD-10-CM

## 2023-06-22 DIAGNOSIS — M32.9 SYSTEMIC LUPUS ERYTHEMATOSUS, UNSPECIFIED SLE TYPE, UNSPECIFIED ORGAN INVOLVEMENT STATUS (HCC): ICD-10-CM

## 2023-06-22 LAB
ALBUMIN SERPL-MCNC: 3.5 G/DL (ref 3.4–5)
ALBUMIN/GLOB SERPL: 1.1 {RATIO} (ref 1–2)
ALP LIVER SERPL-CCNC: 85 U/L
ALT SERPL-CCNC: 193 U/L
ANION GAP SERPL CALC-SCNC: 4 MMOL/L (ref 0–18)
AST SERPL-CCNC: 131 U/L (ref 15–37)
BASOPHILS # BLD AUTO: 0.04 X10(3) UL (ref 0–0.2)
BASOPHILS NFR BLD AUTO: 0.8 %
BILIRUB SERPL-MCNC: 0.5 MG/DL (ref 0.1–2)
BUN BLD-MCNC: 15 MG/DL (ref 7–18)
C3 SERPL-MCNC: 88.9 MG/DL (ref 90–180)
C4 SERPL-MCNC: 29.7 MG/DL (ref 10–40)
CALCIUM BLD-MCNC: 8.8 MG/DL (ref 8.5–10.1)
CHLORIDE SERPL-SCNC: 108 MMOL/L (ref 98–112)
CO2 SERPL-SCNC: 29 MMOL/L (ref 21–32)
CREAT BLD-MCNC: 0.7 MG/DL
CRP SERPL-MCNC: <0.29 MG/DL (ref ?–0.3)
EOSINOPHIL # BLD AUTO: 0.24 X10(3) UL (ref 0–0.7)
EOSINOPHIL NFR BLD AUTO: 4.8 %
ERYTHROCYTE [DISTWIDTH] IN BLOOD BY AUTOMATED COUNT: 11.6 %
ERYTHROCYTE [SEDIMENTATION RATE] IN BLOOD: 23 MM/HR
FASTING STATUS PATIENT QL REPORTED: NO
GFR SERPLBLD BASED ON 1.73 SQ M-ARVRAT: 111 ML/MIN/1.73M2 (ref 60–?)
GLOBULIN PLAS-MCNC: 3.3 G/DL (ref 2.8–4.4)
GLUCOSE BLD-MCNC: 90 MG/DL (ref 70–99)
HCT VFR BLD AUTO: 35.9 %
HGB BLD-MCNC: 12.5 G/DL
IMM GRANULOCYTES # BLD AUTO: 0.01 X10(3) UL (ref 0–1)
IMM GRANULOCYTES NFR BLD: 0.2 %
LYMPHOCYTES # BLD AUTO: 1.8 X10(3) UL (ref 1–4)
LYMPHOCYTES NFR BLD AUTO: 36.2 %
MCH RBC QN AUTO: 31.2 PG (ref 26–34)
MCHC RBC AUTO-ENTMCNC: 34.8 G/DL (ref 31–37)
MCV RBC AUTO: 89.5 FL
MONOCYTES # BLD AUTO: 0.4 X10(3) UL (ref 0.1–1)
MONOCYTES NFR BLD AUTO: 8 %
NEUTROPHILS # BLD AUTO: 2.48 X10 (3) UL (ref 1.5–7.7)
NEUTROPHILS # BLD AUTO: 2.48 X10(3) UL (ref 1.5–7.7)
NEUTROPHILS NFR BLD AUTO: 50 %
OSMOLALITY SERPL CALC.SUM OF ELEC: 292 MOSM/KG (ref 275–295)
PLATELET # BLD AUTO: 180 10(3)UL (ref 150–450)
POTASSIUM SERPL-SCNC: 3.4 MMOL/L (ref 3.5–5.1)
PROT SERPL-MCNC: 6.8 G/DL (ref 6.4–8.2)
RBC # BLD AUTO: 4.01 X10(6)UL
SODIUM SERPL-SCNC: 141 MMOL/L (ref 136–145)
TSI SER-ACNC: 2.2 MIU/ML (ref 0.36–3.74)
VIT B12 SERPL-MCNC: 492 PG/ML (ref 193–986)
VIT D+METAB SERPL-MCNC: 53 NG/ML (ref 30–100)
WBC # BLD AUTO: 5 X10(3) UL (ref 4–11)

## 2023-06-22 PROCEDURE — 86225 DNA ANTIBODY NATIVE: CPT

## 2023-06-22 PROCEDURE — 86039 ANTINUCLEAR ANTIBODIES (ANA): CPT

## 2023-06-22 PROCEDURE — 86160 COMPLEMENT ANTIGEN: CPT

## 2023-06-22 PROCEDURE — 86140 C-REACTIVE PROTEIN: CPT

## 2023-06-22 PROCEDURE — 85652 RBC SED RATE AUTOMATED: CPT

## 2023-06-22 PROCEDURE — 82306 VITAMIN D 25 HYDROXY: CPT

## 2023-06-22 PROCEDURE — 86235 NUCLEAR ANTIGEN ANTIBODY: CPT

## 2023-06-22 PROCEDURE — 36415 COLL VENOUS BLD VENIPUNCTURE: CPT

## 2023-06-22 PROCEDURE — 86038 ANTINUCLEAR ANTIBODIES: CPT

## 2023-06-22 PROCEDURE — 82607 VITAMIN B-12: CPT

## 2023-06-22 PROCEDURE — 85025 COMPLETE CBC W/AUTO DIFF WBC: CPT

## 2023-06-22 PROCEDURE — 84443 ASSAY THYROID STIM HORMONE: CPT

## 2023-06-22 PROCEDURE — 80053 COMPREHEN METABOLIC PANEL: CPT

## 2023-06-26 LAB — NUCLEAR IGG TITR SER IF: POSITIVE {TITER}

## 2023-06-27 LAB
ANA NUCLEOLAR TITR SER IF: 160 {TITER}
DSDNA AB TITR SER: <10 {TITER}

## 2023-06-28 LAB
CENTROMERE IGG SER-ACNC: 0.7 U/ML
ENA JO1 AB SER IA-ACNC: 0.4 U/ML
ENA RNP IGG SER IA-ACNC: 0.5 U/ML
ENA SCL70 IGG SER IA-ACNC: 5.6 U/ML
ENA SM IGG SER IA-ACNC: <0.7 U/ML
ENA SS-A IGG SER IA-ACNC: 0.5 U/ML
ENA SS-B IGG SER IA-ACNC: 0.6 U/ML
U1 SNRNP IGG SER IA-ACNC: 0.9 U/ML

## 2023-07-03 DIAGNOSIS — F43.10 PTSD (POST-TRAUMATIC STRESS DISORDER): ICD-10-CM

## 2023-07-05 DIAGNOSIS — D50.9 IRON DEFICIENCY ANEMIA, UNSPECIFIED IRON DEFICIENCY ANEMIA TYPE: Primary | ICD-10-CM

## 2023-07-05 NOTE — TELEPHONE ENCOUNTER
Name from pharmacy: VENLAFAXINE ER 150MG CAPSULES          Will file in chart as: VENLAFAXINE  MG Oral Capsule SR 24 Hr    Sig: TAKE 1 CAPSULE(150 MG) BY MOUTH DAILY    Disp: 90 capsule    Refills: 1 (Pharmacy requested: Not specified)    Start: 7/3/2023    Class: Normal    Non-formulary        LOV  2/10/23 DR COHN      LAST LAB  n/a     LAST RX 8/12/22 90 with 1     Next OV   Future Appointments   Date Time Provider Cole Parra   7/7/2023 12:00 PM Gib Ion [facility]   7/7/2023  2:30 PM Azra Shirley DO EM[facility]BSN EM[facility] [place]   7/14/2023  3:00 PM Angelica Cruz MD EM[facility]   8/11/2023  9:30 AM MD Kimber Lovebryan [id number] none

## 2023-07-07 ENCOUNTER — OFFICE VISIT (OUTPATIENT)
Dept: RHEUMATOLOGY | Facility: CLINIC | Age: 43
End: 2023-07-07
Payer: COMMERCIAL

## 2023-07-07 VITALS
HEART RATE: 78 BPM | BODY MASS INDEX: 36 KG/M2 | DIASTOLIC BLOOD PRESSURE: 76 MMHG | WEIGHT: 201 LBS | OXYGEN SATURATION: 97 % | RESPIRATION RATE: 16 BRPM | TEMPERATURE: 98 F | SYSTOLIC BLOOD PRESSURE: 122 MMHG

## 2023-07-07 DIAGNOSIS — R76.8 POSITIVE ANA (ANTINUCLEAR ANTIBODY): ICD-10-CM

## 2023-07-07 DIAGNOSIS — M32.9 SYSTEMIC LUPUS ERYTHEMATOSUS, UNSPECIFIED SLE TYPE, UNSPECIFIED ORGAN INVOLVEMENT STATUS (HCC): Primary | ICD-10-CM

## 2023-07-07 DIAGNOSIS — E55.9 VITAMIN D DEFICIENCY: ICD-10-CM

## 2023-07-07 DIAGNOSIS — Z79.899 LONG-TERM USE OF HYDROXYCHLOROQUINE: ICD-10-CM

## 2023-07-07 DIAGNOSIS — R53.82 CHRONIC FATIGUE: ICD-10-CM

## 2023-07-07 DIAGNOSIS — M79.7 FIBROMYALGIA: ICD-10-CM

## 2023-07-07 PROCEDURE — 3074F SYST BP LT 130 MM HG: CPT | Performed by: INTERNAL MEDICINE

## 2023-07-07 PROCEDURE — 3078F DIAST BP <80 MM HG: CPT | Performed by: INTERNAL MEDICINE

## 2023-07-07 PROCEDURE — 99215 OFFICE O/P EST HI 40 MIN: CPT | Performed by: INTERNAL MEDICINE

## 2023-07-07 RX ORDER — HYDROXYCHLOROQUINE SULFATE 200 MG/1
200 TABLET, FILM COATED ORAL DAILY
Qty: 90 TABLET | Refills: 1 | Status: SHIPPED | OUTPATIENT
Start: 2023-07-07

## 2023-07-07 RX ORDER — VENLAFAXINE HYDROCHLORIDE 150 MG/1
CAPSULE, EXTENDED RELEASE ORAL
Qty: 90 CAPSULE | Refills: 0 | Status: SHIPPED | OUTPATIENT
Start: 2023-07-07

## 2023-07-08 ENCOUNTER — PATIENT MESSAGE (OUTPATIENT)
Dept: INTERNAL MEDICINE CLINIC | Facility: CLINIC | Age: 43
End: 2023-07-08

## 2023-07-14 ENCOUNTER — OFFICE VISIT (OUTPATIENT)
Dept: FAMILY MEDICINE CLINIC | Facility: CLINIC | Age: 43
End: 2023-07-14
Payer: COMMERCIAL

## 2023-07-14 VITALS
SYSTOLIC BLOOD PRESSURE: 102 MMHG | DIASTOLIC BLOOD PRESSURE: 62 MMHG | TEMPERATURE: 98 F | WEIGHT: 208 LBS | HEART RATE: 75 BPM | HEIGHT: 62.5 IN | RESPIRATION RATE: 18 BRPM | OXYGEN SATURATION: 98 % | BODY MASS INDEX: 37.32 KG/M2

## 2023-07-14 DIAGNOSIS — Z23 NEED FOR VACCINATION: ICD-10-CM

## 2023-07-14 DIAGNOSIS — E55.9 VITAMIN D DEFICIENCY: Primary | ICD-10-CM

## 2023-07-14 DIAGNOSIS — R74.01 ELEVATED TRANSAMINASE LEVEL: ICD-10-CM

## 2023-07-14 PROCEDURE — 3008F BODY MASS INDEX DOCD: CPT | Performed by: FAMILY MEDICINE

## 2023-07-14 PROCEDURE — 3074F SYST BP LT 130 MM HG: CPT | Performed by: FAMILY MEDICINE

## 2023-07-14 PROCEDURE — 90715 TDAP VACCINE 7 YRS/> IM: CPT | Performed by: FAMILY MEDICINE

## 2023-07-14 PROCEDURE — 90471 IMMUNIZATION ADMIN: CPT | Performed by: FAMILY MEDICINE

## 2023-07-14 PROCEDURE — 3078F DIAST BP <80 MM HG: CPT | Performed by: FAMILY MEDICINE

## 2023-07-14 PROCEDURE — 99213 OFFICE O/P EST LOW 20 MIN: CPT | Performed by: FAMILY MEDICINE

## 2023-07-16 ENCOUNTER — TELEPHONE (OUTPATIENT)
Dept: INTERNAL MEDICINE CLINIC | Facility: CLINIC | Age: 43
End: 2023-07-16

## 2023-07-21 RX ORDER — SEMAGLUTIDE 2.68 MG/ML
2 INJECTION, SOLUTION SUBCUTANEOUS WEEKLY
Qty: 9 ML | Refills: 1 | Status: SHIPPED | OUTPATIENT
Start: 2023-07-21 | End: 2023-08-11

## 2023-07-21 NOTE — TELEPHONE ENCOUNTER
Let's try ozempic  I sent the 2mg dose, she can do 37 clicks to equal 1mg weekly x 4 weeks, and then increase to the full 2mg dose

## 2023-07-23 NOTE — TELEPHONE ENCOUNTER
See other message. It was denied and Jose Raul Camarillo sent Tila Breach.   Patient did respond on 7/23/23

## 2023-07-25 ENCOUNTER — TELEPHONE (OUTPATIENT)
Dept: INTERNAL MEDICINE CLINIC | Facility: CLINIC | Age: 43
End: 2023-07-25

## 2023-07-31 ENCOUNTER — TELEPHONE (OUTPATIENT)
Dept: HEMATOLOGY/ONCOLOGY | Facility: HOSPITAL | Age: 43
End: 2023-07-31

## 2023-08-10 ENCOUNTER — LAB ENCOUNTER (OUTPATIENT)
Dept: LAB | Age: 43
End: 2023-08-10
Attending: FAMILY MEDICINE
Payer: COMMERCIAL

## 2023-08-10 ENCOUNTER — OFFICE VISIT (OUTPATIENT)
Dept: HEMATOLOGY/ONCOLOGY | Facility: HOSPITAL | Age: 43
End: 2023-08-10
Attending: INTERNAL MEDICINE
Payer: COMMERCIAL

## 2023-08-10 VITALS
HEART RATE: 65 BPM | SYSTOLIC BLOOD PRESSURE: 112 MMHG | HEIGHT: 62.52 IN | WEIGHT: 211.19 LBS | DIASTOLIC BLOOD PRESSURE: 74 MMHG | BODY MASS INDEX: 37.89 KG/M2 | OXYGEN SATURATION: 100 % | TEMPERATURE: 98 F | RESPIRATION RATE: 18 BRPM

## 2023-08-10 DIAGNOSIS — D50.9 IRON DEFICIENCY ANEMIA, UNSPECIFIED IRON DEFICIENCY ANEMIA TYPE: Primary | ICD-10-CM

## 2023-08-10 DIAGNOSIS — D50.9 IRON DEFICIENCY ANEMIA, UNSPECIFIED IRON DEFICIENCY ANEMIA TYPE: ICD-10-CM

## 2023-08-10 DIAGNOSIS — R74.01 ELEVATED TRANSAMINASE LEVEL: ICD-10-CM

## 2023-08-10 LAB
BASOPHILS # BLD AUTO: 0.05 X10(3) UL (ref 0–0.2)
BASOPHILS NFR BLD AUTO: 0.8 %
DEPRECATED HBV CORE AB SER IA-ACNC: 78.1 NG/ML
EOSINOPHIL # BLD AUTO: 0.51 X10(3) UL (ref 0–0.7)
EOSINOPHIL NFR BLD AUTO: 8.6 %
ERYTHROCYTE [DISTWIDTH] IN BLOOD BY AUTOMATED COUNT: 12 %
GGT SERPL-CCNC: 77 U/L
HBV SURFACE AB SER QL: REACTIVE
HBV SURFACE AB SERPL IA-ACNC: 104.22 MIU/ML
HBV SURFACE AG SER-ACNC: <0.1 [IU]/L
HBV SURFACE AG SERPL QL IA: NONREACTIVE
HCT VFR BLD AUTO: 38.3 %
HCV AB SERPL QL IA: NONREACTIVE
HGB BLD-MCNC: 12.5 G/DL
IMM GRANULOCYTES # BLD AUTO: 0 X10(3) UL (ref 0–1)
IMM GRANULOCYTES NFR BLD: 0 %
IRON SATN MFR SERPL: 13 %
IRON SERPL-MCNC: 51 UG/DL
LYMPHOCYTES # BLD AUTO: 2.02 X10(3) UL (ref 1–4)
LYMPHOCYTES NFR BLD AUTO: 34.2 %
MCH RBC QN AUTO: 30.6 PG (ref 26–34)
MCHC RBC AUTO-ENTMCNC: 32.6 G/DL (ref 31–37)
MCV RBC AUTO: 93.6 FL
MONOCYTES # BLD AUTO: 0.5 X10(3) UL (ref 0.1–1)
MONOCYTES NFR BLD AUTO: 8.5 %
NEUTROPHILS # BLD AUTO: 2.82 X10 (3) UL (ref 1.5–7.7)
NEUTROPHILS # BLD AUTO: 2.82 X10(3) UL (ref 1.5–7.7)
NEUTROPHILS NFR BLD AUTO: 47.9 %
PLATELET # BLD AUTO: 191 10(3)UL (ref 150–450)
RBC # BLD AUTO: 4.09 X10(6)UL
TIBC SERPL-MCNC: 380 UG/DL (ref 240–450)
TRANSFERRIN SERPL-MCNC: 255 MG/DL (ref 200–360)
WBC # BLD AUTO: 5.9 X10(3) UL (ref 4–11)

## 2023-08-10 PROCEDURE — 99214 OFFICE O/P EST MOD 30 MIN: CPT | Performed by: INTERNAL MEDICINE

## 2023-08-10 PROCEDURE — 87340 HEPATITIS B SURFACE AG IA: CPT

## 2023-08-10 PROCEDURE — 83540 ASSAY OF IRON: CPT

## 2023-08-10 PROCEDURE — 83550 IRON BINDING TEST: CPT

## 2023-08-10 PROCEDURE — 82977 ASSAY OF GGT: CPT

## 2023-08-10 PROCEDURE — 85025 COMPLETE CBC W/AUTO DIFF WBC: CPT

## 2023-08-10 PROCEDURE — 86706 HEP B SURFACE ANTIBODY: CPT

## 2023-08-10 PROCEDURE — 86803 HEPATITIS C AB TEST: CPT

## 2023-08-10 PROCEDURE — 36415 COLL VENOUS BLD VENIPUNCTURE: CPT

## 2023-08-10 PROCEDURE — 82728 ASSAY OF FERRITIN: CPT

## 2023-08-10 NOTE — PROGRESS NOTES
Education Record    Learner:  Patient    Disease / Diagnosis:iron deficiency anemia     Barriers / Limitations:  None   Comments:    Method:  Discussion   Comments:    General Topics:  Plan of care reviewed   Comments:    Outcome:  Shows understanding   Comments:    Patient here for follow-up. States energy levels are poor. Up to date with GI. Still having lightheadedness. States dyspnea has improved. Having heavy menstrual cycles.

## 2023-08-11 ENCOUNTER — OFFICE VISIT (OUTPATIENT)
Dept: OBGYN CLINIC | Facility: CLINIC | Age: 43
End: 2023-08-11
Payer: COMMERCIAL

## 2023-08-11 ENCOUNTER — APPOINTMENT (OUTPATIENT)
Dept: HEMATOLOGY/ONCOLOGY | Facility: HOSPITAL | Age: 43
End: 2023-08-11
Attending: INTERNAL MEDICINE
Payer: COMMERCIAL

## 2023-08-11 VITALS
WEIGHT: 209.25 LBS | BODY MASS INDEX: 38 KG/M2 | SYSTOLIC BLOOD PRESSURE: 110 MMHG | DIASTOLIC BLOOD PRESSURE: 68 MMHG | HEART RATE: 94 BPM

## 2023-08-11 DIAGNOSIS — N94.10 DYSPAREUNIA IN FEMALE: ICD-10-CM

## 2023-08-11 DIAGNOSIS — Z12.4 SCREENING FOR CERVICAL CANCER: ICD-10-CM

## 2023-08-11 DIAGNOSIS — Z01.419 WELL WOMAN EXAM WITH ROUTINE GYNECOLOGICAL EXAM: Primary | ICD-10-CM

## 2023-08-11 PROCEDURE — 3074F SYST BP LT 130 MM HG: CPT | Performed by: OBSTETRICS & GYNECOLOGY

## 2023-08-11 PROCEDURE — 99396 PREV VISIT EST AGE 40-64: CPT | Performed by: OBSTETRICS & GYNECOLOGY

## 2023-08-11 PROCEDURE — 3078F DIAST BP <80 MM HG: CPT | Performed by: OBSTETRICS & GYNECOLOGY

## 2023-08-11 RX ORDER — OMEPRAZOLE 40 MG/1
CAPSULE, DELAYED RELEASE ORAL
COMMUNITY
Start: 2023-07-26

## 2023-08-15 ENCOUNTER — PATIENT MESSAGE (OUTPATIENT)
Dept: OBGYN CLINIC | Facility: CLINIC | Age: 43
End: 2023-08-15

## 2023-08-15 DIAGNOSIS — N76.0 VAGINITIS AND VULVOVAGINITIS: Primary | ICD-10-CM

## 2023-08-15 RX ORDER — CLINDAMYCIN HYDROCHLORIDE 300 MG/1
300 CAPSULE ORAL 2 TIMES DAILY
Qty: 14 CAPSULE | Refills: 0 | Status: SHIPPED | OUTPATIENT
Start: 2023-08-15

## 2023-08-18 ENCOUNTER — HOSPITAL ENCOUNTER (OUTPATIENT)
Dept: MAMMOGRAPHY | Age: 43
Discharge: HOME | End: 2023-08-18
Attending: OBSTETRICS & GYNECOLOGY
Payer: COMMERCIAL

## 2023-08-18 DIAGNOSIS — Z12.31 ENCOUNTER FOR SCREENING MAMMOGRAM FOR MALIGNANT NEOPLASM OF BREAST: ICD-10-CM

## 2023-08-18 PROCEDURE — 77063 BREAST TOMOSYNTHESIS BI: CPT | Performed by: OBSTETRICS & GYNECOLOGY

## 2023-08-18 PROCEDURE — 77067 SCR MAMMO BI INCL CAD: CPT | Performed by: OBSTETRICS & GYNECOLOGY

## 2023-08-21 ENCOUNTER — PATIENT MESSAGE (OUTPATIENT)
Dept: INTERNAL MEDICINE CLINIC | Facility: CLINIC | Age: 43
End: 2023-08-21

## 2023-08-21 ENCOUNTER — MEDICAL CORRESPONDENCE (OUTPATIENT)
Dept: OBGYN CLINIC | Facility: CLINIC | Age: 43
End: 2023-08-21

## 2023-08-21 ENCOUNTER — OFFICE VISIT (OUTPATIENT)
Dept: INTERNAL MEDICINE CLINIC | Facility: CLINIC | Age: 43
End: 2023-08-21
Payer: COMMERCIAL

## 2023-08-21 VITALS
BODY MASS INDEX: 37.5 KG/M2 | RESPIRATION RATE: 18 BRPM | SYSTOLIC BLOOD PRESSURE: 128 MMHG | HEART RATE: 80 BPM | HEIGHT: 62.5 IN | WEIGHT: 209 LBS | DIASTOLIC BLOOD PRESSURE: 72 MMHG

## 2023-08-21 DIAGNOSIS — E78.2 MIXED HYPERLIPIDEMIA: ICD-10-CM

## 2023-08-21 DIAGNOSIS — E66.01 CLASS 2 SEVERE OBESITY WITH SERIOUS COMORBIDITY AND BODY MASS INDEX (BMI) OF 38.0 TO 38.9 IN ADULT, UNSPECIFIED OBESITY TYPE: ICD-10-CM

## 2023-08-21 DIAGNOSIS — Z51.81 THERAPEUTIC DRUG MONITORING: Primary | ICD-10-CM

## 2023-08-21 DIAGNOSIS — F50.81 BINGE EATING DISORDER: ICD-10-CM

## 2023-08-21 DIAGNOSIS — M79.7 FIBROMYALGIA: ICD-10-CM

## 2023-08-21 DIAGNOSIS — Z98.84 S/P LAPAROSCOPIC SLEEVE GASTRECTOMY: ICD-10-CM

## 2023-08-21 DIAGNOSIS — G47.33 OSA ON CPAP: ICD-10-CM

## 2023-08-21 DIAGNOSIS — R73.03 PREDIABETES: ICD-10-CM

## 2023-08-21 PROCEDURE — 3074F SYST BP LT 130 MM HG: CPT | Performed by: PHYSICIAN ASSISTANT

## 2023-08-21 PROCEDURE — 99214 OFFICE O/P EST MOD 30 MIN: CPT | Performed by: PHYSICIAN ASSISTANT

## 2023-08-21 PROCEDURE — 3008F BODY MASS INDEX DOCD: CPT | Performed by: PHYSICIAN ASSISTANT

## 2023-08-21 PROCEDURE — 3078F DIAST BP <80 MM HG: CPT | Performed by: PHYSICIAN ASSISTANT

## 2023-08-21 RX ORDER — METFORMIN HYDROCHLORIDE 750 MG/1
750 TABLET, EXTENDED RELEASE ORAL DAILY
Qty: 90 TABLET | Refills: 0 | Status: SHIPPED | OUTPATIENT
Start: 2023-08-21

## 2023-08-25 ENCOUNTER — APPOINTMENT (OUTPATIENT)
Dept: HEMATOLOGY/ONCOLOGY | Facility: HOSPITAL | Age: 43
End: 2023-08-25
Attending: INTERNAL MEDICINE
Payer: COMMERCIAL

## 2023-08-28 ENCOUNTER — APPOINTMENT (OUTPATIENT)
Dept: HEMATOLOGY/ONCOLOGY | Facility: HOSPITAL | Age: 43
End: 2023-08-28
Attending: INTERNAL MEDICINE
Payer: COMMERCIAL

## 2023-08-28 VITALS
SYSTOLIC BLOOD PRESSURE: 100 MMHG | DIASTOLIC BLOOD PRESSURE: 60 MMHG | TEMPERATURE: 98 F | BODY MASS INDEX: 39 KG/M2 | HEART RATE: 65 BPM | OXYGEN SATURATION: 99 % | RESPIRATION RATE: 16 BRPM | WEIGHT: 214.19 LBS

## 2023-08-28 DIAGNOSIS — D50.9 IRON DEFICIENCY ANEMIA, UNSPECIFIED IRON DEFICIENCY ANEMIA TYPE: Primary | ICD-10-CM

## 2023-08-28 PROCEDURE — 96365 THER/PROPH/DIAG IV INF INIT: CPT

## 2023-08-28 NOTE — PROGRESS NOTES
Education Record    Learner:  Patient    Disease / Diagnosis: Iron Deficiency Anemia    Barriers / Limitations:  None   Comments:    Method:  Brief focused and Reinforcement   Comments:    General Topics:  Plan of care reviewed   Comments:    Outcome:  Shows understanding   Comments:    Monoferric infused without any complications. Observed for half hour after infusion. Vital signs taken at the end of the 30-minute observation. Patient denied any complaints/issues. Discharged in good condition. Advised to call for any questions/concerns/issues. Labs in 3 months. Patient aware. Lab appointment scheduled.

## 2023-09-11 ENCOUNTER — TELEPHONE (OUTPATIENT)
Dept: HEMATOLOGY/ONCOLOGY | Facility: HOSPITAL | Age: 43
End: 2023-09-11

## 2023-10-01 ENCOUNTER — HOSPITAL ENCOUNTER (EMERGENCY)
Age: 43
Discharge: HOME | End: 2023-10-01
Attending: EMERGENCY MEDICINE

## 2023-10-01 ENCOUNTER — APPOINTMENT (OUTPATIENT)
Dept: GENERAL RADIOLOGY | Age: 43
End: 2023-10-01

## 2023-10-01 VITALS
BODY MASS INDEX: 40.48 KG/M2 | WEIGHT: 220 LBS | HEIGHT: 62 IN | HEART RATE: 57 BPM | TEMPERATURE: 98 F | RESPIRATION RATE: 12 BRPM | OXYGEN SATURATION: 99 % | DIASTOLIC BLOOD PRESSURE: 75 MMHG | SYSTOLIC BLOOD PRESSURE: 127 MMHG

## 2023-10-01 DIAGNOSIS — M77.42 METATARSALGIA OF LEFT FOOT: Primary | ICD-10-CM

## 2023-10-01 PROCEDURE — 99283 EMERGENCY DEPT VISIT LOW MDM: CPT

## 2023-10-01 PROCEDURE — 73630 X-RAY EXAM OF FOOT: CPT

## 2023-10-01 PROCEDURE — 99284 EMERGENCY DEPT VISIT MOD MDM: CPT

## 2023-10-01 NOTE — DISCHARGE INSTRUCTIONS
[facility] EMERGENCY DEPARTMENT IN [address]  Dept: [phone]  Dept Fax: [phone]     Occupational restrictions  For: Faisal Mu    Normal weight limits  Normal arm and hand activities  No prolonged standing greater than 30 minutes for left leg. Opportunities for elevation of leg every 2-4 hours for 15 minutes. Normal back activities  No wound  May be able to perform CPR if necessary.

## 2023-10-12 ENCOUNTER — PATIENT MESSAGE (OUTPATIENT)
Dept: FAMILY MEDICINE CLINIC | Facility: CLINIC | Age: 43
End: 2023-10-12

## 2023-10-13 NOTE — TELEPHONE ENCOUNTER
From: Loan Melton  To:  Art Gonzalez  Sent: 10/12/2023 12:50 PM CDT  Subject: Question for upcoming appt    Hello, would I be able to Get a 2 step TB test done when I come in for my next appointments for my physical?

## 2023-10-20 ENCOUNTER — OFFICE VISIT (OUTPATIENT)
Dept: FAMILY MEDICINE CLINIC | Facility: CLINIC | Age: 43
End: 2023-10-20

## 2023-10-20 VITALS
WEIGHT: 225 LBS | BODY MASS INDEX: 41.41 KG/M2 | HEART RATE: 64 BPM | SYSTOLIC BLOOD PRESSURE: 100 MMHG | HEIGHT: 62 IN | TEMPERATURE: 98 F | RESPIRATION RATE: 16 BRPM | DIASTOLIC BLOOD PRESSURE: 72 MMHG | OXYGEN SATURATION: 99 %

## 2023-10-20 DIAGNOSIS — E66.01 CLASS 3 SEVERE OBESITY DUE TO EXCESS CALORIES WITHOUT SERIOUS COMORBIDITY WITH BODY MASS INDEX (BMI) OF 40.0 TO 44.9 IN ADULT (HCC): ICD-10-CM

## 2023-10-20 DIAGNOSIS — Z00.00 ROUTINE GENERAL MEDICAL EXAMINATION AT A HEALTH CARE FACILITY: Primary | ICD-10-CM

## 2023-10-20 DIAGNOSIS — F51.04 PSYCHOPHYSIOLOGICAL INSOMNIA: ICD-10-CM

## 2023-10-20 PROBLEM — E66.813 CLASS 3 SEVERE OBESITY DUE TO EXCESS CALORIES WITHOUT SERIOUS COMORBIDITY WITH BODY MASS INDEX (BMI) OF 40.0 TO 44.9 IN ADULT: Status: ACTIVE | Noted: 2019-06-04

## 2023-10-20 PROBLEM — E66.813 CLASS 3 SEVERE OBESITY DUE TO EXCESS CALORIES WITHOUT SERIOUS COMORBIDITY WITH BODY MASS INDEX (BMI) OF 40.0 TO 44.9 IN ADULT (HCC): Status: ACTIVE | Noted: 2019-06-04

## 2023-10-20 PROCEDURE — 3074F SYST BP LT 130 MM HG: CPT | Performed by: FAMILY MEDICINE

## 2023-10-20 PROCEDURE — 3008F BODY MASS INDEX DOCD: CPT | Performed by: FAMILY MEDICINE

## 2023-10-20 PROCEDURE — 99396 PREV VISIT EST AGE 40-64: CPT | Performed by: FAMILY MEDICINE

## 2023-10-20 PROCEDURE — 3078F DIAST BP <80 MM HG: CPT | Performed by: FAMILY MEDICINE

## 2023-10-24 ENCOUNTER — OFFICE VISIT (OUTPATIENT)
Dept: RHEUMATOLOGY | Facility: CLINIC | Age: 43
End: 2023-10-24

## 2023-10-24 VITALS
SYSTOLIC BLOOD PRESSURE: 118 MMHG | TEMPERATURE: 98 F | HEART RATE: 66 BPM | WEIGHT: 218 LBS | OXYGEN SATURATION: 97 % | HEIGHT: 62 IN | RESPIRATION RATE: 16 BRPM | BODY MASS INDEX: 40.12 KG/M2 | DIASTOLIC BLOOD PRESSURE: 60 MMHG

## 2023-10-24 DIAGNOSIS — M32.9 SYSTEMIC LUPUS ERYTHEMATOSUS, UNSPECIFIED SLE TYPE, UNSPECIFIED ORGAN INVOLVEMENT STATUS (HCC): Primary | ICD-10-CM

## 2023-10-24 DIAGNOSIS — R79.82 CRP ELEVATED: ICD-10-CM

## 2023-10-24 DIAGNOSIS — R53.82 CHRONIC FATIGUE: ICD-10-CM

## 2023-10-24 DIAGNOSIS — L73.2 HIDRADENITIS: ICD-10-CM

## 2023-10-24 DIAGNOSIS — R76.8 DS DNA ANTIBODY POSITIVE: ICD-10-CM

## 2023-10-24 DIAGNOSIS — Z79.899 LONG-TERM USE OF HYDROXYCHLOROQUINE: ICD-10-CM

## 2023-10-24 DIAGNOSIS — M79.7 FIBROMYALGIA: ICD-10-CM

## 2023-10-24 PROCEDURE — 3074F SYST BP LT 130 MM HG: CPT | Performed by: INTERNAL MEDICINE

## 2023-10-24 PROCEDURE — 3008F BODY MASS INDEX DOCD: CPT | Performed by: INTERNAL MEDICINE

## 2023-10-24 PROCEDURE — 99214 OFFICE O/P EST MOD 30 MIN: CPT | Performed by: INTERNAL MEDICINE

## 2023-10-24 PROCEDURE — 3078F DIAST BP <80 MM HG: CPT | Performed by: INTERNAL MEDICINE

## 2023-10-24 RX ORDER — HYDROXYCHLOROQUINE SULFATE 200 MG/1
200 TABLET, FILM COATED ORAL DAILY
Qty: 90 TABLET | Refills: 1 | Status: SHIPPED | OUTPATIENT
Start: 2023-10-24

## 2023-11-07 ENCOUNTER — PATIENT MESSAGE (OUTPATIENT)
Dept: OBGYN CLINIC | Facility: CLINIC | Age: 43
End: 2023-11-07

## 2023-11-07 DIAGNOSIS — R39.15 URINARY URGENCY: Primary | ICD-10-CM

## 2023-11-11 ENCOUNTER — LAB ENCOUNTER (OUTPATIENT)
Dept: LAB | Facility: HOSPITAL | Age: 43
End: 2023-11-11
Attending: FAMILY MEDICINE
Payer: COMMERCIAL

## 2023-11-11 DIAGNOSIS — R39.15 URINARY URGENCY: ICD-10-CM

## 2023-11-11 DIAGNOSIS — M32.9 SYSTEMIC LUPUS ERYTHEMATOSUS, UNSPECIFIED SLE TYPE, UNSPECIFIED ORGAN INVOLVEMENT STATUS (HCC): ICD-10-CM

## 2023-11-11 DIAGNOSIS — Z00.00 ROUTINE GENERAL MEDICAL EXAMINATION AT A HEALTH CARE FACILITY: ICD-10-CM

## 2023-11-11 DIAGNOSIS — D50.9 IRON DEFICIENCY ANEMIA, UNSPECIFIED IRON DEFICIENCY ANEMIA TYPE: ICD-10-CM

## 2023-11-11 DIAGNOSIS — E55.9 VITAMIN D DEFICIENCY: ICD-10-CM

## 2023-11-11 LAB
ALBUMIN SERPL-MCNC: 3.4 G/DL (ref 3.4–5)
ALBUMIN/GLOB SERPL: 0.9 {RATIO} (ref 1–2)
ALP LIVER SERPL-CCNC: 85 U/L
ALT SERPL-CCNC: 40 U/L
ANION GAP SERPL CALC-SCNC: 0 MMOL/L (ref 0–18)
AST SERPL-CCNC: 33 U/L (ref 15–37)
BASOPHILS # BLD AUTO: 0.05 X10(3) UL (ref 0–0.2)
BASOPHILS NFR BLD AUTO: 0.8 %
BILIRUB SERPL-MCNC: 0.6 MG/DL (ref 0.1–2)
BILIRUB UR QL STRIP.AUTO: NEGATIVE
BUN BLD-MCNC: 15 MG/DL (ref 9–23)
C3 SERPL-MCNC: 91.3 MG/DL (ref 90–180)
C4 SERPL-MCNC: 28.9 MG/DL (ref 10–40)
CALCIUM BLD-MCNC: 8.9 MG/DL (ref 8.5–10.1)
CHLORIDE SERPL-SCNC: 110 MMOL/L (ref 98–112)
CHOLEST SERPL-MCNC: 153 MG/DL (ref ?–200)
CLARITY UR REFRACT.AUTO: CLEAR
CO2 SERPL-SCNC: 33 MMOL/L (ref 21–32)
COLOR UR AUTO: YELLOW
CREAT BLD-MCNC: 0.63 MG/DL
CRP SERPL-MCNC: <0.29 MG/DL (ref ?–0.3)
DEPRECATED HBV CORE AB SER IA-ACNC: 143.3 NG/ML
EGFRCR SERPLBLD CKD-EPI 2021: 114 ML/MIN/1.73M2 (ref 60–?)
EOSINOPHIL # BLD AUTO: 0.94 X10(3) UL (ref 0–0.7)
EOSINOPHIL NFR BLD AUTO: 15.8 %
ERYTHROCYTE [DISTWIDTH] IN BLOOD BY AUTOMATED COUNT: 11.6 %
ERYTHROCYTE [SEDIMENTATION RATE] IN BLOOD: 17 MM/HR
FASTING PATIENT LIPID ANSWER: YES
FASTING STATUS PATIENT QL REPORTED: YES
GLOBULIN PLAS-MCNC: 3.7 G/DL (ref 2.8–4.4)
GLUCOSE BLD-MCNC: 87 MG/DL (ref 70–99)
GLUCOSE UR STRIP.AUTO-MCNC: NORMAL MG/DL
HCT VFR BLD AUTO: 40.5 %
HDLC SERPL-MCNC: 59 MG/DL (ref 40–59)
HGB BLD-MCNC: 13.4 G/DL
IMM GRANULOCYTES # BLD AUTO: 0.02 X10(3) UL (ref 0–1)
IMM GRANULOCYTES NFR BLD: 0.3 %
IRON SATN MFR SERPL: 21 %
IRON SERPL-MCNC: 75 UG/DL
KETONES UR STRIP.AUTO-MCNC: NEGATIVE MG/DL
LDLC SERPL CALC-MCNC: 86 MG/DL (ref ?–100)
LEUKOCYTE ESTERASE UR QL STRIP.AUTO: NEGATIVE
LYMPHOCYTES # BLD AUTO: 2.13 X10(3) UL (ref 1–4)
LYMPHOCYTES NFR BLD AUTO: 35.9 %
MCH RBC QN AUTO: 31.3 PG (ref 26–34)
MCHC RBC AUTO-ENTMCNC: 33.1 G/DL (ref 31–37)
MCV RBC AUTO: 94.6 FL
MONOCYTES # BLD AUTO: 0.53 X10(3) UL (ref 0.1–1)
MONOCYTES NFR BLD AUTO: 8.9 %
NEUTROPHILS # BLD AUTO: 2.27 X10 (3) UL (ref 1.5–7.7)
NEUTROPHILS # BLD AUTO: 2.27 X10(3) UL (ref 1.5–7.7)
NEUTROPHILS NFR BLD AUTO: 38.3 %
NITRITE UR QL STRIP.AUTO: NEGATIVE
NONHDLC SERPL-MCNC: 94 MG/DL (ref ?–130)
OSMOLALITY SERPL CALC.SUM OF ELEC: 296 MOSM/KG (ref 275–295)
PH UR STRIP.AUTO: 7.5 [PH] (ref 5–8)
PLATELET # BLD AUTO: 184 10(3)UL (ref 150–450)
POTASSIUM SERPL-SCNC: 3.9 MMOL/L (ref 3.5–5.1)
PROT SERPL-MCNC: 7.1 G/DL (ref 6.4–8.2)
PROT UR STRIP.AUTO-MCNC: 20 MG/DL
RBC # BLD AUTO: 4.28 X10(6)UL
RBC UR QL AUTO: NEGATIVE
SODIUM SERPL-SCNC: 143 MMOL/L (ref 136–145)
SP GR UR STRIP.AUTO: >1.03 (ref 1–1.03)
TIBC SERPL-MCNC: 349 UG/DL (ref 240–450)
TRANSFERRIN SERPL-MCNC: 234 MG/DL (ref 200–360)
TRIGL SERPL-MCNC: 30 MG/DL (ref 30–149)
TSI SER-ACNC: 6.1 MIU/ML (ref 0.36–3.74)
UROBILINOGEN UR STRIP.AUTO-MCNC: 8 MG/DL
VIT D+METAB SERPL-MCNC: 27.4 NG/ML (ref 30–100)
VIT D+METAB SERPL-MCNC: 29.1 NG/ML (ref 30–100)
VLDLC SERPL CALC-MCNC: 5 MG/DL (ref 0–30)
WBC # BLD AUTO: 5.9 X10(3) UL (ref 4–11)

## 2023-11-11 PROCEDURE — 86140 C-REACTIVE PROTEIN: CPT

## 2023-11-11 PROCEDURE — 81001 URINALYSIS AUTO W/SCOPE: CPT

## 2023-11-11 PROCEDURE — 36415 COLL VENOUS BLD VENIPUNCTURE: CPT

## 2023-11-11 PROCEDURE — 85652 RBC SED RATE AUTOMATED: CPT

## 2023-11-11 PROCEDURE — 86160 COMPLEMENT ANTIGEN: CPT

## 2023-11-11 PROCEDURE — 84443 ASSAY THYROID STIM HORMONE: CPT

## 2023-11-11 PROCEDURE — 82728 ASSAY OF FERRITIN: CPT

## 2023-11-11 PROCEDURE — 82306 VITAMIN D 25 HYDROXY: CPT

## 2023-11-11 PROCEDURE — 85025 COMPLETE CBC W/AUTO DIFF WBC: CPT

## 2023-11-11 PROCEDURE — 83550 IRON BINDING TEST: CPT

## 2023-11-11 PROCEDURE — 83540 ASSAY OF IRON: CPT

## 2023-11-11 PROCEDURE — 80053 COMPREHEN METABOLIC PANEL: CPT

## 2023-11-11 PROCEDURE — 80061 LIPID PANEL: CPT

## 2023-11-13 DIAGNOSIS — F43.10 PTSD (POST-TRAUMATIC STRESS DISORDER): ICD-10-CM

## 2023-11-13 DIAGNOSIS — E55.9 VITAMIN D DEFICIENCY: ICD-10-CM

## 2023-11-13 DIAGNOSIS — M32.9 SYSTEMIC LUPUS ERYTHEMATOSUS, UNSPECIFIED SLE TYPE, UNSPECIFIED ORGAN INVOLVEMENT STATUS (HCC): Primary | ICD-10-CM

## 2023-11-13 RX ORDER — ERGOCALCIFEROL 1.25 MG/1
50000 CAPSULE ORAL WEEKLY
Qty: 12 CAPSULE | Refills: 0 | Status: SHIPPED | OUTPATIENT
Start: 2023-11-13 | End: 2024-02-05

## 2023-11-15 ENCOUNTER — PATIENT MESSAGE (OUTPATIENT)
Dept: FAMILY MEDICINE CLINIC | Facility: CLINIC | Age: 43
End: 2023-11-15

## 2023-11-15 DIAGNOSIS — R79.89 HIGH SERUM BICARBONATE: Primary | ICD-10-CM

## 2023-11-16 DIAGNOSIS — E03.9 ACQUIRED HYPOTHYROIDISM: ICD-10-CM

## 2023-11-16 NOTE — TELEPHONE ENCOUNTER
CO2  21.0 - 32.0 mmol/L 33.0 High      Azra Shirley, DO  11/13/2023  8:39 AM CST  Please let patient know that CO2 was higher compared to before kidney functions normal.  Needs to discuss with PCP. CBC grossly normal, complements improved. Inflammatory markers are normal for the first time in some time. Her vitamin D is still low, needs Rx for once weekly x12 weeks and repeat vitamin D thereafter. No necessary change in immunosuppression at this time. Please have her repeat same set of labs prior to appointment in January [address], DO  [facility] Rheumatology  11/13/2023     F/u OV? Please advise. Thank you.

## 2023-11-17 ENCOUNTER — HOSPITAL ENCOUNTER (OUTPATIENT)
Dept: ULTRASOUND IMAGING | Age: 43
Discharge: HOME | End: 2023-11-17
Attending: FAMILY MEDICINE
Payer: COMMERCIAL

## 2023-11-17 ENCOUNTER — HOSPITAL ENCOUNTER (OUTPATIENT)
Dept: CV DIAGNOSTICS | Age: 43
Discharge: HOME | End: 2023-11-17
Attending: FAMILY MEDICINE
Payer: COMMERCIAL

## 2023-11-17 DIAGNOSIS — R74.01 ELEVATED TRANSAMINASE LEVEL: ICD-10-CM

## 2023-11-17 PROCEDURE — 93306 TTE W/DOPPLER COMPLETE: CPT | Performed by: FAMILY MEDICINE

## 2023-11-17 PROCEDURE — 76981 USE PARENCHYMA: CPT | Performed by: FAMILY MEDICINE

## 2023-11-17 PROCEDURE — 76705 ECHO EXAM OF ABDOMEN: CPT | Performed by: FAMILY MEDICINE

## 2023-11-17 RX ORDER — OMEPRAZOLE 40 MG/1
40 CAPSULE, DELAYED RELEASE ORAL DAILY
Qty: 90 CAPSULE | Refills: 0 | Status: SHIPPED | OUTPATIENT
Start: 2023-11-17

## 2023-11-17 RX ORDER — ROSUVASTATIN CALCIUM 10 MG/1
10 TABLET, COATED ORAL NIGHTLY
Qty: 30 TABLET | Refills: 0 | Status: SHIPPED | OUTPATIENT
Start: 2023-11-17

## 2023-11-17 RX ORDER — VENLAFAXINE HYDROCHLORIDE 150 MG/1
CAPSULE, EXTENDED RELEASE ORAL
Qty: 90 CAPSULE | Refills: 0 | Status: SHIPPED | OUTPATIENT
Start: 2023-11-17

## 2023-11-17 NOTE — TELEPHONE ENCOUNTER
LOV 10/20/2023    LAST LAB    LAST RX 23 90*0    Next OV   Future Appointments   Date Time Provider Cole Cardozoi   2023  3:15 PM PF CARD STRESS ECHO RM 1 PF CARD [place]   2023  2:00 PM Golda Apgar, APRN [facility] OB[facility]N O [facility] [place]   2024  2:45 PM Thompson Villarreal DO [facility] [place]   2024  2:00 PM Robert North PA-C [facility] [address]       PROTOCOL  Name from pharmacy: VENLAFAXINE ER 150MG CAPSULES          Will file in chart as: VENLAFAXINE  MG Oral Capsule SR 24 Hr    Sig: TAKE 1 CAPSULE(150 MG) BY MOUTH DAILY    Disp: 90 capsule    Refills: 0 (Pharmacy requested: Not specified)    Start: 2023    Class: Normal    Non-formulary For: PTSD (post-traumatic stress disorder)    Last ordered: 4 months ago (2023) by Maria D Holden MD    Last refill: 2023    Rx #: [id number]       To be filled at: [address], [phone], [phone]

## 2023-11-17 NOTE — TELEPHONE ENCOUNTER
Refilled #90, needs to schedule a f/u for further refills, her last appointment was for an annual physical.

## 2023-11-18 RX ORDER — ROSUVASTATIN CALCIUM 10 MG/1
10 TABLET, COATED ORAL NIGHTLY
Qty: 90 TABLET | Refills: 0 | OUTPATIENT
Start: 2023-11-18

## 2023-11-18 RX ORDER — LEVOTHYROXINE SODIUM 0.2 MG/1
200 TABLET ORAL
Qty: 90 TABLET | Refills: 0 | OUTPATIENT
Start: 2023-11-18

## 2023-11-24 ENCOUNTER — APPOINTMENT (OUTPATIENT)
Dept: HEMATOLOGY/ONCOLOGY | Facility: HOSPITAL | Age: 43
End: 2023-11-24
Attending: INTERNAL MEDICINE
Payer: COMMERCIAL

## 2023-12-05 NOTE — TELEPHONE ENCOUNTER
Requesting   Requested Prescriptions     Pending Prescriptions Disp Refills    METFORMIN  MG Oral Tablet 24 Hr [Pharmacy Med Name: METFORMIN ER 750MG 24HR TABS] 90 tablet 0     Sig: TAKE 1 TABLET(750 MG) BY MOUTH DAILY       LOV: 8/21/23  RTC: not noted  Filled: 8/21/23 #90 with 0 refills    Future Appointments   Date Time Provider Cole Parra   12/7/2023  2:00 PM WILLIE Draper EM[facility] OB[facility]N O [facility] [place]   1/26/2024  2:45 PM Michael Foreman DO EMGAUBREY [facility] [place]   2/23/2024  2:00 PM Denise Alvarenga PA-C EM[facility]I EM[facility] [address]

## 2023-12-06 RX ORDER — METFORMIN HYDROCHLORIDE 750 MG/1
750 TABLET, EXTENDED RELEASE ORAL DAILY
Qty: 90 TABLET | Refills: 0 | Status: SHIPPED | OUTPATIENT
Start: 2023-12-06

## 2023-12-07 ENCOUNTER — OFFICE VISIT (OUTPATIENT)
Dept: OBGYN CLINIC | Facility: CLINIC | Age: 43
End: 2023-12-07
Payer: COMMERCIAL

## 2023-12-07 VITALS
HEIGHT: 62 IN | HEART RATE: 78 BPM | SYSTOLIC BLOOD PRESSURE: 112 MMHG | BODY MASS INDEX: 41.79 KG/M2 | WEIGHT: 227.13 LBS | DIASTOLIC BLOOD PRESSURE: 68 MMHG

## 2023-12-07 DIAGNOSIS — K59.00 CONSTIPATION, UNSPECIFIED CONSTIPATION TYPE: ICD-10-CM

## 2023-12-07 DIAGNOSIS — R39.15 URINARY URGENCY: Primary | ICD-10-CM

## 2023-12-07 DIAGNOSIS — N89.8 VAGINAL DISCHARGE: ICD-10-CM

## 2023-12-07 DIAGNOSIS — N93.0 POSTCOITAL BLEEDING: ICD-10-CM

## 2023-12-07 PROCEDURE — 3074F SYST BP LT 130 MM HG: CPT | Performed by: NURSE PRACTITIONER

## 2023-12-07 PROCEDURE — 3008F BODY MASS INDEX DOCD: CPT | Performed by: NURSE PRACTITIONER

## 2023-12-07 PROCEDURE — 87086 URINE CULTURE/COLONY COUNT: CPT | Performed by: NURSE PRACTITIONER

## 2023-12-07 PROCEDURE — 3078F DIAST BP <80 MM HG: CPT | Performed by: NURSE PRACTITIONER

## 2023-12-07 PROCEDURE — 99214 OFFICE O/P EST MOD 30 MIN: CPT | Performed by: NURSE PRACTITIONER

## 2023-12-07 RX ORDER — METHYLPREDNISOLONE 4 MG/1
4 TABLET ORAL AS DIRECTED
COMMUNITY
Start: 2023-12-05

## 2023-12-07 RX ORDER — MELOXICAM 7.5 MG/1
7.5 TABLET ORAL DAILY
COMMUNITY
Start: 2023-12-01

## 2023-12-07 RX ORDER — DOXYCYCLINE HYCLATE 100 MG/1
CAPSULE ORAL
COMMUNITY
Start: 2023-11-01

## 2023-12-07 NOTE — PROGRESS NOTES
Subjective:  41year old [facility] Complaint   Patient presents with    Vaginal Bleeding     Bleeding with intercourse, urinary urgency     Urinary urgency middle of Oct  Symptoms have improved  Denies fever, chills, body aches and hematuria  Intermittent burning on urination    Pt also notes that she is having postcoital bleeding  Had previously discussed with Dr. Gerry Ford at 8/11/2023 visit  A Vikor swab was completed and pt was prescribed clindamycin   Pt took all of the medication  Feels that it helped slightly  Also notes some vaginal discharge  No odor    Also with concerns of constipation  Stool is soft  Requesting referral to pelvic floor PT    Review of Systems:  Pertinent items are noted in the HPI. Objective:  /68   Pulse 78   Ht 62\"   Wt 227 lb 2 oz (103 kg)   LMP 11/09/2023 (Exact Date)       Physical Examination:  General appearance: Well dressed, well nourished in no apparent distress  Neurologic/Psychiatric: Alert and oriented to person, place and time, mood normal, affect appropriate  Abdomen: Soft, non-tender, non-distended, no masses, no hepatosplenomegaly, no hernias, no inguinal lymphadenopathy  Pelvic:    External genitalia- Normal, Bartholin's, urethra, skeins glands normal   Vagina- No vaginal lesions, physiologic discharge   Cervix- No lesions, long/closed, no cervical motion tenderness     Assessment/Plan:      Diagnoses and all orders for this visit:    Urinary urgency  -     Urine Culture, Routine;  Future  -     Pelvic Floor Therapy - [facility] Location  - will treat based on culture results  - avoid bladder irritants  - if negative, may also consider urology    Vaginal discharge  -     VIKOR VAGINAL ID; Future  - will treat based on culture results  - to follow up with new/worsening symptoms or no improvement    Constipation, unspecified constipation type  -     Pelvic Floor Therapy - [facility] Location  - continue good bowel program and keeping stool soft    Postcoital Tylenol if needed. bleeding  -     VIKOR VAGINAL ID; Future  - last pap 8/2022 - normal  - will treat based on vikor results  - can also try lubricant  - to follow up with new/worsening symptoms or no improvement        Return if symptoms worsen or fail to improve.

## 2023-12-11 ENCOUNTER — TELEPHONE (OUTPATIENT)
Dept: OBGYN CLINIC | Facility: CLINIC | Age: 43
End: 2023-12-11

## 2023-12-12 ENCOUNTER — TELEPHONE (OUTPATIENT)
Dept: FAMILY MEDICINE CLINIC | Facility: CLINIC | Age: 43
End: 2023-12-12

## 2023-12-12 DIAGNOSIS — R74.01 ELEVATED TRANSAMINASE LEVEL: Primary | ICD-10-CM

## 2023-12-12 RX ORDER — FLUCONAZOLE 150 MG/1
TABLET ORAL
Qty: 3 TABLET | Refills: 0 | Status: SHIPPED | OUTPATIENT
Start: 2023-12-12

## 2023-12-12 RX ORDER — METRONIDAZOLE 500 MG/1
500 TABLET ORAL 2 TIMES DAILY
Qty: 14 TABLET | Refills: 0 | Status: SHIPPED | OUTPATIENT
Start: 2023-12-12 | End: 2023-12-19

## 2023-12-12 NOTE — TELEPHONE ENCOUNTER
Pt called stating in regard to her Ultasound results form 11-17-23. She was told the following \"Liver elastography shows moderate fibrosis please refer to hepatologist Karly Tarango. (copied & pasted in from under the results). She called that office to schedule. They told her they need a referral & diagnosis sent to them before she can be seen.

## 2024-01-14 ENCOUNTER — TELEPHONE (OUTPATIENT)
Dept: INTERNAL MEDICINE CLINIC | Facility: CLINIC | Age: 44
End: 2024-01-14

## 2024-01-26 ENCOUNTER — OFFICE VISIT (OUTPATIENT)
Dept: RHEUMATOLOGY | Facility: CLINIC | Age: 44
End: 2024-01-26

## 2024-01-26 VITALS
HEART RATE: 73 BPM | WEIGHT: 235.81 LBS | HEIGHT: 62 IN | DIASTOLIC BLOOD PRESSURE: 62 MMHG | SYSTOLIC BLOOD PRESSURE: 112 MMHG | BODY MASS INDEX: 43.39 KG/M2 | TEMPERATURE: 97 F | OXYGEN SATURATION: 98 % | RESPIRATION RATE: 16 BRPM

## 2024-01-26 DIAGNOSIS — K74.00 LIVER FIBROSIS: Primary | ICD-10-CM

## 2024-01-26 DIAGNOSIS — M32.9 SYSTEMIC LUPUS ERYTHEMATOSUS, UNSPECIFIED SLE TYPE, UNSPECIFIED ORGAN INVOLVEMENT STATUS (HCC): ICD-10-CM

## 2024-01-26 DIAGNOSIS — M79.7 FIBROMYALGIA: ICD-10-CM

## 2024-01-26 DIAGNOSIS — R76.8 POSITIVE ANA (ANTINUCLEAR ANTIBODY): ICD-10-CM

## 2024-01-26 DIAGNOSIS — R53.82 CHRONIC FATIGUE: ICD-10-CM

## 2024-01-26 DIAGNOSIS — R79.82 CRP ELEVATED: ICD-10-CM

## 2024-01-26 DIAGNOSIS — E55.9 VITAMIN D DEFICIENCY: ICD-10-CM

## 2024-01-26 DIAGNOSIS — R76.8 DS DNA ANTIBODY POSITIVE: ICD-10-CM

## 2024-01-26 DIAGNOSIS — Z79.899 LONG-TERM USE OF HYDROXYCHLOROQUINE: ICD-10-CM

## 2024-01-26 PROCEDURE — 99214 OFFICE O/P EST MOD 30 MIN: CPT | Performed by: INTERNAL MEDICINE

## 2024-01-26 RX ORDER — HYDROXYCHLOROQUINE SULFATE 200 MG/1
200 TABLET, FILM COATED ORAL 2 TIMES DAILY
Qty: 180 TABLET | Refills: 1 | Status: SHIPPED | OUTPATIENT
Start: 2024-01-26

## 2024-01-26 NOTE — PROGRESS NOTES
RHEUMATOLOGY Follow up   Date of visit: 01/26/2024  ?  Chief Complaint   Patient presents with    Follow - Up     LOV 10/24/2023. Pt feels about the same minus the rashes and no mouth blisters. She states she does have pain in her knees 5/10 and back 7/10. She also has been having pain/swelling in both hands 5/10. Pt has not had an eye exam within the last year. She has concerns about her liver ultrasound that shows moderate fibrosis and would like to discuss this.      ASSESSMENT, DISCUSSION & PLAN   Assessment:  1. Liver fibrosis    2. Vitamin D deficiency    3. Positive MODE (antinuclear antibody)    4. Systemic lupus erythematosus, unspecified SLE type, unspecified organ involvement status (HCC)    5. Fibromyalgia    6. Chronic fatigue    7. Long-term use of hydroxychloroquine    8. CRP elevated    9. Ds DNA antibody positive        Discussion:  Ms. Nola De Paz is a 44 yo woman with complicated past medical history of obesity, depression/anxiety/PTSD, sleep apnea who is suffering from diffuse pain and fatigue.  At this time, it is unclear the etiology of her symptoms, I suspect that she has fibromyalgia however this does not explain the elevations in her inflammatory markers. Unfortunately, pt did suffer from pneumonia which seems to have cleared on repeat CT chest imaging. Unclear if recent elevations in inflammatory markers related to recent infection. Unclear if steroids truly made difference in inflammation or issues with lungs.     Her exam was more consistent with diffuse tender point positivity found in fibromyalgia.  However VISE testing showed MODE positivity and equivocal RF IgA as well as equivocal dsDNA which was negative on confirmatory testing.  Patient was started on Plaquenil and noticed maybe slight improvement of the stiffness and fatigue as well. She has also had bariatric surgery and has been doing well overall since her significant weight loss.    At prior visit, she had worsened symptoms  with fatigue, joint pain and myalgias. She also had recurrence of dsdna, rnp and smith positivity. She restarted plaquenil and has been feeling much better overall.  She admits to only taking once daily and forgetting the second dose most days.     At there last visit, she had worsened symptoms such as dizziness, chest pain, shortness of breath, and other symptoms. Unlikely lupus related given DOYLE panel negative and inflammatory markers/complements stable/improved.   She feels better overall since adjusting her diet and getting iron infusions.  Strongly recommended that she continue to monitor her intake particularly hydration and other foods.     Will have her get updated labs and depending on these results  Encouraged her to try to increase the plaquenil to be more weight based dosing.   She had liver testing showing fibrosis, recommended hepatology eval which she has plans for. Given MODE positivity, I added additional testing for autoimmune hepatitis in preparation for her visit with them.   She will look into LDN (low dose naltrexone) to be added to her regimen for the fibro. Will wait until hepatology workup/clearance to start med first.     LDN refers to daily dosages of naltrexone that are approximately 1/10th of the typical opioid addiction treatment dosage. In most published research, the daily dosage is 4.5 mg, though the dosage can vary a few milligrams below or above that common value. At the low dosage level, naltrexone exhibits paradoxical properties, including analgesia and anti-inflammatory actions, which have not been reported at larger dosages.  Side effects are thought to be mild but may include headaches, vivid dreams/nightmares, anxiety, tachycardia, etc. There is less concern for hepatic issues with the low doses of naltrexone compared to higher dose.   Low dose naltrexone uptitration: Week 1-2: 1.5 mg daily. Week 3-4: 3.0 mg daily. Week 5 onward: 4.5 mg daily.    Okay for pt to follow up in 6  months or sooner as needed   Encouraged to keep me updated in the meantime with symptoms.     Patient verbalized understanding of above instructions. No further questions at this time.    Code selection for this visit was based on time spent (30min) on date of service in preparing to see the patient, obtaining and/or reviewing separately obtained history, performing a medically appropriate examination, counseling and educating the patient/family/caregiver, ordering medications or testing, referring and communicating with other healthcare providers, documenting clinical information in the E HR, independently interpreting results and communicating results to the patient/family/caregiver and care coordination with the patient's other providers.  Additional time spent addressing concerns via [employer] leading up to today's visit.       ?  Plan:  Diagnoses and all orders for this visit:    Liver fibrosis  -     Actin (Smooth Muscle) Antibody; Future  -     Liver-Kidney Microsomal Ab; Future  -     Mitochondrial (M2) Antibody; Future    Vitamin D deficiency    Positive MODE (antinuclear antibody)  -     Actin (Smooth Muscle) Antibody; Future  -     Liver-Kidney Microsomal Ab; Future  -     Mitochondrial (M2) Antibody; Future    Systemic lupus erythematosus, unspecified SLE type, unspecified organ involvement status (HCC)  -     hydroxychloroquine 200 MG Oral Tab; Take 1 tablet (200 mg total) by mouth 2 (two) times daily.    Fibromyalgia    Chronic fatigue    Long-term use of hydroxychloroquine    CRP elevated  -     hydroxychloroquine 200 MG Oral Tab; Take 1 tablet (200 mg total) by mouth 2 (two) times daily.    Ds DNA antibody positive  -     hydroxychloroquine 200 MG Oral Tab; Take 1 tablet (200 mg total) by mouth 2 (two) times daily.            Return in about 6 months (around 7/26/2024).  ?  HPI   Nola REINOSO Baldev is a 43 year old female with the following active problems who is seen for medically necessary follow-up today.  She was seen as a new patient virtually initially years ago for evaluation of diffuse pain and elevated CRP. She does have hx of depression/anxiety/PTSD and there was concern for fibromyalgia. Was dx with lupus based off labs and started on plaquenil. She presents for follow up today.     Since her last visit, she has been feeling about the same as last time  Still with low energy, stable  No recent rashes or blisters like before (was getting fevers, resolved)     Prior episodes of dizziness, weakness, chest pain and some sob aren't happening as frequently. Happens about 1-2x/month and lasts for only a few minutes at a time.   Is monitoring her diet and trying to get sufficient protein.   + weight gain since her last visit. Had to go off mounjaro due to cost went from under 200 to now 235#. Having trouble with insurance covering cost. Is on vyvanse and metformin now to see if this will help. Feels vyvanse is helping more with her attention and possible energy in the mornings when first started.     Pain stable/okay. Some worsened symptoms with weather changes recently- felt primarily in the hands, knees, lower back.   Has had some swelling in the hands.   States she is taking plaquenil once daily and often forgets the second dose and was getting some upset stomach.   Is taking bariatric MV.    Still with dry eyes and mild intermittent dry mouth. Is using OTC drops for the eyes- Systane- using up to 4x/day Seen by eye drop. Did not recommend plugs or prescription drops.     HS has been getting worse in the pannus area/pubic region. Following with dermatology- did round of abx which helped.     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       HPI from initial consultation  referred for rheumatologic evaluation due to diffuse pain and fatigue.      States she has been suffering for awhile. States she reports a generalized fatigue and overall not well feeling. She did have labs drawn last summer with elevations in her  CRP.   She admits to sore muscle easily even with minimal activity or with stress. States can have difficulty moving by the end of most days. Denies specific location, stating it affects her all over. Does feel slightly worsened around her hips and thigh muscles.   + significant fatigue. Recently diagnosed with sleep apnea, has since been using a CPAP but states sleep has significantly improved. Does not nap during the day. Still wakes up feeling tired still.   + admits to pain in her back diffusely, worst over her lower back and neck. Neck being affected more recently. Back/neck more consistent. Also has pain in her hips and knees. Right shoulder pain intermittent.      Feels like pain changes on a day to day basis. Expresses that she feels like she's not functioning at a 100% due to feeling unwell in general.   Can have nausea, headaches intermittently.      Does have hx of depression, anxiety and PTSD from an event last summer. Does feel like symptoms are better controlled recently- had Effexor dose increased about 3 months.      Denies overt swelling but states she has sensation of swelling with tightness/stiffness of her hands, neck and back.      Can get discoloration of fingertips to purple/blue with cold exposure      Was recently admitted to hospital for pain and discomfort. COVID19 testing was negative. Told that she had early signs of arthritis of her neck. Had PE ruled out as well.      + reflux, improved with medication   + intermittent constipation but thought related to medication   + hx of plantar fasciitis, with difficulty walking in the morning. Does wear inserts in her shoes. No formal workup for this.   + fevers per pt more often than should be usual (as high as 99.9 or less; baseline temp per pt is 96-97)  + feels lymph node enlargement in her neck frequently  + frequent sinus infections without epistaxis   + recent numbness/tingling in fingers and toes about one month ago, attributed to Vyvanse  prescription.      For pain, she takes ibuprofen prn as well as heat pack with some relief. Has tried stretching which can help at times but then pain returns.      The patient denies hair loss, oral or nasal ulcers, photosensitive rash, elevated or scarring rashes, prior hematologic abnormality, prior renal or liver disease, or history of seizures.  No history of prior blood clot in the legs or lungs, strokes or ischemic phenomenon. Never been pregnant.   Denies nonhealing ulcers on the fingertips or trouble swallowing.  The patient denies any history of uveitis, crampy abdominal pain, diarrhea, bloody stools, Achilles heel pain, psoriatic lesions, spooning or pitting of the nails but + ridges.  There are no symptoms of severe dry eyes or dry mouth..  No chills, night sweats, unexpected weight loss, easy bruising bleeding, or unexplained weakness.  Denies chronic cough or hemoptysis.   Denies obvious blood in urine.      Family hx:   Denies known hx of autoimmune disease, no known lupus, RA, or psoriasis   Thinks father has some sort of inflammatory skin disease.   ?  Past Medical History:  Past Medical History:   Diagnosis Date    Anxiety     Back problem     DDD    Blood disorder     anemia    Depression     Disorder of thyroid     Esophageal reflux     Fibromyalgia     High cholesterol     Hyperlipidemia     Hypothyroidism     IBS (irritable bowel syndrome)     Migraines     Obesity     BLESSING (obstructive sleep apnea) 8/23/19 PSG    AHI 19 REM AHI 57 Supine AHI 46 non-supine AHI 11 [place] 71%    Osteoarthritis     Pneumonia due to organism     Shortness of breath     Sleep apnea     cpap    Thyroid disease      Past Surgical History:  Past Surgical History:   Procedure Laterality Date    COLONOSCOPY N/A 8/28/2020    Procedure: COLONOSCOPY;  Surgeon: Tone Steiner MD;  Location:  ENDOSCOPY    OTHER SURGICAL HISTORY  12/21/2020    gastric sleeve    REMOVAL GALLBLADDER  2013    [facility]  - INTERNAL  12/21/2020    WISDOM TEETH REMOVED  2013     Family History:  Family History   Problem Relation Age of Onset    Heart Disease Mother     High Cholesterol Mother     Other (DDD) Mother     Hypertension Father     Other (lymphoma) Maternal Grandmother     Other (cancer) Paternal Grandmother         liver     Prostate Cancer Paternal Grandfather         in 80s     Social History:  Social History     Socioeconomic History    Marital status: Single   Tobacco Use    Smoking status: Never    Smokeless tobacco: Never   Vaping Use    Vaping Use: Never used   Substance and Sexual Activity    Alcohol use: Yes     Comment: 1-2 a month    Drug use: Not Currently    Sexual activity: Yes     Partners: Male     Birth control/protection: Condom   Other Topics Concern    Caffeine Concern Yes     Comment: 1 caff. drink daily     Exercise Yes     Comment: Stretching 3-5x weekly 15-30mins, Yoga 2x weekly 30-60 mins    Seat Belt Yes    Self-Exams Yes   Social History Narrative    Works as LPN.     Medications:  Outpatient Medications Marked as Taking for the 1/26/24 encounter (Office Visit) with Azra Shirley DO   Medication Sig Dispense Refill    hydroxychloroquine 200 MG Oral Tab Take 1 tablet (200 mg total) by mouth 2 (two) times daily. 180 tablet 1    Meloxicam 7.5 MG Oral Tab Take 1 tablet (7.5 mg total) by mouth daily.      metFORMIN  MG Oral Tablet 24 Hr Take 1 tablet (750 mg total) by mouth daily. 90 tablet 0    OMEPRAZOLE 40 MG Oral Capsule Delayed Release TAKE 1 CAPSULE(40 MG) BY MOUTH DAILY 90 capsule 0    rosuvastatin 10 MG Oral Tab TAKE 1 TABLET(10 MG) BY MOUTH EVERY NIGHT 30 tablet 0    VENLAFAXINE  MG Oral Capsule SR 24 Hr TAKE 1 CAPSULE(150 MG) BY MOUTH DAILY 90 capsule 0    ergocalciferol 1.25 MG (56996 UT) Oral Cap Take 1 capsule (50,000 Units total) by mouth once a week. 12 capsule 0    levothyroxine 175 MCG Oral Tab Take 1 tablet (175 mcg total) by mouth before breakfast. 90 tablet 2     cyclobenzaprine 10 MG Oral Tab Take 1 tablet (10 mg total) by mouth nightly as needed for Muscle spasms. 30 tablet 0    traZODone 50 MG Oral Tab Take 1 tablet (50 mg total) by mouth nightly.      clindamycin 1 % External Lotion APPLY TOPICALLY TO THE AFFECTED AREA EVERY DAY BEFORE NOON      ezetimibe 10 MG Oral Tab Take 1 tablet (10 mg total) by mouth daily. 90 tablet 1    LORazepam 0.5 MG Oral Tab Take 1 tablet (0.5 mg total) by mouth 2 (two) times daily as needed. 40 tablet 0    fexofenadine 180 MG Oral Tab Take 1 tablet (180 mg total) by mouth daily.      Multiple Vitamin (MULTIVITAMIN ADULT OR) Apply topically.      ondansetron 4 MG Oral Tablet Dispersible Take 1 tablet (4 mg total) by mouth every 8 (eight) hours as needed for Nausea. 30 tablet 0    Nystatin 984868 UNIT/GM External Powder Apply 1 Application topically 4 (four) times daily. 1 Bottle 0    clotrimazole-betamethasone 1-0.05 % External Cream Apply 1 Application topically 2 (two) times daily as needed (rash). 60 g 3    Albuterol Sulfate HFA (PROAIR HFA) 108 (90 Base) MCG/ACT Inhalation Aero Soln Inhale 2 puffs into the lungs every 4 (four) hours as needed for Wheezing or Shortness of Breath. 1 Inhaler 3    Triamcinolone Acetonide 55 MCG/ACT Nasal Aerosol by Nasal route daily as needed.       Modified Medications    Modified Medication Previous Medication    HYDROXYCHLOROQUINE 200 MG ORAL TAB hydroxychloroquine 200 MG Oral Tab       Take 1 tablet (200 mg total) by mouth 2 (two) times daily.    Take 1 tablet (200 mg total) by mouth daily.     Medications Discontinued During This Encounter   Medication Reason    doxycycline 100 MG Oral Cap     fluconazole (DIFLUCAN) 150 MG Oral Tab     levothyroxine 200 MCG Oral Tab     methylPREDNISolone 4 MG Oral Tablet Therapy Pack     Tirzepatide-Weight Management (ZEPBOUND) 2.5 MG/0.5ML Subcutaneous Solution Auto-injector     hydroxychloroquine 200 MG Oral Tab      ?  ?  Allergies:  Allergies   Allergen Reactions     Penicillins HIVES and RASH     ?  REVIEW OF SYSTEMS   ?  Review of Systems   Constitutional:  Positive for malaise/fatigue. Negative for chills and fever.   HENT:  Positive for congestion. Negative for hearing loss, nosebleeds and tinnitus.    Eyes:  Negative for blurred vision, double vision, pain and redness.   Respiratory:  Negative for cough, hemoptysis and shortness of breath.    Cardiovascular:  Positive for leg swelling. Negative for chest pain and palpitations.   Gastrointestinal:  Positive for heartburn. Negative for abdominal pain, blood in stool, diarrhea and nausea.   Genitourinary:  Negative for dysuria, frequency, hematuria and urgency.   Musculoskeletal:  Positive for back pain, joint pain, myalgias and neck pain.   Skin:  Negative for itching and rash.   Neurological:  Positive for dizziness (increased intermittently). Negative for tingling, seizures, weakness and headaches.   Endo/Heme/Allergies:  Bruises/bleeds easily.   Psychiatric/Behavioral:  Negative for depression. The patient is nervous/anxious and has insomnia.      PHYSICAL EXAM   Today's Vitals:  Temperature Blood Pressure Heart Rate Resp Rate SpO2   Temp: 97.4 °F (36.3 °C) BP: 112/62 Pulse: 73 Resp: 16 SpO2: 98 %   ?  Current Weight Height BMI BSA Pain   Wt Readings from Last 1 Encounters:   01/26/24 235 lb 12.8 oz (107 kg)    Height: 5' 2\" (157.5 cm) Body mass index is 43.13 kg/m². Body surface area is 2.05 meters squared.         Physical Exam  Vitals and nursing note reviewed.   Constitutional:       General: She is not in acute distress.     Appearance: She is well-developed. She is obese. She is not diaphoretic.   HENT:      Head: Normocephalic and atraumatic.   Eyes:      General: No scleral icterus.     Extraocular Movements: Extraocular movements intact.      Conjunctiva/sclera: Conjunctivae normal.   Neck:      Vascular: No JVD.      Trachea: No tracheal deviation.   Cardiovascular:      Rate and Rhythm: Normal rate and regular  rhythm.   Pulmonary:      Effort: Pulmonary effort is normal. No respiratory distress.      Breath sounds: Normal breath sounds. No wheezing.   Musculoskeletal:         General: Tenderness present. No swelling.      Cervical back: Neck supple.      Comments: No evidence of heberden or mayra nodes of any of the fingers, no basilar joint tenderness of the 1st CMC bilaterally.  Tenderness diffusely - improved; now over ankles and knees  No swelling, redness or restriction of motion of the DIPs, PIPs, MCPs, wrists, elbows, ankles, or joints of the feet.  Bilateral shoulders with full ROM.  Bilateral knees with medial joint line tenderness, no crepitus, no effusion.    (prior fibro exam)  Posterior Tender Point Exam  Occiput -/-  Trapezius +/+  Supraspinatus +/+  Gluteal deferred   Greater trochanter +/+  Anterior Tender Point Exam:  Low cervical +/+  Second rib +/+  Lateral epicondyle +/+  Knee +/+  14 tender points positive   Lymphadenopathy:      Cervical: No cervical adenopathy.   Skin:     General: Skin is warm and dry.      Findings: No erythema or rash.   Neurological:      Mental Status: She is alert and oriented to person, place, and time.      Cranial Nerves: No cranial nerve deficit.      Gait: Gait normal.   Psychiatric:         Mood and Affect: Mood normal.         Behavior: Behavior normal.       ?  Radiology review:     PROCEDURE:  CT CHEST PE AORTA (IV ONLY) (CPT=71260)     COMPARISON:  [place] , CT, CT ANGIOGRAPHY, CHEST (CPT=71275), 5/18/2020, 9:21 AM.  [place], CT, CT ANGIOGRAPHY, CHEST (CPT=71275), 5/27/2020, 2:48 PM.     INDICATIONS:  shortness of breath, denies fever or known contact with COVID. Recent pneumonia diagnosis     TECHNIQUE:  CT images were obtained with non-ionic intravenous contrast material. Dose reduction techniques were used. Dose information is transmitted to the ACR (American College of Radiology) NRDR (National Radiology Data Registry) which includes the   Dose Index  Registry.     PATIENT STATED HISTORY:(As transcribed by Technologist)  HENNY      CONTRAST USED:  100cc of Omnipaque 350     FINDINGS:       Preliminary reading provided by radiologist from [facility].      VASCULATURE:  Negative for acute pulmonary embolism.  No filling defects are seen centrally or peripherally within the pulmonary arterial system.     LUNGS/PLEURA:  Ground-glass opacities are present in the superior segment left lower lobe, left upper lobe, basal segments left lower lobe, minimal and subtle in the basal right lower lobe with sparing of the right upper lobe and middle lobe of this   process.  No pleural effusion or pneumothorax.  These changes have developed since the most recent CT scan of the chest from 5/18/2020.     THORACIC AORTA:  No thoracic aortic aneurysm.      MEDIASTINUM/LAURA:  No pathologically enlarged nodes.     CARDIAC:  Unremarkable.     CHEST WALL:  Unremarkable.     LIMITED ABDOMEN:  No acute process seen.     BONES:  No acute process seen.     OTHER:  None.        CONCLUSION:  Recurrent ground-glass opacities in the lung bilateral, suspicious for atypical including viral pneumonia, with other etiologies possible.  Negative for acute pulmonary embolism.      Dictated by: Jarred Manzo MD on 6/22/2020 at 6:16 AM     CONCLUSION:  Minimal osteoarthritic changes are likely present at the sacroiliac joints bilaterally.  If clinical symptoms persist then consider MRI.       Dictated by: Andrew Romero MD on 6/17/2020 at 7:07 PM      CONCLUSION:  Overall mild degenerative changes of the lumbar spine are present.  If clinical symptoms persist then recommend MRI.        Dictated by: Andrew Romero MD on 6/17/2020 at 7:06 PM       CONCLUSION:  Overall mild degenerative changes of the thoracic spine are present.  If clinical symptoms persist then recommend MRI.        Dictated by: Andrew Romero MD on 6/17/2020 at 7:05 PM         Labs:  Lab Results   Component Value Date    WBC 5.9 11/11/2023     RBC 4.28 11/11/2023    HGB 13.4 11/11/2023    HCT 40.5 11/11/2023    .0 11/11/2023    MPV 10.7 01/17/2013    MCV 94.6 11/11/2023    MCH 31.3 11/11/2023    MCHC 33.1 11/11/2023    RDW 11.6 11/11/2023    NEPRELIM 2.27 11/11/2023    NEPERCENT 38.3 11/11/2023    LYPERCENT 35.9 11/11/2023    MOPERCENT 8.9 11/11/2023    EOPERCENT 15.8 11/11/2023    BAPERCENT 0.8 11/11/2023    NE 2.27 11/11/2023    LYMABS 2.13 11/11/2023    MOABSO 0.53 11/11/2023    EOABSO 0.94 (H) 11/11/2023    BAABSO 0.05 11/11/2023     Lab Results   Component Value Date    GLU 87 11/11/2023    BUN 15 11/11/2023    BUNCREA 27.0 (H) 06/22/2021    CREATSERUM 0.63 11/11/2023    ANIONGAP 0 11/11/2023    GFRNAA 100 07/16/2022    GFRAA 115 07/16/2022    CA 8.9 11/11/2023    OSMOCALC 296 (H) 11/11/2023    ALKPHO 85 11/11/2023    AST 33 11/11/2023    ALT 40 11/11/2023    BILT 0.6 11/11/2023    TP 7.1 11/11/2023    ALB 3.4 11/11/2023    GLOBULIN 3.7 11/11/2023    AGRATIO 1.2 08/08/2020     11/11/2023    K 3.9 11/11/2023     11/11/2023    CO2 33.0 (H) 11/11/2023       Additional Labs:  06/2023  CBC with WBC 5.0; Hgb 12.5; plt 180  CMP grossly normal except K 3.4; AST//193  CINDI 1:160  Dsdna, SSA, SSB, Smith, U1RNP, RNP70, centromere, Scl70, Jo1 negative   CRP normal  ESR 23 borderline  C3 88.9 low  C4 29.7 normal   TSH normal  B12 492  Vit D 53 normal     05/2022  Espinoza 139 (N<100)   (N<100)  dsDNA 178 (N<100)  SSA, SSB, SCL 70, Ellyn 1, centromere, histone negative CINDI by IFA 1: 640 speckled  C4 32.4 normal  C3 111 normal  ESR 27  CRP normal  CMP grossly normal  CBC hemoglobin 11.9; otherwise grossly normal    12/2021  ESR 28  CRP normal  C3 99.4 normal  C4 29.9 normal     11/2021  CINDI positive (no titer provided)  Espinoza 196 (N<100)   (N<100)  Otherwise negative DOYLE panel   CRP normal  ESR 24 normal   C3 111 normal  C4 31.9 normal     04/2021  Espinoza 219 (N<100)   (N<100)  Cindi 1:320 speckled   RF negative  C3 129  normal  C4 38 normal   CRP 0.52  ESR 35    08/2020- [facility]  MODE 1:320 homogenous  DsDNA positive but negative on confirmatory  PsPT IgM borderline positive  RF IgA equivocal  TPO +  Otherwise negative       MODE 1:320 homogenous  DOYLE panel RNP equivocal   Aldolase normal  LDH normal  CK normal  Myositis antibody panel negative   ESR 72 elevated  CRP 2.24 elevated     CRP  04/18/2020 - 5.45  04/17/2020 - 3.68  04/16/2020 - 2.42  04/15/2020 - 2.55 (N<0.3)  08/24/2019 - 18.5 (N<8.0)     04/2020   normal  Ferritin normal   COVID19 negative      08/2019  MODE 1:40 homogenous   CCP negative   RF negative   ESR 46 elevated     Azra DO Cas[place]  [facility]  01/26/2024

## 2024-01-29 DIAGNOSIS — E55.9 VITAMIN D DEFICIENCY: ICD-10-CM

## 2024-01-29 DIAGNOSIS — M54.42 CHRONIC MIDLINE LOW BACK PAIN WITH BILATERAL SCIATICA: ICD-10-CM

## 2024-01-29 DIAGNOSIS — G89.29 CHRONIC MIDLINE LOW BACK PAIN WITH BILATERAL SCIATICA: ICD-10-CM

## 2024-01-29 DIAGNOSIS — M79.7 FIBROMYALGIA: ICD-10-CM

## 2024-01-29 DIAGNOSIS — M54.41 CHRONIC MIDLINE LOW BACK PAIN WITH BILATERAL SCIATICA: ICD-10-CM

## 2024-01-29 RX ORDER — CYCLOBENZAPRINE HCL 10 MG
10 TABLET ORAL NIGHTLY PRN
Qty: 30 TABLET | Refills: 0 | Status: SHIPPED | OUTPATIENT
Start: 2024-01-29

## 2024-01-29 RX ORDER — ROSUVASTATIN CALCIUM 10 MG/1
10 TABLET, COATED ORAL NIGHTLY
Qty: 90 TABLET | Refills: 0 | Status: SHIPPED | OUTPATIENT
Start: 2024-01-29

## 2024-01-29 RX ORDER — ERGOCALCIFEROL 1.25 MG/1
50000 CAPSULE ORAL WEEKLY
Qty: 12 CAPSULE | Refills: 0 | OUTPATIENT
Start: 2024-01-29

## 2024-01-29 NOTE — TELEPHONE ENCOUNTER
Last office visit: 1/26/2024    Next Rheum Apt:8/2/2024 Azra Shirley DO    Last fill: cyclobenzaprine 6/9/2023  30 tabs, 0 refills                Vitamin D   11/13/2023  12 caps, 0 refills   Last vitamin D level 11/11/2023  29.1     Labs:   Lab Results   Component Value Date    CREATSERUM 0.63 11/11/2023    ALKPHO 85 11/11/2023    AST 33 11/11/2023    ALT 40 11/11/2023    BILT 0.6 11/11/2023    TP 7.1 11/11/2023    ALB 3.4 11/11/2023       Lab Results   Component Value Date    WBC 5.9 11/11/2023    HGB 13.4 11/11/2023    .0 11/11/2023    NEPRELIM 2.27 11/11/2023    NEPERCENT 38.3 11/11/2023    LYPERCENT 35.9 11/11/2023    NE 2.27 11/11/2023    LYMABS 2.13 11/11/2023

## 2024-02-06 ENCOUNTER — PATIENT MESSAGE (OUTPATIENT)
Dept: FAMILY MEDICINE CLINIC | Facility: CLINIC | Age: 44
End: 2024-02-06

## 2024-02-06 DIAGNOSIS — M79.7 FIBROMYALGIA: Primary | ICD-10-CM

## 2024-02-06 DIAGNOSIS — D50.0 IRON DEFICIENCY ANEMIA DUE TO CHRONIC BLOOD LOSS: ICD-10-CM

## 2024-02-06 DIAGNOSIS — K74.00 LIVER FIBROSIS: ICD-10-CM

## 2024-02-06 DIAGNOSIS — E66.01 MORBID OBESITY WITH BMI OF 50.0-59.9, ADULT (HCC): ICD-10-CM

## 2024-02-07 DIAGNOSIS — E78.2 MIXED HYPERLIPIDEMIA: ICD-10-CM

## 2024-02-07 RX ORDER — EZETIMIBE 10 MG/1
10 TABLET ORAL DAILY
Qty: 90 TABLET | Refills: 0 | Status: SHIPPED | OUTPATIENT
Start: 2024-02-07

## 2024-02-07 NOTE — TELEPHONE ENCOUNTER
Cholesterol Medication Protocol [id number]/07/2024 12:01 AM   Protocol Details ALT < 80    ALT resulted within past year    Lipid panel within past 12 months    In person appointment or virtual visit in the past 12 mos or appointment in next 3 mos        LOV 10/20/23     LAST LAB  11/11/23    LAST RX  8/12/22 90 with 1     Next OV   Future Appointments   Date Time Provider Department Center   2/16/2024  2:40 PM Julissa Gonzalez MD [facility]   2/23/2024  2:00 PM Solange David PA-C [facility]   8/16/2024 12:00 PM Azra Shirley DO [facility]         PROTOCOL pass

## 2024-02-09 NOTE — TELEPHONE ENCOUNTER
Dr. COHN, patient states insurance changed to HMO, needs referrals pended. Please advise.     LOV: 10/20/23  Future Appointments   Date Time Provider Department Center   2/16/2024  2:40 PM Julissa Gonzalez MD [facility]   2/23/2024  2:00 PM Solange David PA-C [facility]   8/16/2024 12:00 PM Azra Shirley DO [facility] [place]

## 2024-02-10 ENCOUNTER — LAB ENCOUNTER (OUTPATIENT)
Dept: LAB | Facility: REFERENCE LAB | Age: 44
End: 2024-02-10
Attending: INTERNAL MEDICINE
Payer: COMMERCIAL

## 2024-02-10 DIAGNOSIS — E03.9 ACQUIRED HYPOTHYROIDISM: ICD-10-CM

## 2024-02-10 DIAGNOSIS — R79.89 HIGH SERUM BICARBONATE: ICD-10-CM

## 2024-02-10 DIAGNOSIS — D50.9 IRON DEFICIENCY ANEMIA, UNSPECIFIED IRON DEFICIENCY ANEMIA TYPE: ICD-10-CM

## 2024-02-10 DIAGNOSIS — R76.8 POSITIVE ANA (ANTINUCLEAR ANTIBODY): ICD-10-CM

## 2024-02-10 DIAGNOSIS — K74.00 LIVER FIBROSIS: ICD-10-CM

## 2024-02-10 LAB
ANION GAP SERPL CALC-SCNC: 4 MMOL/L (ref 0–18)
BASOPHILS # BLD AUTO: 0.05 X10(3) UL (ref 0–0.2)
BASOPHILS NFR BLD AUTO: 0.9 %
BUN BLD-MCNC: 17 MG/DL (ref 9–23)
BUN/CREAT SERPL: 26.6 (ref 10–20)
CALCIUM BLD-MCNC: 9.4 MG/DL (ref 8.7–10.4)
CHLORIDE SERPL-SCNC: 106 MMOL/L (ref 98–112)
CO2 SERPL-SCNC: 31 MMOL/L (ref 21–32)
CREAT BLD-MCNC: 0.64 MG/DL
DEPRECATED HBV CORE AB SER IA-ACNC: 129.2 NG/ML
DEPRECATED RDW RBC AUTO: 39.4 FL (ref 35.1–46.3)
EGFRCR SERPLBLD CKD-EPI 2021: 114 ML/MIN/1.73M2 (ref 60–?)
EOSINOPHIL # BLD AUTO: 0.68 X10(3) UL (ref 0–0.7)
EOSINOPHIL NFR BLD AUTO: 11.6 %
ERYTHROCYTE [DISTWIDTH] IN BLOOD BY AUTOMATED COUNT: 11.7 % (ref 11–15)
FASTING STATUS PATIENT QL REPORTED: YES
GLUCOSE BLD-MCNC: 81 MG/DL (ref 70–99)
HCT VFR BLD AUTO: 38.2 %
HGB BLD-MCNC: 12.8 G/DL
IMM GRANULOCYTES # BLD AUTO: 0.02 X10(3) UL (ref 0–1)
IMM GRANULOCYTES NFR BLD: 0.3 %
IRON SATN MFR SERPL: 29 %
IRON SERPL-MCNC: 85 UG/DL
LYMPHOCYTES # BLD AUTO: 1.88 X10(3) UL (ref 1–4)
LYMPHOCYTES NFR BLD AUTO: 32.2 %
MCH RBC QN AUTO: 31 PG (ref 26–34)
MCHC RBC AUTO-ENTMCNC: 33.5 G/DL (ref 31–37)
MCV RBC AUTO: 92.5 FL
MONOCYTES # BLD AUTO: 0.34 X10(3) UL (ref 0.1–1)
MONOCYTES NFR BLD AUTO: 5.8 %
NEUTROPHILS # BLD AUTO: 2.87 X10 (3) UL (ref 1.5–7.7)
NEUTROPHILS # BLD AUTO: 2.87 X10(3) UL (ref 1.5–7.7)
NEUTROPHILS NFR BLD AUTO: 49.2 %
OSMOLALITY SERPL CALC.SUM OF ELEC: 293 MOSM/KG (ref 275–295)
PLATELET # BLD AUTO: 245 10(3)UL (ref 150–450)
POTASSIUM SERPL-SCNC: 4 MMOL/L (ref 3.5–5.1)
RBC # BLD AUTO: 4.13 X10(6)UL
SODIUM SERPL-SCNC: 141 MMOL/L (ref 136–145)
T4 FREE SERPL-MCNC: 1 NG/DL (ref 0.8–1.7)
TIBC SERPL-MCNC: 297 UG/DL (ref 250–425)
TRANSFERRIN SERPL-MCNC: 199 MG/DL (ref 250–380)
TSI SER-ACNC: 2.33 MIU/ML (ref 0.55–4.78)
WBC # BLD AUTO: 5.8 X10(3) UL (ref 4–11)

## 2024-02-10 PROCEDURE — 36415 COLL VENOUS BLD VENIPUNCTURE: CPT

## 2024-02-10 PROCEDURE — 82728 ASSAY OF FERRITIN: CPT

## 2024-02-10 PROCEDURE — 83540 ASSAY OF IRON: CPT

## 2024-02-10 PROCEDURE — 84439 ASSAY OF FREE THYROXINE: CPT

## 2024-02-10 PROCEDURE — 84466 ASSAY OF TRANSFERRIN: CPT

## 2024-02-10 PROCEDURE — 80048 BASIC METABOLIC PNL TOTAL CA: CPT

## 2024-02-10 PROCEDURE — 83516 IMMUNOASSAY NONANTIBODY: CPT

## 2024-02-10 PROCEDURE — 86376 MICROSOMAL ANTIBODY EACH: CPT

## 2024-02-10 PROCEDURE — 84443 ASSAY THYROID STIM HORMONE: CPT

## 2024-02-10 PROCEDURE — 85025 COMPLETE CBC W/AUTO DIFF WBC: CPT

## 2024-02-13 LAB
ACTIN SMOOTH MUSCLE AB: 19 UNITS
LIVER-KIDNEY MICRO AB: 1.9 UNITS
M2 MITOCHONDRIAL AB: <20 UNITS

## 2024-02-23 ENCOUNTER — OFFICE VISIT (OUTPATIENT)
Dept: INTERNAL MEDICINE CLINIC | Facility: CLINIC | Age: 44
End: 2024-02-23
Payer: COMMERCIAL

## 2024-02-23 VITALS
RESPIRATION RATE: 16 BRPM | SYSTOLIC BLOOD PRESSURE: 124 MMHG | DIASTOLIC BLOOD PRESSURE: 72 MMHG | BODY MASS INDEX: 43.42 KG/M2 | WEIGHT: 242 LBS | HEART RATE: 88 BPM | HEIGHT: 62.5 IN

## 2024-02-23 DIAGNOSIS — F43.10 PTSD (POST-TRAUMATIC STRESS DISORDER): ICD-10-CM

## 2024-02-23 DIAGNOSIS — F50.81 BINGE EATING DISORDER: ICD-10-CM

## 2024-02-23 DIAGNOSIS — E78.2 MIXED HYPERLIPIDEMIA: ICD-10-CM

## 2024-02-23 DIAGNOSIS — Z51.81 THERAPEUTIC DRUG MONITORING: Primary | ICD-10-CM

## 2024-02-23 DIAGNOSIS — G47.33 OSA ON CPAP: ICD-10-CM

## 2024-02-23 DIAGNOSIS — E66.01 CLASS 3 SEVERE OBESITY WITH SERIOUS COMORBIDITY AND BODY MASS INDEX (BMI) OF 40.0 TO 44.9 IN ADULT, UNSPECIFIED OBESITY TYPE (HCC): ICD-10-CM

## 2024-02-23 DIAGNOSIS — Z98.84 S/P LAPAROSCOPIC SLEEVE GASTRECTOMY: ICD-10-CM

## 2024-02-23 DIAGNOSIS — R73.03 PREDIABETES: ICD-10-CM

## 2024-02-23 RX ORDER — LISDEXAMFETAMINE DIMESYLATE CAPSULES 40 MG/1
40 CAPSULE ORAL DAILY
Qty: 30 CAPSULE | Refills: 0 | Status: SHIPPED | OUTPATIENT
Start: 2024-03-25 | End: 2024-04-24

## 2024-02-23 RX ORDER — LISDEXAMFETAMINE DIMESYLATE CAPSULES 40 MG/1
40 CAPSULE ORAL DAILY
Qty: 30 CAPSULE | Refills: 0 | Status: SHIPPED | OUTPATIENT
Start: 2024-04-25 | End: 2024-05-25

## 2024-02-23 RX ORDER — LISDEXAMFETAMINE DIMESYLATE CAPSULES 40 MG/1
40 CAPSULE ORAL DAILY
Qty: 30 CAPSULE | Refills: 0 | Status: SHIPPED | OUTPATIENT
Start: 2024-02-23 | End: 2024-03-24

## 2024-02-23 RX ORDER — NALTREXONE HYDROCHLORIDE AND BUPROPION HYDROCHLORIDE 8; 90 MG/1; MG/1
TABLET, EXTENDED RELEASE ORAL
Qty: 70 TABLET | Refills: 0 | Status: SHIPPED | OUTPATIENT
Start: 2024-02-23 | End: 2024-03-24

## 2024-02-23 NOTE — PROGRESS NOTES
HISTORY OF PRESENT ILLNESS  Chief Complaint   Patient presents with    Weight Check     +31       Nola De Paz is a 43 year old female here for follow up in medical weight loss program.   Last OV 8/21/23  Sleeve gastrectomy 12/221/20  +31lbs  Compliant on vyvanse  Denies chest pain, shortness of breath, dizziness, blurred vision, headache, paresthesia, nausea/vomiting.   Struggling more with cravings  Metformin upsetting stomach a lot  Exercise/Activity: trying to get more movement, walking pad at the end of the day  Nutrition: 24 hour food log reviewed, eating regular meals, +protein  Stress: manageable  Sleep: no problems      Wt Readings from Last 6 Encounters:   02/23/24 242 lb (109.8 kg)   01/26/24 235 lb 12.8 oz (107 kg)   12/07/23 227 lb 2 oz (103 kg)   10/24/23 218 lb (98.9 kg)   10/20/23 225 lb (102.1 kg)   10/01/23 220 lb (99.8 kg)            Breakfast Lunch Dinner Snacks Fluids   Reviewed           REVIEW OF SYSTEMS  GENERAL HEALTH: feels well otherwise, denied any fevers chills or night sweats   RESPIRATORY: denies shortness of breath   CARDIOVASCULAR: denies chest pain  GI: denies abdominal pain    EXAM  /72   Pulse 88   Resp 16   Ht 5' 2.5\" (1.588 m)   Wt 242 lb (109.8 kg)   LMP 01/31/2024 (Approximate)   BMI 43.56 kg/m²   GENERAL: well developed, well nourished,in no apparent distress, A/O x3  SKIN: no rashes,no suspicious lesions  HEENT: atraumatic, normocephalic, OP-clear, PERRL  NECK: supple,no adenopathy  LUNGS: clear to auscultation bilaterally   CARDIO: RRR without murmur  GI: good BS's,NT/ND, no masses or HSM  EXTREMITIES: no cyanosis, no clubbing, no edema    Lab Results   Component Value Date    WBC 5.8 02/10/2024    RBC 4.13 02/10/2024    HGB 12.8 02/10/2024    HCT 38.2 02/10/2024    MCV 92.5 02/10/2024    MCH 31.0 02/10/2024    MCHC 33.5 02/10/2024    RDW 11.7 02/10/2024    .0 02/10/2024    MPV 10.7 01/17/2013     Lab Results   Component Value Date    GLU 81 02/10/2024     BUN 17 02/10/2024    BUNCREA 26.6 (H) 02/10/2024    CREATSERUM 0.64 02/10/2024    ANIONGAP 4 02/10/2024    GFRNAA 100 07/16/2022    GFRAA 115 07/16/2022    CA 9.4 02/10/2024    OSMOCALC 293 02/10/2024    ALKPHO 85 11/11/2023    AST 33 11/11/2023    ALT 40 11/11/2023    BILT 0.6 11/11/2023    TP 7.1 11/11/2023    ALB 3.4 11/11/2023    GLOBULIN 3.7 11/11/2023    AGRATIO 1.2 08/08/2020     02/10/2024    K 4.0 02/10/2024     02/10/2024    CO2 31.0 02/10/2024     Lab Results   Component Value Date     06/22/2021    A1C 5.4 06/22/2021     Lab Results   Component Value Date    CHOLEST 153 11/11/2023    TRIG 30 11/11/2023    HDL 59 11/11/2023    LDL 86 11/11/2023    VLDL 5 11/11/2023    TCHDLRATIO 3.5 08/08/2020    NONHDLC 94 11/11/2023     Lab Results   Component Value Date    T4F 1.0 02/10/2024    TSH 2.330 02/10/2024    TSHT4 0.91 08/08/2020     Lab Results   Component Value Date    B12 492 06/22/2023    VITB12 480 08/08/2020     Lab Results   Component Value Date    VITD 27.4 (L) 11/11/2023    VITD 29.1 (L) 11/11/2023       Current Outpatient Medications on File Prior to Visit   Medication Sig Dispense Refill    lisdexamfetamine (VYVANSE) 30 MG Oral Cap Take 1 capsule (30 mg total) by mouth every morning. 30 capsule 0    ezetimibe 10 MG Oral Tab TAKE 1 TABLET(10 MG) BY MOUTH DAILY 90 tablet 0    CYCLOBENZAPRINE 10 MG Oral Tab TAKE 1 TABLET(10 MG) BY MOUTH EVERY NIGHT AS NEEDED FOR MUSCLE SPASMS 30 tablet 0    rosuvastatin 10 MG Oral Tab TAKE 1 TABLET(10 MG) BY MOUTH EVERY NIGHT 90 tablet 0    hydroxychloroquine 200 MG Oral Tab Take 1 tablet (200 mg total) by mouth 2 (two) times daily. 180 tablet 1    Meloxicam 7.5 MG Oral Tab Take 1 tablet (7.5 mg total) by mouth daily.      OMEPRAZOLE 40 MG Oral Capsule Delayed Release TAKE 1 CAPSULE(40 MG) BY MOUTH DAILY 90 capsule 0    VENLAFAXINE  MG Oral Capsule SR 24 Hr TAKE 1 CAPSULE(150 MG) BY MOUTH DAILY 90 capsule 0    levothyroxine 175 MCG Oral Tab  Take 1 tablet (175 mcg total) by mouth before breakfast. 90 tablet 2    traZODone 50 MG Oral Tab Take 1 tablet (50 mg total) by mouth nightly.      clindamycin 1 % External Lotion APPLY TOPICALLY TO THE AFFECTED AREA EVERY DAY BEFORE NOON      LORazepam 0.5 MG Oral Tab Take 1 tablet (0.5 mg total) by mouth 2 (two) times daily as needed. 40 tablet 0    fexofenadine 180 MG Oral Tab Take 1 tablet (180 mg total) by mouth daily.      Multiple Vitamin (MULTIVITAMIN ADULT OR) Apply topically.      ondansetron 4 MG Oral Tablet Dispersible Take 1 tablet (4 mg total) by mouth every 8 (eight) hours as needed for Nausea. 30 tablet 0    Nystatin 140954 UNIT/GM External Powder Apply 1 Application topically 4 (four) times daily. 1 Bottle 0    clotrimazole-betamethasone 1-0.05 % External Cream Apply 1 Application topically 2 (two) times daily as needed (rash). 60 g 3    Albuterol Sulfate HFA (PROAIR HFA) 108 (90 Base) MCG/ACT Inhalation Aero Soln Inhale 2 puffs into the lungs every 4 (four) hours as needed for Wheezing or Shortness of Breath. 1 Inhaler 3    Triamcinolone Acetonide 55 MCG/ACT Nasal Aerosol by Nasal route daily as needed.       No current facility-administered medications on file prior to visit.       ASSESSMENT  Analyzed weight data:       Diagnoses and all orders for this visit:    Therapeutic drug monitoring  -     Naltrexone-buPROPion HCl ER (CONTRAVE) 8-90 MG Oral Tablet 12 Hr; Week 1: 1 tablet in AM      None in PM Week 2: 1 tablet in AM      1 tablet in PM Week 3: 2 tablets in AM    1 tablet in PM Week 4: [place] 2 tablets in AM     2 tablets in PM  -     lisdexamfetamine (VYVANSE) 40 MG Oral Cap; Take 1 capsule (40 mg total) by mouth daily.  -     lisdexamfetamine (VYVANSE) 40 MG Oral Cap; Take 1 capsule (40 mg total) by mouth daily.  -     lisdexamfetamine (VYVANSE) 40 MG Oral Cap; Take 1 capsule (40 mg total) by mouth daily.    Class 3 severe obesity with serious comorbidity and body mass index (BMI) of 40.0  to 44.9 in adult, unspecified obesity type (HCC)  -     Naltrexone-buPROPion HCl ER (CONTRAVE) 8-90 MG Oral Tablet 12 Hr; Week 1: 1 tablet in AM      None in PM Week 2: 1 tablet in AM      1 tablet in PM Week 3: 2 tablets in AM    1 tablet in PM Week 4: [place] 2 tablets in AM     2 tablets in PM  -     lisdexamfetamine (VYVANSE) 40 MG Oral Cap; Take 1 capsule (40 mg total) by mouth daily.  -     lisdexamfetamine (VYVANSE) 40 MG Oral Cap; Take 1 capsule (40 mg total) by mouth daily.  -     lisdexamfetamine (VYVANSE) 40 MG Oral Cap; Take 1 capsule (40 mg total) by mouth daily.    S/P laparoscopic sleeve gastrectomy  -     Naltrexone-buPROPion HCl ER (CONTRAVE) 8-90 MG Oral Tablet 12 Hr; Week 1: 1 tablet in AM      None in PM Week 2: 1 tablet in AM      1 tablet in PM Week 3: 2 tablets in AM    1 tablet in PM Week 4: Page 2 tablets in AM     2 tablets in PM  -     lisdexamfetamine (VYVANSE) 40 MG Oral Cap; Take 1 capsule (40 mg total) by mouth daily.  -     lisdexamfetamine (VYVANSE) 40 MG Oral Cap; Take 1 capsule (40 mg total) by mouth daily.  -     lisdexamfetamine (VYVANSE) 40 MG Oral Cap; Take 1 capsule (40 mg total) by mouth daily.    Binge eating disorder  -     Naltrexone-buPROPion HCl ER (CONTRAVE) 8-90 MG Oral Tablet 12 Hr; Week 1: 1 tablet in AM      None in PM Week 2: 1 tablet in AM      1 tablet in PM Week 3: 2 tablets in AM    1 tablet in PM Week 4: Page 2 tablets in AM     2 tablets in PM  -     lisdexamfetamine (VYVANSE) 40 MG Oral Cap; Take 1 capsule (40 mg total) by mouth daily.  -     lisdexamfetamine (VYVANSE) 40 MG Oral Cap; Take 1 capsule (40 mg total) by mouth daily.  -     lisdexamfetamine (VYVANSE) 40 MG Oral Cap; Take 1 capsule (40 mg total) by mouth daily.    Prediabetes  -     Naltrexone-buPROPion HCl ER (CONTRAVE) 8-90 MG Oral Tablet 12 Hr; Week 1: 1 tablet in AM      None in PM Week 2: 1 tablet in AM      1 tablet in PM Week 3: 2 tablets in AM    1 tablet in PM Week 4: Page 2 tablets  in AM     2 tablets in PM  -     lisdexamfetamine (VYVANSE) 40 MG Oral Cap; Take 1 capsule (40 mg total) by mouth daily.  -     lisdexamfetamine (VYVANSE) 40 MG Oral Cap; Take 1 capsule (40 mg total) by mouth daily.  -     lisdexamfetamine (VYVANSE) 40 MG Oral Cap; Take 1 capsule (40 mg total) by mouth daily.    Mixed hyperlipidemia  -     Naltrexone-buPROPion HCl ER (CONTRAVE) 8-90 MG Oral Tablet 12 Hr; Week 1: 1 tablet in AM      None in PM Week 2: 1 tablet in AM      1 tablet in PM Week 3: 2 tablets in AM    1 tablet in PM Week 4: [place] 2 tablets in AM     2 tablets in PM  -     lisdexamfetamine (VYVANSE) 40 MG Oral Cap; Take 1 capsule (40 mg total) by mouth daily.  -     lisdexamfetamine (VYVANSE) 40 MG Oral Cap; Take 1 capsule (40 mg total) by mouth daily.  -     lisdexamfetamine (VYVANSE) 40 MG Oral Cap; Take 1 capsule (40 mg total) by mouth daily.    BLESSING on CPAP  -     Naltrexone-buPROPion HCl ER (CONTRAVE) 8-90 MG Oral Tablet 12 Hr; Week 1: 1 tablet in AM      None in PM Week 2: 1 tablet in AM      1 tablet in PM Week 3: 2 tablets in AM    1 tablet in PM Week 4: [place] 2 tablets in AM     2 tablets in PM  -     lisdexamfetamine (VYVANSE) 40 MG Oral Cap; Take 1 capsule (40 mg total) by mouth daily.  -     lisdexamfetamine (VYVANSE) 40 MG Oral Cap; Take 1 capsule (40 mg total) by mouth daily.  -     lisdexamfetamine (VYVANSE) 40 MG Oral Cap; Take 1 capsule (40 mg total) by mouth daily.        PLAN  Preop Weight:    367.8    66.2                         DOS:12/21/20 - SLEEVE GASTRECTOMY  2/11/21              312.5    57.1  3/18/21              299.5    54.8  Today's weight : 242 lbs  Net loss - + 31lbs/Net loss 159lbs    Will continue vyvanse - advised side effects and adverse effects of medication   Will start contrave. Discussed with the patient possible side effects including nausea, headache, elevated blood pressure .  Discussed contraindications including seizures.  Advised if worsening depression or SI  to stop and notify MD. Advised to check her BP more frequently.     Take contrave like this:  For 1 week once a day in morning,   Week 2- take that is 1 tab in morning and then 1 in evening,   Week 3- take 2 tabs in am and 1 in pm  Week 4- 2 pills in am, and 2 in pm and stay on this dose if ok.  Ok to decrease dose by a week if feeling nauseated go backwards one week on dose, and then stay on the lower dose where tolerable  Prediabetes - continue to work on low carb diet  HLD - stable on current medication rosuvastatin and zetia, will continue, will continue to work on lifestyle modifications  CPAP compliance  Consistency with logging foods - protein and produce  Nutrition: low carb diet/ recommended to eat breakfast daily/ regular protein intake  Medication use and side effects reviewed with patient.  Medication contraindications: metformin  Follow up with dietitian and psychologist as recommended.  Discussed the role of sleep and stress in weight management.  Counseled on comprehensive weight loss plan including attention to nutrition, exercise and behavior/stress management for success. See patient instruction below for more details.  Discussed strategies to overcome barriers to successful weight loss and weight maintenance  FITTE: ACSM recommendations (150-300 minutes/ week in active weight loss)   Weight Loss consent to treat reviewed and signed       NOTE TO PATIENT: The 21st Century Cures Act makes clinical notes like these available to patients in the interest of transparency. Clinical notes are medical documents used by physicians and care providers to communicate with each other. These documents include medical language and terminology, abbreviations, and treatment information that may sound technical and at times possibly unfamiliar. In addition, at times, the verbiage may appear blunt or direct. These documents are one tool providers use to communicate relevant information and clinical opinions of the  care providers in a way that allows common understanding of the clinical context.     There are no Patient Instructions on file for this visit.    No follow-ups on file.    Patient verbalizes understanding.    Solange David PA-C  2/23/2024

## 2024-03-01 ENCOUNTER — OFFICE VISIT (OUTPATIENT)
Dept: FAMILY MEDICINE CLINIC | Facility: CLINIC | Age: 44
End: 2024-03-01
Payer: COMMERCIAL

## 2024-03-01 VITALS
TEMPERATURE: 98 F | HEART RATE: 80 BPM | DIASTOLIC BLOOD PRESSURE: 62 MMHG | RESPIRATION RATE: 18 BRPM | OXYGEN SATURATION: 98 % | SYSTOLIC BLOOD PRESSURE: 110 MMHG | HEIGHT: 62.5 IN | WEIGHT: 248 LBS | BODY MASS INDEX: 44.5 KG/M2

## 2024-03-01 DIAGNOSIS — E03.9 ACQUIRED HYPOTHYROIDISM: ICD-10-CM

## 2024-03-01 DIAGNOSIS — E78.00 PURE HYPERCHOLESTEROLEMIA: ICD-10-CM

## 2024-03-01 DIAGNOSIS — K74.00 LIVER FIBROSIS: ICD-10-CM

## 2024-03-01 DIAGNOSIS — K20.90 ESOPHAGITIS DETERMINED BY ENDOSCOPY: ICD-10-CM

## 2024-03-01 DIAGNOSIS — G47.33 OSA (OBSTRUCTIVE SLEEP APNEA): ICD-10-CM

## 2024-03-01 DIAGNOSIS — J45.20 MILD INTERMITTENT ASTHMA WITHOUT COMPLICATION (HCC): Chronic | ICD-10-CM

## 2024-03-01 DIAGNOSIS — E66.01 CLASS 3 SEVERE OBESITY DUE TO EXCESS CALORIES WITHOUT SERIOUS COMORBIDITY WITH BODY MASS INDEX (BMI) OF 40.0 TO 44.9 IN ADULT (HCC): ICD-10-CM

## 2024-03-01 DIAGNOSIS — L73.2 HYDRADENITIS: Primary | ICD-10-CM

## 2024-03-01 DIAGNOSIS — G89.29 CHRONIC PAIN OF BOTH KNEES: ICD-10-CM

## 2024-03-01 DIAGNOSIS — M32.9 LUPUS (HCC): ICD-10-CM

## 2024-03-01 DIAGNOSIS — R00.2 PALPITATIONS: ICD-10-CM

## 2024-03-01 DIAGNOSIS — G89.29 CHRONIC RIGHT SHOULDER PAIN: ICD-10-CM

## 2024-03-01 DIAGNOSIS — F43.10 PTSD (POST-TRAUMATIC STRESS DISORDER): ICD-10-CM

## 2024-03-01 DIAGNOSIS — M25.512 ACUTE PAIN OF LEFT SHOULDER: ICD-10-CM

## 2024-03-01 DIAGNOSIS — M25.561 CHRONIC PAIN OF BOTH KNEES: ICD-10-CM

## 2024-03-01 DIAGNOSIS — M72.2 PLANTAR FASCIITIS: ICD-10-CM

## 2024-03-01 DIAGNOSIS — M25.562 CHRONIC PAIN OF BOTH KNEES: ICD-10-CM

## 2024-03-01 DIAGNOSIS — M25.511 CHRONIC RIGHT SHOULDER PAIN: ICD-10-CM

## 2024-03-01 PROCEDURE — 3078F DIAST BP <80 MM HG: CPT | Performed by: FAMILY MEDICINE

## 2024-03-01 PROCEDURE — 99214 OFFICE O/P EST MOD 30 MIN: CPT | Performed by: FAMILY MEDICINE

## 2024-03-01 PROCEDURE — 3074F SYST BP LT 130 MM HG: CPT | Performed by: FAMILY MEDICINE

## 2024-03-01 PROCEDURE — 3008F BODY MASS INDEX DOCD: CPT | Performed by: FAMILY MEDICINE

## 2024-03-01 RX ORDER — VENLAFAXINE HYDROCHLORIDE 150 MG/1
150 CAPSULE, EXTENDED RELEASE ORAL DAILY
Qty: 90 CAPSULE | Refills: 1 | Status: SHIPPED | OUTPATIENT
Start: 2024-03-01

## 2024-03-01 RX ORDER — ROSUVASTATIN CALCIUM 10 MG/1
10 TABLET, COATED ORAL NIGHTLY
Qty: 90 TABLET | Refills: 0 | Status: SHIPPED | OUTPATIENT
Start: 2024-03-01

## 2024-03-01 RX ORDER — LEVOTHYROXINE SODIUM 175 UG/1
175 TABLET ORAL
Qty: 90 TABLET | Refills: 3 | Status: SHIPPED | OUTPATIENT
Start: 2024-03-01

## 2024-03-01 RX ORDER — OMEPRAZOLE 40 MG/1
40 CAPSULE, DELAYED RELEASE ORAL DAILY
Qty: 90 CAPSULE | Refills: 1 | Status: SHIPPED | OUTPATIENT
Start: 2024-03-01

## 2024-03-01 NOTE — PROGRESS NOTES
Nola De Paz is a 44 year old female.  Chief Complaint   Patient presents with    Lab Results     HPI:   Nola De Paz is a 44 year old female with hypothyroidism, asthma, BLESSING, fibromyalgia, iron deficiency seen for follow up.    Compliant with her thyroid medication, tolerating medication well without any side effects.    Compliant with cholesterol medication and diet, tolerating medication well without any side effects.    Complaining of pain in both her shoulders, chronic pain in the left but for the past month has an in the left shoulder as well.    Last week and a half has had palpitations and lightheadedness on and off.    On contrave from weight loss clinic, just started it today, was told if that was working might adjust the dose of effexor.    Mood has been stable on the current dose of effexor.    KATELYNN-7 Scale       Feeling nervous, anxious, or on edge: Over half days  Not being able to stop or control worrying: Several days  Worrying too much about different things   : Several days  Trouble relaxing: Several days  Being so restless that it's hard to sit still: Not at all  Becoming easily annoyed or irritable: Not at all  Feeling afraid as if something awful might happen: Not at all    KATELYNN 7 Total Score: 5  If you checked off any problems, how difficult have these made it for you to do your work, take care of things at home, or get along with other people?: Somewhat difficult         PHQ9 Scale     1. Little interest or pleasure in doing things: Not at all  2. Feeling down, depressed, or hopeless: Several days  3. Trouble falling or staying asleep, or sleeping too much: Several days  4. Feeling tired or having little energy: Several days  5. Poor appetite or overeating: Not at all  6. Feeling bad about yourself - or that you are a failure or have let yourself or your family down: Not at all  7. Trouble concentrating on things, such as reading the newspaper or watching television: Not at all  8. Moving or  speaking so slowly that other people could have noticed. Or the opposite - being so fidgety or restless that you have been moving around a lot more than usual: Not at all  9. Thoughts that you would be better off dead, or of hurting yourself in some way: Not at all  PHQ-9 TOTAL SCORE: 3  If you checked off any problems, how difficult have these problems made it for you to do your work, take care of things at home, or get along with other people?: Somewhat difficult        Needs referral to follow up with derm for hydradenitis.     Needs her orthotics updated and would like a referral to follow up with podiatrist.    Needs referral for ophthalmologist.    Needs referral to see hepatologist in network for fatty liver disease.    Chronic pain in both knees, no trauma, states pain has been getting worse and is worse with walking.    ALLERGY:     Allergies   Allergen Reactions    Penicillins HIVES and RASH     MEDICATIONS:     Current Outpatient Medications   Medication Sig Dispense Refill    levothyroxine 175 MCG Oral Tab Take 1 tablet (175 mcg total) by mouth before breakfast. 90 tablet 3    Omeprazole 40 MG Oral Capsule Delayed Release Take 1 capsule (40 mg total) by mouth daily. 90 capsule 1    venlafaxine  MG Oral Capsule SR 24 Hr Take 1 capsule (150 mg total) by mouth daily. 90 capsule 1    rosuvastatin 10 MG Oral Tab Take 1 tablet (10 mg total) by mouth nightly. 90 tablet 0    hydroxychloroquine 200 MG Oral Tab Take 1 tablet (200 mg total) by mouth 2 (two) times daily. 180 tablet 1    cyclobenzaprine 10 MG Oral Tab Take 1 tablet (10 mg total) by mouth nightly as needed for Muscle spasms. 30 tablet 0    Naltrexone-buPROPion HCl ER (CONTRAVE) 8-90 MG Oral Tablet 12 Hr Week 1: 1 tablet in AM      None in PM Week 2: 1 tablet in AM      1 tablet in PM Week 3: 2 tablets in AM    1 tablet in PM Week 4: [place] 2 tablets in AM     2 tablets in PM 70 tablet 0    lisdexamfetamine (VYVANSE) 40 MG Oral Cap Take 1 capsule  (40 mg total) by mouth daily. 30 capsule 0    [START ON 3/25/2024] lisdexamfetamine (VYVANSE) 40 MG Oral Cap Take 1 capsule (40 mg total) by mouth daily. 30 capsule 0    [START ON 4/25/2024] lisdexamfetamine (VYVANSE) 40 MG Oral Cap Take 1 capsule (40 mg total) by mouth daily. 30 capsule 0    lisdexamfetamine (VYVANSE) 30 MG Oral Cap Take 1 capsule (30 mg total) by mouth every morning. 30 capsule 0    ezetimibe 10 MG Oral Tab TAKE 1 TABLET(10 MG) BY MOUTH DAILY 90 tablet 0    Meloxicam 7.5 MG Oral Tab Take 1 tablet (7.5 mg total) by mouth daily.      traZODone 50 MG Oral Tab Take 1 tablet (50 mg total) by mouth nightly.      clindamycin 1 % External Lotion APPLY TOPICALLY TO THE AFFECTED AREA EVERY DAY BEFORE NOON      LORazepam 0.5 MG Oral Tab Take 1 tablet (0.5 mg total) by mouth 2 (two) times daily as needed. 40 tablet 0    fexofenadine 180 MG Oral Tab Take 1 tablet (180 mg total) by mouth daily.      Multiple Vitamin (MULTIVITAMIN ADULT OR) Apply topically.      ondansetron 4 MG Oral Tablet Dispersible Take 1 tablet (4 mg total) by mouth every 8 (eight) hours as needed for Nausea. 30 tablet 0    Nystatin 225730 UNIT/GM External Powder Apply 1 Application topically 4 (four) times daily. 1 Bottle 0    clotrimazole-betamethasone 1-0.05 % External Cream Apply 1 Application topically 2 (two) times daily as needed (rash). 60 g 3    Albuterol Sulfate HFA (PROAIR HFA) 108 (90 Base) MCG/ACT Inhalation Aero Soln Inhale 2 puffs into the lungs every 4 (four) hours as needed for Wheezing or Shortness of Breath. 1 Inhaler 3    Triamcinolone Acetonide 55 MCG/ACT Nasal Aerosol by Nasal route daily as needed.        Past Medical History:   Diagnosis Date    Anxiety     Back problem     DDD    Blood disorder     anemia    Depression     Disorder of thyroid     Esophageal reflux     Fibromyalgia     High cholesterol     Hyperlipidemia     Hypothyroidism     IBS (irritable bowel syndrome)     Migraines     Obesity     BLESSING  (obstructive sleep apnea) 8/23/19 PSG    AHI 19 REM AHI 57 Supine AHI 46 non-supine AHI 11 [place]2 [place] 71%    Osteoarthritis     Pneumonia due to organism     Shortness of breath     Sleep apnea     cpap    Thyroid disease       Social History:  Social History     Socioeconomic History    Marital status: Single   Tobacco Use    Smoking status: Never    Smokeless tobacco: Never   Vaping Use    Vaping Use: Never used   Substance and Sexual Activity    Alcohol use: Yes     Comment: 1-2 a month    Drug use: Not Currently    Sexual activity: Yes     Partners: Male     Birth control/protection: Condom   Other Topics Concern    Caffeine Concern Yes     Comment: 1 caff. drink daily     Exercise Yes     Comment: Stretching 3-5x weekly 15-30mins, Yoga 2x weekly 30-60 mins    Seat Belt Yes    Self-Exams Yes   Social History Narrative    Works as LPN.        REVIEW OF SYSTEMS:   GENERAL HEALTH: feels well otherwise  SKIN: denies any unusual skin lesions or rashes  RESPIRATORY: denies shortness of breath with exertion  CARDIOVASCULAR: as documented in HPI  GI: denies abdominal pain and denies heartburn  NEURO: denies headaches  PSYCH: as documented in HPI    EXAM:   /62   Pulse 80   Temp 98 °F (36.7 °C) (Temporal)   Resp 18   Ht 5' 2.5\" (1.588 m)   Wt 248 lb (112.5 kg)   LMP 01/31/2024 (Approximate)   SpO2 98%   BMI 44.64 kg/m²   GENERAL: morbidly obese,in no apparent distress  SKIN: no rashes,no suspicious lesions  HEENT: atraumatic, normocephalic,ears and throat are clear  NECK: supple,no adenopathy,no bruits  LUNGS: clear to auscultation  CARDIO: RRR without murmur  GI: good BS's,no masses, HSM or tenderness  SHOULDER: bilateral no tenderness, ROM is full but with discomfort.  EXTREMITIES: no cyanosis, clubbing or edema  PSYCH: well groomed, appropriate mood and affect, good eye contact, normal speech and no thought disorder.  KNEES: bilateral, no swelling, no tenderness to palpation along the joint line, +  crepitus, normal ROM.  NEURO: DTR 2+ and symmetrical bilateral    ASSESSMENT AND PLAN:   Nola was seen today for lab results.    Diagnoses and all orders for this visit:    Hydradenitis  -     Derm Referral - In Network  -      currently symptoms are stable    PTSD (post-traumatic stress disorder) controlled  -     venlafaxine  MG Oral Capsule SR 24 Hr; Take 1 capsule (150 mg total) by mouth daily.  -     Aidan Zhao Navigator  -     continue the same dose of medication    Plantar fasciitis  -     Podiatry Referral - In Network    Lupus ([facility])  -     Ophthalmology Referral - In Network    Chronic right shoulder pain  -     XR SHOULDER, COMPLETE (MIN 2 VIEWS), RIGHT (CPT=73030); Future  -     Physical Therapy Referral - [facility]    Acute pain of left shoulder  -     Physical Therapy Referral - [facility]    Chronic pain of both knees  -     XR KNEE (3 VIEWS), LEFT (CPT=73562); Future  -     XR KNEE (3 VIEWS), RIGHT (CPT=73562); Future    Palpitations  -     CARD MONITOR HOLTER ZIO 3-7 DAYS (CPT=93242/79595); Future  -    if holter is abnormal or patient continues to have symptoms will refer to cardiology.    Liver fibrosis  -     Hepatology - In Network  -     encouraged to continue to follow up with weight loss clinic.    Mild intermittent asthma without complication ([facility]) controlled      -  continue to use inhaler as needed.    Class 3 severe obesity due to excess calories without serious comorbidity with body mass index (BMI) of 40.0 to 44.9 in adult ([facility])      - limit calorie in take, exercise for 150 min a week and f/u at weight loss clinic.    BLESSING (obstructive sleep apnea) controlled     - continue CPAP     Acquired hypothyroidism controlled  -     levothyroxine 175 MCG Oral Tab; Take 1 tablet (175 mcg total) by mouth before breakfast.  -     continue the same dose of medication    Esophagitis determined by endoscopy  -     Omeprazole 40 MG Oral Capsule Delayed Release; Take 1 capsule (40 mg  total) by mouth daily.  -    continue the same dose of medication    Pure hypercholesterolemia controlled  -     rosuvastatin 10 MG Oral Tab; Take 1 tablet (10 mg total) by mouth nightly.  -    continue the same dose of medication.  -     Lipid Panel [E]; Future    Return in about 6 months (around 9/1/2024) for anxiety, Hypothyroidism f/u, hyperlipidemia.       The 21st Century Cures Act makes medical notes like these available to patients in the interest of transparency. Please be advised this is a medical document. Medical documents are intended to carry relevant information, facts as evident, and the clinical opinion of the practitioner. The medical note is intended as peer to peer communication and may appear blunt or direct. It is written in medical language and may contain abbreviations or verbiage that are unfamiliar.

## 2024-03-02 DIAGNOSIS — M79.7 FIBROMYALGIA: ICD-10-CM

## 2024-03-02 DIAGNOSIS — M54.41 CHRONIC MIDLINE LOW BACK PAIN WITH BILATERAL SCIATICA: ICD-10-CM

## 2024-03-02 DIAGNOSIS — M54.42 CHRONIC MIDLINE LOW BACK PAIN WITH BILATERAL SCIATICA: ICD-10-CM

## 2024-03-02 DIAGNOSIS — G89.29 CHRONIC MIDLINE LOW BACK PAIN WITH BILATERAL SCIATICA: ICD-10-CM

## 2024-03-04 RX ORDER — VENLAFAXINE HYDROCHLORIDE 150 MG/1
150 CAPSULE, EXTENDED RELEASE ORAL DAILY
Qty: 90 CAPSULE | Refills: 0 | OUTPATIENT
Start: 2024-03-04

## 2024-03-04 RX ORDER — CYCLOBENZAPRINE HCL 10 MG
10 TABLET ORAL NIGHTLY PRN
Qty: 30 TABLET | Refills: 0 | Status: SHIPPED | OUTPATIENT
Start: 2024-03-04

## 2024-03-04 RX ORDER — OMEPRAZOLE 40 MG/1
40 CAPSULE, DELAYED RELEASE ORAL DAILY
Qty: 90 CAPSULE | Refills: 0 | OUTPATIENT
Start: 2024-03-04

## 2024-03-04 NOTE — TELEPHONE ENCOUNTER
Future Appointments   Date Time Provider Department Center   6/21/2024  2:00 PM Solange David PA-C [facility]   8/16/2024 12:00 PM Azra Shirley DO [facility] [place]   9/6/2024  2:20 PM Julissa Gonzalez MD [facility]     Last office visit: 1/26/2024    Last fill: 1/29/2024 30 tab, 0 refills

## 2024-03-07 ENCOUNTER — TELEPHONE (OUTPATIENT)
Age: 44
End: 2024-03-07

## 2024-03-11 DIAGNOSIS — R79.82 CRP ELEVATED: ICD-10-CM

## 2024-03-11 DIAGNOSIS — R76.8 DS DNA ANTIBODY POSITIVE: ICD-10-CM

## 2024-03-11 DIAGNOSIS — M32.9 SYSTEMIC LUPUS ERYTHEMATOSUS, UNSPECIFIED SLE TYPE, UNSPECIFIED ORGAN INVOLVEMENT STATUS (HCC): ICD-10-CM

## 2024-03-11 RX ORDER — HYDROXYCHLOROQUINE SULFATE 200 MG/1
200 TABLET, FILM COATED ORAL 2 TIMES DAILY
Qty: 180 TABLET | Refills: 1 | Status: SHIPPED | OUTPATIENT
Start: 2024-03-11

## 2024-03-11 NOTE — TELEPHONE ENCOUNTER
LOV: 1/26/2024    RTC: 6 months   Future Appointments   Date Time Provider Department Center   6/21/2024  2:00 PM Solange David PA-C [facility]   8/16/2024 12:00 PM Azra Shirley DO [facility] [place]   9/6/2024  2:20 PM Julissa Gonzalez MD [facility]   LABS:   Component      Latest Ref Rng 2/10/2024   Actin Smooth Muscle Ab      0 - 19 Units 19    Liver-Kidney Micro Ab      0.0 - 20.0 Units 1.9    M2 Mitochondrial Ab      0.0 - 20.0 Units <20.0      LAST REFILL: 1/26/2024, Quantity 180 tablets, Refills 1    Dr Shirley-- orders pending, approve if agreeable.

## 2024-03-18 PROBLEM — M54.2 NECK PAIN: Status: RESOLVED | Noted: 2020-04-16 | Resolved: 2024-03-18

## 2024-03-25 ENCOUNTER — PATIENT MESSAGE (OUTPATIENT)
Dept: INTERNAL MEDICINE CLINIC | Facility: CLINIC | Age: 44
End: 2024-03-25

## 2024-03-25 NOTE — TELEPHONE ENCOUNTER
From: Nola De Paz  To: Solange David  Sent: 3/25/2024 9:56 AM CDT  Subject: Medication question     Hello, I am not liking the way that the contrave that I am taking has been making me feel and so I think I would like to switch to compounded semiglutide but I have a few questions. what is the pricing for that and does it go up as dosage goes up? Is there compounded terzepitide available?

## 2024-03-28 NOTE — TELEPHONE ENCOUNTER
Cut to 1 tab daily for 2 weeks, and then 1 tab every other day for 2 weeks and then can stop contrave    We can begin compound semaglutide, can we please call in to [facility]    I would also recommend she contact her insurance to see if they cover Wegovy or Zepbound

## 2024-03-28 NOTE — TELEPHONE ENCOUNTER
Medication Detail    Medication Quantity Refills Start End   CUSTOM MEDICATION 1 each 0 3/27/2024 --   Sig:   Semaglutide 0.25mg    Semaglutide 0.25mg/Cyanocobolamin    1/0.5 mg/mL  Inject 25 units/0.25mL into skin q weekly    Disp: 1mL vial

## 2024-04-02 ENCOUNTER — HOSPITAL ENCOUNTER (OUTPATIENT)
Age: 44
Discharge: HOME | End: 2024-04-02
Payer: COMMERCIAL

## 2024-04-02 VITALS
TEMPERATURE: 97 F | HEART RATE: 60 BPM | DIASTOLIC BLOOD PRESSURE: 77 MMHG | WEIGHT: 240 LBS | HEIGHT: 62 IN | RESPIRATION RATE: 18 BRPM | BODY MASS INDEX: 44.16 KG/M2 | SYSTOLIC BLOOD PRESSURE: 119 MMHG | OXYGEN SATURATION: 99 %

## 2024-04-02 DIAGNOSIS — H69.91 DYSFUNCTION OF RIGHT EUSTACHIAN TUBE: Primary | ICD-10-CM

## 2024-04-02 PROCEDURE — 99203 OFFICE O/P NEW LOW 30 MIN: CPT | Performed by: NURSE PRACTITIONER

## 2024-04-02 RX ORDER — METHYLPREDNISOLONE 4 MG/1
TABLET ORAL
Qty: 1 EACH | Refills: 0 | Status: SHIPPED | OUTPATIENT
Start: 2024-04-02 | End: 2024-04-06

## 2024-04-02 NOTE — ED INITIAL ASSESSMENT (HPI)
Pt with co right sided ear pain with resolved sorethroat. Sinus pressure/congestion. Sx onset Sunday.

## 2024-04-02 NOTE — ED PROVIDER NOTES
Patient Seen in: [facility]      History     Chief Complaint   Patient presents with    Cough/URI    Sore Throat    Ear Problem Pain     Stated Complaint: congestion, right ear pain,sore throat    Subjective:   44-year-old female presents today with ongoing sinus pressure congestion runny nose she believes due to her allergies.  States been going on for couple weeks now intermittently.  Start developing ear pain over the last 2 to 3 days with sore throat.  States sore throat had resolved but continues to have pain to her right ear.  Denies any discharge from the ear.  No changes in hearing.  No dizziness or nausea vomiting.  Patient is alert oriented x 3.  No other symptoms or concerns.  The patient's medication list, past medical history and social history elements as listed in today's nurse's notes were reviewed and agreed (except as otherwise stated in the HPI).  The patient's family history reviewed and determined to be noncontributory to the presenting problem            Objective:   Past Medical History:   Diagnosis Date    Anxiety     Back problem     DDD    Blood disorder     anemia    Depression     Disorder of thyroid     Esophageal reflux     Fibromyalgia     High cholesterol     Hyperlipidemia     Hypothyroidism     IBS (irritable bowel syndrome)     Migraines     Obesity     BLESSING (obstructive sleep apnea) 8/23/19 PSG    AHI 19 REM AHI 57 Supine AHI 46 non-supine AHI 11 [place]ao2 [place] 71%    Osteoarthritis     Pneumonia due to organism     Shortness of breath     Sleep apnea     cpap    Thyroid disease               Past Surgical History:   Procedure Laterality Date    COLONOSCOPY N/A 8/28/2020    Procedure: COLONOSCOPY;  Surgeon: Tone Steiner MD;  Location:  ENDOSCOPY    OTHER SURGICAL HISTORY  12/21/2020    gastric sleeve    REMOVAL GALLBLADDER  2013    [place]    SURGICAL BARIATRICS - INTERNAL  12/21/2020    WISDOM TEETH REMOVED  2013                Social History     Socioeconomic  History    Marital status: Single   Tobacco Use    Smoking status: Never    Smokeless tobacco: Never   Vaping Use    Vaping Use: Never used   Substance and Sexual Activity    Alcohol use: Yes     Comment: 1-2 a month    Drug use: Not Currently    Sexual activity: Yes     Partners: Male     Birth control/protection: Condom   Other Topics Concern    Caffeine Concern Yes     Comment: 1 caff. drink daily     Exercise Yes     Comment: Stretching 3-5x weekly 15-30mins, Yoga 2x weekly 30-60 mins    Seat Belt Yes    Self-Exams Yes   Social History Narrative    Works as LPN.              Review of Systems    Positive for stated complaint: congestion, right ear pain,sore throat  Other systems are as noted in HPI.  Constitutional and vital signs reviewed.      All other systems reviewed and negative except as noted above.    Physical Exam     ED Triage Vitals [04/02/24 0844]   /77   Pulse 60   Resp 18   Temp 96.6 °F (35.9 °C)   Temp src Temporal   SpO2 99 %   O2 Device None (Room air)       Current:/77   Pulse 60   Temp 96.6 °F (35.9 °C) (Temporal)   Resp 18   Ht 157.5 cm (5' 2\")   Wt 108.9 kg   LMP 01/31/2024 (Approximate)   SpO2 99%   BMI 43.90 kg/m²         Physical Exam  Vitals and nursing note reviewed.   Constitutional:       Appearance: She is well-developed.   HENT:      Right Ear: A middle ear effusion is present. Tympanic membrane is injected.      Left Ear: Tympanic membrane and ear canal normal.      Nose: Mucosal edema present.      Mouth/Throat:      Lips: Pink.      Mouth: Mucous membranes are moist.   Cardiovascular:      Rate and Rhythm: Normal rate.   Pulmonary:      Effort: Pulmonary effort is normal.   Musculoskeletal:      Cervical back: Normal range of motion and neck supple.   Lymphadenopathy:      Cervical: No cervical adenopathy.   Skin:     General: Skin is warm and dry.   Neurological:      Mental Status: She is alert and oriented to person, place, and time.               ED  Course   Labs Reviewed - No data to display                   MDM     Please note that this report has been produced using speech recognition software and may contain errors related to that system including, but not limited to, errors in grammar, punctuation, and spelling, as well as words and phrases that possibly may have been recognized inappropriately.  If there are any questions or concerns, contact the dictating provider for clarification.        Note to patient: The 21st Century Cures Act makes medical notes like these available to patients in the interest of transparency. However, this is a medical document intended as peer to peer communication. It is written in medical language and may contain abbreviations or verbiage that are unfamiliar. It may appear blunt or direct. Medical documents are intended to carry relevant information, facts as evident, and the clinical opinion of the practitioner.                                   Medical Decision Making  Differential diagnosis includes but is not limited to: Otitis media, otitis externa, strep throat, eustachian tube dysfunction, mastoiditis, TMJ      Presents today with ongoing right ear pain for last 2 to 3 days.  Does have history of allergic rhinitis.  On exam does have some fluid behind the right TM with injection.  Will treat for possible eustachian tube dysfunction.  Will give prescription for Medrol Dosepak to take as directed.  Do not feel patient warrants antibiotics at this time.  Allergies versus viral illness.  To continue take over-the-counter antihistamine decongestant as well as Tylenol and Motrin.  Follow-up with her primary care physician in 1 week if symptoms do not improve.  Patient verbalized understanding and agreed with plan of care.    Risk  OTC drugs.  Prescription drug management.        Disposition and Plan     Clinical Impression:  1. Dysfunction of right eustachian tube         Disposition:  Discharge  4/2/2024  9:01  am    Follow-up:  Julissa Gonzalez MD  [address] [place] [address]  [address]  [facility] [address]  [phone]    In 1 week  As needed          Medications Prescribed:  Current Discharge Medication List        START taking these medications    Details   methylPREDNISolone (MEDROL) 4 MG Oral Tablet Therapy Pack Dosepack: take as directed  Qty: 1 each, Refills: 0

## 2024-04-06 RX ORDER — METHYLPREDNISOLONE 4 MG/1
TABLET ORAL
Qty: 1 EACH | Refills: 0 | Status: SHIPPED | OUTPATIENT
Start: 2024-04-06

## 2024-04-06 NOTE — ED NOTES
Patient was seen on 4/2/24 and treated with a Medrol dose pack. States she went out of town and lost the Medrol Dose pack. Would like another sent in to [facility] on [address] in [place]. Please advise.

## 2024-04-17 ENCOUNTER — HOSPITAL ENCOUNTER (OUTPATIENT)
Dept: CV DIAGNOSTICS | Facility: HOSPITAL | Age: 44
Discharge: HOME | End: 2024-04-17
Attending: FAMILY MEDICINE
Payer: COMMERCIAL

## 2024-04-17 DIAGNOSIS — R00.2 PALPITATIONS: ICD-10-CM

## 2024-04-17 PROCEDURE — 93242 EXT ECG>48HR<7D RECORDING: CPT | Performed by: FAMILY MEDICINE

## 2024-04-17 PROCEDURE — 93243 EXT ECG>48HR<7D SCAN A/R: CPT | Performed by: FAMILY MEDICINE

## 2024-04-30 ENCOUNTER — LAB ENCOUNTER (OUTPATIENT)
Dept: LAB | Age: 44
End: 2024-04-30
Attending: FAMILY MEDICINE
Payer: COMMERCIAL

## 2024-04-30 DIAGNOSIS — D50.9 IRON DEFICIENCY ANEMIA, UNSPECIFIED IRON DEFICIENCY ANEMIA TYPE: ICD-10-CM

## 2024-04-30 DIAGNOSIS — E78.00 PURE HYPERCHOLESTEROLEMIA: ICD-10-CM

## 2024-04-30 DIAGNOSIS — E55.9 VITAMIN D DEFICIENCY: ICD-10-CM

## 2024-04-30 DIAGNOSIS — M32.9 SYSTEMIC LUPUS ERYTHEMATOSUS, UNSPECIFIED SLE TYPE, UNSPECIFIED ORGAN INVOLVEMENT STATUS (HCC): ICD-10-CM

## 2024-04-30 LAB
ALBUMIN SERPL-MCNC: 3.7 G/DL (ref 3.2–4.8)
ALBUMIN/GLOB SERPL: 1.3 {RATIO} (ref 1–2)
ALP LIVER SERPL-CCNC: 65 U/L
ALT SERPL-CCNC: 31 U/L
ANION GAP SERPL CALC-SCNC: <0 MMOL/L (ref 0–18)
AST SERPL-CCNC: 34 U/L (ref ?–34)
BASOPHILS # BLD AUTO: 0.05 X10(3) UL (ref 0–0.2)
BASOPHILS NFR BLD AUTO: 1 %
BILIRUB SERPL-MCNC: 0.6 MG/DL (ref 0.3–1.2)
BUN BLD-MCNC: 14 MG/DL (ref 9–23)
BUN/CREAT SERPL: 25.5 (ref 10–20)
C3 SERPL-MCNC: 121.8 MG/DL (ref 90–170)
C4 SERPL-MCNC: 36.5 MG/DL (ref 12–36)
CALCIUM BLD-MCNC: 9.2 MG/DL (ref 8.7–10.4)
CHLORIDE SERPL-SCNC: 112 MMOL/L (ref 98–112)
CHOLEST SERPL-MCNC: 213 MG/DL (ref ?–200)
CO2 SERPL-SCNC: 30 MMOL/L (ref 21–32)
CREAT BLD-MCNC: 0.55 MG/DL
CRP SERPL-MCNC: <0.4 MG/DL (ref ?–1)
DEPRECATED HBV CORE AB SER IA-ACNC: 85.4 NG/ML
DEPRECATED RDW RBC AUTO: 37.8 FL (ref 35.1–46.3)
EGFRCR SERPLBLD CKD-EPI 2021: 117 ML/MIN/1.73M2 (ref 60–?)
EOSINOPHIL # BLD AUTO: 0.4 X10(3) UL (ref 0–0.7)
EOSINOPHIL NFR BLD AUTO: 7.8 %
ERYTHROCYTE [DISTWIDTH] IN BLOOD BY AUTOMATED COUNT: 11.4 % (ref 11–15)
ERYTHROCYTE [SEDIMENTATION RATE] IN BLOOD: 44 MM/HR
FASTING PATIENT LIPID ANSWER: NO
FASTING STATUS PATIENT QL REPORTED: NO
GLOBULIN PLAS-MCNC: 2.9 G/DL (ref 2.8–4.4)
GLUCOSE BLD-MCNC: 85 MG/DL (ref 70–99)
HCT VFR BLD AUTO: 37.1 %
HDLC SERPL-MCNC: 49 MG/DL (ref 40–59)
HGB BLD-MCNC: 12.9 G/DL
IMM GRANULOCYTES # BLD AUTO: 0.01 X10(3) UL (ref 0–1)
IMM GRANULOCYTES NFR BLD: 0.2 %
IRON SATN MFR SERPL: 24 %
IRON SERPL-MCNC: 73 UG/DL
LDLC SERPL CALC-MCNC: 158 MG/DL (ref ?–100)
LYMPHOCYTES # BLD AUTO: 1.99 X10(3) UL (ref 1–4)
LYMPHOCYTES NFR BLD AUTO: 38.6 %
MCH RBC QN AUTO: 31.4 PG (ref 26–34)
MCHC RBC AUTO-ENTMCNC: 34.8 G/DL (ref 31–37)
MCV RBC AUTO: 90.3 FL
MONOCYTES # BLD AUTO: 0.44 X10(3) UL (ref 0.1–1)
MONOCYTES NFR BLD AUTO: 8.5 %
NEUTROPHILS # BLD AUTO: 2.26 X10 (3) UL (ref 1.5–7.7)
NEUTROPHILS # BLD AUTO: 2.26 X10(3) UL (ref 1.5–7.7)
NEUTROPHILS NFR BLD AUTO: 43.9 %
NONHDLC SERPL-MCNC: 164 MG/DL (ref ?–130)
OSMOLALITY SERPL CALC.SUM OF ELEC: 290 MOSM/KG (ref 275–295)
PLATELET # BLD AUTO: 197 10(3)UL (ref 150–450)
POTASSIUM SERPL-SCNC: 3.8 MMOL/L (ref 3.5–5.1)
PROT SERPL-MCNC: 6.6 G/DL (ref 5.7–8.2)
RBC # BLD AUTO: 4.11 X10(6)UL
SODIUM SERPL-SCNC: 140 MMOL/L (ref 136–145)
TIBC SERPL-MCNC: 308 UG/DL (ref 250–425)
TRANSFERRIN SERPL-MCNC: 207 MG/DL (ref 250–380)
TRIGL SERPL-MCNC: 36 MG/DL (ref 30–149)
VIT D+METAB SERPL-MCNC: 30.8 NG/ML (ref 30–100)
VLDLC SERPL CALC-MCNC: 7 MG/DL (ref 0–30)
WBC # BLD AUTO: 5.2 X10(3) UL (ref 4–11)

## 2024-04-30 PROCEDURE — 84466 ASSAY OF TRANSFERRIN: CPT

## 2024-04-30 PROCEDURE — 36415 COLL VENOUS BLD VENIPUNCTURE: CPT

## 2024-04-30 PROCEDURE — 82306 VITAMIN D 25 HYDROXY: CPT

## 2024-04-30 PROCEDURE — 86140 C-REACTIVE PROTEIN: CPT

## 2024-04-30 PROCEDURE — 85652 RBC SED RATE AUTOMATED: CPT

## 2024-04-30 PROCEDURE — 80053 COMPREHEN METABOLIC PANEL: CPT

## 2024-04-30 PROCEDURE — 80061 LIPID PANEL: CPT

## 2024-04-30 PROCEDURE — 86160 COMPLEMENT ANTIGEN: CPT

## 2024-04-30 PROCEDURE — 85025 COMPLETE CBC W/AUTO DIFF WBC: CPT

## 2024-04-30 PROCEDURE — 83540 ASSAY OF IRON: CPT

## 2024-04-30 PROCEDURE — 82728 ASSAY OF FERRITIN: CPT

## 2024-05-01 ENCOUNTER — PATIENT MESSAGE (OUTPATIENT)
Dept: FAMILY MEDICINE CLINIC | Facility: CLINIC | Age: 44
End: 2024-05-01

## 2024-05-01 ENCOUNTER — OFFICE VISIT (OUTPATIENT)
Dept: FAMILY MEDICINE CLINIC | Facility: CLINIC | Age: 44
End: 2024-05-01
Payer: COMMERCIAL

## 2024-05-01 VITALS
WEIGHT: 240 LBS | OXYGEN SATURATION: 98 % | TEMPERATURE: 98 F | DIASTOLIC BLOOD PRESSURE: 80 MMHG | SYSTOLIC BLOOD PRESSURE: 116 MMHG | BODY MASS INDEX: 44.16 KG/M2 | HEART RATE: 56 BPM | HEIGHT: 62 IN | RESPIRATION RATE: 18 BRPM

## 2024-05-01 DIAGNOSIS — J01.40 ACUTE PANSINUSITIS, RECURRENCE NOT SPECIFIED: Primary | ICD-10-CM

## 2024-05-01 PROCEDURE — 3079F DIAST BP 80-89 MM HG: CPT | Performed by: PHYSICIAN ASSISTANT

## 2024-05-01 PROCEDURE — 99213 OFFICE O/P EST LOW 20 MIN: CPT | Performed by: PHYSICIAN ASSISTANT

## 2024-05-01 PROCEDURE — 3008F BODY MASS INDEX DOCD: CPT | Performed by: PHYSICIAN ASSISTANT

## 2024-05-01 PROCEDURE — 3074F SYST BP LT 130 MM HG: CPT | Performed by: PHYSICIAN ASSISTANT

## 2024-05-01 RX ORDER — FLUTICASONE PROPIONATE 50 MCG
2 SPRAY, SUSPENSION (ML) NASAL DAILY
Qty: 1 EACH | Refills: 0 | Status: SHIPPED | OUTPATIENT
Start: 2024-05-01 | End: 2024-05-15

## 2024-05-01 RX ORDER — METHYLPREDNISOLONE 4 MG/1
TABLET ORAL
Qty: 1 EACH | Refills: 0 | Status: SHIPPED | OUTPATIENT
Start: 2024-05-01

## 2024-05-01 RX ORDER — DOXYCYCLINE HYCLATE 100 MG/1
100 CAPSULE ORAL 2 TIMES DAILY
Qty: 20 CAPSULE | Refills: 0 | Status: SHIPPED | OUTPATIENT
Start: 2024-05-01 | End: 2024-05-11

## 2024-05-01 NOTE — TELEPHONE ENCOUNTER
From: Nola De Paz  To: Julissa Gonzalez  Sent: 5/1/2024 7:04 AM CDT  Subject: Recent lab results     I just wanted to report to the doctor that even though it says on my recent lab results that I was not fasting I was indeed fasting. I just wanted to report it in case it effects any of the lab results  Thank you

## 2024-05-01 NOTE — TELEPHONE ENCOUNTER
Noted lipid results:  Patient Fasting for Lipid? No   Resulting Agency [facility] ([facility])     FYI

## 2024-05-02 RX ORDER — FLUTICASONE PROPIONATE 50 MCG
2 SPRAY, SUSPENSION (ML) NASAL DAILY
Qty: 48 G | Refills: 0 | OUTPATIENT
Start: 2024-05-02

## 2024-05-02 NOTE — PATIENT INSTRUCTIONS
Doxycycline  Flonase  Antihistamine  Saline rinse  Follow up with PCP   If worse seek treatment

## 2024-05-02 NOTE — PROGRESS NOTES
CHIEF COMPLAINT:     Chief Complaint   Patient presents with    Ear Problem     Have had congestion in head for about a month, treated for ear inflammation and swollen glands about two weeks ago with prednisone, symptoms are back now, congestion never went away. - Entered by patient  Right ear pain and right gland swelling for 3 weeks on/od       HPI:   Nola De Paz is a 44 year old female who presents for cold symptoms for one month.  Symptoms have progressed into sinus congestion and have been worsening since onset.  The patient reports purulent nasal discharge, nasal congestion, sinus pressure, and post nasal drip. Patient also reports ear pressure and sore throat on and off.   Denies fever, dental pain, tinnitus, N/V/D, SOB, and wheezing.  The patient denies history of allergies.  The patient denies itchy/ watery eyes.  Has treated symptoms with Zyrtec, saline, and Medrol.       Current Outpatient Medications   Medication Sig Dispense Refill    doxycycline 100 MG Oral Cap Take 1 capsule (100 mg total) by mouth 2 (two) times daily for 10 days. 20 capsule 0    fluticasone propionate 50 MCG/ACT Nasal Suspension 2 sprays by Each Nare route daily for 14 days. 1 each 0    methylPREDNISolone 4 MG Oral Tablet Therapy Pack Take as directed on package instructions 1 each 0    hydroxychloroquine 200 MG Oral Tab Take 1 tablet (200 mg total) by mouth 2 (two) times daily. 180 tablet 1    cyclobenzaprine 10 MG Oral Tab Take 1 tablet (10 mg total) by mouth nightly as needed for Muscle spasms. 30 tablet 0    levothyroxine 175 MCG Oral Tab Take 1 tablet (175 mcg total) by mouth before breakfast. 90 tablet 3    Omeprazole 40 MG Oral Capsule Delayed Release Take 1 capsule (40 mg total) by mouth daily. 90 capsule 1    venlafaxine  MG Oral Capsule SR 24 Hr Take 1 capsule (150 mg total) by mouth daily. 90 capsule 1    rosuvastatin 10 MG Oral Tab Take 1 tablet (10 mg total) by mouth nightly. 90 tablet 0    ezetimibe 10 MG Oral  Tab TAKE 1 TABLET(10 MG) BY MOUTH DAILY 90 tablet 0    traZODone 50 MG Oral Tab Take 1 tablet (50 mg total) by mouth nightly.      LORazepam 0.5 MG Oral Tab Take 1 tablet (0.5 mg total) by mouth 2 (two) times daily as needed. 40 tablet 0    Multiple Vitamin (MULTIVITAMIN ADULT OR) Apply topically.      ondansetron 4 MG Oral Tablet Dispersible Take 1 tablet (4 mg total) by mouth every 8 (eight) hours as needed for Nausea. 30 tablet 0    Nystatin 908601 UNIT/GM External Powder Apply 1 Application topically 4 (four) times daily. 1 Bottle 0    methylPREDNISolone (MEDROL) 4 MG Oral Tablet Therapy Pack Dosepack: take as directed (Patient not taking: Reported on 5/1/2024) 1 each 0    CUSTOM MEDICATION Semaglutide 0.25mg    Semaglutide 0.25mg/Cyanocobolamin    1/0.5 mg/mL  Inject 25 units/0.25mL into skin q weekly    Disp: 1mL vial 1 each 0    lisdexamfetamine (VYVANSE) 40 MG Oral Cap Take 1 capsule (40 mg total) by mouth daily. (Patient not taking: Reported on 4/2/2024) 30 capsule 0    lisdexamfetamine (VYVANSE) 30 MG Oral Cap Take 1 capsule (30 mg total) by mouth every morning. (Patient not taking: Reported on 4/2/2024) 30 capsule 0    Meloxicam 7.5 MG Oral Tab Take 1 tablet (7.5 mg total) by mouth daily. (Patient not taking: Reported on 4/2/2024)      clindamycin 1 % External Lotion APPLY TOPICALLY TO THE AFFECTED AREA EVERY DAY BEFORE NOON      fexofenadine 180 MG Oral Tab Take 1 tablet (180 mg total) by mouth daily. (Patient not taking: Reported on 4/2/2024)      clotrimazole-betamethasone 1-0.05 % External Cream Apply 1 Application topically 2 (two) times daily as needed (rash). 60 g 3    Albuterol Sulfate HFA (PROAIR HFA) 108 (90 Base) MCG/ACT Inhalation Aero Soln Inhale 2 puffs into the lungs every 4 (four) hours as needed for Wheezing or Shortness of Breath. 1 Inhaler 3    Triamcinolone Acetonide 55 MCG/ACT Nasal Aerosol by Nasal route daily as needed. (Patient not taking: Reported on 5/1/2024)        Past Medical  History:    Anxiety    Back problem    DDD    Blood disorder    anemia    Depression    Disorder of thyroid    Esophageal reflux    Fibromyalgia    High cholesterol    Hyperlipidemia    Hypothyroidism    IBS (irritable bowel syndrome)    Migraines    Obesity    BLESSING (obstructive sleep apnea)    AHI 19 REM AHI 57 Supine AHI 46 non-supine AHI 11 [place] 71%    Osteoarthritis    Pneumonia due to organism    Shortness of breath    Sleep apnea    cpap    Thyroid disease      Past Surgical History:   Procedure Laterality Date    Colonoscopy N/A 8/28/2020    Procedure: COLONOSCOPY;  Surgeon: Tone Steiner MD;  Location:  ENDOSCOPY    Other surgical history  12/21/2020    gastric sleeve    Removal gallbladder  2013    [place]    Surgical bariatrics - internal  12/21/2020    [place] teeth removed  2013      Family History   Problem Relation Age of Onset    Heart Disease Mother     High Cholesterol Mother     Other (DDD) Mother     Hypertension Father     Other (lymphoma) Maternal Grandmother     Other (cancer) Paternal Grandmother         liver     Prostate Cancer Paternal Grandfather         in 80s      Social History     Socioeconomic History    Marital status: Single   Tobacco Use    Smoking status: Never    Smokeless tobacco: Never   Vaping Use    Vaping status: Never Used   Substance and Sexual Activity    Alcohol use: Yes     Comment: 1-2 a month    Drug use: Not Currently    Sexual activity: Yes     Partners: Male     Birth control/protection: Condom   Other Topics Concern    Caffeine Concern Yes     Comment: 1 caff. drink daily     Exercise Yes     Comment: Stretching 3-5x weekly 15-30mins, Yoga 2x weekly 30-60 mins    Seat Belt Yes    Self-Exams Yes   Social History Narrative    Works as LPN.     Social Determinants of Health     Physical Activity: Inactive (11/2/2020)    [facility]    Exercise Vital Sign     Days of Exercise per Week: 0 days     Minutes of  Exercise per Session: 0 min         REVIEW OF SYSTEMS:   GENERAL:  Normal appetite  SKIN: no rashes or abnormal skin lesions  HEENT: See HPI.    LUNGS: denies shortness of breath or wheezing, See HPI  CARDIOVASCULAR: denies chest pain or palpitations   GI: denies N/V/C or abdominal pain  NEURO: + sinus headaches.  No numbness or tingling in face.    EXAM:   /80   Pulse 56   Temp 97.8 °F (36.6 °C) (Oral)   Resp 18   Ht 5' 2\" (1.575 m)   Wt 240 lb (108.9 kg)   LMP 01/31/2024 (Approximate)   SpO2 98%   BMI 43.90 kg/m²   GENERAL: well developed, well nourished,in no apparent distress  SKIN: no rashes,no suspicious lesions  HEAD: atraumatic, normocephalic, +tenderness on palpation of the sinuses  EYES: PERRL. EOMI.  Conjunctiva normal.  Cornea clear.  Lid margins normal.   EARS: Left TM cloudy, + bulging, no retraction, no fluid, bony landmarks normal.   Right TM cloudy, + bulging, no retraction, no fluid, bony landmarks normal.  NOSE: nostrils patent, + nasal drainage, nasal mucosa reddened and inflamed.   THROAT: oral mucosa pink, moist. No visible dental caries. Posterior pharynx is without erythema and without exudates.  Uvula is midline.    NECK: supple, non-tender  LUNGS: clear to auscultation bilaterally, no wheezes or rhonchi. Breathing is non labored.  CARDIO: RRR without murmur  EXTREMITIES: no cyanosis, clubbing or edema  LYMPH:  No lymphadenopathy.        ASSESSMENT AND PLAN:   Nola De Paz is a 44 year old female who presents with Ear Problem (Have had congestion in head for about a month, treated for ear inflammation and swollen glands about two weeks ago with prednisone, symptoms are back now, congestion never went away. - Entered by patient/Right ear pain and right gland swelling for 3 weeks on/od). Symptoms are consistent with:      ASSESSMENT:  Encounter Diagnosis   Name Primary?    Acute pansinusitis, recurrence not specified Yes         PLAN: Meds as below.  Comfort care instructions as  listed in Patient Instructions    Meds & Refills for this Visit:  Requested Prescriptions     Signed Prescriptions Disp Refills    doxycycline 100 MG Oral Cap 20 capsule 0     Sig: Take 1 capsule (100 mg total) by mouth 2 (two) times daily for 10 days.    fluticasone propionate 50 MCG/ACT Nasal Suspension 1 each 0     Si sprays by Each Nare route daily for 14 days.    methylPREDNISolone 4 MG Oral Tablet Therapy Pack 1 each 0     Sig: Take as directed on package instructions       Risks, benefits, side effects of medication addressed and explained.    Patient Instructions   Doxycycline  Flonase  Antihistamine  Saline rinse  Follow up with PCP   If worse seek treatment      The patient indicates understanding of these issues and agrees to the plan.  The patient is asked to return if sx's persist or worsen.

## 2024-05-06 ENCOUNTER — TELEPHONE (OUTPATIENT)
Dept: FAMILY MEDICINE CLINIC | Facility: CLINIC | Age: 44
End: 2024-05-06

## 2024-05-06 DIAGNOSIS — K74.00 LIVER FIBROSIS: Primary | ICD-10-CM

## 2024-05-06 NOTE — TELEPHONE ENCOUNTER
Received a fax from Dr. Ortiz's office, Hepatology, requesting a referral since patient has an HMO.  Dr. Ortiz is on the [facility] HMO Provider list.  Referral entered as requested. Faxed to Dr Ortiz's office at [facility] at [phone]. Confirmation fax received.

## 2024-05-07 ENCOUNTER — PATIENT MESSAGE (OUTPATIENT)
Dept: FAMILY MEDICINE CLINIC | Facility: CLINIC | Age: 44
End: 2024-05-07

## 2024-05-07 DIAGNOSIS — R00.2 PALPITATIONS: Primary | ICD-10-CM

## 2024-05-07 NOTE — TELEPHONE ENCOUNTER
From: Nola De Paz  To: Julissa Gonzalez  Sent: 5/7/2024 4:37 PM CDT  Subject: Cardiac monitor    Hello, I was wondering if the doctor could interpret my cardiac monitor results?

## 2024-05-08 NOTE — TELEPHONE ENCOUNTER
Rare PVC' s and PAC's, symptoms that the patient had were not associated with the premature contractions, no further workup recommended, if patient would like to see a cardiologist will place a referral.

## 2024-05-09 ENCOUNTER — OFFICE VISIT (OUTPATIENT)
Dept: SURGERY | Facility: CLINIC | Age: 44
End: 2024-05-09

## 2024-05-09 VITALS
HEART RATE: 63 BPM | HEIGHT: 62 IN | RESPIRATION RATE: 17 BRPM | BODY MASS INDEX: 44 KG/M2 | DIASTOLIC BLOOD PRESSURE: 81 MMHG | SYSTOLIC BLOOD PRESSURE: 137 MMHG | OXYGEN SATURATION: 100 % | TEMPERATURE: 99 F

## 2024-05-10 ENCOUNTER — HOSPITAL ENCOUNTER (OUTPATIENT)
Dept: GENERAL RADIOLOGY | Age: 44
Discharge: HOME | End: 2024-05-10
Attending: PODIATRIST
Payer: COMMERCIAL

## 2024-05-10 ENCOUNTER — OFFICE VISIT (OUTPATIENT)
Facility: LOCATION | Age: 44
End: 2024-05-10

## 2024-05-10 DIAGNOSIS — M79.673 PAIN OF FOOT, UNSPECIFIED LATERALITY: ICD-10-CM

## 2024-05-10 DIAGNOSIS — M72.2 BILATERAL PLANTAR FASCIITIS: Primary | ICD-10-CM

## 2024-05-10 DIAGNOSIS — M76.822 POSTERIOR TIBIAL TENDINITIS, LEFT: ICD-10-CM

## 2024-05-10 DIAGNOSIS — M76.812 ANTERIOR TIBIALIS TENDINITIS, LEFT: ICD-10-CM

## 2024-05-10 DIAGNOSIS — M62.462 GASTROCNEMIUS EQUINUS OF LEFT LOWER EXTREMITY: ICD-10-CM

## 2024-05-10 DIAGNOSIS — M62.461 GASTROCNEMIUS EQUINUS OF RIGHT LOWER EXTREMITY: ICD-10-CM

## 2024-05-10 DIAGNOSIS — M77.42 METATARSALGIA, LEFT FOOT: ICD-10-CM

## 2024-05-10 PROCEDURE — 99203 OFFICE O/P NEW LOW 30 MIN: CPT | Performed by: PODIATRIST

## 2024-05-10 PROCEDURE — 73630 X-RAY EXAM OF FOOT: CPT | Performed by: PODIATRIST

## 2024-05-11 NOTE — PROGRESS NOTES
[facility]  Progress Note    Nola De Paz is a 44 year old female.   Chief Complaint   Patient presents with    Foot Pain     New pt- bilateral foot- onset- 10/2023- fell at work -was seen by diff Podiatrist and also Dx w/ plantar fascitis - L is worse than R- rates pain 4/10 on and off         HPI:     Patient is a 44-year-old female who is presenting to clinic today with multiple complaints of bilateral feet.  Patient states that in October 2023, she fell at work, causing pain to her left forefoot.  Patient was managed by an outside podiatrist at that time.  Since then, patient states that she has had plantar fasciitis to both of her feet.  Currently, patient states that she has multiple areas of pain to the left foot and ankle and does state that she continues to have plantar fasciitis to both feet.  Patient does state that she has had injections into her heels before, but but has not tried physical therapy for this.  Currently rates her pain 4/10.  Patient states that the pain in her feet are minimal today.  She is denying any other concerns and here for further evaluation and care.  Denies recent nausea, vomiting, fever, chills.      Allergies: Penicillins   Current Outpatient Medications   Medication Sig Dispense Refill    doxycycline 100 MG Oral Cap Take 1 capsule (100 mg total) by mouth 2 (two) times daily for 10 days. 20 capsule 0    fluticasone propionate 50 MCG/ACT Nasal Suspension 2 sprays by Each Nare route daily for 14 days. 1 each 0    methylPREDNISolone 4 MG Oral Tablet Therapy Pack Take as directed on package instructions (Patient not taking: Reported on 5/9/2024) 1 each 0    methylPREDNISolone (MEDROL) 4 MG Oral Tablet Therapy Pack Dosepack: take as directed (Patient not taking: Reported on 5/1/2024) 1 each 0    CUSTOM MEDICATION Semaglutide 0.25mg    Semaglutide 0.25mg/Cyanocobolamin    1/0.5 mg/mL  Inject 25 units/0.25mL into skin q weekly    Disp: 1mL vial 1 each 0     hydroxychloroquine 200 MG Oral Tab Take 1 tablet (200 mg total) by mouth 2 (two) times daily. 180 tablet 1    cyclobenzaprine 10 MG Oral Tab Take 1 tablet (10 mg total) by mouth nightly as needed for Muscle spasms. 30 tablet 0    levothyroxine 175 MCG Oral Tab Take 1 tablet (175 mcg total) by mouth before breakfast. 90 tablet 3    Omeprazole 40 MG Oral Capsule Delayed Release Take 1 capsule (40 mg total) by mouth daily. 90 capsule 1    venlafaxine  MG Oral Capsule SR 24 Hr Take 1 capsule (150 mg total) by mouth daily. 90 capsule 1    rosuvastatin 10 MG Oral Tab Take 1 tablet (10 mg total) by mouth nightly. 90 tablet 0    lisdexamfetamine (VYVANSE) 40 MG Oral Cap Take 1 capsule (40 mg total) by mouth daily. (Patient not taking: Reported on 4/2/2024) 30 capsule 0    lisdexamfetamine (VYVANSE) 30 MG Oral Cap Take 1 capsule (30 mg total) by mouth every morning. (Patient not taking: Reported on 5/9/2024) 30 capsule 0    ezetimibe 10 MG Oral Tab TAKE 1 TABLET(10 MG) BY MOUTH DAILY 90 tablet 0    Meloxicam 7.5 MG Oral Tab Take 1 tablet (7.5 mg total) by mouth daily. (Patient not taking: Reported on 4/2/2024)      traZODone 50 MG Oral Tab Take 1 tablet (50 mg total) by mouth nightly.      clindamycin 1 % External Lotion APPLY TOPICALLY TO THE AFFECTED AREA EVERY DAY BEFORE NOON      LORazepam 0.5 MG Oral Tab Take 1 tablet (0.5 mg total) by mouth 2 (two) times daily as needed. 40 tablet 0    fexofenadine 180 MG Oral Tab Take 1 tablet (180 mg total) by mouth daily.      Multiple Vitamin (MULTIVITAMIN ADULT OR) Apply topically.      ondansetron 4 MG Oral Tablet Dispersible Take 1 tablet (4 mg total) by mouth every 8 (eight) hours as needed for Nausea. 30 tablet 0    Nystatin 687504 UNIT/GM External Powder Apply 1 Application topically 4 (four) times daily. 1 Bottle 0    clotrimazole-betamethasone 1-0.05 % External Cream Apply 1 Application topically 2 (two) times daily as needed (rash). 60 g 3    Albuterol Sulfate HFA  (PROAIR HFA) 108 (90 Base) MCG/ACT Inhalation Aero Soln Inhale 2 puffs into the lungs every 4 (four) hours as needed for Wheezing or Shortness of Breath. 1 Inhaler 3    Triamcinolone Acetonide 55 MCG/ACT Nasal Aerosol by Nasal route daily as needed.        Past Medical History:    Anxiety    Back problem    DDD    Blood disorder    anemia    Depression    Disorder of thyroid    Esophageal reflux    Fibromyalgia    High cholesterol    Hyperlipidemia    Hypothyroidism    IBS (irritable bowel syndrome)    Migraines    Obesity    BLESSING (obstructive sleep apnea)    AHI 19 REM AHI 57 Supine AHI 46 non-supine AHI 11 [place] 71%    Osteoarthritis    Pneumonia due to organism    Shortness of breath    Sleep apnea    cpap    Thyroid disease      Past Surgical History:   Procedure Laterality Date    Colonoscopy N/A 8/28/2020    Procedure: COLONOSCOPY;  Surgeon: Tone Steiner MD;  Location:  ENDOSCOPY    Other surgical history  12/21/2020    gastric sleeve    Removal gallbladder  2013    [place]    Surgical bariatrics - internal  12/21/2020    [place] teeth removed  2013      Family History   Problem Relation Age of Onset    Heart Disease Mother     High Cholesterol Mother     Other (DDD) Mother     Hypertension Father     Other (lymphoma) Maternal Grandmother     Other (cancer) Paternal Grandmother         liver     Prostate Cancer Paternal Grandfather         in 80s      Social History     Socioeconomic History    Marital status: Single   Tobacco Use    Smoking status: Never    Smokeless tobacco: Never   Vaping Use    Vaping status: Never Used   Substance and Sexual Activity    Alcohol use: Yes     Comment: 1-2 a month    Drug use: Not Currently    Sexual activity: Yes     Partners: Male     Birth control/protection: Condom   Other Topics Concern    Caffeine Concern Yes     Comment: 1 caff. drink daily     Exercise Yes     Comment: Stretching 3-5x weekly 15-30mins, Yoga 2x weekly 30-60 mins    Seat Belt Yes    Self-Exams  Yes           REVIEW OF SYSTEMS:     10 point ROS completed and was negative, except for pertinent positive and negatives stated in subjective.       EXAM:     GENERAL: well developed, well nourished, in no apparent distress  EXTREMITIES:  1. Integument: Skin appears moist, warm, and supple with positive hair growth. There are no color changes. No open lesions. No macerations. No Hyperkeratotic lesions.   2. Vascular: Dorsalis pedis 2/4 bilateral and posterior tibial pulses 2/4 bilateral, capillary refill normal.  3. Neurological: Gross sensation intact via light touch bilaterally.  Normal sharp/dull sensation  4. Musculoskeletal: All muscle groups are graded 5/5 in the foot and ankle with pain elicited with inversion and dorsiflexion against resistance to the left lower extremity.  Patient does have pain with palpation to bilateral plantar medial aspects of the heels near the origin of the plantar fascia.  No pain with side-to-side heel squeeze, bilaterally.  Patient does have discomfort at the insertion of the posterior tibial tendon on the navicular tuberosity of the left lower extremity.  Patient also has some pain with palpation along the course of the distal anterior tibial tendon of the left lower extremity.  Pain elicited to second metatarsal head of the left foot.  No acute deformities noted to bilateral feet.  Patient does have decreased ankle joint dorsiflexion bilaterally consistent with equinus deformity.    XR FOOT WEIGHTBEARING (3 VIEWS), LEFT (CPT=73630)    Result Date: 5/10/2024  CONCLUSION:  There is a 4 mm erosion with irregularity involving the proximal lateral diaphysis of the left 4th metatarsal suggesting changes of gout. There is a 10 x 6 mm well-defined lytic lesion within proximal diaphysis of the left 4th metatarsal most likely an enchondroma. There is smooth lucency along the distal medial aspect of the lateral cuneiform on the oblique view which may be projectional; however an erosion is  not excluded. Calcaneal enthesophytes noted.   LOCATION:  [facility]   Dictated by (CST): Scar Allen MD on 5/10/2024 at 4:17 PM     Finalized by (CST): Scar Allen MD on 5/10/2024 at 4:27 PM             ASSESSMENT AND PLAN:   Diagnoses and all orders for this visit:    Bilateral plantar fasciitis  -     OP REFERRAL TO [facility]    Posterior tibial tendinitis, left  -     OP REFERRAL TO [facility]    Anterior tibialis tendinitis, left  -     OP REFERRAL TO [facility]    Gastrocnemius equinus of right lower extremity  -     OP REFERRAL TO [facility]    Gastrocnemius equinus of left lower extremity  -     OP REFERRAL TO [facility]    Metatarsalgia, left foot    Pain of foot, unspecified laterality  -     XR FOOT WEIGHTBEARING (3 VIEWS), LEFT (CPT=73630); Future  -     OP REFERRAL TO [facility]        Plan:   -Patient was seen and evaluated today in clinic.  Chart history reviewed.    Updated radiographs were obtained today of the left foot and independently reviewed, showing incidental finding consistent with enchondroma of the proximal fourth metatarsal, as well as a lucency to the medial aspect of the lateral cuneiform.  Patient does not have any clinical symptoms in these locations.    Discussed clinical exam findings with patient today, which are consistent with bilateral plantar fasciitis, posterior tibial and anterior tibialis tendinitis of the left lower extremity, as well as metatarsalgia of the second metatarsal head.    In regards to second metatarsal head pain, I briefly discussed [place] disease with patient and its implications on the feet.  There are no current radiographic changes to the second metatarsal head.    Recommend offloading second metatarsal head.  Patient provided with metatarsal pads, as well as offloading felt pads to help take pressure off of the  second metatarsal head of the left foot when ambulating.    Provided patient with information on recommended over-the-counter inserts    In regards to patient's areas of tendinitis to the left foot and bilateral plantar fasciitis, discussed treatment options  Discussed conservative management and potential for formal PT.  Discussed possible oral steroids if patient is not diabetic, otherwise use of NSAIDS if no history of GERD or stomach upset.  Recommend cryotherapy/icing.  Discussed the importance of rest and the need for immobilization.  Recommend use of OTC and/or custom orthotics in the treatment and future prevention of tendinitis.  Supportive shoe gear recommendation was also given to patient today.  Patient should avoid any activities that elicit pain  Recommend use of compression stockings vs. ACE wrap to control edema/swelling.  Discussed potential need to order MRI if conservative management fails to resolve symptoms out of concern for underlying tear.  Discussed potential need for surgical intervention if conservative management fails to resolve symptoms.    Recommended moving forward with physical therapy at this time.  Patient is agreeable to this and a referral was placed today.    -The patient indicates understanding of these issues and agrees to the plan.    Time spent reviewing pertinent information from patient's chart, reviewing any pertinent imaging, obtaining history and physical exam, discussing and mutually agreeing on a treatment plan, and documenting encounter: 40 minutes    RTC 4-6 weeks      Juliano Aldana DPM        5/10/2024    Dragon speech recognition software was used to prepare this note.  Errors in word recognition may occur.  Please contact me with any questions/concerns with this note.

## 2024-05-15 ENCOUNTER — TELEPHONE (OUTPATIENT)
Dept: SURGERY | Facility: CLINIC | Age: 44
End: 2024-05-15

## 2024-05-16 ENCOUNTER — PATIENT MESSAGE (OUTPATIENT)
Dept: RHEUMATOLOGY | Facility: CLINIC | Age: 44
End: 2024-05-16

## 2024-05-16 ENCOUNTER — HOSPITAL ENCOUNTER (EMERGENCY)
Age: 44
Discharge: HOME | End: 2024-05-16
Attending: EMERGENCY MEDICINE

## 2024-05-16 VITALS
HEART RATE: 81 BPM | SYSTOLIC BLOOD PRESSURE: 140 MMHG | BODY MASS INDEX: 44.18 KG/M2 | TEMPERATURE: 99 F | RESPIRATION RATE: 16 BRPM | DIASTOLIC BLOOD PRESSURE: 84 MMHG | WEIGHT: 240.06 LBS | HEIGHT: 62 IN | OXYGEN SATURATION: 97 %

## 2024-05-16 DIAGNOSIS — G89.29 CHRONIC SI JOINT PAIN: ICD-10-CM

## 2024-05-16 DIAGNOSIS — M32.9 SYSTEMIC LUPUS ERYTHEMATOSUS, UNSPECIFIED SLE TYPE, UNSPECIFIED ORGAN INVOLVEMENT STATUS (HCC): Primary | ICD-10-CM

## 2024-05-16 DIAGNOSIS — M54.41 ACUTE BILATERAL LOW BACK PAIN WITH BILATERAL SCIATICA: ICD-10-CM

## 2024-05-16 DIAGNOSIS — M53.3 CHRONIC SI JOINT PAIN: ICD-10-CM

## 2024-05-16 DIAGNOSIS — M54.31 SCIATICA OF RIGHT SIDE: Primary | ICD-10-CM

## 2024-05-16 DIAGNOSIS — M54.42 ACUTE BILATERAL LOW BACK PAIN WITH BILATERAL SCIATICA: ICD-10-CM

## 2024-05-16 DIAGNOSIS — R70.0 ELEVATED SED RATE: ICD-10-CM

## 2024-05-16 DIAGNOSIS — M51.36 DDD (DEGENERATIVE DISC DISEASE), LUMBAR: ICD-10-CM

## 2024-05-16 LAB
B-HCG UR QL: NEGATIVE
BILIRUB UR QL STRIP.AUTO: NEGATIVE
CLARITY UR REFRACT.AUTO: CLEAR
COLOR UR AUTO: YELLOW
GLUCOSE UR STRIP.AUTO-MCNC: NEGATIVE MG/DL
KETONES UR STRIP.AUTO-MCNC: NEGATIVE MG/DL
LEUKOCYTE ESTERASE UR QL STRIP.AUTO: NEGATIVE
NITRITE UR QL STRIP.AUTO: NEGATIVE
PH UR STRIP.AUTO: 7 [PH] (ref 5–8)
PROT UR STRIP.AUTO-MCNC: NEGATIVE MG/DL
RBC UR QL AUTO: NEGATIVE
SP GR UR STRIP.AUTO: 1.02 (ref 1–1.03)
UROBILINOGEN UR STRIP.AUTO-MCNC: 1 MG/DL

## 2024-05-16 PROCEDURE — 99283 EMERGENCY DEPT VISIT LOW MDM: CPT

## 2024-05-16 PROCEDURE — 81025 URINE PREGNANCY TEST: CPT

## 2024-05-16 PROCEDURE — 81003 URINALYSIS AUTO W/O SCOPE: CPT | Performed by: EMERGENCY MEDICINE

## 2024-05-16 PROCEDURE — 96372 THER/PROPH/DIAG INJ SC/IM: CPT

## 2024-05-16 RX ORDER — KETOROLAC TROMETHAMINE 30 MG/ML
30 INJECTION, SOLUTION INTRAMUSCULAR; INTRAVENOUS ONCE
Status: COMPLETED | OUTPATIENT
Start: 2024-05-16 | End: 2024-05-16

## 2024-05-16 RX ORDER — DIAZEPAM 5 MG/1
5 TABLET ORAL 3 TIMES DAILY PRN
Qty: 10 TABLET | Refills: 0 | Status: SHIPPED | OUTPATIENT
Start: 2024-05-16 | End: 2024-05-23

## 2024-05-16 RX ORDER — METHYLPREDNISOLONE 4 MG/1
TABLET ORAL
Qty: 1 EACH | Refills: 0 | Status: SHIPPED | OUTPATIENT
Start: 2024-05-16

## 2024-05-16 NOTE — DISCHARGE INSTRUCTIONS
Please take the muscle relaxer as prescribed as needed for your leg pain. Alternate tylenol and motrin for pain control for pain control. Take the medrol dose pack and finish the course.

## 2024-05-16 NOTE — ED PROVIDER NOTES
Patient Seen in: [facility] In [place]      History     Chief Complaint   Patient presents with    Leg or Foot Injury     Stated Complaint: right hip pain    Subjective:   HPI    ***    Objective:   Past Medical History:    Anxiety    Back problem    DDD    Blood disorder    anemia    Depression    Disorder of thyroid    Esophageal reflux    Fibromyalgia    High cholesterol    Hyperlipidemia    Hypothyroidism    IBS (irritable bowel syndrome)    Migraines    Obesity    BLESSING (obstructive sleep apnea)    AHI 19 REM AHI 57 Supine AHI 46 non-supine AHI 11 [place] 71%    Osteoarthritis    Pneumonia due to organism    Shortness of breath    Sleep apnea    cpap    Thyroid disease              Past Surgical History:   Procedure Laterality Date    Colonoscopy N/A 8/28/2020    Procedure: COLONOSCOPY;  Surgeon: Tone Steiner MD;  Location:  ENDOSCOPY    Other surgical history  12/21/2020    gastric sleeve    Removal gallbladder  2013    [facility]    Surgical bariatrics - internal  12/21/2020    [place] teeth removed  2013                Social History     Socioeconomic History    Marital status: Single   Tobacco Use    Smoking status: Never    Smokeless tobacco: Never   Vaping Use    Vaping status: Never Used   Substance and Sexual Activity    Alcohol use: Yes     Comment: 1-2 a month    Drug use: Not Currently    Sexual activity: Yes     Partners: Male     Birth control/protection: Condom   Other Topics Concern    Caffeine Concern Yes     Comment: 1 caff. drink daily     Exercise Yes     Comment: Stretching 3-5x weekly 15-30mins, Yoga 2x weekly 30-60 mins    Seat Belt Yes    Self-Exams Yes   Social History Narrative    Works as LPN.     Social Determinants of Health     Physical Activity: Inactive (11/2/2020)    [facility] [employer] [employer]    Exercise Vital Sign     Days of Exercise per Week: 0 days     Minutes of Exercise per Session: 0 min              Review of  Systems    Positive for stated complaint: right hip pain  Other systems are as noted in HPI.  Constitutional and vital signs reviewed.      All other systems reviewed and negative except as noted above.    Physical Exam     ED Triage Vitals [05/16/24 0031]   /84   Pulse 81   Resp 16   Temp 98.8 °F (37.1 °C)   Temp src Oral   SpO2 97 %   O2 Device None (Room air)       Current Vitals:   Vital Signs  BP: 140/84  Pulse: 81  Resp: 16  Temp: 98.8 °F (37.1 °C)  Temp src: Oral    Oxygen Therapy  SpO2: 97 %  O2 Device: None (Room air)            Physical Exam    ***      ED Course     Labs Reviewed   URINALYSIS, ROUTINE - Abnormal; Notable for the following components:       Result Value    Urobilinogen Urine 1.0 (*)     All other components within normal limits   POCT PREGNANCY URINE - Normal             ***         MDM      ***                                   MDM    Disposition and Plan     Clinical Impression:  1. Sciatica of right side         Disposition:  Discharge  5/16/2024  2:50 am    Follow-up:  Julissa Gonzalez MD  [address] [place] [address]  [phone]    Go in 2 day(s)  discuss outpatient physical therapy.          Medications Prescribed:  Current Discharge Medication List        START taking these medications    Details   !! methylPREDNISolone (MEDROL) 4 MG Oral Tablet Therapy Pack Dosepack: take as directed  Qty: 1 each, Refills: 0    Associated Diagnoses: Sciatica of right side      diazePAM 5 MG Oral Tab Take 1 tablet (5 mg total) by mouth 3 (three) times daily as needed (muscle pain).  Qty: 10 tablet, Refills: 0    Associated Diagnoses: Sciatica of right side       !! - Potential duplicate medications found. Please discuss with provider.                                          1.0 (*)     All other components within normal limits   POCT PREGNANCY URINE - Normal                      MDM      This is a 44-year-old female presents ED with complaints of right-sided hip and leg pain.  Vital signs stable arrival patient appears nontoxic examination no midline CTL spine translocation step-offs deformities.  Negative straight leg test.  Urinalysis clean no signs of infection urine pregnancy negative.  Patient overslept to ED tonight.  She was given Toradol for pain control.  Discussed supportive care with Medrol Dosepak as well as Valium as muscle relaxer.  OTC medications like Tylenol Motrin for pain control.  Close follow-up rheumatology recommended strict return precaution instructed.  Patient shows understanding of plan and is in agreement plan discharged home in stable condition.                                   MDM    Disposition and Plan     Clinical Impression:  1. Sciatica of right side         Disposition:  Discharge  5/16/2024  2:50 am    Follow-up:  Julissa Gonzalez MD  [address] [place] [address]  [phone]    Go in 2 day(s)  discuss outpatient physical therapy.          Medications Prescribed:  Current Discharge Medication List        START taking these medications    Details   !! methylPREDNISolone (MEDROL) 4 MG Oral Tablet Therapy Pack Dosepack: take as directed  Qty: 1 each, Refills: 0    Associated Diagnoses: Sciatica of right side      diazePAM 5 MG Oral Tab Take 1 tablet (5 mg total) by mouth 3 (three) times daily as needed (muscle pain).  Qty: 10 tablet, Refills: 0    Associated Diagnoses: Sciatica of right side       !! - Potential duplicate medications found. Please discuss with provider.

## 2024-05-17 ENCOUNTER — HOSPITAL ENCOUNTER (OUTPATIENT)
Dept: GENERAL RADIOLOGY | Age: 44
Discharge: HOME | End: 2024-05-17
Attending: FAMILY MEDICINE

## 2024-05-17 ENCOUNTER — E-VISIT (OUTPATIENT)
Dept: TELEHEALTH | Age: 44
End: 2024-05-17

## 2024-05-17 ENCOUNTER — HOSPITAL ENCOUNTER (OUTPATIENT)
Dept: GENERAL RADIOLOGY | Age: 44
Discharge: HOME | End: 2024-05-17
Attending: INTERNAL MEDICINE

## 2024-05-17 ENCOUNTER — OFFICE VISIT (OUTPATIENT)
Dept: FAMILY MEDICINE CLINIC | Facility: CLINIC | Age: 44
End: 2024-05-17

## 2024-05-17 VITALS
DIASTOLIC BLOOD PRESSURE: 82 MMHG | HEART RATE: 97 BPM | TEMPERATURE: 97 F | OXYGEN SATURATION: 97 % | WEIGHT: 263 LBS | RESPIRATION RATE: 16 BRPM | BODY MASS INDEX: 48.4 KG/M2 | HEIGHT: 62 IN | SYSTOLIC BLOOD PRESSURE: 128 MMHG

## 2024-05-17 DIAGNOSIS — M25.562 CHRONIC PAIN OF BOTH KNEES: ICD-10-CM

## 2024-05-17 DIAGNOSIS — G89.29 CHRONIC SI JOINT PAIN: ICD-10-CM

## 2024-05-17 DIAGNOSIS — M54.42 ACUTE BILATERAL LOW BACK PAIN WITH BILATERAL SCIATICA: ICD-10-CM

## 2024-05-17 DIAGNOSIS — M54.41 ACUTE BILATERAL LOW BACK PAIN WITH BILATERAL SCIATICA: ICD-10-CM

## 2024-05-17 DIAGNOSIS — M53.3 CHRONIC SI JOINT PAIN: ICD-10-CM

## 2024-05-17 DIAGNOSIS — M25.561 CHRONIC PAIN OF BOTH KNEES: ICD-10-CM

## 2024-05-17 DIAGNOSIS — M32.9 SYSTEMIC LUPUS ERYTHEMATOSUS, UNSPECIFIED SLE TYPE, UNSPECIFIED ORGAN INVOLVEMENT STATUS (HCC): ICD-10-CM

## 2024-05-17 DIAGNOSIS — G89.29 CHRONIC PAIN OF BOTH KNEES: ICD-10-CM

## 2024-05-17 DIAGNOSIS — R39.9 URINARY SYMPTOM OR SIGN: Primary | ICD-10-CM

## 2024-05-17 DIAGNOSIS — M51.36 DDD (DEGENERATIVE DISC DISEASE), LUMBAR: ICD-10-CM

## 2024-05-17 DIAGNOSIS — R70.0 ELEVATED SED RATE: ICD-10-CM

## 2024-05-17 DIAGNOSIS — B37.31 VAGINAL YEAST INFECTION: ICD-10-CM

## 2024-05-17 DIAGNOSIS — M54.31 SCIATICA OF RIGHT SIDE: Primary | ICD-10-CM

## 2024-05-17 DIAGNOSIS — G89.29 CHRONIC RIGHT SHOULDER PAIN: ICD-10-CM

## 2024-05-17 DIAGNOSIS — M25.511 CHRONIC RIGHT SHOULDER PAIN: ICD-10-CM

## 2024-05-17 LAB
APPEARANCE: CLEAR
BILIRUBIN: NEGATIVE
GLUCOSE (URINE DIPSTICK): NEGATIVE MG/DL
KETONES (URINE DIPSTICK): NEGATIVE MG/DL
LEUKOCYTES: NEGATIVE
MULTISTIX LOT#: ABNORMAL NUMERIC
NITRITE, URINE: NEGATIVE
PH, URINE: 5.5 (ref 4.5–8)
PROTEIN (URINE DIPSTICK): 30 MG/DL
SPECIFIC GRAVITY: >=1.03 (ref 1–1.03)
URINE-COLOR: YELLOW
UROBILINOGEN,SEMI-QN: 1 MG/DL (ref 0–1.9)

## 2024-05-17 PROCEDURE — 73562 X-RAY EXAM OF KNEE 3: CPT | Performed by: FAMILY MEDICINE

## 2024-05-17 PROCEDURE — 3074F SYST BP LT 130 MM HG: CPT | Performed by: NURSE PRACTITIONER

## 2024-05-17 PROCEDURE — 72202 X-RAY EXAM SI JOINTS 3/> VWS: CPT | Performed by: INTERNAL MEDICINE

## 2024-05-17 PROCEDURE — 3079F DIAST BP 80-89 MM HG: CPT | Performed by: NURSE PRACTITIONER

## 2024-05-17 PROCEDURE — 81003 URINALYSIS AUTO W/O SCOPE: CPT | Performed by: NURSE PRACTITIONER

## 2024-05-17 PROCEDURE — 3008F BODY MASS INDEX DOCD: CPT | Performed by: NURSE PRACTITIONER

## 2024-05-17 PROCEDURE — 87086 URINE CULTURE/COLONY COUNT: CPT | Performed by: NURSE PRACTITIONER

## 2024-05-17 PROCEDURE — 72110 X-RAY EXAM L-2 SPINE 4/>VWS: CPT | Performed by: INTERNAL MEDICINE

## 2024-05-17 PROCEDURE — 99213 OFFICE O/P EST LOW 20 MIN: CPT | Performed by: NURSE PRACTITIONER

## 2024-05-17 PROCEDURE — 73030 X-RAY EXAM OF SHOULDER: CPT | Performed by: FAMILY MEDICINE

## 2024-05-17 RX ORDER — NITROFURANTOIN 25; 75 MG/1; MG/1
100 CAPSULE ORAL 2 TIMES DAILY
Qty: 14 CAPSULE | Refills: 0 | Status: SHIPPED | OUTPATIENT
Start: 2024-05-17 | End: 2024-05-24

## 2024-05-17 RX ORDER — FLUCONAZOLE 150 MG/1
150 TABLET ORAL ONCE
Qty: 1 TABLET | Refills: 1 | Status: SHIPPED | OUTPATIENT
Start: 2024-05-17 | End: 2024-05-17

## 2024-05-17 NOTE — PROGRESS NOTES
CHIEF COMPLAINT:     Chief Complaint   Patient presents with    Urinary Symptoms     Sx onset today, pain w urination, burning, stinging, burning even after urinating, urgency, strong smelling urine   Vaginal pain, itchiness sx onset 2 days ago, slight abn discharge   Denies odor   Completed recent abx course and currently on prednisone        HPI:   Nola De Paz is a 44 year old female who presents with symptoms of UTI. Patient reporting symptoms of UTI - dysuria and urgency and yeast infection for 1 days.  Symptoms have been consistent since onset.  Treatments tried: OTC Monistat with some relief of vaginal itching, but now having more dysuria.  Associated symptoms: vaginal itching.  Patient denies flank pain, fever, hematuria, nausea, or vomiting. Patient reports genital discharge or itching. Patient denies new sexual partners in the last 3 months. Patient denies recent unprotected sexual intercourse.  Negative pregnancy test yesterday in the ER.  Menses are irregular.    Current Outpatient Medications   Medication Sig Dispense Refill    nitrofurantoin monohydrate macro 100 MG Oral Cap Take 1 capsule (100 mg total) by mouth 2 (two) times daily for 7 days. 14 capsule 0    fluconazole 150 MG Oral Tab Take 1 tablet (150 mg total) by mouth once for 1 dose. May repeat in 72 hours 1 tablet 1    methylPREDNISolone (MEDROL) 4 MG Oral Tablet Therapy Pack Dosepack: take as directed 1 each 0    diazePAM 5 MG Oral Tab Take 1 tablet (5 mg total) by mouth 3 (three) times daily as needed (muscle pain). 10 tablet 0    methylPREDNISolone 4 MG Oral Tablet Therapy Pack Take as directed on package instructions (Patient not taking: Reported on 5/9/2024) 1 each 0    methylPREDNISolone (MEDROL) 4 MG Oral Tablet Therapy Pack Dosepack: take as directed (Patient not taking: Reported on 5/1/2024) 1 each 0    CUSTOM MEDICATION Semaglutide 0.25mg    Semaglutide 0.25mg/Cyanocobolamin    1/0.5 mg/mL  Inject 25 units/0.25mL into skin q  weekly    Disp: 1mL vial 1 each 0    hydroxychloroquine 200 MG Oral Tab Take 1 tablet (200 mg total) by mouth 2 (two) times daily. 180 tablet 1    cyclobenzaprine 10 MG Oral Tab Take 1 tablet (10 mg total) by mouth nightly as needed for Muscle spasms. 30 tablet 0    levothyroxine 175 MCG Oral Tab Take 1 tablet (175 mcg total) by mouth before breakfast. 90 tablet 3    Omeprazole 40 MG Oral Capsule Delayed Release Take 1 capsule (40 mg total) by mouth daily. 90 capsule 1    venlafaxine  MG Oral Capsule SR 24 Hr Take 1 capsule (150 mg total) by mouth daily. 90 capsule 1    rosuvastatin 10 MG Oral Tab Take 1 tablet (10 mg total) by mouth nightly. 90 tablet 0    lisdexamfetamine (VYVANSE) 40 MG Oral Cap Take 1 capsule (40 mg total) by mouth daily. (Patient not taking: Reported on 4/2/2024) 30 capsule 0    lisdexamfetamine (VYVANSE) 30 MG Oral Cap Take 1 capsule (30 mg total) by mouth every morning. (Patient not taking: Reported on 5/9/2024) 30 capsule 0    ezetimibe 10 MG Oral Tab TAKE 1 TABLET(10 MG) BY MOUTH DAILY 90 tablet 0    Meloxicam 7.5 MG Oral Tab Take 1 tablet (7.5 mg total) by mouth daily. (Patient not taking: Reported on 4/2/2024)      traZODone 50 MG Oral Tab Take 1 tablet (50 mg total) by mouth nightly.      clindamycin 1 % External Lotion APPLY TOPICALLY TO THE AFFECTED AREA EVERY DAY BEFORE NOON      LORazepam 0.5 MG Oral Tab Take 1 tablet (0.5 mg total) by mouth 2 (two) times daily as needed. 40 tablet 0    fexofenadine 180 MG Oral Tab Take 1 tablet (180 mg total) by mouth daily.      Multiple Vitamin (MULTIVITAMIN ADULT OR) Apply topically.      ondansetron 4 MG Oral Tablet Dispersible Take 1 tablet (4 mg total) by mouth every 8 (eight) hours as needed for Nausea. 30 tablet 0    Nystatin 025868 UNIT/GM External Powder Apply 1 Application topically 4 (four) times daily. 1 Bottle 0    clotrimazole-betamethasone 1-0.05 % External Cream Apply 1 Application topically 2 (two) times daily as needed  (rash). 60 g 3    Albuterol Sulfate HFA (PROAIR HFA) 108 (90 Base) MCG/ACT Inhalation Aero Soln Inhale 2 puffs into the lungs every 4 (four) hours as needed for Wheezing or Shortness of Breath. 1 Inhaler 3    Triamcinolone Acetonide 55 MCG/ACT Nasal Aerosol by Nasal route daily as needed.        Past Medical History:    Anxiety    Back problem    DDD    Blood disorder    anemia    Depression    Disorder of thyroid    Esophageal reflux    Fibromyalgia    High cholesterol    Hyperlipidemia    Hypothyroidism    IBS (irritable bowel syndrome)    Migraines    Obesity    BLESSING (obstructive sleep apnea)    AHI 19 REM AHI 57 Supine AHI 46 non-supine AHI 11 [place] 71%    Osteoarthritis    Pneumonia due to organism    Shortness of breath    Sleep apnea    cpap    Thyroid disease      Social History:  Social History     Socioeconomic History    Marital status: Single   Tobacco Use    Smoking status: Never    Smokeless tobacco: Never   Vaping Use    Vaping status: Never Used   Substance and Sexual Activity    Alcohol use: Yes     Comment: 1-2 a month    Drug use: Not Currently    Sexual activity: Yes     Partners: Male     Birth control/protection: Condom   Other Topics Concern    Caffeine Concern Yes     Comment: 1 caff. drink daily     Exercise Yes     Comment: Stretching 3-5x weekly 15-30mins, Yoga 2x weekly 30-60 mins    Seat Belt Yes    Self-Exams Yes   Social History Narrative    Works as LPN.     Social Determinants of Health     Physical Activity: Inactive (11/2/2020)    [facility]    Exercise Vital Sign     Days of Exercise per Week: 0 days     Minutes of Exercise per Session: 0 min         REVIEW OF SYSTEMS:   GENERAL: See above  GI: See HPI.    : See HPI.      EXAM:   /82   Pulse 97   Temp 97 °F (36.1 °C)   Resp 16   Ht 5' 2\" (1.575 m)   Wt 263 lb (119.3 kg)   LMP 01/31/2024 (Approximate)   SpO2 97%   BMI 48.10 kg/m²   GENERAL: well developed, well  nourished,in no apparent distress  CARDIO: RRR, no murmurs  LUNGS: clear to ausculation bilaterally, no wheezing or rhonchi  GI: BS present x 4.  No hepatosplenomegaly.  : no suprapubic tenderness. No bladder distention, or CVAT     Recent Results (from the past 24 hour(s))   URINALYSIS, AUTO, W/O SCOPE    Collection Time: 05/17/24  5:28 PM   Result Value Ref Range    Glucose Urine Negative Negative mg/dL    Bilirubin Urine Negative Negative    Ketones, UA Negative Negative - Trace mg/dL    Spec Gravity >=1.030 1.005 - 1.030    Blood Urine Trace-intact (A) Negative    PH Urine 5.5 5.0 - 8.0    Protein Urine 30 (A) Negative - Trace mg/dL    Urobilinogen Urine 1.0 0.2 - 1.0 mg/dL    Nitrite Urine Negative Negative    Leukocyte Esterase Urine Negative Negative    APPEARANCE Clear Clear    Color Urine Yellow Yellow    Multistix Lot# [id number] Numeric    Multistix Expiration Date 12/31/24 Date         ASSESSMENT AND PLAN:   Nola De Paz is a 44 year old female presents with urinary symptoms.    ASSESSMENT:  Encounter Diagnoses   Name Primary?    Urinary symptom or sign Yes    Vaginal yeast infection        PLAN: Meds as listed below.  Will treat empirically.  Comfort measures as described in Patient Instructions. will send urine culture.     Meds & Refills for this Visit:  Requested Prescriptions     Signed Prescriptions Disp Refills    nitrofurantoin monohydrate macro 100 MG Oral Cap 14 capsule 0     Sig: Take 1 capsule (100 mg total) by mouth 2 (two) times daily for 7 days.    fluconazole 150 MG Oral Tab 1 tablet 1     Sig: Take 1 tablet (150 mg total) by mouth once for 1 dose. May repeat in 72 hours       Risk and benefits of medication discussed.   Stressed importance of completing full course of antibiotic unless told otherwise.     The patient indicates understanding of these issues and agrees to the plan.  The patient is asked to see PCP in 3 days if not better. Seek care immediately for new onset of fever,  vomiting, worsening symptoms.    There are no Patient Instructions on file for this visit.

## 2024-05-17 NOTE — PROGRESS NOTES
Nola De Paz is a 44 year old female.  HPI:   See answers to questions above.     Current Outpatient Medications   Medication Sig Dispense Refill    methylPREDNISolone (MEDROL) 4 MG Oral Tablet Therapy Pack Dosepack: take as directed 1 each 0    diazePAM 5 MG Oral Tab Take 1 tablet (5 mg total) by mouth 3 (three) times daily as needed (muscle pain). 10 tablet 0    methylPREDNISolone 4 MG Oral Tablet Therapy Pack Take as directed on package instructions (Patient not taking: Reported on 5/9/2024) 1 each 0    methylPREDNISolone (MEDROL) 4 MG Oral Tablet Therapy Pack Dosepack: take as directed (Patient not taking: Reported on 5/1/2024) 1 each 0    CUSTOM MEDICATION Semaglutide 0.25mg    Semaglutide 0.25mg/Cyanocobolamin    1/0.5 mg/mL  Inject 25 units/0.25mL into skin q weekly    Disp: 1mL vial 1 each 0    hydroxychloroquine 200 MG Oral Tab Take 1 tablet (200 mg total) by mouth 2 (two) times daily. 180 tablet 1    cyclobenzaprine 10 MG Oral Tab Take 1 tablet (10 mg total) by mouth nightly as needed for Muscle spasms. 30 tablet 0    levothyroxine 175 MCG Oral Tab Take 1 tablet (175 mcg total) by mouth before breakfast. 90 tablet 3    Omeprazole 40 MG Oral Capsule Delayed Release Take 1 capsule (40 mg total) by mouth daily. 90 capsule 1    venlafaxine  MG Oral Capsule SR 24 Hr Take 1 capsule (150 mg total) by mouth daily. 90 capsule 1    rosuvastatin 10 MG Oral Tab Take 1 tablet (10 mg total) by mouth nightly. 90 tablet 0    lisdexamfetamine (VYVANSE) 40 MG Oral Cap Take 1 capsule (40 mg total) by mouth daily. (Patient not taking: Reported on 4/2/2024) 30 capsule 0    lisdexamfetamine (VYVANSE) 30 MG Oral Cap Take 1 capsule (30 mg total) by mouth every morning. (Patient not taking: Reported on 5/9/2024) 30 capsule 0    ezetimibe 10 MG Oral Tab TAKE 1 TABLET(10 MG) BY MOUTH DAILY 90 tablet 0    Meloxicam 7.5 MG Oral Tab Take 1 tablet (7.5 mg total) by mouth daily. (Patient not taking: Reported on 4/2/2024)       traZODone 50 MG Oral Tab Take 1 tablet (50 mg total) by mouth nightly.      clindamycin 1 % External Lotion APPLY TOPICALLY TO THE AFFECTED AREA EVERY DAY BEFORE NOON      LORazepam 0.5 MG Oral Tab Take 1 tablet (0.5 mg total) by mouth 2 (two) times daily as needed. 40 tablet 0    fexofenadine 180 MG Oral Tab Take 1 tablet (180 mg total) by mouth daily.      Multiple Vitamin (MULTIVITAMIN ADULT OR) Apply topically.      ondansetron 4 MG Oral Tablet Dispersible Take 1 tablet (4 mg total) by mouth every 8 (eight) hours as needed for Nausea. 30 tablet 0    Nystatin [id number] UNIT/GM External Powder Apply 1 Application topically 4 (four) times daily. 1 Bottle 0    clotrimazole-betamethasone 1-0.05 % External Cream Apply 1 Application topically 2 (two) times daily as needed (rash). 60 g 3    Albuterol Sulfate HFA (PROAIR HFA) 108 (90 Base) MCG/ACT Inhalation Aero Soln Inhale 2 puffs into the lungs every 4 (four) hours as needed for Wheezing or Shortness of Breath. 1 Inhaler 3    Triamcinolone Acetonide 55 MCG/ACT Nasal Aerosol by Nasal route daily as needed.        Past Medical History:    Anxiety    Back problem    DDD    Blood disorder    anemia    Depression    Disorder of thyroid    Esophageal reflux    Fibromyalgia    High cholesterol    Hyperlipidemia    Hypothyroidism    IBS (irritable bowel syndrome)    Migraines    Obesity    BLESSING (obstructive sleep apnea)    AHI 19 REM AHI 57 Supine AHI 46 non-supine AHI 11 [place] 71%    Osteoarthritis    Pneumonia due to organism    Shortness of breath    Sleep apnea    cpap    Thyroid disease      Past Surgical History:   Procedure Laterality Date    Colonoscopy N/A 8/28/2020    Procedure: COLONOSCOPY;  Surgeon: Tone Steiner MD;  Location:  ENDOSCOPY    Other surgical history  12/21/2020    gastric sleeve    Removal gallbladder  2013    [facility]    Surgical bariatrics - internal  12/21/2020    [place] teeth removed  2013      Family History   Problem Relation Age of Onset     Heart Disease Mother     High Cholesterol Mother     Other (DDD) Mother     Hypertension Father     Other (lymphoma) Maternal Grandmother     Other (cancer) Paternal Grandmother         liver     Prostate Cancer Paternal Grandfather         in 80s      Social History:  Social History     Socioeconomic History    Marital status: Single   Tobacco Use    Smoking status: Never    Smokeless tobacco: Never   Vaping Use    Vaping status: Never Used   Substance and Sexual Activity    Alcohol use: Yes     Comment: 1-2 a month    Drug use: Not Currently    Sexual activity: Yes     Partners: Male     Birth control/protection: Condom   Other Topics Concern    Caffeine Concern Yes     Comment: 1 caff. drink daily     Exercise Yes     Comment: Stretching 3-5x weekly 15-30mins, Yoga 2x weekly 30-60 mins    Seat Belt Yes    Self-Exams Yes   Social History Narrative    Works as LPN.     Social Determinants of Health     Physical Activity: Inactive (11/2/2020)    [facility] [employer] [employer]    Exercise Vital Sign     Days of Exercise per Week: 0 days     Minutes of Exercise per Session: 0 min         ASSESSMENT AND PLAN:     Encounter Diagnosis   Name Primary?    Sciatica of right side Yes     Was seen in ER yesterday for these symptoms, tx with Toradol at ER, medrol dose pack and Diazepam. Advised needs to be re-evaluated for persisting or worsening symptoms. Pt wants referral for pain specialist - advised to send [employer] message to PCP.      Meds & Refills for this Visit:  Requested Prescriptions      No prescriptions requested or ordered in this encounter       Duration of  the service:  5 minutes

## 2024-05-18 ENCOUNTER — LAB ENCOUNTER (OUTPATIENT)
Dept: LAB | Age: 44
End: 2024-05-18
Attending: INTERNAL MEDICINE

## 2024-05-18 DIAGNOSIS — M32.9 SYSTEMIC LUPUS ERYTHEMATOSUS, UNSPECIFIED SLE TYPE, UNSPECIFIED ORGAN INVOLVEMENT STATUS (HCC): ICD-10-CM

## 2024-05-18 DIAGNOSIS — R70.0 ELEVATED SED RATE: ICD-10-CM

## 2024-05-18 LAB
CRP SERPL-MCNC: <0.29 MG/DL (ref ?–0.3)
ERYTHROCYTE [SEDIMENTATION RATE] IN BLOOD: 23 MM/HR

## 2024-05-18 PROCEDURE — 86140 C-REACTIVE PROTEIN: CPT

## 2024-05-18 PROCEDURE — 36415 COLL VENOUS BLD VENIPUNCTURE: CPT

## 2024-05-18 PROCEDURE — 85652 RBC SED RATE AUTOMATED: CPT

## 2024-05-20 ENCOUNTER — PATIENT OUTREACH (OUTPATIENT)
Dept: CASE MANAGEMENT | Age: 44
End: 2024-05-20

## 2024-05-20 NOTE — PROGRESS NOTES
1st attempt ER f/up apt request  No answer, [facility] to schedule apt  PCP -existing apt (9/6), unable to contact  Closing encounter

## 2024-05-21 ENCOUNTER — MED REC SCAN ONLY (OUTPATIENT)
Dept: FAMILY MEDICINE CLINIC | Facility: CLINIC | Age: 44
End: 2024-05-21

## 2024-05-24 ENCOUNTER — OFFICE VISIT (OUTPATIENT)
Dept: FAMILY MEDICINE CLINIC | Facility: CLINIC | Age: 44
End: 2024-05-24

## 2024-05-24 VITALS
OXYGEN SATURATION: 98 % | RESPIRATION RATE: 18 BRPM | DIASTOLIC BLOOD PRESSURE: 84 MMHG | HEART RATE: 90 BPM | SYSTOLIC BLOOD PRESSURE: 126 MMHG | WEIGHT: 263 LBS | HEIGHT: 62 IN | BODY MASS INDEX: 48.4 KG/M2 | TEMPERATURE: 98 F

## 2024-05-24 DIAGNOSIS — M51.36 DDD (DEGENERATIVE DISC DISEASE), LUMBAR: ICD-10-CM

## 2024-05-24 DIAGNOSIS — M54.41 ACUTE BILATERAL LOW BACK PAIN WITH RIGHT-SIDED SCIATICA: Primary | ICD-10-CM

## 2024-05-24 RX ORDER — TRAMADOL HYDROCHLORIDE 50 MG/1
50 TABLET ORAL 2 TIMES DAILY PRN
Qty: 60 TABLET | Refills: 0 | Status: SHIPPED | OUTPATIENT
Start: 2024-05-24 | End: 2024-06-23

## 2024-05-24 NOTE — PROGRESS NOTES
Nola De Paz is a 44 year old female.  Chief Complaint   Patient presents with    ER F/U     HPI:   Nola De Paz is a 44 year old female with history of hypothyroidism, BLESSING, fibromyalgia, SLE, lumbar DDD complaining of low back pain.    States back pain started on Friday the 10th and then progressively got worse and by Wednesday 16th was a 9-10 and radiating down her right leg, had trouble walking, driving or standing for prolonged time.  Went to ER on Wednesday, was given a shot of Toradol and discharged home on Medrol Dosepak and muscle relaxer.  Patient states pain has gotten better is still a 5 and still at times will radiate down her right leg, has been taking over-the-counter Tylenol and ibuprofen 200 mg twice a day with no relief.  Taking the muscle relaxer only at night as it makes her groggy.    Patient states back pain is chronic has had MRI of her back done before and also received injections in her back and SI joint several years ago, most recently in 2021 at [facility] [place].  Did do physical therapy in 2022 and states it did give her some relief.    ALLERGY:     Allergies   Allergen Reactions    Penicillins HIVES and RASH     MEDICATIONS:     Current Outpatient Medications   Medication Sig Dispense Refill    nitrofurantoin monohydrate macro 100 MG Oral Cap Take 1 capsule (100 mg total) by mouth 2 (two) times daily for 7 days. 14 capsule 0    methylPREDNISolone (MEDROL) 4 MG Oral Tablet Therapy Pack Dosepack: take as directed 1 each 0    methylPREDNISolone 4 MG Oral Tablet Therapy Pack Take as directed on package instructions (Patient not taking: Reported on 5/9/2024) 1 each 0    methylPREDNISolone (MEDROL) 4 MG Oral Tablet Therapy Pack Dosepack: take as directed (Patient not taking: Reported on 5/1/2024) 1 each 0    CUSTOM MEDICATION Semaglutide 0.25mg    Semaglutide 0.25mg/Cyanocobolamin    1/0.5 mg/mL  Inject 25 units/0.25mL into skin q weekly    Disp: 1mL vial 1 each 0     hydroxychloroquine 200 MG Oral Tab Take 1 tablet (200 mg total) by mouth 2 (two) times daily. 180 tablet 1    cyclobenzaprine 10 MG Oral Tab Take 1 tablet (10 mg total) by mouth nightly as needed for Muscle spasms. 30 tablet 0    levothyroxine 175 MCG Oral Tab Take 1 tablet (175 mcg total) by mouth before breakfast. 90 tablet 3    Omeprazole 40 MG Oral Capsule Delayed Release Take 1 capsule (40 mg total) by mouth daily. 90 capsule 1    venlafaxine  MG Oral Capsule SR 24 Hr Take 1 capsule (150 mg total) by mouth daily. 90 capsule 1    rosuvastatin 10 MG Oral Tab Take 1 tablet (10 mg total) by mouth nightly. 90 tablet 0    lisdexamfetamine (VYVANSE) 40 MG Oral Cap Take 1 capsule (40 mg total) by mouth daily. (Patient not taking: Reported on 4/2/2024) 30 capsule 0    lisdexamfetamine (VYVANSE) 30 MG Oral Cap Take 1 capsule (30 mg total) by mouth every morning. (Patient not taking: Reported on 5/9/2024) 30 capsule 0    ezetimibe 10 MG Oral Tab TAKE 1 TABLET(10 MG) BY MOUTH DAILY 90 tablet 0    Meloxicam 7.5 MG Oral Tab Take 1 tablet (7.5 mg total) by mouth daily. (Patient not taking: Reported on 4/2/2024)      traZODone 50 MG Oral Tab Take 1 tablet (50 mg total) by mouth nightly.      clindamycin 1 % External Lotion APPLY TOPICALLY TO THE AFFECTED AREA EVERY DAY BEFORE NOON      LORazepam 0.5 MG Oral Tab Take 1 tablet (0.5 mg total) by mouth 2 (two) times daily as needed. 40 tablet 0    fexofenadine 180 MG Oral Tab Take 1 tablet (180 mg total) by mouth daily.      Multiple Vitamin (MULTIVITAMIN ADULT OR) Apply topically.      ondansetron 4 MG Oral Tablet Dispersible Take 1 tablet (4 mg total) by mouth every 8 (eight) hours as needed for Nausea. 30 tablet 0    Nystatin 676656 UNIT/GM External Powder Apply 1 Application topically 4 (four) times daily. 1 Bottle 0    clotrimazole-betamethasone 1-0.05 % External Cream Apply 1 Application topically 2 (two) times daily as needed (rash). 60 g 3    Albuterol Sulfate HFA  (PROAIR HFA) 108 (90 Base) MCG/ACT Inhalation Aero Soln Inhale 2 puffs into the lungs every 4 (four) hours as needed for Wheezing or Shortness of Breath. 1 Inhaler 3    Triamcinolone Acetonide 55 MCG/ACT Nasal Aerosol by Nasal route daily as needed.        Past Medical History:    Allergic rhinitis    Anxiety    Arthritis    Back problem    DDD    Blood disorder    anemia    Depression    Disorder of thyroid    Esophageal reflux    Fibromyalgia    High cholesterol    Hyperlipidemia    Hypothyroidism    IBS (irritable bowel syndrome)    Migraines    Obesity    BLESSING (obstructive sleep apnea)    AHI 19 REM AHI 57 Supine AHI 46 non-supine AHI 11 [place] 71%    Osteoarthritis    Pneumonia due to organism    Shortness of breath    Sleep apnea    cpap    Thyroid disease      Social History:  Social History     Socioeconomic History    Marital status: Single   Tobacco Use    Smoking status: Never    Smokeless tobacco: Never   Vaping Use    Vaping status: Never Used   Substance and Sexual Activity    Alcohol use: Not Currently     Comment: 1-2 a month    Drug use: Never    Sexual activity: Yes     Partners: Male     Birth control/protection: Condom   Other Topics Concern    Caffeine Concern No    Exercise No    Seat Belt No    Special Diet No    Stress Concern No    Weight Concern No    Self-Exams Yes   Social History Narrative    Works as LPN.     Social Determinants of Health     Physical Activity: Inactive (11/2/2020)    Received from [facility]    Exercise Vital Sign     Days of Exercise per Week: 0 days     Minutes of Exercise per Session: 0 min        REVIEW OF SYSTEMS:   As documented in HPI    EXAM:   /84   Pulse 90   Temp 98 °F (36.7 °C) (Skin)   Resp 18   Ht 5' 2\" (1.575 m)   Wt 263 lb (119.3 kg)   LMP 01/31/2024 (Approximate)   SpO2 98%   BMI 48.10 kg/m²   GENERAL: obese,in no apparent distress  LUNGS: clear to auscultation  CARDIO: RRR without murmur  BACK:  Minimal tenderness to palpation over the lumbar spine, mild spasm of paraspinal muscles, ROM is full but with some discomfort on extension.  SLR negative bilateral.  NEURO: Bilateral lower extremity sensation is normal, strength 5 x 5, DTR 2+ and symmetrical bilateral.    ASSESSMENT AND PLAN:   Nola was seen today for er f/u.    Diagnoses and all orders for this visit:    Acute bilateral low back pain with right-sided sciatica  -     traMADol 50 MG Oral Tab; Take 1 tablet (50 mg total) by mouth 2 (two) times daily as needed for Pain.  -     Physical Therapy Referral - [facility]  -     Advised to continue taking her muscle relaxer  -     Encouraged to start physical therapy, if pain does not resolve or improve with therapy will consider referral to pain management.    DDD (degenerative disc disease), lumbar       -MRI done in 2022 reviewed, shows mild multilevel posterior articular facet joint arthropathy    Return in about 1 month (around 6/24/2024), or if symptoms worsen or fail to improve.       The 21st Century Cures Act makes medical notes like these available to patients in the interest of transparency. Please be advised this is a medical document. Medical documents are intended to carry relevant information, facts as evident, and the clinical opinion of the practitioner. The medical note is intended as peer to peer communication and may appear blunt or direct. It is written in medical language and may contain abbreviations or verbiage that are unfamiliar.

## 2024-05-30 ENCOUNTER — PATIENT MESSAGE (OUTPATIENT)
Facility: CLINIC | Age: 44
End: 2024-05-30

## 2024-05-31 NOTE — TELEPHONE ENCOUNTER
From: Nola De Paz  To: Solange David  Sent: 5/30/2024 8:21 PM CDT  Subject: Increase in semiglutide?     Hello,     It’s time for me to refill my semiglutide compounded medication and I was wondering if I can do an increase on the dosage? I’m currently taking 0.25mg once a week.

## 2024-05-31 NOTE — TELEPHONE ENCOUNTER
Requesting semaglutide compound increase  LOV: 2/23/24  RTC: not noted  Last Relevant Labs: na  Filled: 3/27/24 #1ml with 0 refills    6/21/2024  2:00 PM Solange David PA-C [id number] [address]       Medication Detail    Medication Quantity Refills Start End   CUSTOM MEDICATION 1 each 0 3/27/2024 --   Sig:   Semaglutide 0.25mg    Semaglutide 0.25mg/Cyanocobolamin    1/0.5 mg/mL  Inject 25 units/0.25mL into skin q weekly    Disp: 1mL vial

## 2024-06-05 ENCOUNTER — PATIENT MESSAGE (OUTPATIENT)
Dept: FAMILY MEDICINE CLINIC | Facility: CLINIC | Age: 44
End: 2024-06-05

## 2024-06-05 DIAGNOSIS — Z98.84 H/O BARIATRIC SURGERY: Primary | ICD-10-CM

## 2024-06-06 NOTE — TELEPHONE ENCOUNTER
From: Nola De Paz  To: Julissa Carlos  Sent: 6/5/2024 10:10 PM CDT  Subject: Referral needed for dietician     Hello,   It is time for me to follow up with my bariatric surgeon and dietician again to get a body comp test and check in with them. I need a referral for them please. My surgeon was  at [facility] and the Dietician is Fernanda GUTIERREZ at [facility] also.    Thank you

## 2024-06-10 ENCOUNTER — PATIENT MESSAGE (OUTPATIENT)
Dept: FAMILY MEDICINE CLINIC | Facility: CLINIC | Age: 44
End: 2024-06-10

## 2024-06-10 DIAGNOSIS — G89.29 CHRONIC RIGHT SHOULDER PAIN: ICD-10-CM

## 2024-06-10 DIAGNOSIS — M51.36 DDD (DEGENERATIVE DISC DISEASE), LUMBAR: ICD-10-CM

## 2024-06-10 DIAGNOSIS — M25.561 CHRONIC PAIN OF BOTH KNEES: ICD-10-CM

## 2024-06-10 DIAGNOSIS — M79.7 FIBROMYALGIA: ICD-10-CM

## 2024-06-10 DIAGNOSIS — M54.41 ACUTE BILATERAL LOW BACK PAIN WITH RIGHT-SIDED SCIATICA: Primary | ICD-10-CM

## 2024-06-10 DIAGNOSIS — M25.562 CHRONIC PAIN OF BOTH KNEES: ICD-10-CM

## 2024-06-10 DIAGNOSIS — G89.29 CHRONIC PAIN OF BOTH KNEES: ICD-10-CM

## 2024-06-10 DIAGNOSIS — M25.511 CHRONIC RIGHT SHOULDER PAIN: ICD-10-CM

## 2024-06-11 NOTE — TELEPHONE ENCOUNTER
OK for referral for pain specialist?  I don't think Chiro would be approved until try/fail of PT.

## 2024-06-11 NOTE — TELEPHONE ENCOUNTER
From: Nola De Paz  To: Julissa Morochojudson  Sent: 6/10/2024 3:53 PM CDT  Subject: Therapy etc     Hi,   I have tried to schedule therapy and they will only see me for one thing at a time so it’s gonna take forever for me to get all areas treated and I’ve also had therapy before for my back and shoulders so I was wondering would it be possible to skip therapy because it’s very hard to schedule with my work schedule also, is there a way I can go right to trying to see pain dr or get injections or maybe see a chiropractor or try any other type of alternative medicine ? Injections for inflammation? IV’s etc?

## 2024-06-13 ENCOUNTER — TELEPHONE (OUTPATIENT)
Dept: CASE MANAGEMENT | Age: 44
End: 2024-06-13

## 2024-06-13 NOTE — TELEPHONE ENCOUNTER
Insurance not approving external referral to Dr Byron Schaeffer.   Dr Carlos che to advise patient she needs to stay in network and see Dr Neal?

## 2024-06-13 NOTE — TELEPHONE ENCOUNTER
Hello     We have received correspondence regarding request for Dr. Byron Schaeffer. The health plan notes that this is a out of network provider.     The health plan notes that the clinical documentation does not substantiate the highly specialized care or medical necessity.     The health plan would like patient to be re-directed to in network provider.     Please advise plan of care.     Thank you,  Jaycee  Referral specialist    This is a referral to an out of network provider. Please refer this member to an in-network pain management specialist. In compliance with the [facility] Guidelines, this case will be closed as there is no clinical documentation to substantiate the highly specialized care or medical necessity to approve out of network services. If you need assistance finding an in-network provider, please utilize the [email or web address] website or contact customer service at [phone]. If you intend to pursue this request, please resubmit a new referral with supporting clinical documentation to indicate medical necessity for the use of an out-of-network provider. This request will remain open until June 14, 2024, at which point it will be closed to comply with timeliness standards set by [employer].

## 2024-06-17 ENCOUNTER — MED REC SCAN ONLY (OUTPATIENT)
Dept: FAMILY MEDICINE CLINIC | Facility: CLINIC | Age: 44
End: 2024-06-17

## 2024-06-19 ENCOUNTER — OFFICE VISIT (OUTPATIENT)
Dept: RHEUMATOLOGY | Facility: CLINIC | Age: 44
End: 2024-06-19

## 2024-06-19 VITALS
DIASTOLIC BLOOD PRESSURE: 80 MMHG | TEMPERATURE: 97 F | BODY MASS INDEX: 48.95 KG/M2 | RESPIRATION RATE: 16 BRPM | WEIGHT: 266 LBS | HEIGHT: 62 IN | HEART RATE: 94 BPM | SYSTOLIC BLOOD PRESSURE: 140 MMHG | OXYGEN SATURATION: 96 %

## 2024-06-19 DIAGNOSIS — M54.2 NECK PAIN: ICD-10-CM

## 2024-06-19 DIAGNOSIS — M79.7 FIBROMYALGIA: ICD-10-CM

## 2024-06-19 DIAGNOSIS — R20.2 HAND TINGLING: ICD-10-CM

## 2024-06-19 DIAGNOSIS — M51.36 DDD (DEGENERATIVE DISC DISEASE), LUMBAR: ICD-10-CM

## 2024-06-19 DIAGNOSIS — R76.8 DS DNA ANTIBODY POSITIVE: ICD-10-CM

## 2024-06-19 DIAGNOSIS — M32.9 SYSTEMIC LUPUS ERYTHEMATOSUS, UNSPECIFIED SLE TYPE, UNSPECIFIED ORGAN INVOLVEMENT STATUS (HCC): Primary | ICD-10-CM

## 2024-06-19 DIAGNOSIS — R70.0 ELEVATED SED RATE: ICD-10-CM

## 2024-06-19 PROCEDURE — 3079F DIAST BP 80-89 MM HG: CPT | Performed by: INTERNAL MEDICINE

## 2024-06-19 PROCEDURE — 3077F SYST BP >= 140 MM HG: CPT | Performed by: INTERNAL MEDICINE

## 2024-06-19 PROCEDURE — 99215 OFFICE O/P EST HI 40 MIN: CPT | Performed by: INTERNAL MEDICINE

## 2024-06-19 PROCEDURE — 3008F BODY MASS INDEX DOCD: CPT | Performed by: INTERNAL MEDICINE

## 2024-06-19 RX ORDER — GABAPENTIN 100 MG/1
100 CAPSULE ORAL 3 TIMES DAILY
Qty: 270 CAPSULE | Refills: 0 | Status: SHIPPED | OUTPATIENT
Start: 2024-06-19

## 2024-06-19 NOTE — PROGRESS NOTES
RHEUMATOLOGY Follow up   Date of visit: 06/19/2024  ?  Chief Complaint   Patient presents with    Follow - Up     LOV 1/26/2024.  Rapid 3 score is a 4.7.     ASSESSMENT, DISCUSSION & PLAN   Assessment:  1. Systemic lupus erythematosus, unspecified SLE type, unspecified organ involvement status (HCC)    2. Elevated sed rate    3. DDD (degenerative disc disease), lumbar    4. Ds DNA antibody positive    5. Fibromyalgia    6. Neck pain    7. Hand tingling        Discussion:  Ms. Nola De Paz is a 43 yo woman with complicated past medical history of obesity, depression/anxiety/PTSD, sleep apnea who is suffering from diffuse pain and fatigue.  At this time, it is unclear the etiology of her symptoms, I suspect that she has fibromyalgia however this does not explain the elevations in her inflammatory markers. Unfortunately, pt did suffer from pneumonia which seems to have cleared on repeat CT chest imaging. Unclear if recent elevations in inflammatory markers related to recent infection. Unclear if steroids truly made difference in inflammation or issues with lungs.     Her exam was more consistent with diffuse tender point positivity found in fibromyalgia.  However VISE testing showed MODE positivity and equivocal RF IgA as well as equivocal dsDNA which was negative on confirmatory testing.  Patient was started on Plaquenil and noticed maybe slight improvement of the stiffness and fatigue as well. She has also had bariatric surgery and has been doing well overall since her significant weight loss.    At prior visit, she had worsened symptoms with fatigue, joint pain and myalgias. She also had recurrence of dsdna, rnp and smith positivity. She restarted plaquenil and had been feeling much better overall. She was only taking one pill daily at that time.  She continued to have symptoms of dizziness, chest pain, shortness of breath, and other symptoms. Unlikely lupus related given DOYLE panel negative and inflammatory  markers/complements stable/improved.   She felt better overall since adjusting her diet and getting iron infusions.      At this time, she continues to feel worse. Feels her HS is getting worse and she continues to gain weight. Has not been eating/exercising like she was before and having difficulty getting medications approved.   Hard to determine synovitis on exam due to body habitus but does not seem to have active joint swelling.   Recommended repeat labs before we consider step up immunosuppression.   She has been doing much better with getting full dose of plaquenil unlike before but does not feel making a difference like previously.     I worry that her symptoms are weight related vs cardiac related. Recommended re-evaluation for BLESSING since she has gained over 60# in the past one year.   She lacks obvious signs of inflammation on her exam and last set of ESR/CRP was actually near normal  She will continue plaquenil BID.   Strongly recommended sleep study/evaluation for narcolepsy.     Okay for pt to follow up in 6 months or sooner as needed   Encouraged to keep me updated in the meantime with symptoms.     Patient verbalized understanding of above instructions. No further questions at this time.    Code selection for this visit was based on time spent (40min) on date of service in preparing to see the patient, obtaining and/or reviewing separately obtained history, performing a medically appropriate examination, counseling and educating the patient/family/caregiver, ordering medications or testing, referring and communicating with other healthcare providers, documenting clinical information in the E HR, independently interpreting results and communicating results to the patient/family/caregiver and care coordination with the patient's other providers.  Additional time spent addressing concerns via [employer] leading up to today's visit.         ?  Plan:  Diagnoses and all orders for this visit:    Systemic lupus  erythematosus, unspecified SLE type, unspecified organ involvement status (HCC)  -     C-Reactive Protein; Future  -     Sed Rate, Westergren (Automated); Future  -     Rheumatoid Arthritis Factor; Future  -     Cyclic Citrullinate Pep. IGG; Future  -     Anti-Nuclear Antibody (MODE) by IFA, Reflex Titer + Specific Antibodies; Future  -     Immunoglobulin A/G/M, Quant; Future    Elevated sed rate  -     C-Reactive Protein; Future  -     Sed Rate, Westergren (Automated); Future  -     Rheumatoid Arthritis Factor; Future  -     Cyclic Citrullinate Pep. IGG; Future  -     Anti-Nuclear Antibody (MODE) by IFA, Reflex Titer + Specific Antibodies; Future  -     Immunoglobulin A/G/M, Quant; Future    DDD (degenerative disc disease), lumbar    Ds DNA antibody positive  -     C-Reactive Protein; Future  -     Sed Rate, Westergren (Automated); Future  -     Rheumatoid Arthritis Factor; Future  -     Cyclic Citrullinate Pep. IGG; Future  -     Anti-Nuclear Antibody (MODE) by IFA, Reflex Titer + Specific Antibodies; Future  -     Immunoglobulin A/G/M, Quant; Future    Fibromyalgia  -     gabapentin 100 MG Oral Cap; Take 1 capsule (100 mg total) by mouth 3 (three) times daily.    Neck pain  -     XR CERVICAL SPINE (2-3 VIEWS) (CPT=72040); Future    Hand tingling  -     XR CERVICAL SPINE (2-3 VIEWS) (CPT=72040); Future              Return in about 6 months (around 12/19/2024).  ?  HPI   Nola De Paz is a 44 year old female with the following active problems who is seen for medically necessary follow-up today. She was seen as a new patient virtually initially years ago for evaluation of diffuse pain and elevated CRP. She does have hx of depression/anxiety/PTSD and there was concern for fibromyalgia. Was dx with lupus based off labs and started on plaquenil. She presents for follow up today.   Pt late for appt but still seen.     Since her last visit, she hasn't been feeling well  Continues with worsened fatigue and pain- feels  similar to when had covid infection   Suffered ear infection about a month ago and required abx and steroids    + worsened palpitations- had heart monitor- has to see cardiologist- Dr. Goodman. Had some sinus tach and SV ectopic beats. Is getting dizziness and chest pain and generalized weakness.     Feels weakness easily with minimal activity   Feels like she has to sleep throughout the day  Denies trouble sleeping at night. Waking up feeling rested. Used to have BLESSING and after weight loss, told resolved. Stopped around 2021 due to improvement.       Intermittent rashes over the arms and sometimes with sun exposure  Still rashes in the skin folds.   + nasal ulcers, stable  Rarely in the mouth     Increased joint pain- new in the hands. Can get swelling overnight while sleeping feeling worsened aching. Feels fire sensation.   + numbness diffuse fingertips b/l.   + worsened neck pain     Has been better about taking plaquenil BID   Taking muscle relaxant as needed and tramadol as needed.     No recent rashes or blisters like before (was getting fevers, resolved)     + continues with weight gain since her last visit. Had to go off mounjaro due to cost and now getting compounded semaglutide. Stopped vyvanse when switching her weight loss drugs. Also had to stop due to insurance cost.     Still with dry eyes and mild intermittent dry mouth. Is using OTC drops for the eyes- Systane- using up to 4x/day     HS has been getting worse in the pannus area/pubic region again. States now spread and more frequent outbreaks. Following with dermatology- using topicals. Has been told possibly not advanced enough to start biologic.     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       HPI from initial consultation  referred for rheumatologic evaluation due to diffuse pain and fatigue.      States she has been suffering for awhile. States she reports a generalized fatigue and overall not well feeling. She did have labs drawn last summer  with elevations in her CRP.   She admits to sore muscle easily even with minimal activity or with stress. States can have difficulty moving by the end of most days. Denies specific location, stating it affects her all over. Does feel slightly worsened around her hips and thigh muscles.   + significant fatigue. Recently diagnosed with sleep apnea, has since been using a CPAP but states sleep has significantly improved. Does not nap during the day. Still wakes up feeling tired still.   + admits to pain in her back diffusely, worst over her lower back and neck. Neck being affected more recently. Back/neck more consistent. Also has pain in her hips and knees. Right shoulder pain intermittent.      Feels like pain changes on a day to day basis. Expresses that she feels like she's not functioning at a 100% due to feeling unwell in general.   Can have nausea, headaches intermittently.      Does have hx of depression, anxiety and PTSD from an event last summer. Does feel like symptoms are better controlled recently- had Effexor dose increased about 3 months.      Denies overt swelling but states she has sensation of swelling with tightness/stiffness of her hands, neck and back.      Can get discoloration of fingertips to purple/blue with cold exposure      Was recently admitted to hospital for pain and discomfort. COVID19 testing was negative. Told that she had early signs of arthritis of her neck. Had PE ruled out as well.      + reflux, improved with medication   + intermittent constipation but thought related to medication   + hx of plantar fasciitis, with difficulty walking in the morning. Does wear inserts in her shoes. No formal workup for this.   + fevers per pt more often than should be usual (as high as 99.9 or less; baseline temp per pt is 96-97)  + feels lymph node enlargement in her neck frequently  + frequent sinus infections without epistaxis   + recent numbness/tingling in fingers and toes about one month  ago, attributed to Vyvanse prescription.      For pain, she takes ibuprofen prn as well as heat pack with some relief. Has tried stretching which can help at times but then pain returns.      The patient denies hair loss, oral or nasal ulcers, photosensitive rash, elevated or scarring rashes, prior hematologic abnormality, prior renal or liver disease, or history of seizures.  No history of prior blood clot in the legs or lungs, strokes or ischemic phenomenon. Never been pregnant.   Denies nonhealing ulcers on the fingertips or trouble swallowing.  The patient denies any history of uveitis, crampy abdominal pain, diarrhea, bloody stools, Achilles heel pain, psoriatic lesions, spooning or pitting of the nails but + ridges.  There are no symptoms of severe dry eyes or dry mouth..  No chills, night sweats, unexpected weight loss, easy bruising bleeding, or unexplained weakness.  Denies chronic cough or hemoptysis.   Denies obvious blood in urine.      Family hx:   Denies known hx of autoimmune disease, no known lupus, RA, or psoriasis   Thinks father has some sort of inflammatory skin disease.   ?  Past Medical History:  Past Medical History:    Allergic rhinitis    Anxiety    Arthritis    Back problem    DDD    Blood disorder    anemia    Depression    Disorder of thyroid    Esophageal reflux    Fibromyalgia    High cholesterol    Hyperlipidemia    Hypothyroidism    IBS (irritable bowel syndrome)    Migraines    Obesity    BLESSING (obstructive sleep apnea)    AHI 19 REM AHI 57 Supine AHI 46 non-supine AHI 11 [place] 71%    Osteoarthritis    Pneumonia due to organism    Shortness of breath    Sleep apnea    cpap    Thyroid disease     Past Surgical History:  Past Surgical History:   Procedure Laterality Date    Cholecystectomy  2013    Colonoscopy N/A 08/28/2020    Procedure: COLONOSCOPY;  Surgeon: Tone Steiner MD;  Location:  ENDOSCOPY    Other surgical history  12/21/2020    gastric sleeve    Removal  gallbladder  2013    [facility]    Surgical bariatrics - internal  12/21/2020    [place] teeth removed  2013     Family History:  Family History   Problem Relation Age of Onset    Heart Disease Mother     High Cholesterol Mother     Other (DDD) Mother     Anemia Mother     Heart Disorder Mother         Heart disease, high blood pressure, stent, high cholesterol    Hypertension Mother     Obesity Mother     Hypertension Father     Heart Disorder Father         Heart murmur, high blood pressure    Obesity Father     Other (lymphoma) Maternal Grandmother     Cancer Maternal Grandmother         Lymphoma    Other (cancer) Paternal Grandmother         liver     Asthma Paternal Grandmother         Asthma and emphysema, copd, non smoker    Cancer Paternal Grandmother         Liver and lung cancer    Diabetes Paternal Grandmother     Prostate Cancer Paternal Grandfather         in 80s    Cancer Paternal Grandfather         Prostate cancer    Stroke Paternal Grandfather      Social History:  Social History     Socioeconomic History    Marital status: Single   Tobacco Use    Smoking status: Never    Smokeless tobacco: Never   Vaping Use    Vaping status: Never Used   Substance and Sexual Activity    Alcohol use: Not Currently     Comment: 1-2 a month    Drug use: Never    Sexual activity: Yes     Partners: Male     Birth control/protection: Condom   Other Topics Concern    Caffeine Concern No    Exercise No    Seat Belt No    Special Diet No    Stress Concern No    Weight Concern No    Self-Exams Yes   Social History Narrative    Works as LPN.     Social Determinants of Health     Physical Activity: Inactive (11/2/2020)    [facility]    Exercise Vital Sign     Days of Exercise per Week: 0 days     Minutes of Exercise per Session: 0 min     Medications:  Outpatient Medications Marked as Taking for the 6/19/24 encounter (Office Visit) with Azra Shirley DO   Medication Sig Dispense  Refill    gabapentin 100 MG Oral Cap Take 1 capsule (100 mg total) by mouth 3 (three) times daily. 270 capsule 0    CUSTOM MEDICATION Semaglutide 0.5mg    Semaglutide/Cyanocobalamin 1mg/0.5 ml    Inject 50 units/0.5ml subcutaneous weekly     Disp: 2.5ml 1 each 0    [] traMADol 50 MG Oral Tab Take 1 tablet (50 mg total) by mouth 2 (two) times daily as needed for Pain. 60 tablet 0    hydroxychloroquine 200 MG Oral Tab Take 1 tablet (200 mg total) by mouth 2 (two) times daily. 180 tablet 1    cyclobenzaprine 10 MG Oral Tab Take 1 tablet (10 mg total) by mouth nightly as needed for Muscle spasms. 30 tablet 0    levothyroxine 175 MCG Oral Tab Take 1 tablet (175 mcg total) by mouth before breakfast. 90 tablet 3    Omeprazole 40 MG Oral Capsule Delayed Release Take 1 capsule (40 mg total) by mouth daily. 90 capsule 1    venlafaxine  MG Oral Capsule SR 24 Hr Take 1 capsule (150 mg total) by mouth daily. 90 capsule 1    rosuvastatin 10 MG Oral Tab Take 1 tablet (10 mg total) by mouth nightly. 90 tablet 0    ezetimibe 10 MG Oral Tab TAKE 1 TABLET(10 MG) BY MOUTH DAILY 90 tablet 0    Meloxicam 7.5 MG Oral Tab Take 1 tablet (7.5 mg total) by mouth daily.      traZODone 50 MG Oral Tab Take 1 tablet (50 mg total) by mouth nightly.      clindamycin 1 % External Lotion APPLY TOPICALLY TO THE AFFECTED AREA EVERY DAY BEFORE NOON      LORazepam 0.5 MG Oral Tab Take 1 tablet (0.5 mg total) by mouth 2 (two) times daily as needed. 40 tablet 0    fexofenadine 180 MG Oral Tab Take 1 tablet (180 mg total) by mouth daily.      Multiple Vitamin (MULTIVITAMIN ADULT OR) Apply topically.      ondansetron 4 MG Oral Tablet Dispersible Take 1 tablet (4 mg total) by mouth every 8 (eight) hours as needed for Nausea. 30 tablet 0    Nystatin 181212 UNIT/GM External Powder Apply 1 Application topically 4 (four) times daily. 1 Bottle 0    clotrimazole-betamethasone 1-0.05 % External Cream Apply 1 Application topically 2 (two) times daily as  needed (rash). 60 g 3    Albuterol Sulfate HFA (PROAIR HFA) 108 (90 Base) MCG/ACT Inhalation Aero Soln Inhale 2 puffs into the lungs every 4 (four) hours as needed for Wheezing or Shortness of Breath. 1 Inhaler 3    Triamcinolone Acetonide 55 MCG/ACT Nasal Aerosol by Nasal route daily as needed.       Modified Medications    No medications on file     Medications Discontinued During This Encounter   Medication Reason    methylPREDNISolone (MEDROL) 4 MG Oral Tablet Therapy Pack        ?  ?  Allergies:  Allergies   Allergen Reactions    Penicillins HIVES and RASH     ?  REVIEW OF SYSTEMS   ?  Review of Systems   Constitutional:  Positive for malaise/fatigue. Negative for chills and fever.   HENT:  Positive for congestion. Negative for hearing loss, nosebleeds and tinnitus.    Eyes:  Negative for blurred vision, double vision, pain and redness.   Respiratory:  Negative for cough, hemoptysis and shortness of breath.    Cardiovascular:  Positive for leg swelling. Negative for chest pain and palpitations.   Gastrointestinal:  Positive for heartburn. Negative for abdominal pain, blood in stool, diarrhea and nausea.   Genitourinary:  Negative for dysuria, frequency, hematuria and urgency.   Musculoskeletal:  Positive for back pain, joint pain, myalgias and neck pain.   Skin:  Negative for itching and rash.   Neurological:  Positive for dizziness (increased intermittently). Negative for tingling, seizures, weakness and headaches.   Endo/Heme/Allergies:  Bruises/bleeds easily.   Psychiatric/Behavioral:  Negative for depression. The patient is nervous/anxious and has insomnia.      PHYSICAL EXAM   Today's Vitals:  Temperature Blood Pressure Heart Rate Resp Rate SpO2   Temp: 97.3 °F (36.3 °C) BP: 140/80 Pulse: 94 Resp: 16 SpO2: 96 %   ?  Current Weight Height BMI BSA Pain   Wt Readings from Last 1 Encounters:   06/19/24 266 lb (120.7 kg)    Height: 5' 2\" (157.5 cm) Body mass index is 48.65 kg/m². Body surface area is 2.16  meters squared.         Physical Exam  Vitals and nursing note reviewed.   Constitutional:       General: She is not in acute distress.     Appearance: She is well-developed. She is obese. She is not diaphoretic.   HENT:      Head: Normocephalic and atraumatic.   Eyes:      General: No scleral icterus.     Extraocular Movements: Extraocular movements intact.      Conjunctiva/sclera: Conjunctivae normal.   Neck:      Vascular: No JVD.      Trachea: No tracheal deviation.   Cardiovascular:      Rate and Rhythm: Normal rate and regular rhythm.   Pulmonary:      Effort: Pulmonary effort is normal. No respiratory distress.      Breath sounds: Normal breath sounds. No wheezing.   Musculoskeletal:         General: Tenderness present. No swelling.      Cervical back: Neck supple.      Comments: No evidence of heberden or mayra nodes of any of the fingers, no basilar joint tenderness of the 1st CMC bilaterally.  Tenderness diffusely - improved; now over ankles and knees  No swelling, redness or restriction of motion of the DIPs, PIPs, MCPs, wrists, elbows, ankles, or joints of the feet.  Bilateral shoulders with full ROM.  Bilateral knees with medial joint line tenderness, no crepitus, no effusion.    (prior fibro exam)  Posterior Tender Point Exam  Occiput -/-  Trapezius +/+  Supraspinatus +/+  Gluteal deferred   Greater trochanter +/+  Anterior Tender Point Exam:  Low cervical +/+  Second rib +/+  Lateral epicondyle +/+  Knee +/+  14 tender points positive   Lymphadenopathy:      Cervical: No cervical adenopathy.   Skin:     General: Skin is warm and dry.      Findings: No erythema or rash.   Neurological:      Mental Status: She is alert and oriented to person, place, and time.      Cranial Nerves: No cranial nerve deficit.      Gait: Gait normal.   Psychiatric:         Mood and Affect: Mood normal.         Behavior: Behavior normal.       ?  Radiology review:     PROCEDURE:  CT CHEST PE AORTA (IV ONLY) (CPT=71260)      COMPARISON:  [place] , CT, CT ANGIOGRAPHY, CHEST (CPT=71275), 5/18/2020, 9:21 AM.  [place], CT, CT ANGIOGRAPHY, CHEST (CPT=71275), 5/27/2020, 2:48 PM.     INDICATIONS:  shortness of breath, denies fever or known contact with COVID. Recent pneumonia diagnosis     TECHNIQUE:  CT images were obtained with non-ionic intravenous contrast material. Dose reduction techniques were used. Dose information is transmitted to the ACR (American College of Radiology) NRDR (National Radiology Data Registry) which includes the   Dose Index Registry.     PATIENT STATED HISTORY:(As transcribed by Technologist)  HENNY      CONTRAST USED:  100cc of Omnipaque 350     FINDINGS:       Preliminary reading provided by radiologist from [facility].      VASCULATURE:  Negative for acute pulmonary embolism.  No filling defects are seen centrally or peripherally within the pulmonary arterial system.     LUNGS/PLEURA:  Ground-glass opacities are present in the superior segment left lower lobe, left upper lobe, basal segments left lower lobe, minimal and subtle in the basal right lower lobe with sparing of the right upper lobe and middle lobe of this   process.  No pleural effusion or pneumothorax.  These changes have developed since the most recent CT scan of the chest from 5/18/2020.     THORACIC AORTA:  No thoracic aortic aneurysm.      MEDIASTINUM/LAURA:  No pathologically enlarged nodes.     CARDIAC:  Unremarkable.     CHEST WALL:  Unremarkable.     LIMITED ABDOMEN:  No acute process seen.     BONES:  No acute process seen.     OTHER:  None.        CONCLUSION:  Recurrent ground-glass opacities in the lung bilateral, suspicious for atypical including viral pneumonia, with other etiologies possible.  Negative for acute pulmonary embolism.      Dictated by: Jarred Manzo MD on 6/22/2020 at 6:16 AM     CONCLUSION:  Minimal osteoarthritic changes are likely present at the sacroiliac joints bilaterally.  If clinical symptoms persist then  consider MRI.       Dictated by: Andrew Romero MD on 6/17/2020 at 7:07 PM      CONCLUSION:  Overall mild degenerative changes of the lumbar spine are present.  If clinical symptoms persist then recommend MRI.        Dictated by: Andrew Romero MD on 6/17/2020 at 7:06 PM       CONCLUSION:  Overall mild degenerative changes of the thoracic spine are present.  If clinical symptoms persist then recommend MRI.        Dictated by: Andrew Romero MD on 6/17/2020 at 7:05 PM         Labs:  Lab Results   Component Value Date    WBC 5.2 04/30/2024    RBC 4.11 04/30/2024    HGB 12.9 04/30/2024    HCT 37.1 04/30/2024    .0 04/30/2024    MPV 10.7 01/17/2013    MCV 90.3 04/30/2024    MCH 31.4 04/30/2024    MCHC 34.8 04/30/2024    RDW 11.4 04/30/2024    NEPRELIM 2.26 04/30/2024    NEPERCENT 43.9 04/30/2024    LYPERCENT 38.6 04/30/2024    MOPERCENT 8.5 04/30/2024    EOPERCENT 7.8 04/30/2024    BAPERCENT 1.0 04/30/2024    NE 2.26 04/30/2024    LYMABS 1.99 04/30/2024    MOABSO 0.44 04/30/2024    EOABSO 0.40 04/30/2024    BAABSO 0.05 04/30/2024     Lab Results   Component Value Date    GLU 85 04/30/2024    BUN 14 04/30/2024    BUNCREA 25.5 (H) 04/30/2024    CREATSERUM 0.55 04/30/2024    ANIONGAP <0 (L) 04/30/2024    GFRNAA 100 07/16/2022    GFRAA 115 07/16/2022    CA 9.2 04/30/2024    OSMOCALC 290 04/30/2024    ALKPHO 65 04/30/2024    AST 34 04/30/2024    ALT 31 04/30/2024    BILT 0.6 04/30/2024    TP 6.6 04/30/2024    ALB 3.7 04/30/2024    GLOBULIN 2.9 04/30/2024    AGRATIO 1.2 08/08/2020     04/30/2024    K 3.8 04/30/2024     04/30/2024    CO2 30.0 04/30/2024       Additional Labs:  05/2024  ESR 23 borderline  CRP normal     04/2024  Iron 73  Ferritin 85.4  CRP normal  ESR 44 elevated (dx with sinus infection)  C3 121.8 normal  C4 36.5 elevated  Vit D 30.8 borderline    02/2024  Mitochondrial ab neg  LKM ab neg  Smooth muscle ab negative    11/2023  CRP normal  ESR 17 normal  C3 91.3 normal  C4 28.9 normal   Vit D 27.4 low      06/2023  CBC with WBC 5.0; Hgb 12.5; plt 180  CMP grossly normal except K 3.4; AST//193  CINDI 1:160  Dsdna, SSA, SSB, Smith, U1RNP, RNP70, centromere, Scl70, Jo1 negative   CRP normal  ESR 23 borderline  C3 88.9 low  C4 29.7 normal   TSH normal  B12 492  Vit D 53 normal     05/2022  Espinoza 139 (N<100)   (N<100)  dsDNA 178 (N<100)  SSA, SSB, SCL 70, Ellyn 1, centromere, histone negative CINDI by IFA 1: 640 speckled  C4 32.4 normal  C3 111 normal  ESR 27  CRP normal  CMP grossly normal  CBC hemoglobin 11.9; otherwise grossly normal    12/2021  ESR 28  CRP normal  C3 99.4 normal  C4 29.9 normal     11/2021  CINDI positive (no titer provided)  Espinoza 196 (N<100)   (N<100)  Otherwise negative DOYLE panel   CRP normal  ESR 24 normal   C3 111 normal  C4 31.9 normal     04/2021  Espinoza 219 (N<100)   (N<100)  Cindi 1:320 speckled   RF negative  C3 129 normal  C4 38 normal   CRP 0.52  ESR 35    08/2020- [facility]  CINDI 1:320 homogenous  DsDNA positive but negative on confirmatory  PsPT IgM borderline positive  RF IgA equivocal  TPO +  Otherwise negative       CINDI 1:320 homogenous  DOYLE panel RNP equivocal   Aldolase normal  LDH normal  CK normal  Myositis antibody panel negative   ESR 72 elevated  CRP 2.24 elevated     CRP  04/18/2020 - 5.45  04/17/2020 - 3.68  04/16/2020 - 2.42  04/15/2020 - 2.55 (N<0.3)  08/24/2019 - 18.5 (N<8.0)     04/2020   normal  Ferritin normal   COVID19 negative      08/2019  CINDI 1:40 homogenous   CCP negative   RF negative   ESR 46 elevated     Azra Casper, DO  [facility]  06/19/2024

## 2024-06-20 ENCOUNTER — PATIENT MESSAGE (OUTPATIENT)
Dept: FAMILY MEDICINE CLINIC | Facility: CLINIC | Age: 44
End: 2024-06-20

## 2024-06-20 DIAGNOSIS — G47.33 OSA (OBSTRUCTIVE SLEEP APNEA): ICD-10-CM

## 2024-06-20 DIAGNOSIS — Z86.69 HISTORY OF EAR INFECTION: Primary | ICD-10-CM

## 2024-06-21 ENCOUNTER — TELEPHONE (OUTPATIENT)
Dept: FAMILY MEDICINE CLINIC | Facility: CLINIC | Age: 44
End: 2024-06-21

## 2024-06-21 DIAGNOSIS — K80.50 CHOLEDOCHOLITHIASIS: ICD-10-CM

## 2024-06-21 DIAGNOSIS — K74.02 ADVANCED HEPATIC FIBROSIS: Primary | ICD-10-CM

## 2024-06-21 NOTE — TELEPHONE ENCOUNTER
LEFT MESSAGE for pt at     [phone] (Mobile)     VOICEMAIL to call back for information regarding testing that needs to be completed at [facility]:    MR Abdomen MRCP with and without Contrast  MR Elastography    Both tests according to Jaycee LEMUS at  Referrals indicates these tests will need to be completed in network at [facility]. Need to give patient the [facility] Centralized Scheduling # of [phone] to schedule when she returns the call.    Referral has been placed 3 visits for Dr Dea Holly who is on the [facility] list of Specialists.    Packet given to Gracie at [facility].  Extra packet given to Dr SUSAN JARAMILLO.

## 2024-06-21 NOTE — TELEPHONE ENCOUNTER
Left Message for patient that the referral is being handled by the clinical staff and will be reviewed by the referral department and the medical director and will not be approved by Monday.  You will be notified by the clinical staff on the progress of this referral request for MRI orders.

## 2024-06-21 NOTE — TELEPHONE ENCOUNTER
Referral:  Nola ARLETH De Paz  LOV: 5/24/2024  Calling for a referral to:    Physician and Practice Name: [facility]   Specialty: Liver Clinic   Concern/condition: Elevated Liver enzymes and F3 Fibrosis on recent liver ultrasound elastography.   Does patient have a scheduled Appointment?: no    We received clinical notes via fax from [facility] - Dr. Dea Araya MD.  Patient is requesting a referral for 2 MRI's referred by the Liver Specialist Dr. Holly.  Patient would like to schedule these MRI's on Monday.

## 2024-06-24 ENCOUNTER — TELEPHONE (OUTPATIENT)
Dept: SURGERY | Facility: CLINIC | Age: 44
End: 2024-06-24

## 2024-06-24 NOTE — TELEPHONE ENCOUNTER
Patient called at 10:09 am to see if we had a referral for Fernanda and I called patient to let her know that we do have the referral and she can come in for her appt. DT

## 2024-06-24 NOTE — TELEPHONE ENCOUNTER
From: Nola De Paz  To: Julissa Morochojudson  Sent: 6/20/2024 3:34 PM CDT  Subject: Referral for Pulminology and ENT     Hi   I would like to request a referral for Pulminology so I may be tested to see if I have sleep apnea again or to rule out narcolepsy at the suggestion of my rheumatologist during our recent visit. I would also like to request a referral to see an ENT for frequent recent ear infections and fluid in the ears I’ve had    Thank you

## 2024-06-26 ENCOUNTER — TELEPHONE (OUTPATIENT)
Dept: INTERNAL MEDICINE CLINIC | Facility: CLINIC | Age: 44
End: 2024-06-26

## 2024-06-26 NOTE — TELEPHONE ENCOUNTER
Patient called stating she spoke to Dr Holly's office and she needs an MR Liver with Elastography, not an US Liver. Patient was told [facility] does not perform this test and she will need to go to [facility] to have it done.  Called Dr Holly's office and spoke to Lina who confirms patient needs an MR Liver with Elastography.  She was told by [facility] Central Scheduling that we do not perform this test.  Called MRI department and spoke to Miya who confirms [facility] do not do imaging with Elastography.      Patient needs order for MR Liver with Elastography (CPT 76256) to be done at [facility].

## 2024-06-28 NOTE — TELEPHONE ENCOUNTER
Per [facility]  \"the ordering provider office can fax the order to the location - I called [employer] [facility] @ [phone] - the rep states the order can be faxed to them @ fax# [phone].  After the order is faxed it usually takes about 1 hr to process into their system and the patient would then be able to call the # above to schedule.\"    Left message at Dr. Holly office asking that the order be faxed to the [facility] MRI dept at number listed above.

## 2024-06-28 NOTE — TELEPHONE ENCOUNTER
Referral to have the MR completed at [facility] has been denied.  Patient is being redirected to [facility] for testing.

## 2024-07-03 NOTE — TELEPHONE ENCOUNTER
Per TE 24     Miscellaneous Notes  - documented in this encounter  Telephone Encounter - Fernanda Dominguez, RN - 2024 4:23 PM CDT  Formatting of this note might be different from the original.  Hi,  Rec'd a call from Dr. Jaramillo's office, nurse Kayla. Due to pt's insurance, she has to have MRE at [facility]. Faxed order to [phone] via Epic fax. Contacted pt, left VM for her to obtain CD after she gets MRE completed, and to bring it to her appointment on 24.Phone number of liver clinic nurse line left on VM if pt has questions. Thanks, Suki  Electronically signed by Fernanda Dominguez, RN at 2024 4:29 PM CDT      Pt called looking for update. Notified of the above. Pt stated she hasn't scheduled MRI yet. Provided pt with number below to schedule. She is asking about auth. Notified from our end, it is authorized. Pt verbalizes understanding and will call and schedule.     Note:  PATIENT NAME:  STEPHEN RAZO/ [phone] (home)/ There is no work phone number on file.  PATIENT :  1980  REFERRAL ID #:  [id number]  REFERRAL STATUS:  Authorized [1]  REVIEW REFERRAL NOTES FOR MORE INFORMATION:  DATE AUTHORIZED:  2024 // EXPIRATION DATE: 2025   REFERRED BY:  SUSAN JARAMILLO MD (TEL: [phone])  REFERRED TO: No referral provider, MD (TEL: No Referred to Provider on Referral)     No vendor specified on referral. / No vendor phone number known.  No facility specified on referral  REFERRED FOR:    Diagnoses:    K74.02 (ICD-10-CM) - 571.5 (ICD-9-CM) - Advanced hepatic fibrosis    K80.50 (ICD-10-CM) - 574.50 (ICD-9-CM) - Choledocholithiasis  Procedures:     [id number] - MRI ABDOMEN AND MRCP W/3D (W+W0)(CPT=74183/37794)   NUMBER OF VISITS AUTH: 1

## 2024-07-08 ENCOUNTER — MED REC SCAN ONLY (OUTPATIENT)
Dept: FAMILY MEDICINE CLINIC | Facility: CLINIC | Age: 44
End: 2024-07-08

## 2024-07-10 ENCOUNTER — APPOINTMENT (OUTPATIENT)
Dept: PHYSICAL THERAPY | Age: 44
End: 2024-07-10
Attending: FAMILY MEDICINE
Payer: COMMERCIAL

## 2024-07-10 ENCOUNTER — TELEPHONE (OUTPATIENT)
Dept: PHYSICAL THERAPY | Facility: HOSPITAL | Age: 44
End: 2024-07-10

## 2024-07-11 ENCOUNTER — OFFICE VISIT (OUTPATIENT)
Dept: PHYSICAL THERAPY | Age: 44
End: 2024-07-11
Attending: NURSE PRACTITIONER
Payer: COMMERCIAL

## 2024-07-11 DIAGNOSIS — K59.00 CONSTIPATION, UNSPECIFIED CONSTIPATION TYPE: ICD-10-CM

## 2024-07-11 DIAGNOSIS — R39.15 URINARY URGENCY: Primary | ICD-10-CM

## 2024-07-11 PROCEDURE — 97163 PT EVAL HIGH COMPLEX 45 MIN: CPT | Performed by: PHYSICAL THERAPIST

## 2024-07-11 NOTE — PROGRESS NOTES
MUSCULOSKELETAL AND PELVIC FLOOR EVALUATION:     Diagnosis:   Urinary urgency (R39.15)  Constipation, unspecified constipation type (K59.00)  , Pelvic floor dysfunction   Referring Provider: Chrissy Fairchild  Date of Evaluation:    7/11/2024    Precautions:  None Next MD visit:   none scheduled  Date of Surgery: n/a     PATIENT SUMMARY   Nola De Paz is a 44 year old female  who presents to therapy today with complaints of having sporadic urinary incontinences and when she has the urge, she cannot make it to the bathroom, She also has UTI symptoms, burning sensation when she urinates, but she does not have UTI, occasionally, she painful intercourse, pain toward the end of sexual intercourse,  she feels inflamed and swollen with everything down there. Occasionally she has trouble with BM, even if her stool is soft, it the getting the movement of it.  Previous treatment include consult with MD for UTI but is negative, 1x she had trace of blood. Patient reports that UI symptoms started about 8 months ago,  but pain with sexual activity has been going on for a while. Low back pain has been going on for about 10 years now, has DDD, arthritis on her back, had injections on her back and SIJ in the past, and has helped. When it starts hurting on R SIJ, she will have sharp pains and deep aching pain on the R LE. Denies numbness and tingling. In front of her hips, she has pulling, tightness. R SIJ is the one bothering her recently, she cannot stand and walk because of the back pain, she had to go to the ER about 2 months ago, was informed to start PT. Her work involves a lot of lifting, a lot of going up an down from the floor.  Current symptoms include: back pain, urgency/frequency, and leakage    Pt describes pain level: current 0/10 ave 4/10, 0/10, worst 10/10. 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 Pt goals include decrease pain during intercourse.  Past medical history was reviewed with Nola. Significant findings include  has a past medical history of Allergic rhinitis (2010), Anxiety, Arthritis, Back problem, Blood disorder, Depression, Disorder of thyroid, Esophageal reflux, Fibromyalgia, High cholesterol, Hyperlipidemia, Hypothyroidism, IBS (irritable bowel syndrome), Migraines, Obesity, BLESSING (obstructive sleep apnea) (8/23/19 [facility]), Osteoarthritis, Pneumonia due to organism, Shortness of breath, Sleep apnea, and Thyroid disease.    URINARY HABITS    Urinary leakage with coughing, sneezing, or laughing no   Urinary leakage with urgencies no   Amount of urinary leakage (mild, mod, sev) Severe, she has to run,  a lot of times she does not make it   Protection: Pads, tampons, liners used none   # of protection used N/a   Amount of saturation N/a   Incomplete bladder emptying Most of the time, yes   Straining when voiding no   Post void dribble  No   Others: She had noctural UI/ woke up with the bed wet, had it in her sleep- 1- 2 months ago      Do you ever leak urine without knowing it? Yes    BOWEL HABITS  Types of symptoms: other hard to have BM, she sometimes does not feel the urge, so she has to go and push    Frequency of bowel movements: 1x/day, every other day  Stool consistency: [place] Stool Scale: 4  Do you strain with defecation: Yes   Laxative use: No, but uses stool softeners every other day  Completely empty: sometimes  Position: tried squatty potty, did not work, did rocking    SEXUAL HEALTH STATUS  Sexual [place] Status: Active  Any problems with sexual activity: pain is more toward end of sexual activity, initial pain goes away and has burning pain or soreness for the rest of the night  Marinoff Scale: 2  Marinoff Score (0-3):  0 No problem  1 Discomfort  that does not affect Completion  2 Pain interrupts or prevents Completion  3 Pain prevents any attempts at intercourse     Pain with initial and/or deep penetration: none  Pain with pelvic exam/tampon use: No  History of Sexual Abuse: None       ASSESSMENT  Nola presents to physical therapy evaluation with primary c/o urge urinary incontinence, difficulty with bowel movement, pain with sexual activity, nocturnal incontinence, anterior pelvic tightness, low back pain. The results of the objective tests and measures show severe STRs and muscles tightness on PF muscles, PF muscles weakness, decreased lumbopelvic stability, LOM in lumbar spine.  Functional deficits include but are not limited to impaired bowel and bladder mechanics.  Signs and symptoms are consistent with diagnosis of Urinary urgency (R39.15) Constipation, unspecified constipation type (K59.00), pelvic floor dysfunction   . Pt and PT discussed evaluation findings, pathology, POC and HEP.  Pt voiced understanding and performs HEP correctly without reported pain. Skilled Pelvic Physical Therapy is medically necessary to address the above impairments and reach functional goals.Patient was late for appointment, lumbar and LE assessment to follow.    OBJECTIVE:   Posture: FHP, rounded shoulders, increased central girth, increased lumbar lordosis, + trendelenburg  Pelvic Alignment: WNL  Deep Tendon Reflexes:  Patella: not done     Achilles: not done  Gait: pt ambulates on level ground with antalgia.     External Palpation: Non tender on abdominal region but with mild to moderate STRs  Scars (location/surgery): none  Abdominal Wall Integrity: Poor  Diastasis Recti: (finger width depth while contracted)- none    Range Of Motion  Lumbar AROM screen: WNL for flexion, 75% LOM for lumbar extension with pain, 50% LOM for lateral flexion   LE AROM screen: to follow    Strength (MMT)   Transverse Abdominis: 2/5  Hip muscles - to follow    Flexibility Summary: to  follow    Special Tests  ASLR - poor lumbar loading transfers    Informed consent for internal pelvic evaluation given: Yes    External Observation:   Voluntary contraction: present   Voluntary relaxation: present  Involuntary contraction: absent  Involuntary relaxation: present    Mons pubis: WNL  Labia majora: WNL  Labia minora: WNL  Urethral meatus: WNL  Introitus: other: tight  Perineal body: WNL    Sensory/Reflex:  Vestibule: normal bilaterally  Anal [place]: hypo bilateral    Internal Examination   Scar: none    Pelvic Floor Muscle strength: (PERF= Power/Endurance/Reps/Fast) MMT: 3/10/1/7  External Anal Sphincter: note tested  Accessory Muscle Use: adductors    Tissue Laxity Test:None  Anterior Wall: WNL  Posterior Wall: WNL  Apical: WNL    Eccentric lengthening contraction: poor      Internal Palpation: WNL except Superficial Transverse Perineal B severe restriction, pain, and tenderness  Bulbocavernosus B severe restriction, pain, and tenderness  Ischiocavernosus B severe restriction, pain, and tenderness  Deep Transverse Perineal B severe restriction, pain, and tenderness  Compress Urethra/Sphincter B severe restriction, pain, and tenderness  Urethrovaginalis B severe restriction, pain, and tenderness  Pubococcygeus/Pubovaginalis B severe restriction, pain, and tenderness  Iliococcygeus B moderate restriction, pain, and tenderness  Coccygeus B severe restriction, pain, and tenderness  Obturator Internus B severe restriction, pain, and tenderness     Today's Treatment and Response:   Patient education was provided on objective findings of external and internal evaluation and expectations with treatment outcomes. Educated on pelvic anatomy and function with diagrams and pelvic model    Charges: PT Eval High Complexity, 40      Total Timed Treatment: 40 min     Total Treatment Time: 40 min     Based on clinical rationale and outcome measures, this evaluation involved High Complexity decision making due to 3+  personal factors/comorbidities, 3 body structures involved/activity limitations, and evolving symptoms including urge urinary incontinence and pelvic pain  PLAN OF CARE:    Goals: (to be met in 10 visits)  Patient will have increased awareness for PF muscles contractions and relaxation to improve ability to promote relaxation and decrease pain by 50% during PF assessment (6 visits), and 90% (10 v)  Patient will demonstrate decreased PF muscles tightness, decreased STRs to mild, to facilitate soft tissues mobility and allow PF muscles to relax and accommodate penile tissue during sexual activity, and digital PF assessment, also improve relaxation to facilitate passage of stool with defecation  Patient will exhibit improvement of hips, core, abdominal muscles strength to 4/5  to improve lumbar stability, improve ability of patient to perform daily and work related activities with no apprehension for pain or difficulty.  Patient will have Marinoff score of 0-1.   Patient will have improvement of PF muscles strength using the [place] Scale to 4/10/10//10, to improve efficiency of PF contractions to decrease UUI symptoms and be able to reach the bathroom on time,decrease incidence of nocturnal UI,  improve PF stabilization, and report decrease anterior pelvic tightness.  Patient will demonstrate improved coordination of core and PF muscles, including proper respiration to facilitate bowel, bladder, sexual functions and minimize symptoms.  Patient will verbalize understanding of PF functions, knowledge of normal bowel, bladder sexual mechanics, and utilize an established HEP to manage symptoms     Frequency / Duration: Patient will be seen for 1-2 x/week or a total of 10 visits over a 90 day period.  Treatment will include: Manual Therapy, Neuromuscular Re-education, Therapeutic Activities, Therapeutic Exercise, and Home Exercise Program instruction STM for PFM, 3-4 visits    Education or treatment limitation: None  Rehab  Potential:good      Patient/Family/Caregiver was advised of these findings, precautions, and treatment options and has agreed to actively participate in planning and for this course of care.    Thank you for your referral. Please co-sign or sign and return this letter via fax as soon as possible to [phone]. If you have any questions, please contact me at Dept: [phone]    Sincerely,  Electronically signed by therapist: Elizabeth Garcia PT  Physician's certification required: Yes  I certify the need for these services furnished under this plan of treatment and while under my care.    X___________________________________________________ Date____________________    Certification From: 7/11/2024  To:10/9/2024

## 2024-07-12 ENCOUNTER — OFFICE VISIT (OUTPATIENT)
Dept: PAIN CLINIC | Facility: CLINIC | Age: 44
End: 2024-07-12
Payer: COMMERCIAL

## 2024-07-12 VITALS
DIASTOLIC BLOOD PRESSURE: 86 MMHG | HEART RATE: 96 BPM | OXYGEN SATURATION: 98 % | SYSTOLIC BLOOD PRESSURE: 136 MMHG | WEIGHT: 266 LBS | BODY MASS INDEX: 49 KG/M2

## 2024-07-12 DIAGNOSIS — M79.7 FIBROMYALGIA: ICD-10-CM

## 2024-07-12 DIAGNOSIS — M54.16 LUMBAR RADICULITIS: Primary | ICD-10-CM

## 2024-07-12 NOTE — H&P
Name: Nola De Paz   : 3/16/1980   DOS: 2024     Chief complaint: Low back      History of present illness:  Nola De Paz is a 44 year old female with a history of fibromyalgia, who presents today for evaluation of low back pain.  The patient complains of back pain primarily in the right buttock.  This was treated with SI joint injection by Dr. Schaeffer.  This led to some symptomatic relief.  Patient had acute flareup in May accompanied by radicular symptoms.  Referred here for evaluation.  Was given muscle relaxers and steroids with good symptom improvement.  Which she denies any chills, fever or weakness. She denies any bladder or bowel incontinence.      Past Medical History:    Allergic rhinitis    Anxiety    Arthritis    Back problem    DDD    Blood disorder    anemia    Depression    Disorder of thyroid    Esophageal reflux    Fibromyalgia    High cholesterol    Hyperlipidemia    Hypothyroidism    IBS (irritable bowel syndrome)    Migraines    Obesity    BLESSING (obstructive sleep apnea)    AHI 19 REM AHI 57 Supine AHI 46 non-supine AHI 11 [place] 71%    Osteoarthritis    Pneumonia due to organism    Shortness of breath    Sleep apnea    cpap    Thyroid disease      Current Outpatient Medications   Medication Sig Dispense Refill    gabapentin 100 MG Oral Cap Take 1 capsule (100 mg total) by mouth 3 (three) times daily. 270 capsule 0    CUSTOM MEDICATION Semaglutide 0.5mg    Semaglutide/Cyanocobalamin 1mg/0.5 ml    Inject 50 units/0.5ml subcutaneous weekly     Disp: 2.5ml 1 each 0    hydroxychloroquine 200 MG Oral Tab Take 1 tablet (200 mg total) by mouth 2 (two) times daily. 180 tablet 1    cyclobenzaprine 10 MG Oral Tab Take 1 tablet (10 mg total) by mouth nightly as needed for Muscle spasms. 30 tablet 0    levothyroxine 175 MCG Oral Tab Take 1 tablet (175 mcg total) by mouth before breakfast. 90 tablet 3    Omeprazole 40 MG Oral Capsule Delayed Release Take 1 capsule (40 mg total) by mouth daily. 90  capsule 1    venlafaxine  MG Oral Capsule SR 24 Hr Take 1 capsule (150 mg total) by mouth daily. 90 capsule 1    rosuvastatin 10 MG Oral Tab Take 1 tablet (10 mg total) by mouth nightly. 90 tablet 0    ezetimibe 10 MG Oral Tab TAKE 1 TABLET(10 MG) BY MOUTH DAILY 90 tablet 0    traZODone 50 MG Oral Tab Take 1 tablet (50 mg total) by mouth nightly.      clindamycin 1 % External Lotion APPLY TOPICALLY TO THE AFFECTED AREA EVERY DAY BEFORE NOON      LORazepam 0.5 MG Oral Tab Take 1 tablet (0.5 mg total) by mouth 2 (two) times daily as needed. 40 tablet 0    fexofenadine 180 MG Oral Tab Take 1 tablet (180 mg total) by mouth daily.      Multiple Vitamin (MULTIVITAMIN ADULT OR) Apply topically.      ondansetron 4 MG Oral Tablet Dispersible Take 1 tablet (4 mg total) by mouth every 8 (eight) hours as needed for Nausea. 30 tablet 0    Nystatin 509666 UNIT/GM External Powder Apply 1 Application topically 4 (four) times daily. 1 Bottle 0    clotrimazole-betamethasone 1-0.05 % External Cream Apply 1 Application topically 2 (two) times daily as needed (rash). 60 g 3    Albuterol Sulfate HFA (PROAIR HFA) 108 (90 Base) MCG/ACT Inhalation Aero Soln Inhale 2 puffs into the lungs every 4 (four) hours as needed for Wheezing or Shortness of Breath. 1 Inhaler 3    Triamcinolone Acetonide 55 MCG/ACT Nasal Aerosol by Nasal route daily as needed.      Meloxicam 7.5 MG Oral Tab Take 1 tablet (7.5 mg total) by mouth daily. (Patient not taking: Reported on 7/12/2024)       Past Surgical History:   Procedure Laterality Date    Cholecystectomy  2013    Colonoscopy N/A 08/28/2020    Procedure: COLONOSCOPY;  Surgeon: Tone Steiner MD;  Location:  ENDOSCOPY    Other surgical history  12/21/2020    gastric sleeve    Removal gallbladder  2013    [facility]    Surgical bariatrics - internal  12/21/2020    [place] teeth removed  2013      Family History   Problem Relation Age of Onset    Heart Disease Mother     High Cholesterol Mother      Other (DDD) Mother     Anemia Mother     Heart Disorder Mother         Heart disease, high blood pressure, stent, high cholesterol    Hypertension Mother     Obesity Mother     Hypertension Father     Heart Disorder Father         Heart murmur, high blood pressure    Obesity Father     Other (lymphoma) Maternal Grandmother     Cancer Maternal Grandmother         Lymphoma    Other (cancer) Paternal Grandmother         liver     Asthma Paternal Grandmother         Asthma and emphysema, copd, non smoker    Cancer Paternal Grandmother         Liver and lung cancer    Diabetes Paternal Grandmother     Prostate Cancer Paternal Grandfather         in 80s    Cancer Paternal Grandfather         Prostate cancer    Stroke Paternal Grandfather      Social History     Socioeconomic History    Marital status: Single   Tobacco Use    Smoking status: Never    Smokeless tobacco: Never   Vaping Use    Vaping status: Never Used   Substance and Sexual Activity    Alcohol use: Not Currently     Comment: 1-2 a month    Drug use: Never    Sexual activity: Yes     Partners: Male     Birth control/protection: Condom   Other Topics Concern    Caffeine Concern No    Exercise No    Seat Belt No    Special Diet No    Stress Concern No    Weight Concern No    Self-Exams Yes   Social History Narrative    Works as LPN.     Social Determinants of Health     Physical Activity: Inactive (11/2/2020)    [place] [facility] [place] [place]    Exercise Vital Sign     Days of Exercise per Week: 0 days     Minutes of Exercise per Session: 0 min       Review of  other systems:  10 point ROS otherwise negative   physical examination: Nola is a 44 year old female not in acute distress  /86 (BP Location: Left arm, Patient Position: Sitting)   Pulse 96   Wt 266 lb (120.7 kg)   LMP 01/31/2024 (Approximate)   SpO2 98%   BMI 48.65 kg/m²    The patient is awake, alert, oriented and corporative. She has a normal affect. The  patient ambulates with normal gait.  HEENT: No gross lesion noted. PEERL. No icterus.  Neck and Upper Extremity: Supple. No thyromegaly or lymphadenopathy.  Bilateral upper extremity range of motion intact.  Fine and gross motor intact  Cardiovascular system: No peripheral edema  Respiratory system: Speech is nonlabored   abdomen: Soft   Neurologic:  Cranial nerves II through XII are grossly intact.       Examination of the lower back:     Motor Examination:   (R)   (L)   Hip Abduction:   5    5   Hip Flexion:    5    5   Knee Extension:   5    5   Knee Flexion:    5    5   Ant. Tibialis:    5    5  Extensor Hallucis Longus:   5    5  Peroneals:     5    5  Gastrocsoleus:     5    5       Radiology diagnostic studies:   Lumbar MRI from 2022 reviewed without any significant central canal foraminal stenosis    Assessment:  Encounter Diagnoses   Name Primary?    Lumbar radiculitis Yes    Fibromyalgia    .      Plan:     The patient is a 44-year-old female with a history of low back pain primarily in the SI joint region.  This had good symptomatic response to previous SI joint injection.  However, patient had acute flareup of symptoms in May 2024.  This was mostly in the right low back but was accompanied by radicular features down the entire right leg.  Based upon the symptoms, suspect lumbar radiculopathy over SI joint pathology.  MRI from 2022 was benign.  Therefore, recommended updating MRI.  Patient has completed physician directed nonsteroidals and physical therapy without improvement.  Imaging necessary for injection planning.    1.  This is to affirm that the patient is part of a home exercise program, formal physical therapy, or functional rehabilitation program.  Patient has completed this within the last 6 weeks.      2.  Patient also is limited in terms of overall daily function by pain symptoms.      3.  Additionally, patient has failed 6 weeks of physician directed conservative management including  medications such as nonsteroidals (tylenol, Advil), patient medications and muscle relaxers.    4.  Patient is also part of a comprehensive pain management program.  This includes pain education, social/behavioral health support, access to physical therapy, medications, interventions, and appropriate surgical referrals if necessary.       Vick Neal MD MPH  Pain Management

## 2024-07-12 NOTE — PATIENT INSTRUCTIONS
Refill policies:    Allow 2-3 business days for refills; controlled substances may take longer.  Contact your pharmacy at least 5 days prior to running out of medication and have them send an electronic request or submit request through the “request refill” option in your [employer] account.  Refills are not addressed on weekends; covering physicians do not authorize routine medications on weekends.  No narcotics or controlled substances are refilled after noon on Fridays or by on call physicians.  By law, narcotics must be electronically prescribed.  A 30 day supply with no refills is the maximum allowed.  If your prescription is due for a refill, you may be due for a follow up appointment.  To best provide you care, patients receiving routine medications need to be seen at least once a year.  Patients receiving narcotic/controlled substance medications need to be seen at least once every 3 months.  In the event that your preferred pharmacy does not have the requested medication in stock (e.g. Backordered), it is your responsibility to find another pharmacy that has the requested medication available.  We will gladly send a new prescription to that pharmacy at your request.    Scheduling Tests:    If your physician has ordered radiology tests such as MRI or CT scans, please contact Central Scheduling at [phone] right away to schedule the test.  Once scheduled, the [facility] Centralized Referral Team will work with your insurance carrier to obtain pre-certification or prior authorization.  Depending on your insurance carrier, approval may take 3-10 days.  It is highly recommended patients assure they have received an authorization before having a test performed.  If test is done without insurance authorization, patient may be responsible for the entire amount billed.      Precertification and Prior Authorizations:  If your physician has recommended that you have a procedure or additional testing performed the [facility]  Centralized Referral Team will contact your insurance carrier to obtain pre-certification or prior authorization.    You are strongly encouraged to contact your insurance carrier to verify that your procedure/test has been approved and is a COVERED benefit.  Although the [facility] Centralized Referral Team does its due diligence, the insurance carrier gives the disclaimer that \"Although the procedure is authorized, this does not guarantee payment.\"    Ultimately the patient is responsible for payment.   Thank you for your understanding in this matter.  Paperwork Completion:  If you require FMLA or disability paperwork for your recovery, please make sure to either drop it off or have it faxed to our office at [phone]. Be sure the form has your name and date of birth on it.  The form will be faxed to our Forms Department and they will complete it for you.  There is a 25$ fee for all forms that need to be filled out.  Please be aware there is a 10-14 day turnaround time.  You will need to sign a release of information (ZARA) form if your paperwork does not come with one.  You may call the Forms Department with any questions at [phone].  Their fax number is [phone].

## 2024-07-12 NOTE — PROGRESS NOTES
Location of Pain: right shoulder and bilateral knees and bilateral lower back    Date Pain Began: May 2024          Work Related:   No        Receiving Work Comp/Disability:   No    Numeric Rating Scale:  Pain at Present:  0                                                                                                              (No Pain) 0  to  10 (Worst Pain)                 Minimum Pain:   0  Maximum Pain  10    Distribution of Pain:    bilateral    Quality of Pain:    shooting, aching, radiating, pressure    Origin of Pain:    Degenerative, Disease related, and Other Unknown    Aggravating Factors:    Sitting, Walking, and Other Laying down    Past Treatments for Current Pain Condition:   Physical Therapy and Other Injections    Prior diagnostic testing for your pain:  XRay

## 2024-07-12 NOTE — PROGRESS NOTES
Patient presents in office today with reported pain in right shoulder and bilateral knees    Current pain level reported = 0/10    Last reported dose of Gabapentin this morning      Narcotic Contract renewal NA    Urine Drug screen NA

## 2024-07-14 DIAGNOSIS — E78.2 MIXED HYPERLIPIDEMIA: ICD-10-CM

## 2024-07-15 ENCOUNTER — APPOINTMENT (OUTPATIENT)
Dept: PHYSICAL THERAPY | Age: 44
End: 2024-07-15
Attending: FAMILY MEDICINE
Payer: COMMERCIAL

## 2024-07-15 ENCOUNTER — TELEPHONE (OUTPATIENT)
Dept: PHYSICAL THERAPY | Facility: HOSPITAL | Age: 44
End: 2024-07-15

## 2024-07-17 RX ORDER — EZETIMIBE 10 MG/1
10 TABLET ORAL DAILY
Qty: 90 TABLET | Refills: 3 | Status: SHIPPED | OUTPATIENT
Start: 2024-07-17

## 2024-07-17 NOTE — TELEPHONE ENCOUNTER
Refill Per Protocol     Requested Prescriptions   Pending Prescriptions Disp Refills    EZETIMIBE 10 MG Oral Tab [Pharmacy Med Name: EZETIMIBE 10MG TABLETS] 90 tablet 0     Sig: TAKE 1 TABLET(10 MG) BY MOUTH DAILY       Cholesterol Medication Protocol Passed - 7/14/2024  6:08 AM        Passed - ALT < 80     Lab Results   Component Value Date    ALT 31 04/30/2024             Passed - ALT resulted within past year        Passed - Lipid panel within past 12 months     Lab Results   Component Value Date    CHOLEST 213 (H) 04/30/2024    TRIG 36 04/30/2024    HDL 49 04/30/2024     (H) 04/30/2024    VLDL 7 04/30/2024    TCHDLRATIO 3.5 08/08/2020    NONHDLC 164 (H) 04/30/2024             Passed - In person appointment or virtual visit in the past 12 mos or appointment in next 3 mos     Recent Outpatient Visits              5 days ago Lumbar radiculitis    [facility], [facility] Vick Neal MD    Office Visit    6 days ago Urinary urgency    [facility] [place] Elizabeth Gacria PT    Office Visit    4 weeks ago Systemic lupus erythematosus, unspecified SLE type, unspecified organ involvement status (HCC)    [facility], [facility], [place] Azra Shirley DO    Office Visit    1 month ago Acute bilateral low back pain with right-sided sciatica    [facility], [address] [place] [address] Julissa Gonzalez MD    Office Visit    2 months ago Urinary symptom or sign    [facility], [facility], [place] [address], [place] Ju Gale NP    Office Visit          Future Appointments         Provider Department Appt Notes    In 5 days Fernanda Taylor PT [facility] [place] 8 visits auth 7/10 to 9/30  [facility]    In 1 week [facility] MRI RM2 (3T WIDE) [facility] MRI     In 1 week Fernanda Taylor PT [facility] [place] 8 visits auth 7/10 to 9/30  [facility]  [facility]     In 1 week Elizabeth Garcia, PT [facility] in [place] 8 visits auth 7/10 to 9/30  [employer] HMO  no c/p    In 2 weeks Solange David PA-C [facility], [address] [place] [address] Follow up    In 2 weeks Vick Neal MD [facility], [facility] both knees and right shoulder pain    In 2 weeks Fernanda Taylor, PT [facility] [place] 8 visits auth 7/10 to 9/30  [employer] HMO-back  no c/p    In 2 weeks GarciaElizabeth joyner, PT [facility] [place] 8 visits auth 7/10 to 9/30  [employer] HMO  no c/p    In 3 weeks Fernanda Taylor, PT [facility] [place] Auth???  [employer] HMO-back  no c/p    In 3 weeks GarciaElizabeth joyner, PT [facility] in [place] auth???  [employer]BS HMO  no c/p    In 1 month Evans Aldana DPM [facility], [address] [place] [address] follow up, policy informed    In 1 month Azra Shirley DO [facility], [facility] 6 month follow up    In 1 month Elizabeth Garcia, PT [facility] in [facility] HMO  no c/p    In 1 month GarciaElizabeth joyner, PT [facility]  [facility]/p    In 1 month Dea Holly MD [facility] 2-3 months    In 1 month Elizabeth Garcia, PT [facility]  no c/p    In 1 month EMG AUDIO NAPER [facility], [address], [place] History of ear infection    In 1 month Mushtaq Winchester MD [facility], [address], [place] History of ear infection    In 1 month Julissa Gonzalez MD [facility], [address] [place] [address] 6 month follow up    In 1 month Elizabeth Garcia, PT [facility]  no c/p    In 2 months Garcia, Elizabeth, PT [facility]  no c/p    In 2 months Frankie Helm MD [facility], [facility],  [place] New Sleep consult    In 2 months Elizabeth Garcia PT [facility] [place] 8 visits auth 7/10 to 9/30  [employer] HMO  no c/p    In 2 months Solange David PA-C [facility], [address] [place] [address] Follow up visit    In 3 months REF SO PFLD [facility], [address], [place] Labs for Dr Baker                           Future Appointments         Provider Department Appt Notes    In 5 days Fernanda Taylor, PT [facility] [place] 8 visits auth 7/10 to 9/30  [employer] HMO-back  no c/p    In 1 week [facility] MRI RM2 (3T WIDE) [facility] MRI     In 1 week Fernanda Taylor PT [facility] [place] 8 visits auth 7/10 to 9/30  [employer] HMO-back  no c/p    In 1 week Elizabeth Garcia, PT [facility] [place] 8 visits auth 7/10 to 9/30  [employer] HMO  no c/p    In 2 weeks Solange David PA-C [facility], [address] [place] [address] Follow up    In 2 weeks Vick Neal MD [facility], [facility] both knees and right shoulder pain    In 2 weeks Fernanda Taylor, PT [facility] [place] 8 visits auth 7/10 to 9/30  [employer] HMO-back  no c/p    In 2 weeks Elizabeth Garcia, PT [facility] [place] 8 visits auth 7/10 to 9/30  [employer] HMO  no c/p    In 3 weeks Fernanda Taylor, PT [facility] [place] Auth???  [employer]BS HMO-back  no c/p    In 3 weeks Elizabeth Garcia, PT [facility] [place] auth???  [employer] HMO  no c/p    In 1 month Evans Aldana DPM [facility], [address] [place] [address] follow up, policy informed    In 1 month Azra Shirley DO [facility], [facility] 6 month follow up    In 1 month Elizabeth Garcia, PT [facility] [place] BCBS HMO  no c/p    In 1 month Garcia, Elizabeth, [facility] HMO  no c/p    In 1 month Dea Holly MD [facility],  [place] 2-3 months    In 1 month Elizabeth Garcia, PT [facility]  [facility]    In 1 month EMG AUDIO NAPER [facility], [address], [place] History of ear infection    In 1 month Mushtaq Winchester MD [facility], [address], [place] History of ear infection    In 1 month Julissa Gonzalez MD [facility], [address] [place] [address] 6 month follow up    In 1 month Elizabeth Garcia, PT [facility]  no c/p    In 2 months Elizabeth Garcia, PT [facility]  no c/p    In 2 months Frankie Helm MD [facility], [facility] New Sleep consult    In 2 months Elizabeth Garcia, PT [facility] [place] 8 visits auth 7/10 to 9/30  [facility]  [facility]    In 2 months Solange David, PA-C [facility], [address] [place] [address] Follow up visit    In 3 months REF SO [facility], [address] Labs for Dr Baker          Recent Outpatient Visits              5 days ago Lumbar radiculitis    [facility] Vick Neal MD    Office Visit    6 days ago Urinary urgency    [facility] [place] Elizabeth Garcia, PT    Office Visit    4 weeks ago Systemic lupus erythematosus, unspecified SLE type, unspecified organ involvement status (HCC)    [facility], [facility] Azra Shirley DO    Office Visit    1 month ago Acute bilateral low back pain with right-sided sciatica    [address] Julissa Gonzalez MD    Office Visit    2 months ago Urinary symptom or sign    [facility], [facility], [address] [place], [place]    Office Visit

## 2024-07-22 ENCOUNTER — TELEPHONE (OUTPATIENT)
Dept: PHYSICAL THERAPY | Facility: HOSPITAL | Age: 44
End: 2024-07-22

## 2024-07-22 ENCOUNTER — APPOINTMENT (OUTPATIENT)
Dept: PHYSICAL THERAPY | Age: 44
End: 2024-07-22
Attending: FAMILY MEDICINE
Payer: COMMERCIAL

## 2024-07-25 ENCOUNTER — HOSPITAL ENCOUNTER (OUTPATIENT)
Dept: MRI IMAGING | Facility: HOSPITAL | Age: 44
Discharge: HOME | End: 2024-07-25
Attending: ANESTHESIOLOGY
Payer: COMMERCIAL

## 2024-07-25 ENCOUNTER — APPOINTMENT (OUTPATIENT)
Dept: PHYSICAL THERAPY | Age: 44
End: 2024-07-25
Attending: NURSE PRACTITIONER
Payer: COMMERCIAL

## 2024-07-25 DIAGNOSIS — M54.16 LUMBAR RADICULITIS: ICD-10-CM

## 2024-07-25 PROCEDURE — 72148 MRI LUMBAR SPINE W/O DYE: CPT | Performed by: ANESTHESIOLOGY

## 2024-07-29 ENCOUNTER — OFFICE VISIT (OUTPATIENT)
Dept: PHYSICAL THERAPY | Age: 44
End: 2024-07-29
Attending: FAMILY MEDICINE
Payer: COMMERCIAL

## 2024-07-29 PROCEDURE — 97110 THERAPEUTIC EXERCISES: CPT

## 2024-07-29 PROCEDURE — 97162 PT EVAL MOD COMPLEX 30 MIN: CPT

## 2024-07-29 NOTE — PROGRESS NOTES
SPINE EVALUATION:     Diagnosis:   Acute bilateral low back pain with right-sided sciatica (M54.41)      Referring Provider: Julissa Gonzalez  Date of Evaluation:    7/29/2024    Precautions:  Drug Allergy Next MD visit:   none scheduled  Date of Surgery: n/a     PATIENT SUMMARY   Nola De Paz is a 44 year old female who presents to therapy today with complaints of decreased spine & hip mobility with stiffness, back tension, BLE posterior thigh tension. Of note, pt reports a lot of tension & pain around her tailbone & just underneath. Pt reports no pain down the leg at this point. No N/T. Pt reports quite a bit of back pain hx with DDD with hx of PT for her back, a few injections into the lower lumbar & SIJ; recently about 1-2 mo ago reports flare up: pain progressively worsened, had to go to ER 2/2 being unable to walk & bear weight on the RLE, with severe LBP & pressure, hard to stand up straight. Given RX for PT & steroids, muscle relaxers. Symptoms did reduce, took about 2 weeks to resolve, now reports recently the L side has started to bother her too. Standing or laying down feels best.    Pt describes pain level current 4/10, at best 0/10, at worst 10/10. 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 Pt goals include \"decrease in pain, increase in flexibility & mobility\"  Past medical history was reviewed with Nola. Significant findings include  has a past medical history of Allergic rhinitis (2010), Anxiety, Arthritis, Back problem, Blood disorder, Depression, Disorder of thyroid,  Esophageal reflux, Fibromyalgia, High cholesterol, Hyperlipidemia, Hypothyroidism, IBS (irritable bowel syndrome), Migraines, Obesity, BLESSING (obstructive sleep apnea) (8/23/19 PSG), Osteoarthritis, Pneumonia due to organism, Shortness of breath, Sleep apnea, and Thyroid disease. +Lupus. Pt  has a past surgical history that includes removal gallbladder (2013); wisdom teeth removed (2013); colonoscopy (N/A, 08/28/2020); other surgical history (12/21/2020); surgical bariatrics - internal (12/21/2020); and cholecystectomy (2013).    MRI SPINE LUMBAR (CPT=72148)  Result Date: 7/26/2024  CONCLUSION:   1. L5-S1:  Mild-to-moderate spinal canal and mild right neural foraminal stenosis.  Spinal canal stenosis at this level is predominantly related to epidural lipomatosis.  2. Remaining lumbar levels do not demonstrate significant neural compromise. 3. Small simple-appearing bilateral ovarian cysts with trace free fluid in the pelvis.  These findings are likely physiologic. 4. Image degradation secondary to patient related motion artifact.   elm-remote  Dictated by (CST): Rodo Akins MD on 7/26/2024 at 7:33 AM     Finalized by (CST): Rodo Akins MD on 7/26/2024 at 7:38 AM          XR LUMBAR SPINE (MIN 4 VIEWS) (CPT=72110)  Result Date: 5/17/2024  CONCLUSION:  No acute fractures.  Multilevel degenerative changes greatest at L5-S1 level.   LOCATION:  [facility]   Dictated by (CST): Yasmany Chavez MD on 5/17/2024 at 4:51 PM     Finalized by (CST): Yasmany Chavez MD on 5/17/2024 at 4:52 PM       XR SACROILIAC JOINTS (MIN 3 VIEWS) (CPT=72202)  Result Date: 5/17/2024  CONCLUSION:  No acute osseous findings.   LOCATION:  [facility]       Dictated by (CST): Yasmany Chavez MD on 5/17/2024 at 4:51 PM     Finalized by (CST): Yasmany Chavez MD on 5/17/2024 at 4:51 PM       ASSESSMENT  Nola presents to physical therapy evaluation with primary c/o decreased spine & hip mobility with stiffness, back tension, BLE posterior thigh  tension. The results of the objective tests and measures show impairments in posture, lumbar & hip ROM, joint mobility, soft tissue mobility, LE flexibility, LE strength, mild pelvic obliquity. Functional deficits include but are not limited to floor<>stand transfers, bending down & picking up pt/ child off the floor, sitting. Signs and symptoms are consistent with diagnosis of Acute bilateral low back pain with right-sided sciatica (M54.41). Pt and PT discussed evaluation findings, pathology, POC and HEP. Pt voiced understanding and performs HEP correctly without reported pain. Skilled Physical Therapy is medically necessary to address the above impairments and reach functional goals.     OBJECTIVE:   Observation/Posture: B genu valgum, pes planus; increased thoracic kyphosis & mild FHP, increased anterior pelvis    Lumbar AROM: (* denotes performed with pain)  Flexion: WNL* with mid range, better at end range, increased X 5 reps (no LE symptoms)  Extension: min restricted with hinging lower thoracic/ upper lumbar, increased X 5 reps (no LE symptoms)  Sidebending: R mod restricted; L mod restricted  Rotation: R mod restricted; L mod restricted    Hip PROM:  ER: L min restricted, R min restricted  IR: L mod restricted, R mod restricted    Accessory motion: +local jt signs & min hypomob to lower lumbar/ lumbosacral central PA; mod hypomob to central PA thoracic spine  Palpation: TTP R>L PSIS, increased tension to R>L piriformis    Strength: (* denotes performed with pain)  LE   Hip abduction: R 3+/5; L 3+/5  Hip Extension: R 3+/5; L 3+/5     Flexibility:   LE   Hamstrings: R mod restricted; L min restricted  Piriformis: R pedro restricted; L mod restricted     Special tests:   SLR: no LE symptoms though +LBP with both  Pelvic alignment: mild obliquity- mild R ant ilial rotation  Prone on elbows: reduced LBP compared to prone    Today’s Treatment and Response:   Pt education was provided on exam findings, treatment  diagnosis, treatment plan, expectations, and prognosis. Pt was also provided recommendations for possible soreness after evaluation, pain science education , and importance of remaining active    Patient was instructed in and issued a HEP for:   Access Code: [email or web address]  URL: [email or web address]  Date: 07/29/2024  Prepared by: Fernanda Taylor    Exercises  - Supine Piriformis Stretch with Foot on Ground  - 1-2 x daily - 7 x weekly - 3 sets - 30 sec hold  - Supine Lower Trunk Rotation  - 1-2 x daily - 7 x weekly - 5 sec hold - 2-3 duration  - Hooklying Hamstring Stretch  - 1-2 x daily - 7 x weekly - 1 sets - 5-10 reps - 10 sec hold    Charges: PT Eval Moderate Complexity, Therex X 1      Total Timed Treatment: 10 min     Total Treatment Time: 40 min     Based on clinical rationale and outcome measures, this evaluation involved Moderate Complexity decision making due to 3+ personal factors/comorbidities, 4+ body structures involved/activity limitations, and evolving symptoms including changing pain levels.  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 Treatment will include: Manual Therapy, Neuromuscular Re-education, Self-Care Home Management, Therapeutic Activities, Therapeutic Exercise, Home Exercise Program  instruction, and Modalities to include: Electrical stimulation (unattended) and Ultrasound    Education or treatment limitation: None  Rehab Potential:excellent    Oswestry Disability Index Score  Score: 56 % (7/29/2024  6:54 PM)    Patient/Family/Caregiver was advised of these findings, precautions, and treatment options and has agreed to actively participate in planning and for this course of care.    Thank you for your referral. Please co-sign or sign and return this letter via fax as soon as possible to [phone]. If you have any questions, please contact me at Dept: [phone]    Sincerely,  Electronically signed by therapist: Fernanda Taylor PT    Physician's certification required: Yes  I certify the need for these services furnished under this plan of treatment and while under my care.    X___________________________________________________ Date____________________    Certification From: 7/29/2024  To:10/27/2024

## 2024-07-30 ENCOUNTER — APPOINTMENT (OUTPATIENT)
Dept: PHYSICAL THERAPY | Age: 44
End: 2024-07-30
Attending: NURSE PRACTITIONER
Payer: COMMERCIAL

## 2024-07-30 NOTE — PATIENT INSTRUCTIONS
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 She subsequently completed her Doctor of Physical Therapy from [employer] in 2013. Fernanda’s experience is primarily with orthopedics, but also has experience in pediatrics as well.     Fernanda has been a certified provider of the [employer] instrument-assisted soft tissue mobilization since 2015 & has further earned the title of [employer] Specialist in 2020. Fernanda is continuing her passion for learning by pursuing training through the [facility] to effectively treat patients with pelvic floor related disorders. 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    Fernanda thrives on treating patients with empathy & compassion to provide the individualized care that all patients need & deserve. Fernanda aims to empower patients with the tools to feel in charge of their recovery, knowing they can advocate for themselves & achieve maximum success when they understand the treatments that work best for them.    When Fernanda is not at work, she enjoys exercising with [employer] classes, scenic walks outside, baking, & traveling with her family.     Fernanda is accepting new patients at the [facility]. To schedule or change an appointment, call [phone]. To speak with Fernanda, call [phone] or message on [employer].                  Access Code: [email or web address]  URL: [email or web address]  Date: 07/29/2024  Prepared by: Fernanda Taylor    Exercises  - Supine Piriformis Stretch with Foot on Ground  - 1-2 x daily - 7 x weekly - 3 sets - 30 sec hold  - Supine Lower Trunk  Rotation  - 1-2 x daily - 7 x weekly - 5 sec hold - 2-3 duration  - Hooklying Hamstring Stretch  - 1-2 x daily - 7 x weekly - 1 sets - 5-10 reps - 10 sec hold

## 2024-08-02 ENCOUNTER — OFFICE VISIT (OUTPATIENT)
Facility: CLINIC | Age: 44
End: 2024-08-02
Payer: COMMERCIAL

## 2024-08-02 ENCOUNTER — OFFICE VISIT (OUTPATIENT)
Dept: PAIN CLINIC | Facility: CLINIC | Age: 44
End: 2024-08-02
Payer: COMMERCIAL

## 2024-08-02 VITALS
SYSTOLIC BLOOD PRESSURE: 128 MMHG | DIASTOLIC BLOOD PRESSURE: 78 MMHG | BODY MASS INDEX: 49 KG/M2 | HEART RATE: 74 BPM | WEIGHT: 268 LBS

## 2024-08-02 VITALS
HEART RATE: 76 BPM | SYSTOLIC BLOOD PRESSURE: 132 MMHG | OXYGEN SATURATION: 98 % | WEIGHT: 268 LBS | BODY MASS INDEX: 49 KG/M2 | DIASTOLIC BLOOD PRESSURE: 80 MMHG

## 2024-08-02 DIAGNOSIS — Z51.81 THERAPEUTIC DRUG MONITORING: Primary | ICD-10-CM

## 2024-08-02 DIAGNOSIS — Z98.84 S/P LAPAROSCOPIC SLEEVE GASTRECTOMY: ICD-10-CM

## 2024-08-02 DIAGNOSIS — M79.7 FIBROMYALGIA: ICD-10-CM

## 2024-08-02 DIAGNOSIS — G89.29 CHRONIC RIGHT SI JOINT PAIN: ICD-10-CM

## 2024-08-02 DIAGNOSIS — F50.81 BINGE EATING DISORDER: ICD-10-CM

## 2024-08-02 DIAGNOSIS — M53.3 CHRONIC RIGHT SI JOINT PAIN: ICD-10-CM

## 2024-08-02 DIAGNOSIS — R73.03 PREDIABETES: ICD-10-CM

## 2024-08-02 DIAGNOSIS — E66.01 CLASS 3 SEVERE OBESITY WITH SERIOUS COMORBIDITY AND BODY MASS INDEX (BMI) OF 45.0 TO 49.9 IN ADULT, UNSPECIFIED OBESITY TYPE (HCC): ICD-10-CM

## 2024-08-02 DIAGNOSIS — M25.511 CHRONIC RIGHT SHOULDER PAIN: Primary | ICD-10-CM

## 2024-08-02 DIAGNOSIS — G47.33 OSA ON CPAP: ICD-10-CM

## 2024-08-02 DIAGNOSIS — G89.29 CHRONIC RIGHT SHOULDER PAIN: Primary | ICD-10-CM

## 2024-08-02 PROCEDURE — 3078F DIAST BP <80 MM HG: CPT | Performed by: PHYSICIAN ASSISTANT

## 2024-08-02 PROCEDURE — 99214 OFFICE O/P EST MOD 30 MIN: CPT | Performed by: PHYSICIAN ASSISTANT

## 2024-08-02 PROCEDURE — 3074F SYST BP LT 130 MM HG: CPT | Performed by: PHYSICIAN ASSISTANT

## 2024-08-02 PROCEDURE — 3075F SYST BP GE 130 - 139MM HG: CPT | Performed by: ANESTHESIOLOGY

## 2024-08-02 PROCEDURE — 99214 OFFICE O/P EST MOD 30 MIN: CPT | Performed by: ANESTHESIOLOGY

## 2024-08-02 PROCEDURE — 3079F DIAST BP 80-89 MM HG: CPT | Performed by: ANESTHESIOLOGY

## 2024-08-02 RX ORDER — TOPIRAMATE 25 MG/1
25 TABLET ORAL 2 TIMES DAILY
Qty: 180 TABLET | Refills: 1 | Status: SHIPPED | OUTPATIENT
Start: 2024-08-02

## 2024-08-02 RX ORDER — TIRZEPATIDE 2.5 MG/.5ML
2.5 INJECTION, SOLUTION SUBCUTANEOUS WEEKLY
Qty: 2 ML | Refills: 0 | Status: SHIPPED | OUTPATIENT
Start: 2024-08-02

## 2024-08-02 NOTE — PROGRESS NOTES
Name: Nola De Paz   : 3/16/1980   DOS: 2024     Pain Clinic Follow Up Visit:     Chief Complaint   Patient presents with    Follow - Up     Bilateral knee pain, right shoulder         Nola De Paz is a 44 year old female with a history of fibromyalgia pain here for follow-up.  The patient complains of pain in both knees, and also in the shoulder.  Also complains of pain in the right buttock.  Has had good response for SI joint injection in the past.  Also had updated lumbar MRI.    Pt denies any chills, fever, or weakness. There is no bladder or bowel incontinence associated with the pain.    REVIEW OF SYSTEMS:  A ten point review of systems was performed with pertinent positives and negatives in the HPI.    Allergies   Allergen Reactions    Penicillins HIVES and RASH       Current Outpatient Medications   Medication Sig Dispense Refill    Tirzepatide-Weight Management (ZEPBOUND) 2.5 MG/0.5ML Subcutaneous Solution Auto-injector Inject 2.5 mg into the skin once a week. 2 mL 0    topiramate 25 MG Oral Tab Take 1 tablet (25 mg total) by mouth 2 (two) times daily. 180 tablet 1    CUSTOM MEDICATION Tirzepatide/Niacinamide 2.5mg    8/2mg /mL  Inject 31unit/0.31mL into skin q weekly    Disp: 2.5mL 2.5 each 0    ezetimibe 10 MG Oral Tab Take 1 tablet (10 mg total) by mouth daily. 90 tablet 3    gabapentin 100 MG Oral Cap Take 1 capsule (100 mg total) by mouth 3 (three) times daily. 270 capsule 0    CUSTOM MEDICATION Semaglutide 0.5mg    Semaglutide/Cyanocobalamin 1mg/0.5 ml    Inject 50 units/0.5ml subcutaneous weekly     Disp: 2.5ml 1 each 0    hydroxychloroquine 200 MG Oral Tab Take 1 tablet (200 mg total) by mouth 2 (two) times daily. 180 tablet 1    cyclobenzaprine 10 MG Oral Tab Take 1 tablet (10 mg total) by mouth nightly as needed for Muscle spasms. 30 tablet 0    levothyroxine 175 MCG Oral Tab Take 1 tablet (175 mcg total) by mouth before breakfast. 90 tablet 3    Omeprazole 40 MG Oral Capsule Delayed  Release Take 1 capsule (40 mg total) by mouth daily. 90 capsule 1    venlafaxine  MG Oral Capsule SR 24 Hr Take 1 capsule (150 mg total) by mouth daily. 90 capsule 1    rosuvastatin 10 MG Oral Tab Take 1 tablet (10 mg total) by mouth nightly. 90 tablet 0    Meloxicam 7.5 MG Oral Tab Take 1 tablet (7.5 mg total) by mouth daily.      traZODone 50 MG Oral Tab Take 1 tablet (50 mg total) by mouth nightly.      clindamycin 1 % External Lotion APPLY TOPICALLY TO THE AFFECTED AREA EVERY DAY BEFORE NOON      LORazepam 0.5 MG Oral Tab Take 1 tablet (0.5 mg total) by mouth 2 (two) times daily as needed. 40 tablet 0    fexofenadine 180 MG Oral Tab Take 1 tablet (180 mg total) by mouth daily.      Multiple Vitamin (MULTIVITAMIN ADULT OR) Apply topically.      ondansetron 4 MG Oral Tablet Dispersible Take 1 tablet (4 mg total) by mouth every 8 (eight) hours as needed for Nausea. 30 tablet 0    Nystatin 096792 UNIT/GM External Powder Apply 1 Application topically 4 (four) times daily. 1 Bottle 0    clotrimazole-betamethasone 1-0.05 % External Cream Apply 1 Application topically 2 (two) times daily as needed (rash). 60 g 3    Albuterol Sulfate HFA (PROAIR HFA) 108 (90 Base) MCG/ACT Inhalation Aero Soln Inhale 2 puffs into the lungs every 4 (four) hours as needed for Wheezing or Shortness of Breath. 1 Inhaler 3    Triamcinolone Acetonide 55 MCG/ACT Nasal Aerosol by Nasal route daily as needed.           EXAM:   /80   Pulse 76   Wt 268 lb (121.6 kg)   LMP 01/31/2024 (Approximate)   SpO2 98%   BMI 49.02 kg/m²   General:  Patient is a(n) 44 year old year old female in no acute distress.  Neurologic:: WNL-Orientation to time, place and person, normal mood & affect, concentration & attention span intact.   Inspection:  Ambulates with well-coordinated, fluid, non-antalgic gait.  Gait is normal.  Neck: Full range of motion.  Does have some limited abduction of the right shoulder  Cranial nerve: Grossly  intact  Respiratory: Speech is nonlabored  Back: Gait intact.  Positive Chetna.  Positive SI joint compression, positive Yeoman    IMAGES:     Lumbar MRI reviewed with evidence of degenerative changes most significant at L5-S1.  There is mild central canal foraminal stenosis    ASSESSMENT AND PLAN:     1. Chronic right shoulder pain    2. Chronic right SI joint pain    3. Fibromyalgia        The patient is a 44-year-old female with history of fibromyalgia and chronic SI joint pain.  Patient is following up to discuss treatment options for shoulder pain, knee pain, and returning SI joint pain.  Patient did well with previous SI joint injection by Dr. Schaeffer at outside facility.  Given return of pain and reproducible pain with palpation of the SI joint recommended repeat right SI joint injection.    Additionally, did discuss ultrasound-guided shoulder injection given mild osteoarthritis in the glenohumeral joint.  May benefit from Synvisc injection in the knees in the future.    Orders:  Orders Placed This Encounter   Procedures    [facility] PAIN NAVIGATOR    [facility] PAIN NAVIGATOR         Radiology orders and consultations:None  The patient indicates understanding of these issues and agrees to the plan.  No follow-ups on file.    Vick Neal MD, 8/2/2024, 3:17 PM

## 2024-08-02 NOTE — PROGRESS NOTES
HISTORY OF PRESENT ILLNESS  Chief Complaint   Patient presents with    Weight Check     +26lbs       Nola De Paz is a 44 year old female here for follow up in medical weight loss program.   +26lbs  Compliant on compound semaglutide  Denies chest pain, shortness of breath, dizziness, blurred vision, headache, paresthesia, nausea/vomiting.   Stress has been higher  Danielle menopausal symptoms  Semaglutide is helping with fullness  Struggling with sugar cravings  Trying to get protein in  Exercise/Activity: walking pad  Nutrition: 24 hour food log reviewed, eating regular meals, +protein  Stress: high, but recently has gotten a little better with decreasing work      Wt Readings from Last 6 Encounters:   08/02/24 268 lb (121.6 kg)   08/02/24 268 lb (121.6 kg)   07/12/24 266 lb (120.7 kg)   06/19/24 266 lb (120.7 kg)   05/24/24 263 lb (119.3 kg)   05/17/24 263 lb (119.3 kg)            Breakfast Lunch Dinner Snacks Fluids   Reviewed           REVIEW OF SYSTEMS  GENERAL HEALTH: feels well otherwise, denied any fevers chills or night sweats   RESPIRATORY: denies shortness of breath   CARDIOVASCULAR: denies chest pain  GI: denies abdominal pain    EXAM  /78   Pulse 74   Wt 268 lb (121.6 kg)   LMP 01/31/2024 (Approximate)   BMI 49.02 kg/m²   GENERAL: well developed, well nourished,in no apparent distress, A/O x3  SKIN: no rashes,no suspicious lesions  HEENT: atraumatic, normocephalic, OP-clear, PERRL  NECK: supple,no adenopathy  LUNGS: clear to auscultation bilaterally   CARDIO: RRR without murmur  GI: good BS's,NT/ND, no masses or HSM  EXTREMITIES: no cyanosis, no clubbing, no edema    Lab Results   Component Value Date    WBC 5.2 04/30/2024    RBC 4.11 04/30/2024    HGB 12.9 04/30/2024    HCT 37.1 04/30/2024    MCV 90.3 04/30/2024    MCH 31.4 04/30/2024    MCHC 34.8 04/30/2024    RDW 11.4 04/30/2024    .0 04/30/2024    MPV 10.7 01/17/2013     Lab Results   Component Value Date    GLU 85 04/30/2024    BUN 14  04/30/2024    BUNCREA 25.5 (H) 04/30/2024    CREATSERUM 0.55 04/30/2024    ANIONGAP <0 (L) 04/30/2024    GFRNAA 100 07/16/2022    GFRAA 115 07/16/2022    CA 9.2 04/30/2024    OSMOCALC 290 04/30/2024    ALKPHO 65 04/30/2024    AST 34 04/30/2024    ALT 31 04/30/2024    BILT 0.6 04/30/2024    TP 6.6 04/30/2024    ALB 3.7 04/30/2024    GLOBULIN 2.9 04/30/2024    AGRATIO 1.2 08/08/2020     04/30/2024    K 3.8 04/30/2024     04/30/2024    CO2 30.0 04/30/2024     Lab Results   Component Value Date     06/22/2021    A1C 5.4 06/22/2021     Lab Results   Component Value Date    CHOLEST 213 (H) 04/30/2024    TRIG 36 04/30/2024    HDL 49 04/30/2024     (H) 04/30/2024    VLDL 7 04/30/2024    TCHDLRATIO 3.5 08/08/2020    NONHDLC 164 (H) 04/30/2024     Lab Results   Component Value Date    T4F 1.0 02/10/2024    TSH 2.330 02/10/2024    TSHT4 0.91 08/08/2020     Lab Results   Component Value Date    B12 492 06/22/2023    VITB12 480 08/08/2020     Lab Results   Component Value Date    VITD 30.8 04/30/2024       Current Outpatient Medications on File Prior to Visit   Medication Sig Dispense Refill    ezetimibe 10 MG Oral Tab Take 1 tablet (10 mg total) by mouth daily. 90 tablet 3    gabapentin 100 MG Oral Cap Take 1 capsule (100 mg total) by mouth 3 (three) times daily. 270 capsule 0    CUSTOM MEDICATION Semaglutide 0.5mg    Semaglutide/Cyanocobalamin 1mg/0.5 ml    Inject 50 units/0.5ml subcutaneous weekly     Disp: 2.5ml 1 each 0    hydroxychloroquine 200 MG Oral Tab Take 1 tablet (200 mg total) by mouth 2 (two) times daily. 180 tablet 1    cyclobenzaprine 10 MG Oral Tab Take 1 tablet (10 mg total) by mouth nightly as needed for Muscle spasms. 30 tablet 0    levothyroxine 175 MCG Oral Tab Take 1 tablet (175 mcg total) by mouth before breakfast. 90 tablet 3    Omeprazole 40 MG Oral Capsule Delayed Release Take 1 capsule (40 mg total) by mouth daily. 90 capsule 1    venlafaxine  MG Oral Capsule SR 24 Hr  Take 1 capsule (150 mg total) by mouth daily. 90 capsule 1    rosuvastatin 10 MG Oral Tab Take 1 tablet (10 mg total) by mouth nightly. 90 tablet 0    traZODone 50 MG Oral Tab Take 1 tablet (50 mg total) by mouth nightly.      clindamycin 1 % External Lotion APPLY TOPICALLY TO THE AFFECTED AREA EVERY DAY BEFORE NOON      LORazepam 0.5 MG Oral Tab Take 1 tablet (0.5 mg total) by mouth 2 (two) times daily as needed. 40 tablet 0    fexofenadine 180 MG Oral Tab Take 1 tablet (180 mg total) by mouth daily.      Multiple Vitamin (MULTIVITAMIN ADULT OR) Apply topically.      Nystatin 071007 UNIT/GM External Powder Apply 1 Application topically 4 (four) times daily. 1 Bottle 0    clotrimazole-betamethasone 1-0.05 % External Cream Apply 1 Application topically 2 (two) times daily as needed (rash). 60 g 3    Triamcinolone Acetonide 55 MCG/ACT Nasal Aerosol by Nasal route daily as needed.      Meloxicam 7.5 MG Oral Tab Take 1 tablet (7.5 mg total) by mouth daily.      ondansetron 4 MG Oral Tablet Dispersible Take 1 tablet (4 mg total) by mouth every 8 (eight) hours as needed for Nausea. 30 tablet 0    Albuterol Sulfate HFA (PROAIR HFA) 108 (90 Base) MCG/ACT Inhalation Aero Soln Inhale 2 puffs into the lungs every 4 (four) hours as needed for Wheezing or Shortness of Breath. 1 Inhaler 3     No current facility-administered medications on file prior to visit.       ASSESSMENT  Analyzed weight data:       Diagnoses and all orders for this visit:    Therapeutic drug monitoring    Class 3 severe obesity with serious comorbidity and body mass index (BMI) of 45.0 to 49.9 in adult, unspecified obesity type (HCC)    S/P laparoscopic sleeve gastrectomy    Binge eating disorder    Prediabetes    BLESSING on CPAP    Fibromyalgia    Other orders  -     Tirzepatide-Weight Management (ZEPBOUND) 2.5 MG/0.5ML Subcutaneous Solution Auto-injector; Inject 2.5 mg into the skin once a week.  -     topiramate 25 MG Oral Tab; Take 1 tablet (25 mg total)  by mouth 2 (two) times daily.  -     Discontinue: CUSTOM MEDICATION; Tirzepatide/Niacinamide 2.5mg    8/2mg /mL  Inject 31unit/0.31mL into skin q weekly    Disp: 2.5mL  -     CUSTOM MEDICATION; Tirzepatide/Niacinamide 2.5mg    8/2mg /mL  Inject 31unit/0.31mL into skin q weekly    Disp: 2.5mL        PLAN  Preop Weight:    367.8    66.2                         DOS:12/21/20 - SLEEVE GASTRECTOMY  2/11/21              312.5    57.1  3/18/21              299.5    54.8  Today's weight : 268  lbs  Net loss - + 24lbs/Net loss 100lbs    Will begin compound tirezepatide 2.5mg weekly - denies any personal or family history of pancreatitis, pancreatic cancer, thyroid cancer, MEN2 - discussed MOA, advised side effects and adverse effects of medication, Discussed with pt at length that combination therapy is considered off label use and patient was advised of theoretical risk of combination therapy and risks that we may not be aware of as it's not been studied clinically  Focus on mindful eat  Prediabetes - didn't tolerate metformin, low carb diet  CPAP compliance  Consistency with logging food - protein and produce  Incorporate strength/resistance training  Nutrition: low carb diet/ recommended to eat breakfast daily/ regular protein intake  Medication use and side effects reviewed with patient.  Medication contraindications: metformin  Follow up with dietitian and psychologist as recommended.  Discussed the role of sleep and stress in weight management.  Counseled on comprehensive weight loss plan including attention to nutrition, exercise and behavior/stress management for success. See patient instruction below for more details.  Discussed strategies to overcome barriers to successful weight loss and weight maintenance  FITTE: ACSM recommendations (150-300 minutes/ week in active weight loss)   Weight Loss consent to treat reviewed and signed       NOTE TO PATIENT: The 21st Century Cures Act makes clinical notes like these  available to patients in the interest of transparency. Clinical notes are medical documents used by physicians and care providers to communicate with each other. These documents include medical language and terminology, abbreviations, and treatment information that may sound technical and at times possibly unfamiliar. In addition, at times, the verbiage may appear blunt or direct. These documents are one tool providers use to communicate relevant information and clinical opinions of the care providers in a way that allows common understanding of the clinical context.     There are no Patient Instructions on file for this visit.    No follow-ups on file.    Patient verbalizes understanding.    Solange David PA-C  8/2/2024

## 2024-08-02 NOTE — PATIENT INSTRUCTIONS
Refill policies:    Allow 2-3 business days for refills; controlled substances may take longer.  Contact your pharmacy at least 5 days prior to running out of medication and have them send an electronic request or submit request through the “request refill” option in your [employer] account.  Refills are not addressed on weekends; covering physicians do not authorize routine medications on weekends.  No narcotics or controlled substances are refilled after noon on Fridays or by on call physicians.  By law, narcotics must be electronically prescribed.  A 30 day supply with no refills is the maximum allowed.  If your prescription is due for a refill, you may be due for a follow up appointment.  To best provide you care, patients receiving routine medications need to be seen at least once a year.  Patients receiving narcotic/controlled substance medications need to be seen at least once every 3 months.  In the event that your preferred pharmacy does not have the requested medication in stock (e.g. Backordered), it is your responsibility to find another pharmacy that has the requested medication available.  We will gladly send a new prescription to that pharmacy at your request.    Scheduling Tests:    If your physician has ordered radiology tests such as MRI or CT scans, please contact Central Scheduling at [phone] right away to schedule the test.  Once scheduled, the [facility] Centralized Referral Team will work with your insurance carrier to obtain pre-certification or prior authorization.  Depending on your insurance carrier, approval may take 3-10 days.  It is highly recommended patients assure they have received an authorization before having a test performed.  If test is done without insurance authorization, patient may be responsible for the entire amount billed.      Precertification and Prior Authorizations:  If your physician has recommended that you have a procedure or additional testing performed the [facility]  Centralized Referral Team will contact your insurance carrier to obtain pre-certification or prior authorization.    You are strongly encouraged to contact your insurance carrier to verify that your procedure/test has been approved and is a COVERED benefit.  Although the [facility] Centralized Referral Team does its due diligence, the insurance carrier gives the disclaimer that \"Although the procedure is authorized, this does not guarantee payment.\"    Ultimately the patient is responsible for payment.   Thank you for your understanding in this matter.  Paperwork Completion:  If you require FMLA or disability paperwork for your recovery, please make sure to either drop it off or have it faxed to our office at [phone]. Be sure the form has your name and date of birth on it.  The form will be faxed to our Forms Department and they will complete it for you.  There is a 25$ fee for all forms that need to be filled out.  Please be aware there is a 10-14 day turnaround time.  You will need to sign a release of information (ZARA) form if your paperwork does not come with one.  You may call the Forms Department with any questions at [phone].  Their fax number is [phone].

## 2024-08-02 NOTE — PROGRESS NOTES
Patient presents in office today with reported pain in right shoulder    Current pain level reported = 5/10    Last reported dose of nothing      Narcotic Contract renewal none    Urine Drug screen none

## 2024-08-05 ENCOUNTER — OFFICE VISIT (OUTPATIENT)
Dept: SURGERY | Facility: CLINIC | Age: 44
End: 2024-08-05
Payer: COMMERCIAL

## 2024-08-05 ENCOUNTER — OFFICE VISIT (OUTPATIENT)
Dept: PHYSICAL THERAPY | Age: 44
End: 2024-08-05
Attending: FAMILY MEDICINE
Payer: COMMERCIAL

## 2024-08-05 ENCOUNTER — TELEPHONE (OUTPATIENT)
Dept: PAIN CLINIC | Facility: CLINIC | Age: 44
End: 2024-08-05

## 2024-08-05 VITALS — HEIGHT: 62.01 IN | BODY MASS INDEX: 48.33 KG/M2 | WEIGHT: 266 LBS

## 2024-08-05 DIAGNOSIS — M53.3 CHRONIC RIGHT SI JOINT PAIN: Primary | ICD-10-CM

## 2024-08-05 DIAGNOSIS — Z98.84 S/P LAPAROSCOPIC SLEEVE GASTRECTOMY: ICD-10-CM

## 2024-08-05 DIAGNOSIS — G89.29 CHRONIC RIGHT SI JOINT PAIN: Primary | ICD-10-CM

## 2024-08-05 DIAGNOSIS — E78.2 MIXED HYPERLIPIDEMIA: ICD-10-CM

## 2024-08-05 DIAGNOSIS — E66.01 CLASS 3 SEVERE OBESITY DUE TO EXCESS CALORIES WITHOUT SERIOUS COMORBIDITY WITH BODY MASS INDEX (BMI) OF 40.0 TO 44.9 IN ADULT (HCC): Primary | ICD-10-CM

## 2024-08-05 PROCEDURE — 97110 THERAPEUTIC EXERCISES: CPT

## 2024-08-05 NOTE — TELEPHONE ENCOUNTER
PATIENT NAME:  STEPHEN RAZO/ [phone] (home)/ There is no work phone number on file.  PATIENT :  1980  REFERRAL ID #:  [id number]  REFERRAL STATUS:  Authorized [1]  REVIEW REFERRAL NOTES FOR MORE INFORMATION:  DATE AUTHORIZED:  2024 // EXPIRATION DATE: 2025   REFERRED BY:  MARYAN GONZÁLES MD (TEL: [phone])  REFERRED TO: No referral provider, MD (TEL: No Referred to Provider on Referral)     No vendor specified on referral. / No vendor phone number known.  No facility specified on referral  REFERRED FOR:    Diagnoses:    M25.511, G89.29 (ICD-10-CM) - 719.41, 338.29 (ICD-9-CM) - Chronic right shoulder pain  Procedures:     [id number] - [facility] PAIN NAVIGATOR   NUMBER OF VISITS AUTH: 1

## 2024-08-05 NOTE — TELEPHONE ENCOUNTER
Order Questions    Question Answer   Anesthesia Type Local   Provider Peng   Location Office w/ Ultrasound   CPT (Hit enter after each entry) DRAIN/INJECT LARGE JOINT/BURSA   Medical clearance requested (will send to Pain Navigator) No   Patient has Medicare coverage? No   Comments (Please list entire procedure name here.) right shoulder injection

## 2024-08-05 NOTE — TELEPHONE ENCOUNTER
Contacted patient informed that [facility] referral authorization obtained -offered patient to schedule. Patient VU and agreed to schedule.     Scheduled patient for In Office -right shoulder injection w/ultrasound on 08/12/24 @ 09:00 AM.

## 2024-08-05 NOTE — PROGRESS NOTES
Diagnosis:   Acute bilateral low back pain with right-sided sciatica (M54.41)        Referring Provider: Julissa Gonzalez  Date of Evaluation:    7/29/2024    Precautions:  Drug Allergy Next MD visit:   none scheduled  Date of Surgery: n/a   Insurance Primary/Secondary: [place] / N/A     # Auth Visits: 8            Subjective: Pt reports aggravating symptoms last week, not sure why/ how. But increased pain for several days. Yesterday & today having R sided symptoms around the tailbone. R SIJ injection scheduled for 9/12. Of note will also have R shoulder injection, & having R shoulder pain, B knee pain, B feet pain & reports having PT RX for these. Would like to start with treatment when able    Pain: 6/10      Objective: See Flowsheet for details      Assessment: Relief with seated piriformis stretch. Sufficient but appropriate challenge with use of Gray TheraBand for hip abduction isometric.      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  Date: 8/5/2024  TX#: 2/8 Date:                 TX#: 3/ Date:                 TX#: 4/ Date:                 TX#: 5/ Date:   Tx#: 6/   Therex: 43'  Discussion of PT treatment options/ POC in light of additional body parts/ RX: PT recommendations  HEP  review: form check seated HS stretch; LTR with option for wider foot position for hip bias; piriformis stretch (pull & push gently)  Active HS stretch X 5 R  -with option for ankle pump for sciatic nerve bias X 5 ea  Seated figure-4 piriformis stretch 1 X 30\" ea  Lumbar roll  Education: connection between posterior hip tightness & weakness & anterior hip tightness  Hooklying hip Abd Devan with Gray TB 10 X 10\"  Hooklying hip Add Devan with yellow ball 10 X 10\"  Hooklying PPT X 5       HEP:  Access Code: [email or web address]  URL: [email or web address]  Date: 07/29/2024  Prepared by: Fernanda Taylor    Exercises  - Supine Piriformis Stretch with Foot on Ground  - 1-2 x daily - 7 x weekly - 3 sets - 30 sec hold  - Supine Lower Trunk Rotation  - 1-2 x daily - 7 x weekly - 5 sec hold - 2-3 duration  - Hooklying Hamstring Stretch  - 1-2 x daily - 7 x weekly - 1 sets - 5-10 reps - 10 sec hold      Access Code: [email or web address]  URL: [email or web address]  Date: 08/05/2024  Prepared by: Fernanda Taylor    Exercises  - Seated Figure 4 Piriformis Stretch  - 1 x daily - 7 x weekly - 3 sets - 30 sec hold  - Hooklying Hamstring Stretch  - 1 x daily - 7 x weekly - 1 sets - 10 reps - 10 sec hold  - Supine Sciatic Nerve Glide  - 1 x daily - 7 x weekly - 1-2 sets - 10 reps - 1-2 sec hold  - Supine Posterior Pelvic Tilt  - 1 x daily - 7 x weekly - 1-2 sets - 10 reps - 2-3 sec hold  - Hooklying Clamshell with Resistance  - 1 x daily - 7 x weekly - 1 sets - 10 reps - 10 sec hold  - Supine Hip Adduction Isometric with Ball  - 1 x daily - 7 x weekly - 1 sets - 10 reps - 10 sec hold    Charges: Therex X 3       Total Timed Treatment: 43 min  Total Treatment Time: 43 min

## 2024-08-05 NOTE — TELEPHONE ENCOUNTER
Patient advised of insurance approval to proceed with injections and is agreeable to scheduling. Patient scheduled for procedure, pre-procedure instructions reviewed. Patient prefers Local sedation. Reviewed sedation instructions including No Fasting & No  Required. Patient encouraged but not required to hold NSAIDs/Meloxicam  for 24 hours prior to procedure. Patient verbalized understanding of instructions, no further needs at this time.        [facility]  PRE-PROCEDURE INSTRUCTIONS WITHOUT SEDATION    Procedure: Right SIJI        Appointment Date: 09/12/2024  Check-In Time: 02:45 PM      Prior to the procedure:  Please update us prior to the procedure if you are experiencing any symptoms of infection such as cough, fever, chills, urinary symptoms, or have recently been prescribed antibiotics, have open wounds, have recently had surgery or dental procedures.    Day of Procedure:  **Drivers will be required for patients who receive prescriptions for Valium.    NO FASTING REQUIRED  Please bring your Insurance Card, Photo ID, List of Current Medications and Referral (if applicable) to your appointment.  [facility] and follow the signs to the [facility].  Check in at [facility] ([address] [place] [address]) outpatient registration in the [facility].  Please note-No prescriptions will be written by Pain Clinic in OR on the day of procedure. If you require a refill of medications, please contact the office 48 hours prior to your procedure.  If you have an implanted Spinal Cord or Peripheral Nerve Stimulator: Please remember to turn device off for procedure.        Medication Hold:    Number of days you need to be off for the following medications:    Aggrenox 10 days   Agrylin (Anagrelide) 10 days  Brilinta (Ticagrelor) 7 days  Imbruvica (Ibrutinib) 3 days   Enbrel (Etanercept) 24 hours   Fragmin (Dalteparin) 24 hours   Pletal (Cilostazol) 7  days  Effient (Prasugrel) 7 days  Pradaxa 10 days  Trental 7 days  Eliquis (Apixaban) 3 days  Xarelto (Rivaroxaban) 3 days  Lovenox (Enoxaparin) 24 hours  Aspirin  Greater than 81mg but less than 325mg   5 days  325mg and greater                  7 days  Coumadin       5 days  Procedure may be cancelled if INR is elevated.   Excedrin (with aspirin) 7 days  Plavix (Clopidogrel)                            7 days    NSAIDs: 24 hours preferred      Ibuprofen (Motrin, Advil, Vicoprofen), Naproxen (Naprosyn, Aleve), Piroxcam (Feldene), Meloxicam (Mobic), Oxaprozin (Daypro), Diclofenac (Voltaren), Indomethacin (Indocin), Etodolac (Lodine), Nabumetone (Relafen), Celebrex (Celecoxib)           HERBAL SUPPLEMENTS  5 days preferred  Fish oil, krill oil, Omega-3, Vascepa, Vitamin E, Turmeric, Garlic                       Insurance Authorization:   Most insurances are now requiring a preauthorization for all procedures.  In the event that your insurance does not authorize your procedure within 48 hours of the scheduled date, your procedure will be cancelled and rescheduled to a later date.  Please contact your insurance carrier to determine what your financial responsibility will be for the procedure(s).      Cancellation/Rescheduling Appointment:   In the event you need to cancel or reschedule your appointment, you must notify the office 24 hours prior.    Post-procedure instructions:        Please schedule a follow up visit within 2 to 4 weeks after your last procedure date   Please call our office with any questions or concerns before or after your procedure at  [phone].  If you are a diabetic, please increase the frequency of your glucose monitoring after the procedure as this may cause a temporary increase in your blood sugar.  Contact your primary care physician if your blood sugar rises as you may require some medication adjustment.  It is normal to have increased pain at injection site for up to 3-5 days after  procedure, you can use heat or ice (20 minutes on 20 minutes off) for comfort.    **To hear a recorded version of these instructions, please call [phone] and follow the prompts.  **Para escuchar las instrucciones en Español, por favor de llamar el vahe [phone] opción 4.

## 2024-08-05 NOTE — PROGRESS NOTES
[facility] BARIATRIC AND WEIGHT LOSS CLINIC  [address]  [facility] [address]  Dept: [phone]  Loc: [phone]    08/05/24    Bariatric Follow-up Nutrition Session  Nola De Paz is a 44 year old female.     Assessment   Procedure:  Vertical Sleeve Gastrectomy  Here for medical weight management: yes  Surgery Date:  12/21/20  Medical Diagnosis:  morbidly obese    Labs:  Lab Results   Component Value Date    GLU 85 04/30/2024    BUN 14 04/30/2024    BUNCREA 25.5 (H) 04/30/2024    CREATSERUM 0.55 04/30/2024    ANIONGAP <0 (L) 04/30/2024    GFRNAA 100 07/16/2022    GFRAA 115 07/16/2022    CA 9.2 04/30/2024    OSMOCALC 290 04/30/2024    ALKPHO 65 04/30/2024    AST 34 04/30/2024    ALT 31 04/30/2024    BILT 0.6 04/30/2024    TP 6.6 04/30/2024    ALB 3.7 04/30/2024    GLOBULIN 2.9 04/30/2024    AGRATIO 1.2 08/08/2020     04/30/2024    K 3.8 04/30/2024     04/30/2024    CO2 30.0 04/30/2024     Lab Results   Component Value Date    MG 2.3 06/22/2020      No results found for: \"PHOS\"    Thyroid:    Lab Results   Component Value Date    TSH 2.330 02/10/2024    TSH 6.100 (H) 11/11/2023    TSH 2.200 06/22/2023    T4F 1.0 02/10/2024    T4F 1.4 01/21/2023    T4F 1.5 09/14/2022       Iron Panel:  Lab Results   Component Value Date    IRON 73 04/30/2024    IRONTOT 43 08/08/2020    TRANSFERRIN 207 (L) 04/30/2024    TIBCP 308 04/30/2024    IRONBIND 316 08/08/2020    SAT 24 04/30/2024    SATUR 14 (L) 08/08/2020       CBC:  Lab Results   Component Value Date    WBC 5.2 04/30/2024    WBC 5.8 02/10/2024    WBC 5.9 11/11/2023     Lab Results   Component Value Date    HGB 12.9 04/30/2024    HGB 12.8 02/10/2024    HGB 13.4 11/11/2023      Lab Results   Component Value Date    .0 04/30/2024    .0 02/10/2024    .0 11/11/2023       Diabetes:    Lab Results   Component Value Date     06/22/2021    A1C 5.4 06/22/2021       Lipid Panel:  Lab Results   Component Value  Date    CHOLEST 213 (H) 04/30/2024    TRIG 36 04/30/2024    HDL 49 04/30/2024     (H) 04/30/2024    VLDL 7 04/30/2024    TCHDLRATIO 3.5 08/08/2020    NONHDLC 164 (H) 04/30/2024        Vitamins/Minerals:  Lab Results   Component Value Date    B12 492 06/22/2023    VITB12 480 08/08/2020     Lab Results   Component Value Date    VITD 30.8 04/30/2024     Lab Results   Component Value Date/Time    THIAMINE 147 06/22/2021 03:26 PM      No results found for: \"VITB1\"  Lab Results   Component Value Date/Time    FOLIC 26.9 06/22/2021 03:26 PM        Meds:     Current Outpatient Medications:     Tirzepatide-Weight Management (ZEPBOUND) 2.5 MG/0.5ML Subcutaneous Solution Auto-injector, Inject 2.5 mg into the skin once a week., Disp: 2 mL, Rfl: 0    topiramate 25 MG Oral Tab, Take 1 tablet (25 mg total) by mouth 2 (two) times daily., Disp: 180 tablet, Rfl: 1    CUSTOM MEDICATION, Tirzepatide/Niacinamide 2.5mg  8/2mg /mL Inject 31unit/0.31mL into skin q weekly  Disp: 2.5mL, Disp: 2.5 each, Rfl: 0    ezetimibe 10 MG Oral Tab, Take 1 tablet (10 mg total) by mouth daily., Disp: 90 tablet, Rfl: 3    gabapentin 100 MG Oral Cap, Take 1 capsule (100 mg total) by mouth 3 (three) times daily., Disp: 270 capsule, Rfl: 0    CUSTOM MEDICATION, Semaglutide 0.5mg  Semaglutide/Cyanocobalamin 1mg/0.5 ml  Inject 50 units/0.5ml subcutaneous weekly   Disp: 2.5ml, Disp: 1 each, Rfl: 0    hydroxychloroquine 200 MG Oral Tab, Take 1 tablet (200 mg total) by mouth 2 (two) times daily., Disp: 180 tablet, Rfl: 1    cyclobenzaprine 10 MG Oral Tab, Take 1 tablet (10 mg total) by mouth nightly as needed for Muscle spasms., Disp: 30 tablet, Rfl: 0    levothyroxine 175 MCG Oral Tab, Take 1 tablet (175 mcg total) by mouth before breakfast., Disp: 90 tablet, Rfl: 3    Omeprazole 40 MG Oral Capsule Delayed Release, Take 1 capsule (40 mg total) by mouth daily., Disp: 90 capsule, Rfl: 1    venlafaxine  MG Oral Capsule SR 24 Hr, Take 1 capsule (150 mg  total) by mouth daily., Disp: 90 capsule, Rfl: 1    rosuvastatin 10 MG Oral Tab, Take 1 tablet (10 mg total) by mouth nightly., Disp: 90 tablet, Rfl: 0    Meloxicam 7.5 MG Oral Tab, Take 1 tablet (7.5 mg total) by mouth daily., Disp: , Rfl:     traZODone 50 MG Oral Tab, Take 1 tablet (50 mg total) by mouth nightly., Disp: , Rfl:     clindamycin 1 % External Lotion, APPLY TOPICALLY TO THE AFFECTED AREA EVERY DAY BEFORE NOON, Disp: , Rfl:     LORazepam 0.5 MG Oral Tab, Take 1 tablet (0.5 mg total) by mouth 2 (two) times daily as needed., Disp: 40 tablet, Rfl: 0    fexofenadine 180 MG Oral Tab, Take 1 tablet (180 mg total) by mouth daily., Disp: , Rfl:     Multiple Vitamin (MULTIVITAMIN ADULT OR), Apply topically., Disp: , Rfl:     ondansetron 4 MG Oral Tablet Dispersible, Take 1 tablet (4 mg total) by mouth every 8 (eight) hours as needed for Nausea., Disp: 30 tablet, Rfl: 0    Nystatin 271511 UNIT/GM External Powder, Apply 1 Application topically 4 (four) times daily., Disp: 1 Bottle, Rfl: 0    clotrimazole-betamethasone 1-0.05 % External Cream, Apply 1 Application topically 2 (two) times daily as needed (rash)., Disp: 60 g, Rfl: 3    Albuterol Sulfate HFA (PROAIR HFA) 108 (90 Base) MCG/ACT Inhalation Aero Soln, Inhale 2 puffs into the lungs every 4 (four) hours as needed for Wheezing or Shortness of Breath., Disp: 1 Inhaler, Rfl: 3    Triamcinolone Acetonide 55 MCG/ACT Nasal Aerosol, by Nasal route daily as needed., Disp: , Rfl:     Surgery Date: 12/21/20  Surgery Weight: 335 lbs  Weight change:  69 lbs  IBW: 130 lbs  EBWL: 205 lbs  Post-Op Excess Body Weight Loss: 33% EBWL  BMI: Body mass index is 48.64 kg/m².      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 Would like to reach an initial goal weight of 195 lbs. Today's Ht 5' 2.01\" (1.575 m)   Wt 266 lb (120.7 kg)   LMP 01/31/2024 (Approximate)   BMI 48.64 kg/m²  indicating a 97 lbs weight loss since stating the WLC.     Weight:    Wt Readings from Last 6 Encounters:   08/02/24 268 lb (121.6 kg)   08/02/24 268 lb (121.6 kg)   07/12/24 266 lb (120.7 kg)   06/19/24 266 lb (120.7 kg)   05/24/24 263 lb (119.3 kg)   05/17/24 263 lb (119.3 kg)       Diet History:  Previous Attempts at Weight Loss: keto (lost 40 lbs)    Patient seen in clinic today for nutrition counseling to assist with weight loss. Patient had gastric sleeve with Dr. Curran.  Reports certain challenges/barriers associated with weight loss. Specifically reports over the last year she attempted IVF, wasn't able to complete it. She mourned that part of life and has struggled with depression and fell back into bad food habits. Her portions at meals are from 1/2 cup to 1 cup and it takes her 15 minutes to eat a meal. Patient is taking small bites (quarter sized) and is chewing each bite 10-15 x before swallowing. Patient takes small sips and is  their beverages from the meals by 10-30 minutes. Patient will have caffeine and carbonated beverages, but has eliminated alcohol. Patient uses straws and chew gum. S/P cholecystectomy in 2013 with no malabsorption issues reported. No current LC labs to review. Upon medication review discussed gabapentin which can contribute to weight gain by affecting hormones that control hunger which can increase your appetite and make it harder for your body to sense when it's full, it can also lower your metabolism and cause fluid retention, trazodone which can contribute to weight gain by  affecting brain chemicals and how the body breaks down calories, it may also prevent feeling full while eating resulting in larger portion sizes and overeating, ans venlafaxine which can contribute to weight gain by affecting brain chemicals and how the body breaks down calories, it may also prevent feeling full while eating resulting in larger portion sizes and overeating. Discussed typical diet which consists of 3 meals and 2-3 snacks/day. She is not tracking her meals. She is aiming for <100 grams CHO/day. She is currently eating meals at consistent times.She is responsible for grocery shopping and cooking. She is eating out 3-4 x week. No food log provided; verbal dietary recall listed below.    Typical Diet:  Breakfast: (6:00 am) donut OR egg bites  Snack: (8:00 - 9:00 am) donuts or Egg bites  Lunch: (1:00 - 2:00 pm) chicken and rice bowl OR taco salad OR chicken in tortilla wrap  Snack: greek yogurt OR fruit   Dinner: (7:00 - 7:30 pm) lunch leftovers OR grilled beef/steak OR hummus and georgia bread  Snack: (middle of the night) chips OR ice cream OR cookies OR SKIPS    Beverages: coffee with sugar, water, diet soda, protein shake   Alcoholic Beverages: N/A    Total Calories:  unsure  Protein Intake: unsure grams/day  Fluid intake:  unsure ounces/day  Excessive in: nothing  Inadequate in:  nothing     Patient has made some modifications and adjustments to diet: yes,  See above  Food intolerances:  green bell peppers and ice cream  Vitamin/mineral supplements:  Calcium and ProCare  Protein supplements:  Premier Protein     Exercise:  Walking for 15-20 minutes 4-5 days/week    Body Composition Analysis #2  1. Weight 266.5 lbs  2. BMI 48.7  3. BMR 1534 kcal  4. Percent body fat 55.4% (147.7 lbs)  5. Visceral fat level 20 (goal is <10)  6. ECW/TBW 0.400  7. SMM (skeletal muscle mass) 64.4 lbs               Lean body mass for arms (R & L) 6.72 lbs, 111.8% & 6.64 lbs, 110.1%               Lean body mass for trunk 53.9  lbs, 101.9%               Lean body mass for legs (R & L) 16.62 lbs, 88.7% & 16.73 lbs, 89.2%  8. Body fat mass 147.7 lbs   9. Recommended Body fat amount loss 112.2 lb  10. Recommendations/Status Adding lower body strength training. Aim for 1100 calories/day, <100 grams CHO/day, and 100 grams protein/day        Body Composition Analysis #1 (4/29/21)  1. Weight 290.2 lbs  2. BMI 53.1  3. BMR 1632 kcal  4. Percent body fat 55.6% (Goal <30%)  5. Visceral fat level 20 (Goal <10)  6. ECW/TBW 0.399  7. SMM (skeletal muscle mass) 70.8 lbs               Lean body mass for arms (R & L) 7.78 lbs, 123.4% & 7.8 lbs, 123.9%               Lean body mass for trunk 60.2 lbs, 109.3%                Lean body mass for legs (R & L) 16.78 lbs, 85.7% & 17.06 lbs, 87.2%  8. Body fat mass 161.4 lbs   9. Recommended Body fat amount loss 123 lbs  10. Recommendations/Status: Pt not currently exercising. Goes to PT regularly for fibromyalgia. Having a hard time scheduling time with her  due to time conflicts. Has not been keeping up with the exercises at home because she has been really fatigued and dealing with other stressors. Reviewed how to structure exercise on own, start w/ 1-2 days per week bodyweight, resistance bands, light dumbbells. Suggested \"the bariatric \" for workout ideas. Also suggested pt focus on the legs more than upper body. Pt agreed and verbalized understanding. Plan to repeat analysis at 1 yr post-op.     Danisha Niño, MS, RD, LDN    Nutrition Diagnosis     Nutrition Diagnosis: Morbid obesity: Unresolved     Intervention   Education:  Review of Food Intake (pt provided food log/journal - no)  Importance of keeping a food log  Discussion of food modifications to current diet (see GOALS)  Discussion of Liquid Protein diet 2 weeks before surgery  Discussion of diet progression stages 1-4 s/p surgery  Discussed ideas for breakfast. Lunch, dinner and snacks  Basic nutrition education:  Discussion  of food groups and what constitutes a serving  Discussion of number of servings  in each food group to have per meal or snack  Discussion of high fiber vs refined carbs; use of low-fat protein; saturates vs unsaturated fats  Importance of eating consistently and not skipping meals  Importance of protein for satiety  Discussed the importance of meal planning  Discussion of need for exercise for weight loss (see GOALS)    Monitor/Evaluate   Goals:  Keep a food log (My Net Diary, M-Factor) as an effective tool to help track food choices  Focus on healthy plate; 1/2 plate vegetables, 1/4 plate protein, 1/4 plate carbohydrates  Avoid skipping meals - eat every 3-4 hours  Start each meal with protein first  Aim for  grams protein/day or 20-30 grams protein/meal  Quick convenient protein options: tuna, deli meat (turkey, chicken, roast beef), pre-cooked shrimp or chicken, greek yogurt, cottage cheese, protein shakes  When choosing yogurt aim for option consisting of 10-15 grams protein and  calories/serving  High protein yogurt examples: Siggi;s, Lao, Two Good, Oikos Triple Zero, Dannon light and fit greek, YQ by Yoplait, Fage, Chobani Less Sugar  Aim for <100 grams carbohydrates/day   Aim for 64 oz of water/day  Eat SLOWLY. Meal should take 20-30 minutes to even though you are eating very small amounts   Cut each bite into dime sized pieces  Chew each bite 20-30 x before swallowing  Do not eat and drink at the same time. Do not drink for 30 minutes before or after a meal  Avoid carbonation, straws, and chewing gum  Eliminate caffeine for at least 3 months and alcohol for at least 1 year after bariatric surgery  Take bariatric vitamins as recommended  Meal plan ahead of time  Use measuring cups and/or food scale for portion control    Exercise:  Stay active - focus on realistic activities that fit into your schedule  Plan ahead of exercise - treat as an appointment  When getting started with exercise,  slowly build up duration and intensity  Aim for 30 minutes or more of moderate to vigorous exercise 5 x week  Incorporate strength training 3 x week for at least 10 minutes    At Home Fitness Suggestions:  Amazon Prime (free with Amazon Prime subscription)  15 minute full body burn with Dacia Logan  15 minutes HIIT with Dacia Logan  [place] - Less [place]  Dance Fit Cardio Blast  Dance That Walk  Jaya Super Cardio Dance Party    Exercise Programs:  7 minute workout by Hair Cha on Demand  Daily Burn  FitOn  Obe Fitness  Om Fit    Yoga Programs:  Amrik Moves  Bull Dog online yoga  CorePower yoga  Start Stretching    Hipuiube Exercise Options:  Fitness   Pahla B Fitness (weight loss over 50)  PopShopear fitness  Walk at Home by Inge Cisse  Yoga with Alethea  Fitness with Lizeth    Band Exercises:  Start low resistance beginner exercises using bands.  Here are some Hipuiube band exercise videos you could try:  www.Wantreez Music.com/watch?v=Uz4KsqWCT1J  www.Picture Production Companyube.com/watch?v=wvnuuRKbsLs    Behavior Modification:  Set small goals for self and focus on realistic, attainable changes to work on long-term.    Visit Length: 11:00 am to 12:09 am - 69 minutes

## 2024-08-05 NOTE — TELEPHONE ENCOUNTER
PATIENT NAME:  STEPHEN RAZO/ [phone] (home)/ There is no work phone number on file.  PATIENT :  1980  REFERRAL ID #:  [id number]  REFERRAL STATUS:  Authorized [1]  REVIEW REFERRAL NOTES FOR MORE INFORMATION:  DATE AUTHORIZED:  2024 // EXPIRATION DATE: 2025   REFERRED BY:  MARYAN GONZÁLES MD (TEL: [phone])  REFERRED TO: No referral provider, MD (TEL: No Referred to Provider on Referral)     No vendor specified on referral. / No vendor phone number known.  No facility specified on referral  REFERRED FOR:    Diagnoses:    M53.3, G89.29 (ICD-10-CM) - 724.6, 338.29 (ICD-9-CM) - Chronic right SI joint pain  Procedures:     [id number] - [facility] PAIN NAVIGATOR   NUMBER OF VISITS AUTH: 1

## 2024-08-05 NOTE — TELEPHONE ENCOUNTER
Order Questions    Question Answer   Anesthesia Type Local   Provider [facility]   Procedure SI Joint   CPT (Hit enter after each entry) INJECTION PROC FOR SACROILIAC JOINT ARTHROGRAPHY &/OR ANESTHETIC/STEROID   Medical clearance requested (will send to Pain Navigator) No   Patient has Medicare coverage? No   Comments (Please list entire procedure name here.) right sacroiliac joint injection

## 2024-08-05 NOTE — PATIENT INSTRUCTIONS
Lumbar roll (full): with or without strap            Access Code: [email or web address]  URL: [email or web address]  Date: 08/05/2024  Prepared by: Fernanda Taylor    Exercises  - Seated Figure 4 Piriformis Stretch  - 1 x daily - 7 x weekly - 3 sets - 30 sec hold  - Hooklying Hamstring Stretch  - 1 x daily - 7 x weekly - 1 sets - 10 reps - 10 sec hold  - Supine Sciatic Nerve Glide  - 1 x daily - 7 x weekly - 1-2 sets - 10 reps - 1-2 sec hold  - Supine Posterior Pelvic Tilt  - 1 x daily - 7 x weekly - 1-2 sets - 10 reps - 2-3 sec hold  - Hooklying Clamshell with Resistance  - 1 x daily - 7 x weekly - 1 sets - 10 reps - 10 sec hold  - Supine Hip Adduction Isometric with Ball  - 1 x daily - 7 x weekly - 1 sets - 10 reps - 10 sec hold

## 2024-08-05 NOTE — PATIENT INSTRUCTIONS
It was so nice meeting you today. Please reach out with any questions or concerns. Thank you for including me in your weight loss journey. Here's a review of today's visit:    Body Composition Analysis #2  1. Weight 266.5 lbs  2. BMI 48.7  3. BMR 1534 kcal  4. Percent body fat 55.4% (147.7 lbs)  5. Visceral fat level 20 (goal is <10)  6. ECW/TBW 0.400  7. SMM (skeletal muscle mass) 64.4 lbs               Lean body mass for arms (R & L) 6.72 lbs, 111.8% & 6.64 lbs, 110.1%               Lean body mass for trunk 53.9 lbs, 101.9%               Lean body mass for legs (R & L) 16.62 lbs, 88.7% & 16.73 lbs, 89.2%  8. Body fat mass 147.7 lbs   9. Recommended Body fat amount loss 112.2 lb  10. Recommendations/Status Adding lower body strength training. Aim for 1100 calories/day, <100 grams CHO/day, and 100 grams protein/day        Body Composition Analysis #1 (4/29/21)  1. Weight 290.2 lbs  2. BMI 53.1  3. BMR 1632 kcal  4. Percent body fat 55.6% (Goal <30%)  5. Visceral fat level 20 (Goal <10)  6. ECW/TBW 0.399  7. SMM (skeletal muscle mass) 70.8 lbs               Lean body mass for arms (R & L) 7.78 lbs, 123.4% & 7.8 lbs, 123.9%               Lean body mass for trunk 60.2 lbs, 109.3%                Lean body mass for legs (R & L) 16.78 lbs, 85.7% & 17.06 lbs, 87.2%  8. Body fat mass 161.4 lbs   9. Recommended Body fat amount loss 123 lbs  10. Recommendations/Status: Pt not currently exercising. Goes to PT regularly for fibromyalgia. Having a hard time scheduling time with her  due to time conflicts. Has not been keeping up with the exercises at home because she has been really fatigued and dealing with other stressors. Reviewed how to structure exercise on own, start w/ 1-2 days per week bodyweight, resistance bands, light dumbbells. Suggested \"the bariatric \" for workout ideas. Also suggested pt focus on the legs more than upper body. Pt agreed and verbalized understanding. Plan to repeat analysis  at 1 yr post-op.    Goals:  Keep a food log (My Net Diary, Listnerd) as an effective tool to help track food choices  Focus on healthy plate; 1/2 plate vegetables, 1/4 plate protein, 1/4 plate carbohydrates  Avoid skipping meals - eat every 3-4 hours  Start each meal with protein first  Aim for  grams protein/day or 20-30 grams protein/meal  Quick convenient protein options: tuna, deli meat (turkey, chicken, roast beef), pre-cooked shrimp or chicken, greek yogurt, cottage cheese, protein shakes  When choosing yogurt aim for option consisting of 10-15 grams protein and  calories/serving  High protein yogurt examples: Siggi;s, Malagasy, Two Good, Oikos Triple Zero, Dannon light and fit greek, YQ by Angelitoplhoracio, [place], Chobani Less Sugar  Aim for <100 grams carbohydrates/day   Aim for 64 oz of water/day  Eat SLOWLY. Meal should take 20-30 minutes to even though you are eating very small amounts   Cut each bite into dime sized pieces  Chew each bite 20-30 x before swallowing  Do not eat and drink at the same time. Do not drink for 30 minutes before or after a meal  Avoid carbonation, straws, and chewing gum  Eliminate caffeine for at least 3 months and alcohol for at least 1 year after bariatric surgery  Take bariatric vitamins as recommended  Meal plan ahead of time  Use measuring cups and/or food scale for portion control    Exercise:  Stay active - focus on realistic activities that fit into your schedule  Plan ahead of exercise - treat as an appointment  When getting started with exercise, slowly build up duration and intensity  Aim for 30 minutes or more of moderate to vigorous exercise 5 x week  Incorporate strength training 3 x week for at least 10 minutes    At Home Fitness Suggestions:  Amazon Prime (free with Amazon Prime subscription)  15 minute full body burn with Dacia Logan  15 minutes HIIT with Dacia Logan  [place] - Less [place]  Dance Fit Cardio Blast  Dance That Walk  Jaya [employer] Cardio  Dance Party    Exercise Programs:  7 minute workout by Hair Cha on Demand  Daily Burn  FitOn  Obe Fitness  Om Fit    Yoga Programs:  Amrik Moves  Bull Dog online yoga  CorePower yoga  Start Stretching    YouTube Exercise Options:  Fitness   Soco UNDERWOOD Fitness (weight loss over 50)  [facility] fitness  Walk at Home by Inge Cisse  Yoga with Alethea  Fitness with Lizeth    Band Exercises:  Start low resistance beginner exercises using bands.  Here are some YouTube band exercise videos you could try:  www.Statube.com/watch?v=Yj9HncJIK2E  www.Attenex.[employer]/watch?v=wvnuuRKbsLs    Behavior Modification:  Set small goals for self and focus on realistic, attainable changes to work on long-term.

## 2024-08-06 ENCOUNTER — TELEPHONE (OUTPATIENT)
Facility: LOCATION | Age: 44
End: 2024-08-06

## 2024-08-06 ENCOUNTER — OFFICE VISIT (OUTPATIENT)
Dept: PHYSICAL THERAPY | Age: 44
End: 2024-08-06
Attending: NURSE PRACTITIONER
Payer: COMMERCIAL

## 2024-08-06 PROCEDURE — 97110 THERAPEUTIC EXERCISES: CPT | Performed by: PHYSICAL THERAPIST

## 2024-08-06 PROCEDURE — 97140 MANUAL THERAPY 1/> REGIONS: CPT | Performed by: PHYSICAL THERAPIST

## 2024-08-06 NOTE — TELEPHONE ENCOUNTER
Called and left a message for patient to call us back at [phone]. We are needing to rescheduled her appointment for 8/23/24 as Dr Aldana will not be in the clinic. As of right now we do have an opening on 8/30 at 330pm, these slots are filling fast.

## 2024-08-06 NOTE — PROGRESS NOTES
Diagnosis:   Urinary urgency (R39.15)  Constipation, unspecified constipation type (K59.00)  , Pelvic floor dysfunction      Referring Provider: Chrissy Fairchild  Date of Evaluation:    7/11/2024    Precautions:  None Next MD visit:   none scheduled  Date of Surgery: n/a   Insurance Primary/Secondary: [employer] / N/A     # Auth Visits: 8            Subjective: Patient reports that in the past week, she had pain with urination 1-2x. The UI has not been as bad. BM has also been good. The only thing that has happened since the last time, last few for na few days up until yesterday the tailbone area has been bothering her and was painful on her anus and the vaginal area. Its not bothering her right now, but when it was happening it was 6/10. It was on and off.   Eval:c/o urge urinary incontinence, difficulty with bowel movement, pain with sexual activity, nocturnal incontinence, anterior pelvic tightness, low back pain.  Pain: 0/10- tailbone, vaginal, rectal area      Objective: Severe tightness on L>R PF muscles, STRs, decreased soft tissues mobility, tautbands on the L.      Assessment: Initiated therapeutic exercises today. Performed manual therapy for PF muscles, and tolerated well, with feedback on pressure from patient. Treatment time includes explanation of intended effect of each intervention, instruction before and during exercises, including cues for proper form and performance, assessment of patient's response to each activity and education to improve performance of active intervention and good comprehension of treatment plan to improve patient participation and compliance. Intervention adjusted to patient's tolerance throughout session duration.       Goals:  (to be met in 10 visits)  Patient will have increased awareness for PF muscles contractions and relaxation to improve ability to promote relaxation and decrease pain by 50% during PF assessment (6 visits), and 90% (10 v)  Patient will demonstrate  decreased PF muscles tightness, decreased STRs to mild, to facilitate soft tissues mobility and allow PF muscles to relax and accommodate penile tissue during sexual activity, and digital PF assessment, also improve relaxation to facilitate passage of stool with defecation  Patient will exhibit improvement of hips, core, abdominal muscles strength to 4/5  to improve lumbar stability, improve ability of patient to perform daily and work related activities with no apprehension for pain or difficulty.  Patient will have Marinoff score of 0-1.   Patient will have improvement of PF muscles strength using the Laycock Scale to 4/10/10//10, to improve efficiency of PF contractions to decrease UUI symptoms and be able to reach the bathroom on time,decrease incidence of nocturnal UI,  improve PF stabilization, and report decrease anterior pelvic tightness.  Patient will demonstrate improved coordination of core and PF muscles, including proper respiration to facilitate bowel, bladder, sexual functions and minimize symptoms.  Patient will verbalize understanding of PF functions, knowledge of normal bowel, bladder sexual mechanics, and utilize an established HEP to manage symptoms     Plan: Continue PT as per established POC. DIDI DAVIS  Date: 8/6/2024  TX#: 2/10 Date:                 TX#: 3/ Date:                 TX#: 4/ Date:                 TX#: 5/ Date:   Tx#: 6/   Nu step L1, 5 mins  Calf stretches 2x 30 secs  Supine adductors stretches 2x 30 secs (pat did not feel stretches on the adductors, she felt more on the anterior hip)  Modified Saúl stretches in supine 1x 30 secs (did not feel stretch on hip flexors)  Standing psoas stretches on chair 2x 30 secs (felt increased tightness, cues to decrease intensity if pain starts)  Mermaid stretch over the bed 1x 30 secs (patient preferred)  Diaphragmatic breathing x 5 mins  Thera ex - 35 mins  Man therapy: 10 mins  STM for pelvic floor muscles, PF muscles relaxation with short  duration contractions and 10 secs relaxations, and diaphragmatic breathing                                  HEP: diaphragmatic breathing, mermaid stretch    Charges: Thera -e x 2, Man therapy -1       Total Timed Treatment: 45 min  Total Treatment Time: 45 min

## 2024-08-07 NOTE — TELEPHONE ENCOUNTER
Sent a [employer] message for patient to call us at [phone], we need to reschedule appointment for 8/23/24.

## 2024-08-12 ENCOUNTER — OFFICE VISIT (OUTPATIENT)
Dept: PHYSICAL THERAPY | Age: 44
End: 2024-08-12
Attending: FAMILY MEDICINE
Payer: COMMERCIAL

## 2024-08-12 ENCOUNTER — OFFICE VISIT (OUTPATIENT)
Dept: PAIN CLINIC | Facility: CLINIC | Age: 44
End: 2024-08-12
Payer: COMMERCIAL

## 2024-08-12 ENCOUNTER — NURSE ONLY (OUTPATIENT)
Dept: PAIN CLINIC | Facility: CLINIC | Age: 44
End: 2024-08-12
Payer: COMMERCIAL

## 2024-08-12 VITALS — DIASTOLIC BLOOD PRESSURE: 76 MMHG | SYSTOLIC BLOOD PRESSURE: 100 MMHG | HEART RATE: 77 BPM | OXYGEN SATURATION: 97 %

## 2024-08-12 DIAGNOSIS — G89.29 CHRONIC RIGHT SHOULDER PAIN: Primary | ICD-10-CM

## 2024-08-12 DIAGNOSIS — M25.511 CHRONIC RIGHT SHOULDER PAIN: Primary | ICD-10-CM

## 2024-08-12 PROCEDURE — 97112 NEUROMUSCULAR REEDUCATION: CPT

## 2024-08-12 PROCEDURE — 97110 THERAPEUTIC EXERCISES: CPT

## 2024-08-12 RX ORDER — TRIAMCINOLONE ACETONIDE 40 MG/ML
40 INJECTION, SUSPENSION INTRA-ARTICULAR; INTRAMUSCULAR ONCE
Status: COMPLETED | OUTPATIENT
Start: 2024-08-12 | End: 2024-08-12

## 2024-08-12 RX ORDER — BUPIVACAINE HYDROCHLORIDE 5 MG/ML
9 INJECTION, SOLUTION EPIDURAL; INTRACAUDAL ONCE
Status: COMPLETED | OUTPATIENT
Start: 2024-08-12 | End: 2024-08-12

## 2024-08-12 RX ORDER — LIDOCAINE HYDROCHLORIDE 10 MG/ML
10 INJECTION, SOLUTION INFILTRATION; PERINEURAL ONCE
Status: COMPLETED | OUTPATIENT
Start: 2024-08-12 | End: 2024-08-12

## 2024-08-12 NOTE — PATIENT INSTRUCTIONS
Access Code: [email or web address]  URL: [email or web address]  Date: 08/12/2024  Prepared by: Fernanda Taylor    Exercises  - Supine Bridge  - 1 x daily - 3 x weekly - 2-3 sets - 10 reps  - Bridge with Arms at Sides and Feet on Swiss Ball  - 1 x daily - 3 x weekly - 2-3 sets - 10 reps  - Supine Lower Trunk Rotation with Swiss Ball  - 1-2 x daily - 7 x weekly - 3 sets - 10 reps - 5 sec hold  - Standing Hip Flexor Stretch  - 1-2 x daily - 7 x weekly - 2-3 sets - 30 sec hold  - Modified Saúl Stretch  - 1-2 x daily - 7 x weekly - 3 sets - 2-3 min hold  - Sidelying Reverse Clamshell  - 1 x daily - 3 x weekly - 2-3 sets - 10 reps  - Clamshell  - 1 x daily - 3 x weekly - 2-3 sets - 10 reps  - Abdominal Press into Ball  - 1 x daily - 3 x weekly - 1-2 sets - 10 reps - 2-3 sec hold

## 2024-08-12 NOTE — PROGRESS NOTES
Diagnosis:   Acute bilateral low back pain with right-sided sciatica (M54.41)        Referring Provider: Julissa Gonzalez  Date of Evaluation:    7/29/2024    Precautions:  Drug Allergy Next MD visit:   none scheduled  Date of Surgery: n/a   Insurance Primary/Secondary: [employer] / N/A     # Auth Visits: 8           Discharge Summary  Pt has attended 3 visits in Physical Therapy.    Subjective: Pt reports feeling really busy, really fatigued; back has been good but feeling some symptoms more on the L side today though also on the R side. Obtained injection to the R shoulder today, & has been sitting taking 4-hour test. Reduced tightening/ tension-type pain around SIJ. Pt reports feeling better with the stretches. Pt report feeling there may be a larger connection in her symptoms to pelvic floor mm.    Pain: 6/10      Objective: See Flowsheet for details  8/12/2024: TTP L PSIS: pelvic obliquity- L post ilial rot      Assessment: Upon discussion with pt, pt & PT are in agreement for pt to be discharged from current lumbar orthopedic POC to focus on pelvic PT POC. Pt advised to contact PT if questions/ concerns arise while performing HEP. Pt advised to follow up with referring provider (PCP) if symptoms increase despite adherence to HEP. Pt is aware that if symptoms recur or increase, pt may be appropriate to return to skilled PT under new POC w/ updated RX if deemed medically necessary. Pt is in agreement with discharge plans. Thank you.      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    Thank you for your referral. If you have any questions, please contact me at Dept: [phone].    Sincerely,  Electronically signed by therapist: Fernanda Taylor, PT     Physician's certification required:  No  Please co-sign or sign and return this letter via fax as soon as possible to [phone].   I certify the need for these services furnished under this plan of treatment and while under my care.    X___________________________________________________ Date____________________    Certification From: 8/12/2024  To:11/10/2024     Date: 8/5/2024  TX#: 2/8 Date: 8/12/2024  TX#: 3/8  Discharge Date:                 TX#: 4/ Date:                 TX#: 5/ Date:   Tx#: 6/   Therex: 43'  Discussion of PT treatment options/ POC in light of additional body parts/ RX: PT recommendations  HEP review: form check seated HS stretch; LTR with option for wider foot position for hip bias; piriformis stretch (pull & push gently)  Active HS stretch X 5 R  -with option for ankle pump for sciatic nerve bias X 5 ea  Seated figure-4 piriformis stretch 1 X 30\" ea  Lumbar roll  Education: connection between posterior hip tightness & weakness & anterior hip tightness  Hooklying hip Abd Devan with Gray TB 10 X 10\"  Hooklying hip Add Devan with yellow ball 10 X 10\"  Hooklying PPT X 5 Therex: 33'  POC options/ discharge planning-> discharge instructions  HEP update  Bridge X 5  SB Bridge X 5  LTR with BLE on SB X 3  Standing hip flexor stretch 2 X 30\" ea  Modified gely hip flexor stretch X 2' ea  Clams X 10 ea R (avoid laying on R shld)  Reverse Clams X 10 R  (avoid laying on R shld)  SB TA presses in hooklying X 10 (LUE only)  (Defer any treatment that incorporates RUE in light of injection today)  Check with Elizabeth (pt's pelvic PT) re: progressions/ modifications as indicated upon d/c from current POC.  Prone laying- may trial with use of 1-2 pillows as tolerated pending tolerance. Defer if increased symptoms, may proceed if reduced symptoms (perform after repetitive flexion/ fwd bending)   Neuro Re-ed: 12'  MET techniques performed by PT (with review for pt how to complete on her own: ball/ band)  Instruction in hip hinge   -with practice sit<>stand squat taps tableside to promote proper lifting mechanics/ posture  -with use of wooden dowel for 3-points of contact (PT holding wooden dowel)      HEP:  Access Code: [email or web address]  URL: [email or web address]  Date: 07/29/2024  Prepared by: Fernanda Taylor    Exercises  - Supine Piriformis Stretch with Foot on Ground  - 1-2 x daily - 7 x weekly - 3 sets - 30 sec hold  - Supine Lower Trunk Rotation  - 1-2 x daily - 7 x weekly - 5 sec hold - 2-3 duration  - Hooklying Hamstring Stretch  - 1-2 x daily - 7 x weekly - 1 sets - 5-10 reps - 10 sec hold      Access Code: [email or web address]  URL: [email or web address]  Date: 08/05/2024  Prepared by: Fernanda Taylor    Exercises  - Seated Figure 4 Piriformis Stretch  - 1 x daily - 7 x weekly - 3 sets - 30 sec hold  - Hooklying Hamstring Stretch  - 1 x daily - 7 x weekly - 1 sets - 10 reps - 10 sec hold  - Supine Sciatic Nerve Glide  - 1 x daily - 7 x weekly - 1-2 sets - 10 reps - 1-2 sec hold  - Supine Posterior Pelvic Tilt  - 1 x daily - 7 x weekly - 1-2 sets - 10 reps - 2-3 sec hold  - Hooklying Clamshell with Resistance  - 1 x daily - 7 x weekly - 1 sets - 10 reps - 10 sec hold  - Supine Hip Adduction Isometric with Ball  - 1 x daily - 7 x weekly - 1 sets - 10 reps - 10 sec hold      Access Code: [email or web address]  URL: [email or web address]  Date:  08/12/2024  Prepared by: Fernanda Taylor    Exercises  - Supine Bridge  - 1 x daily - 3 x weekly - 2-3 sets - 10 reps  - Bridge with Arms at Sides and Feet on Swiss Ball  - 1 x daily - 3 x weekly - 2-3 sets - 10 reps  - Supine Lower Trunk Rotation with Swiss Ball  - 1-2 x daily - 7 x weekly - 3 sets - 10 reps - 5 sec hold  - Standing Hip Flexor Stretch  - 1-2 x daily - 7 x weekly - 2-3 sets - 30 sec hold  - Modified Saúl Stretch  - 1-2 x daily - 7 x weekly - 3 sets - 2-3 min hold  - Sidelying Reverse Clamshell  - 1 x daily - 3 x weekly - 2-3 sets - 10 reps  - Clamshell  - 1 x daily - 3 x weekly - 2-3 sets - 10 reps  - Abdominal Press into Ball  - 1 x daily - 3 x weekly - 1-2 sets - 10 reps - 2-3 sec hold    Charges: Therex X 2, Neuro X 1       Total Timed Treatment: 45 min  Total Treatment Time: 45 min

## 2024-08-12 NOTE — TELEPHONE ENCOUNTER
Left a 3rd message. Patient needs to call us back at [phone] and reschedule appointment. Dr Aldana unfortunately will not be in clinic that day.

## 2024-08-12 NOTE — PROCEDURES
Date of procedure: August 12, 2024    Preop diagnosis: Chronic shoulder pain    Postop diagnosis: Same    Procedure: Ultrasound-guided right shoulder injection    Surgeon: Vick Neal    Anesthesia: Lidocaine    EBL: 0    Indication: Patient is a 44-year-old with a history of shoulder pain    Procedure detail: Informed consent obtained.  Timeout done.  Skin overlying the right shoulder was prepped in usual sterile fashion.  Subsequently, a 27-gauge needle was used to anesthetize overlying soft tissue.  Sterile ultrasound imaging was then used to identify the shoulder joint.  A 21-gauge Stimuplex needle was advanced with imaging guidance into the joint.  After negative aspiration for heme, a total mixture of 40 mg of triamcinolone with 5 cc of 1% lidocaine was then injected.  The needle was withdrawn with tip intact.  Excellent hemostasis.  No objective or subjective weakness.  Imaging saved.    Vick Neal MD

## 2024-08-12 NOTE — PROGRESS NOTES
Timeout completed prior to procedure @ 0907.  Participants present for timeout:  Dr. Neal, RN Aicha , and patient.

## 2024-08-12 NOTE — PATIENT INSTRUCTIONS
Refill policies:    Allow 2-3 business days for refills; controlled substances may take longer.  Contact your pharmacy at least 5 days prior to running out of medication and have them send an electronic request or submit request through the “request refill” option in your [employer] account.  Refills are not addressed on weekends; covering physicians do not authorize routine medications on weekends.  No narcotics or controlled substances are refilled after noon on Fridays or by on call physicians.  By law, narcotics must be electronically prescribed.  A 30 day supply with no refills is the maximum allowed.  If your prescription is due for a refill, you may be due for a follow up appointment.  To best provide you care, patients receiving routine medications need to be seen at least once a year.  Patients receiving narcotic/controlled substance medications need to be seen at least once every 3 months.  In the event that your preferred pharmacy does not have the requested medication in stock (e.g. Backordered), it is your responsibility to find another pharmacy that has the requested medication available.  We will gladly send a new prescription to that pharmacy at your request.    Scheduling Tests:    If your physician has ordered radiology tests such as MRI or CT scans, please contact Central Scheduling at [phone] right away to schedule the test.  Once scheduled, the [facility] Centralized Referral Team will work with your insurance carrier to obtain pre-certification or prior authorization.  Depending on your insurance carrier, approval may take 3-10 days.  It is highly recommended patients assure they have received an authorization before having a test performed.  If test is done without insurance authorization, patient may be responsible for the entire amount billed.      Precertification and Prior Authorizations:  If your physician has recommended that you have a procedure or additional testing performed the [facility]  Centralized Referral Team will contact your insurance carrier to obtain pre-certification or prior authorization.    You are strongly encouraged to contact your insurance carrier to verify that your procedure/test has been approved and is a COVERED benefit.  Although the [facility] Centralized Referral Team does its due diligence, the insurance carrier gives the disclaimer that \"Although the procedure is authorized, this does not guarantee payment.\"    Ultimately the patient is responsible for payment.   Thank you for your understanding in this matter.  Paperwork Completion:  If you require FMLA or disability paperwork for your recovery, please make sure to either drop it off or have it faxed to our office at [phone]. Be sure the form has your name and date of birth on it.  The form will be faxed to our Forms Department and they will complete it for you.  There is a 25$ fee for all forms that need to be filled out.  Please be aware there is a 10-14 day turnaround time.  You will need to sign a release of information (ZARA) form if your paperwork does not come with one.  You may call the Forms Department with any questions at [phone].  Their fax number is [phone].  Refill policies:    Allow 2-3 business days for refills; controlled substances may take longer.  Contact your pharmacy at least 5 days prior to running out of medication and have them send an electronic request or submit request through the “request refill” option in your [employer] account.  Refills are not addressed on weekends; covering physicians do not authorize routine medications on weekends.  No narcotics or controlled substances are refilled after noon on Fridays or by on call physicians.  By law, narcotics must be electronically prescribed.  A 30 day supply with no refills is the maximum allowed.  If your prescription is due for a refill, you may be due for a follow up appointment.  To best provide you care, patients receiving routine  medications need to be seen at least once a year.  Patients receiving narcotic/controlled substance medications need to be seen at least once every 3 months.  In the event that your preferred pharmacy does not have the requested medication in stock (e.g. Backordered), it is your responsibility to find another pharmacy that has the requested medication available.  We will gladly send a new prescription to that pharmacy at your request.    Scheduling Tests:    If your physician has ordered radiology tests such as MRI or CT scans, please contact Central Scheduling at [phone] right away to schedule the test.  Once scheduled, the [facility] Centralized Referral Team will work with your insurance carrier to obtain pre-certification or prior authorization.  Depending on your insurance carrier, approval may take 3-10 days.  It is highly recommended patients assure they have received an authorization before having a test performed.  If test is done without insurance authorization, patient may be responsible for the entire amount billed.      Precertification and Prior Authorizations:  If your physician has recommended that you have a procedure or additional testing performed the [facility] Centralized Referral Team will contact your insurance carrier to obtain pre-certification or prior authorization.    You are strongly encouraged to contact your insurance carrier to verify that your procedure/test has been approved and is a COVERED benefit.  Although the [facility] Centralized Referral Team does its due diligence, the insurance carrier gives the disclaimer that \"Although the procedure is authorized, this does not guarantee payment.\"    Ultimately the patient is responsible for payment.   Thank you for your understanding in this matter.  Paperwork Completion:  If you require FMLA or disability paperwork for your recovery, please make sure to either drop it off or have it faxed to our office at [phone]. Be sure the form has your name  and date of birth on it.  The form will be faxed to our Forms Department and they will complete it for you.  There is a 25$ fee for all forms that need to be filled out.  Please be aware there is a 10-14 day turnaround time.  You will need to sign a release of information (ZARA) form if your paperwork does not come with one.  You may call the Forms Department with any questions at [phone].  Their fax number is [phone].

## 2024-08-13 ENCOUNTER — OFFICE VISIT (OUTPATIENT)
Dept: PHYSICAL THERAPY | Age: 44
End: 2024-08-13
Attending: NURSE PRACTITIONER
Payer: COMMERCIAL

## 2024-08-13 PROCEDURE — 97110 THERAPEUTIC EXERCISES: CPT | Performed by: PHYSICAL THERAPIST

## 2024-08-13 PROCEDURE — 97140 MANUAL THERAPY 1/> REGIONS: CPT | Performed by: PHYSICAL THERAPIST

## 2024-08-13 NOTE — PROGRESS NOTES
Diagnosis:   Urinary urgency (R39.15)  Constipation, unspecified constipation type (K59.00)  , Pelvic floor dysfunction      Referring Provider: Chrissy Fairchild  Date of Evaluation:    7/11/2024    Precautions:  None Next MD visit:   none scheduled  Date of Surgery: n/a   Insurance Primary/Secondary: [place] / N/A     # Auth Visits: 8            Subjective: Yesterday, she was having some pain in her vaginal area and the anus. In the past week she had a lot of pain from the sitting bone to the tailbone area, it was very bothersome for her.    Eval:c/o urge urinary incontinence, difficulty with bowel movement, pain with sexual activity, nocturnal incontinence, anterior pelvic tightness, low back pain.    Pain: 4/10- tailbone to sitting bone increased to 7/10      Objective: Rectal route.Severe tightness on B PF muscles, Grade 2 tenderness, large sized taubands, bilaterally. Tenderness noted on B latearl sacral region, SIJ. Noted hypomobility of LS region, SIJ. Tenderness on coccyx.      Assessment: Reviewed anatomy of pelvic floor muscles with patient, explaining where muscles are located. Performed Joint mobs of LS and SI joints. Manual therapy performed for PF muscles, rectal route to decrease muscles tightness and improve soft tissues mobility. Noted tenderness, STRs and large sized tautbands bilaterally, it was challenging to palpate coccyx internally. Patient will benefit from further PT to decrease muscles tightness, improve soft tissues mobility and eventually improve strength to decrease symptoms.    Goals:  (to be met in 10 visits)  Patient will have increased awareness for PF muscles contractions and relaxation to improve ability to promote relaxation and decrease pain by 50% during PF assessment (6 visits), and 90% (10 v)  Patient will demonstrate decreased PF muscles tightness, decreased STRs to mild, to facilitate soft tissues mobility and allow PF muscles to relax and accommodate penile tissue during  sexual activity, and digital PF assessment, also improve relaxation to facilitate passage of stool with defecation  Patient will exhibit improvement of hips, core, abdominal muscles strength to 4/5  to improve lumbar stability, improve ability of patient to perform daily and work related activities with no apprehension for pain or difficulty.  Patient will have Marinoff score of 0-1.   Patient will have improvement of PF muscles strength using the Laycock Scale to 4/10/10//10, to improve efficiency of PF contractions to decrease UUI symptoms and be able to reach the bathroom on time,decrease incidence of nocturnal UI,  improve PF stabilization, and report decrease anterior pelvic tightness.  Patient will demonstrate improved coordination of core and PF muscles, including proper respiration to facilitate bowel, bladder, sexual functions and minimize symptoms.  Patient will verbalize understanding of PF functions, knowledge of normal bowel, bladder sexual mechanics, and utilize an established HEP to manage symptoms     Plan: Continue PT as per established POC. DIDI DAVIS.   Date: 8/6/2024  TX#: 2/10 Date:  8/13/2024                TX#: 3/8 Date:                 TX#: 4/ Date:                 TX#: 5/ Date:   Tx#: 6/   Nu step L1, 5 mins  Calf stretches 2x 30 secs  Supine adductors stretches 2x 30 secs (pat did not feel stretches on the adductors, she felt more on the anterior hip)  Modified Saúl stretches in supine 1x 30 secs (did not feel stretch on hip flexors)  Standing psoas stretches on chair 2x 30 secs (felt increased tightness, cues to decrease intensity if pain starts)  Mermaid stretch over the bed 1x 30 secs (patient preferred)  Diaphragmatic breathing x 5 mins  Thera ex - 35 mins  Man therapy: 10 mins  STM for pelvic floor muscles, PF muscles relaxation with short duration contractions and 10 secs relaxations, and diaphragmatic breathing       Nu step L1, 5 mins  Calf stretches 2x 30 secs  Mermaid stretch over  the bed 2x 30 secs   LTR x 10  Diaphragmatic breathing x 5 mins  Thera ex - 28 mins  Man therapy: 13 mins  Prone A/P LS and SIJ mobs x 5 mins  STM and stretching of PF muscles rectal route x 8 mins                               HEP: diaphragmatic breathing, mermaid stretch    Charges: Thera -e x 2, Man therapy -1       Total Timed Treatment: 41 min  Total Treatment Time: 41 min

## 2024-08-14 ENCOUNTER — PATIENT MESSAGE (OUTPATIENT)
Dept: PAIN CLINIC | Facility: CLINIC | Age: 44
End: 2024-08-14

## 2024-08-14 DIAGNOSIS — M25.561 CHRONIC PAIN OF BOTH KNEES: Primary | ICD-10-CM

## 2024-08-14 DIAGNOSIS — M25.562 CHRONIC PAIN OF BOTH KNEES: Primary | ICD-10-CM

## 2024-08-14 DIAGNOSIS — G89.29 CHRONIC PAIN OF BOTH KNEES: Primary | ICD-10-CM

## 2024-08-15 NOTE — TELEPHONE ENCOUNTER
From: Nola De Paz  To: Vick Neal  Sent: 8/14/2024 8:03 PM CDT  Subject: Scheduling injections for knees     Hello,     I know we discussed me having an injection in my knees that is not steroid injection but more for cushioning. Would I be able to get that scheduled now?

## 2024-08-17 NOTE — PROGRESS NOTES
Patient presents in office today with reported pain in Right shoulder    Current pain level reported = 5/10    Last reported dose of N/A      \              stretcher

## 2024-08-19 ENCOUNTER — TELEPHONE (OUTPATIENT)
Dept: PAIN CLINIC | Facility: CLINIC | Age: 44
End: 2024-08-19

## 2024-08-19 ENCOUNTER — PATIENT MESSAGE (OUTPATIENT)
Dept: FAMILY MEDICINE CLINIC | Facility: CLINIC | Age: 44
End: 2024-08-19

## 2024-08-19 DIAGNOSIS — M25.562 CHRONIC PAIN OF BOTH KNEES: Primary | ICD-10-CM

## 2024-08-19 DIAGNOSIS — G89.29 CHRONIC PAIN OF BOTH KNEES: Primary | ICD-10-CM

## 2024-08-19 DIAGNOSIS — M25.561 CHRONIC PAIN OF BOTH KNEES: Primary | ICD-10-CM

## 2024-08-19 NOTE — TELEPHONE ENCOUNTER
Patient advised of insurance approval to proceed with injections and is agreeable to scheduling. Patient scheduled for procedure, pre-procedure instructions reviewed. Patient prefers Local sedation. Reviewed sedation instructions including No Fasting & No  Required. Patient encouraged but not required to hold meloxicam for 24 hours prior to procedure. Patient verbalized understanding of instructions, no further needs at this time.        [facility]  PRE-PROCEDURE INSTRUCTIONS WITHOUT SEDATION    Procedure: Bilateral Knee Synvisc Injection.       Appointment Date: 09/24/2024  Check-In Time: 11:45 AM      Prior to the procedure:  Please update us prior to the procedure if you are experiencing any symptoms of infection such as cough, fever, chills, urinary symptoms, or have recently been prescribed antibiotics, have open wounds, have recently had surgery or dental procedures.    Day of Procedure:  **Drivers will be required for patients who receive prescriptions for Valium.    NO FASTING REQUIRED  Please bring your Insurance Card, Photo ID, List of Current Medications and Referral (if applicable) to your appointment.  Please park in the [facility] [employer] and follow the signs to the [facility].  Check in at [facility] ([address] [place] [address]) outpatient registration in the [facility].  Please note-No prescriptions will be written by Pain Clinic in OR on the day of procedure. If you require a refill of medications, please contact the office 48 hours prior to your procedure.  If you have an implanted Spinal Cord or Peripheral Nerve Stimulator: Please remember to turn device off for procedure.        Medication Hold:    Number of days you need to be off for the following medications:    Aggrenox 10 days   Agrylin (Anagrelide) 10 days  Brilinta (Ticagrelor) 7 days  Imbruvica (Ibrutinib) 3 days   Enbrel (Etanercept) 24 hours   Fragmin (Dalteparin) 24 hours   Pletal  (Cilostazol) 7 days  Effient (Prasugrel) 7 days  Pradaxa 10 days  Trental 7 days  Eliquis (Apixaban) 3 days  Xarelto (Rivaroxaban) 3 days  Lovenox (Enoxaparin) 24 hours  Aspirin  Greater than 81mg but less than 325mg   5 days  325mg and greater                  7 days  Coumadin       5 days  Procedure may be cancelled if INR is elevated.   Excedrin (with aspirin) 7 days  Plavix (Clopidogrel)                            7 days    NSAIDs: 24 hours preferred      Ibuprofen (Motrin, Advil, Vicoprofen), Naproxen (Naprosyn, Aleve), Piroxcam (Feldene), Meloxicam (Mobic), Oxaprozin (Daypro), Diclofenac (Voltaren), Indomethacin (Indocin), Etodolac (Lodine), Nabumetone (Relafen), Celebrex (Celecoxib)           HERBAL SUPPLEMENTS  5 days preferred  Fish oil, krill oil, Omega-3, Vascepa, Vitamin E, Turmeric, Garlic                       Insurance Authorization:   Most insurances are now requiring a preauthorization for all procedures.  In the event that your insurance does not authorize your procedure within 48 hours of the scheduled date, your procedure will be cancelled and rescheduled to a later date.  Please contact your insurance carrier to determine what your financial responsibility will be for the procedure(s).      Cancellation/Rescheduling Appointment:   In the event you need to cancel or reschedule your appointment, you must notify the office 24 hours prior.    Post-procedure instructions:        Please schedule a follow up visit within 2 to 4 weeks after your last procedure date   Please call our office with any questions or concerns before or after your procedure at  [phone].  If you are a diabetic, please increase the frequency of your glucose monitoring after the procedure as this may cause a temporary increase in your blood sugar.  Contact your primary care physician if your blood sugar rises as you may require some medication adjustment.  It is normal to have increased pain at injection site for up to 3-5  days after procedure, you can use heat or ice (20 minutes on 20 minutes off) for comfort.    **To hear a recorded version of these instructions, please call [phone] and follow the prompts.  **Para escuchar las instrucciones en Español, por favor de llamar el vahe [phone] opción 4.

## 2024-08-19 NOTE — TELEPHONE ENCOUNTER
PATIENT NAME:  STEPHEN RAZO/ [phone] (home)/ There is no work phone number on file.  PATIENT :  1980  REFERRAL ID #:  [id number]  REFERRAL STATUS:  Authorized [1]  REVIEW REFERRAL NOTES FOR MORE INFORMATION:  DATE AUTHORIZED:  08/15/2024 // EXPIRATION DATE: 08/15/2025   REFERRED BY:  MARYAN GONZÁLES MD (TEL: [phone])  REFERRED TO: No referral provider, MD (TEL: No Referred to Provider on Referral)     No vendor specified on referral. / No vendor phone number known.  No facility specified on referral  REFERRED FOR:    Diagnoses:    M25.561, M25.562, G89.29 (ICD-10-CM) - 719.46, 338.29 (ICD-9-CM) - Chronic pain of both knees  Procedures:     [id number] - [facility] PAIN NAVIGATOR   NUMBER OF VISITS AUTH: 1

## 2024-08-19 NOTE — TELEPHONE ENCOUNTER
Order Questions    Question Answer   Anesthesia Type Local   Provider Ángel   [facility]   CPT (Hit enter after each entry) XR ASPIR/INJ MAJOR JOINT W/FLUORO RT(CPT=77002/00133)   Procedure Modifier ;BILATERAL PROCEDURE   Medical clearance requested (will send to Pain Navigator) No   Patient has Medicare coverage? No   Comments (Please list entire procedure name here.) Bilateral Synvisc injection of knees

## 2024-08-20 ENCOUNTER — OFFICE VISIT (OUTPATIENT)
Dept: PHYSICAL THERAPY | Age: 44
End: 2024-08-20
Attending: NURSE PRACTITIONER
Payer: COMMERCIAL

## 2024-08-20 PROCEDURE — 97110 THERAPEUTIC EXERCISES: CPT | Performed by: PHYSICAL THERAPIST

## 2024-08-20 PROCEDURE — 97140 MANUAL THERAPY 1/> REGIONS: CPT | Performed by: PHYSICAL THERAPIST

## 2024-08-20 NOTE — PROGRESS NOTES
Diagnosis:   Urinary urgency (R39.15)  Constipation, unspecified constipation type (K59.00)  , Pelvic floor dysfunction      Referring Provider: Chrissy Fairchild  Date of Evaluation:    7/11/2024    Precautions:  None Next MD visit:   none scheduled  Date of Surgery: n/a   Insurance Primary/Secondary: [employer] / N/A     # Auth Visits: 8            Subjective: Patient reports that after her last PT session, she felt a relief for 3-4 days in the tailbone area, and she was also doing her s stretches, but she feels that her body would tighten up. Currently have pain on her L SIJ, going to the tailbone area and vaginal area.   Eval:c/o urge urinary incontinence, difficulty with bowel movement, pain with sexual activity, nocturnal incontinence, anterior pelvic tightness, low back pain.    Pain: 4/10- tailbone, L SIJ - 4/5, every once in  awhile would travel to the other side      Objective:   Rectal route. Moderate tightness on B PF muscles, Grade 1 tenderness, , bilaterally. Tenderness noted on B lateral sacral region, SIJ. Noted hypomobility of LS region,  L SIJ. Tenderness on coccyx. STRs, MFRs on coccygeal region.       Assessment: Noted decreased tightness on levator ani muscles, noted improvement with levator ani mobility, was able to palpate coccygeus. Patient responded well to manual therapy. Discussed importance of building up core muscles strength to promote lumbopelvic stability. Talked about yoga and pilates as adjunct to her exercise program. Patient understood.       Goals:  (to be met in 10 visits)  Patient will have increased awareness for PF muscles contractions and relaxation to improve ability to promote relaxation and decrease pain by 50% during PF assessment (6 visits), and 90% (10 v)  Patient will demonstrate decreased PF muscles tightness, decreased STRs to mild, to facilitate soft tissues mobility and allow PF muscles to relax and accommodate penile tissue during sexual activity, and digital PF  assessment, also improve relaxation to facilitate passage of stool with defecation  Patient will exhibit improvement of hips, core, abdominal muscles strength to 4/5  to improve lumbar stability, improve ability of patient to perform daily and work related activities with no apprehension for pain or difficulty.  Patient will have Marinoff score of 0-1.   Patient will have improvement of PF muscles strength using the Laycock Scale to 4/10/10//10, to improve efficiency of PF contractions to decrease UUI symptoms and be able to reach the bathroom on time,decrease incidence of nocturnal UI,  improve PF stabilization, and report decrease anterior pelvic tightness.  Patient will demonstrate improved coordination of core and PF muscles, including proper respiration to facilitate bowel, bladder, sexual functions and minimize symptoms.  Patient will verbalize understanding of PF functions, knowledge of normal bowel, bladder sexual mechanics, and utilize an established HEP to manage symptoms     Plan: Continue PT as per established POC. DIDI DAVIS.   Date: 8/6/2024  TX#: 2/10 Date:  8/13/2024                TX#: 3/8 Date:  8/20/2024                TX#: 4/8 Date:                 TX#: 5/ Date:   Tx#: 6/   Nu step L1, 5 mins  Calf stretches 2x 30 secs  Supine adductors stretches 2x 30 secs (pat did not feel stretches on the adductors, she felt more on the anterior hip)  Modified Saúl stretches in supine 1x 30 secs (did not feel stretch on hip flexors)  Standing psoas stretches on chair 2x 30 secs (felt increased tightness, cues to decrease intensity if pain starts)  Mermaid stretch over the bed 1x 30 secs (patient preferred)  Diaphragmatic breathing x 5 mins  Thera ex - 35 mins  Man therapy: 10 mins  STM for pelvic floor muscles, PF muscles relaxation with short duration contractions and 10 secs relaxations, and diaphragmatic breathing           Nu step L1, 5 mins  Calf stretches 2x 30 secs  Mermaid stretch over the bed 2x 30 secs    LTR x 10  Diaphragmatic breathing x 5 mins  Thera ex - 28 mins  Man therapy: 13 mins  Prone A/P LS and SIJ mobs x 5 mins  STM and stretching of PF muscles rectal route x 8 mins- decreased tightness Nu step L1, 5 mins  Calf stretches 2x 30 secs  TA brace with shoulder extensions x 20 small black tube  Attempted paloff press, but there was onset of R shoulder pain and was discontinues  Mermaid stretch over the bed 2x 30 secs   LTR x 10  Supine protractions x 10 with exhalation.   TA brace x 10 with 5  secs  Sahrmann Level 1 x 10  Diaphragmatic breathing x 2 mins  Thera ex - 32 mins  Man therapy: 13 mins  Prone A/P LS and SIJ mobs x 5 mins  STM and stretching of PF muscles rectal route x 8  mins                          HEP: Access Code: [email or web address]  URL: [email or web address]  Date: 08/21/2024  Prepared by: Elizabeth Garcia    Exercises  - Supine Lower Trunk Rotation  - 2 x daily - 7 x weekly - 1-2 sets - 10 reps  - Seated Table Piriformis Stretch  - 2 x daily - 7 x weekly - 1-2 sets - 3 reps - 30 hold  - Supine Transversus Abdominis Bracing - Hands on Stomach  - 2 x daily - 7 x weekly - 1-2 sets - 10 reps - 3-5 hold  - Gastroc Stretch on Wall  - 1 x daily - 7 x weekly - 1 sets - 2 reps - 30 hold  - Clamshell  - 1 x daily - 7 x weekly - 1-2 sets - 10 reps  - Supine Diaphragmatic Breathing  - 2 x daily - 7 x weekly - 1-2 sets - 10 reps - 5 hold  - Seated Diaphragmatic Breathing  - 2 x daily - 7 x weekly - 1-2 sets - 10 reps - 5 hold  - Diaphragmatic Breathing in Child's Pose with Pelvic Floor Relaxation  - 2 x daily - 7 x weekly - 1-2 sets - 10 reps - 5 hold  - Shoulder Extension with Resistance  - 2 x daily - 7 x weekly - 1-2 sets - 10 reps  - Supine Bilateral Shoulder Protraction  - 2 x daily - 7 x weekly - 1-2 sets - 10 reps  - Supine Transversus Abdominis Bracing with Heel Slide  - 2 x daily - 7 x weekly - 1-2 sets - 10 reps    Charges: Thera -e x 2, Man therapy -1       Total Timed Treatment: 45  min  Total Treatment Time: 45 min

## 2024-08-21 ENCOUNTER — PATIENT MESSAGE (OUTPATIENT)
Facility: CLINIC | Age: 44
End: 2024-08-21

## 2024-08-27 ENCOUNTER — OFFICE VISIT (OUTPATIENT)
Dept: PHYSICAL THERAPY | Age: 44
End: 2024-08-27
Attending: NURSE PRACTITIONER
Payer: COMMERCIAL

## 2024-08-27 PROCEDURE — 97110 THERAPEUTIC EXERCISES: CPT | Performed by: PHYSICAL THERAPIST

## 2024-08-27 PROCEDURE — 97112 NEUROMUSCULAR REEDUCATION: CPT | Performed by: PHYSICAL THERAPIST

## 2024-08-27 NOTE — PROGRESS NOTES
Diagnosis:   Urinary urgency (R39.15)  Constipation, unspecified constipation type (K59.00)  , Pelvic floor dysfunction      Referring Provider: Chrissy Fairchild  Date of Evaluation:    7/11/2024    Precautions:  None Next MD visit:   none scheduled  Date of Surgery: n/a   Insurance Primary/Secondary: [place] / N/A     # Auth Visits: 8           Progress Summary  Pt has attended 5 visits in Physical Therapy.      Subjective: \"Today, I have not had tailbone pain, but I have pain right next to it, no vaginal pain, burning painful sensation like UTI pain, tightness. Had better control at night. The bowel movements has been getting better. She felt that she has been able to completely empty. Had moderate pain with sexual activity. B SI Jt pain today since she has her period.  Eval:c/o urge urinary incontinence, difficulty with bowel movement, pain with sexual activity, nocturnal incontinence, anterior pelvic tightness, low back pain.    Pain:  0/10- tailbone, B SIJ - 3/5,comes and goes    Objective:     Posture: FHP, rounded shoulders, increased central girth, increased lumbar lordosis, + trendelenburg  Pelvic Alignment: WNL  Deep Tendon Reflexes:  Patella: not done     Achilles: not done  Gait: pt ambulates on level ground with normal mechanics     External Palpation: Non tender on abdominal region  Scars (location/surgery): none  Abdominal Wall Integrity: Poor  Diastasis Recti: (finger width depth while contracted)- none     Range Of Motion  Lumbar AROM screen: WNL for flexion, 75% LOM for lumbar extension with pain, WNL for lateral flexion   LE AROM screen: WFL     Strength (MMT)   Transverse Abdominis: 3/5  Hip muscles - B hip muscles graded 5/5     Flexibility Summary: Mild tightness of B hamstrings, moderate tightness ob B piriformis, severe tightness of iliopsoas muscles     Special Tests  ASLR - fair lumbar loading transfers     Informed consent for internal pelvic evaluation given: Yes     External  Observation:   Voluntary contraction: present   Voluntary relaxation: present  Involuntary contraction: absent  Involuntary relaxation: absent     Mons pubis: WNL  Labia majora: WNL  Labia minora: WNL  Urethral meatus: WNL  Introitus: other: tight  Perineal body: WNL     Sensory/Reflex:  Vestibule: normal bilaterally  Anal [place]: hypo bilateral     Internal Examination   Scar: none     Pelvic Floor Muscle strength: (PERF= Power/Endurance/Reps/Fast) MMT: 3/3/10/10, vaginal and rectal  External Anal Sphincter: not tested  Accessory Muscle Use: adductors     Tissue Laxity Test:None  Anterior Wall: WNL  Posterior Wall: WNL  Apical: WNL     Eccentric lengthening contraction: poor        Internal Palpation: WNL except Superficial Transverse Perineal B mild restrictions   Deep Transverse Perineal B mild restriction, pain, and tenderness  Pubococcygeus/Pubovaginalis B severe restriction, pain, and tenderness  Puborectalis muscles - mild tightness on B, Grade 1 tenderness, mild STRs  Iliococcygeus B mild restriction, pain, and tenderness  Coccygeus B mild restriction, pain, and tenderness  Obturator Internus B moderate restriction         Assessment: Patient progressed well with PT treatments, noted improvement of PF soft tissues mobility, decreased tightness and soft tissue restrictions and decreased tenderness, noted improvement of PF muscles tone leading to improvement with urinary and bowel symptoms. Patient still presents with core and PF strength deficits and will benefit from further PT intervention. Patient is below their previous level of function and will benefit from skills and knowledge of PT to manage/ decrease symptoms and achieve established goals and return to PLOF.      Goals:  (to be met in 10 visits)  Patient will have increased awareness for PF muscles contractions and relaxation to improve ability to promote relaxation and decrease pain by 50% during PF assessment (6 visits) - MET, and 90% (10 v)-  progressing as of 8/27/24  Patient will demonstrate decreased PF muscles tightness, decreased STRs to mild, to facilitate soft tissues mobility and allow PF muscles to relax and accommodate penile tissue during sexual activity, and digital PF assessment, also improve relaxation to facilitate passage of stool with defecation -progressing as of 8/27/24  Patient will exhibit improvement of hips, core, abdominal muscles strength to 4/5  to improve lumbar stability, improve ability of patient to perform daily and work related activities with no apprehension for pain or difficulty. -progressing as of 8/27/24  Patient will have Marinoff score of 0-1. - progressing as of 8/27/24  Patient will have improvement of PF muscles strength using the Laycock Scale to 4/10/10//10, to improve efficiency of PF contractions to decrease UUI symptoms and be able to reach the bathroom on time,decrease incidence of nocturnal UI,  improve PF stabilization, and report decrease anterior pelvic tightness.- progressing as of 8/27/24  Patient will demonstrate improved coordination of core and PF muscles, including proper respiration to facilitate bowel, bladder, sexual functions and minimize symptoms.- progressing as of 8/27/24  Patient will verbalize understanding of PF functions, knowledge of normal bowel, bladder sexual mechanics, and utilize an established HEP to manage symptoms - progressing as of 8/27/24    Plan: Continue PT as per established POC. DIDI DAVIS.   Date: 8/6/2024  TX#: 2/10 Date:  8/13/2024                TX#: 3/8 Date:  8/20/2024                TX#: 4/8 Date:  8/27/2024                TX#: 5/10 Date:   Tx#: 6/   Nu step L1, 5 mins  Calf stretches 2x 30 secs  Supine adductors stretches 2x 30 secs (pat did not feel stretches on the adductors, she felt more on the anterior hip)  Modified Saúl stretches in supine 1x 30 secs (did not feel stretch on hip flexors)  Standing psoas stretches on chair 2x 30 secs (felt increased  tightness, cues to decrease intensity if pain starts)  Mermaid stretch over the bed 1x 30 secs (patient preferred)  Diaphragmatic breathing x 5 mins  Thera ex - 35 mins  Man therapy: 10 mins  STM for pelvic floor muscles, PF muscles relaxation with short duration contractions and 10 secs relaxations, and diaphragmatic breathing       Nu step L1, 5 mins  Calf stretches 2x 30 secs  Mermaid stretch over the bed 2x 30 secs   LTR x 10  Diaphragmatic breathing x 5 mins  Thera ex - 28 mins  Man therapy: 13 mins  Prone A/P LS and SIJ mobs x 5 mins  STM and stretching of PF muscles rectal route x 8 mins- decreased tightness Nu step L5, 5 mins  Calf stretches 2x 30 secs  Mermaid stretch over the bed 2x 30 secs   LTR x 10  Supine protractions x 10 with exhalation.   TA brace x 10 with 5  secs  Sahrmann Level 1 x 10  Diaphragmatic breathing x 2 mins  Thera ex - 32 mins  Man therapy: 13 mins  Prone A/P LS and SIJ mobs x 5 mins  STM and stretching of PF muscles rectal route x 8  mins Nu step L5, 5 mins  Calf stretches 2x 30 secs  Mermaid stretch over the bed 2x 30 secs   LTR x 10  Supine protractions x 10 with exhalation.   TA brace x 10 with 5  secs  Sahrmann Level 1 x 10  Thera ex - 32 mins   NMR for 10 mins - for Pelvic Floor (PF) muscles and Transversus Abdominis (TA) muscles control, improve coordination of PF and TA, improve awareness for contractions and relaxations, improve coordination with respiration using digital/tactile cues, verbal cues, digital insertion, vaginal and rectal  Isolated PF contractions x 10 reps with 5 secs hold, 10 secs relaxations, Isolated rapid contractions x 10  Manual Therapy - STM for the Levator ani muscles x 3 mins                         HEP: Access Code: [email or web address]  URL: [email or web address]  Date: 08/21/2024  Prepared by: Elizabeth Garcia    Exercises  - Supine Lower Trunk Rotation  - 2 x daily - 7 x weekly - 1-2 sets - 10 reps  - Seated Table Piriformis Stretch  - 2 x daily  - 7 x weekly - 1-2 sets - 3 reps - 30 hold  - Supine Transversus Abdominis Bracing - Hands on Stomach  - 2 x daily - 7 x weekly - 1-2 sets - 10 reps - 3-5 hold  - Gastroc Stretch on Wall  - 1 x daily - 7 x weekly - 1 sets - 2 reps - 30 hold  - Clamshell  - 1 x daily - 7 x weekly - 1-2 sets - 10 reps  - Supine Diaphragmatic Breathing  - 2 x daily - 7 x weekly - 1-2 sets - 10 reps - 5 hold  - Seated Diaphragmatic Breathing  - 2 x daily - 7 x weekly - 1-2 sets - 10 reps - 5 hold  - Diaphragmatic Breathing in Child's Pose with Pelvic Floor Relaxation  - 2 x daily - 7 x weekly - 1-2 sets - 10 reps - 5 hold  - Shoulder Extension with Resistance  - 2 x daily - 7 x weekly - 1-2 sets - 10 reps  - Supine Bilateral Shoulder Protraction  - 2 x daily - 7 x weekly - 1-2 sets - 10 reps  - Supine Transversus Abdominis Bracing with Heel Slide  - 2 x daily - 7 x weekly - 1-2 sets - 10 reps      Plan: Continue skilled Physical Therapy 1 x/week or a total of 5 visits over a 90 day period. Treatment will include: Therapeutic exercises, manual therapy, NMR, therapeutic activities, HEP education       Patient/Family/Caregiver was advised of these findings, precautions, and treatment options and has agreed to actively participate in planning and for this course of care.    Thank you for your referral. If you have any questions, please contact me at Dept: [phone].    Sincerely,  Electronically signed by therapist: Elizabeth Garcia PT     Physician's certification required:  No  Please co-sign or sign and return this letter via fax as soon as possible to [phone].   I certify the need for these services furnished under this plan of treatment and while under my care.    X___________________________________________________ Date____________________    Certification From: 8/28/2024  To:11/26/2024    Charges: Thera -e x 2, NMR -1       Total Timed Treatment: 45 min  Total Treatment Time: 45 min

## 2024-08-29 ENCOUNTER — OFFICE VISIT (OUTPATIENT)
Dept: SURGERY | Facility: CLINIC | Age: 44
End: 2024-08-29

## 2024-08-29 VITALS
RESPIRATION RATE: 16 BRPM | TEMPERATURE: 96 F | HEART RATE: 75 BPM | BODY MASS INDEX: 48 KG/M2 | WEIGHT: 264 LBS | OXYGEN SATURATION: 99 % | SYSTOLIC BLOOD PRESSURE: 109 MMHG | DIASTOLIC BLOOD PRESSURE: 74 MMHG

## 2024-08-29 DIAGNOSIS — K75.81 METABOLIC DYSFUNCTION-ASSOCIATED STEATOHEPATITIS (MASH): Primary | ICD-10-CM

## 2024-09-03 ENCOUNTER — OFFICE VISIT (OUTPATIENT)
Dept: PHYSICAL THERAPY | Age: 44
End: 2024-09-03
Attending: NURSE PRACTITIONER
Payer: COMMERCIAL

## 2024-09-03 PROCEDURE — 97140 MANUAL THERAPY 1/> REGIONS: CPT | Performed by: PHYSICAL THERAPIST

## 2024-09-03 PROCEDURE — 97110 THERAPEUTIC EXERCISES: CPT | Performed by: PHYSICAL THERAPIST

## 2024-09-03 NOTE — PROGRESS NOTES
Diagnosis:   Urinary urgency (R39.15)  Constipation, unspecified constipation type (K59.00)  , Pelvic floor dysfunction      Referring Provider: Chrissy Fairchild  Date of Evaluation:    7/11/2024    Precautions:  None Next MD visit:   none scheduled  Date of Surgery: n/a   Insurance Primary/Secondary: [employer] [place] / N/A     # Auth Visits: 8               Subjective: My tailbone hurts today. Since last PT visit, I have been very tense, my whole body was tense, I was in a lot of stress. The SIJ pain moved to my low back.    Eval:c/o urge urinary incontinence, difficulty with bowel movement, pain with sexual activity, nocturnal incontinence, anterior pelvic tightness, low back pain.    Pain:  5/10- tailbone, B SIJ - 0/10    Objective: See flow sheet for details,Sahrmann Level 2 was challenging due to onset of SIJ pain.     Posture: FHP, rounded shoulders, increased central girth, increased lumbar lordosis, + trendelenburg  Pelvic Alignment: WNL  Deep Tendon Reflexes:  Patella: not done     Achilles: not done  Gait: pt ambulates on level ground with normal mechanics     External Palpation: Non tender on abdominal region  Scars (location/surgery): none  Abdominal Wall Integrity: Poor  Diastasis Recti: (finger width depth while contracted)- none     Range Of Motion  Lumbar AROM screen: WNL for flexion, 75% LOM for lumbar extension with pain, WNL for lateral flexion   LE AROM screen: WFL     Strength (MMT)   Transverse Abdominis: 3/5  Hip muscles - B hip muscles graded 5/5     Flexibility Summary: Mild tightness of B hamstrings, moderate tightness ob B piriformis, severe tightness of iliopsoas muscles     Special Tests  ASLR - fair lumbar loading transfers     Informed consent for internal pelvic evaluation given: Yes     External Observation:   Voluntary contraction: present   Voluntary relaxation: present  Involuntary contraction: absent  Involuntary relaxation: absent     Mons pubis: WNL  Labia majora: WNL  Labia minora:  WNL  Urethral meatus: WNL  Introitus: other: tight  Perineal body: WNL     Sensory/Reflex:  Vestibule: normal bilaterally  Anal [place]: hypo bilateral     Internal Examination   Scar: none     Pelvic Floor Muscle strength: (PERF= Power/Endurance/Reps/Fast) MMT: 3/3/10/10, vaginal and rectal  External Anal Sphincter: not tested  Accessory Muscle Use: adductors     Tissue Laxity Test:None  Anterior Wall: WNL  Posterior Wall: WNL  Apical: WNL     Eccentric lengthening contraction: poor        Internal Palpation: WNL except Superficial Transverse Perineal B mild restrictions   Deep Transverse Perineal B mild restriction, pain, and tenderness  Pubococcygeus/Pubovaginalis B severe restriction, pain, and tenderness  Puborectalis muscles - mild tightness on B, Grade 1 tenderness, mild STRs  Iliococcygeus B mild restriction, pain, and tenderness  Coccygeus B mild restriction, pain, and tenderness  Obturator Internus B moderate restriction         Assessment: Discussed about using diaphragmatic breathing to promote activation of PNS for whole body relaxation. Added new exercises to promote relaxation and core strength. Discussed importance of core strength to improve lumbopelvic stability. Patient to perform contraction to determine state of PF and have increased awareness for PF relaxation.    Goals:  (to be met in 10 visits)  Patient will have increased awareness for PF muscles contractions and relaxation to improve ability to promote relaxation and decrease pain by 50% during PF assessment (6 visits) - MET, and 90% (10 v)- progressing as of 8/27/24  Patient will demonstrate decreased PF muscles tightness, decreased STRs to mild, to facilitate soft tissues mobility and allow PF muscles to relax and accommodate penile tissue during sexual activity, and digital PF assessment, also improve relaxation to facilitate passage of stool with defecation -progressing as of 8/27/24  Patient will exhibit improvement of hips, core,  abdominal muscles strength to 4/5  to improve lumbar stability, improve ability of patient to perform daily and work related activities with no apprehension for pain or difficulty. -progressing as of 8/27/24  Patient will have Marinoff score of 0-1. - progressing as of 8/27/24  Patient will have improvement of PF muscles strength using the Laycock Scale to 4/10/10//10, to improve efficiency of PF contractions to decrease UUI symptoms and be able to reach the bathroom on time,decrease incidence of nocturnal UI,  improve PF stabilization, and report decrease anterior pelvic tightness.- progressing as of 8/27/24  Patient will demonstrate improved coordination of core and PF muscles, including proper respiration to facilitate bowel, bladder, sexual functions and minimize symptoms.- progressing as of 8/27/24  Patient will verbalize understanding of PF functions, knowledge of normal bowel, bladder sexual mechanics, and utilize an established HEP to manage symptoms - progressing as of 8/27/24    Plan: Continue PT as per established POC. Advance TE, MT as needed.   Date: 8/6/2024  TX#: 2/10 Date:  8/13/2024                TX#: 3/8 Date:  8/20/2024                TX#: 4/8 Date:  8/27/2024                TX#: 5/10 Date: 9/3/2024   Tx#: 6/10   Nu step L1, 5 mins  Calf stretches 2x 30 secs  Supine adductors stretches 2x 30 secs (pat did not feel stretches on the adductors, she felt more on the anterior hip)  Modified Saúl stretches in supine 1x 30 secs (did not feel stretch on hip flexors)  Standing psoas stretches on chair 2x 30 secs (felt increased tightness, cues to decrease intensity if pain starts)  Mermaid stretch over the bed 1x 30 secs (patient preferred)  Diaphragmatic breathing x 5 mins  Thera ex - 35 mins  Man therapy: 10 mins  STM for pelvic floor muscles, PF muscles relaxation with short duration contractions and 10 secs relaxations, and diaphragmatic breathing       Nu step L1, 5 mins  Calf stretches 2x 30  secs  Mermaid stretch over the bed 2x 30 secs   LTR x 10  Diaphragmatic breathing x 5 mins  Thera ex - 28 mins  Man therapy: 13 mins  Prone A/P LS and SIJ mobs x 5 mins  STM and stretching of PF muscles rectal route x 8 mins- decreased tightness Nu step L5, 5 mins  Calf stretches 2x 30 secs  Mermaid stretch over the bed 2x 30 secs   LTR x 10  Supine protractions x 10 with exhalation.   TA brace x 10 with 5  secs  Sahrmann Level 1 x 10  Diaphragmatic breathing x 2 mins  Thera ex - 32 mins  Man therapy: 13 mins  Prone A/P LS and SIJ mobs x 5 mins  STM and stretching of PF muscles rectal route x 8  mins Nu step L5, 5 mins  Calf stretches 2x 30 secs  Mermaid stretch over the bed 2x 30 secs   LTR x 10  Supine protractions x 10 with exhalation.   TA brace x 10 with 5  secs  Sahrmann Level 1 x 10  Thera ex - 32 mins   NMR for 10 mins - for Pelvic Floor (PF) muscles and Transversus Abdominis (TA) muscles control, improve coordination of PF and TA, improve awareness for contractions and relaxations, improve coordination with respiration using digital/tactile cues, verbal cues, digital insertion, vaginal and rectal  Isolated PF contractions x 10 reps with 5 secs hold, 10 secs relaxations, Isolated rapid contractions x 10  Manual Therapy - STM for the Levator ani muscles x 3 mins Nu step L5, 5 mins  Calf stretches 2x 30 secs  Mermaid stretch over the bed 2x 30 secs   Child's pose 3x 30 secs with DBE  LTR x 20  Supine protractions x 10 with exhalation.   TA brace x 10 with 5  secs  TA brace with ID push down x 10 with 5 secs  Sahrmann Level 1, 2 x 10  Thera ex - 32 mins   NMR for 10 mins - for Pelvic Floor (PF) muscles and Transversus Abdominis (TA) muscles control, improve coordination of PF and TA, improve awareness for contractions and relaxations, improve coordination with respiration using digital/tactile cues, verbal cues, digital insertion, vaginal and rectal  Isolated PF contractions x 10 reps with 5 secs hold, 10 secs  relaxations, Isolated rapid contractions x 10  Manual Therapy - Coccyx mobilization, external and internal STM for the Levator ani muscles x 5 mins                        HEP: Access Code: [email or web address]  URL: [email or web address]  Date: 08/21/2024  Prepared by: Elizabeth Garcia    Exercises  - Supine Lower Trunk Rotation  - 2 x daily - 7 x weekly - 1-2 sets - 10 reps  - Seated Table Piriformis Stretch  - 2 x daily - 7 x weekly - 1-2 sets - 3 reps - 30 hold  - Supine Transversus Abdominis Bracing - Hands on Stomach  - 2 x daily - 7 x weekly - 1-2 sets - 10 reps - 3-5 hold  - Gastroc Stretch on Wall  - 1 x daily - 7 x weekly - 1 sets - 2 reps - 30 hold  - Clamshell  - 1 x daily - 7 x weekly - 1-2 sets - 10 reps  - Supine Diaphragmatic Breathing  - 2 x daily - 7 x weekly - 1-2 sets - 10 reps - 5 hold  - Seated Diaphragmatic Breathing  - 2 x daily - 7 x weekly - 1-2 sets - 10 reps - 5 hold  - Diaphragmatic Breathing in Child's Pose with Pelvic Floor Relaxation  - 2 x daily - 7 x weekly - 1-2 sets - 10 reps - 5 hold  - Shoulder Extension with Resistance  - 2 x daily - 7 x weekly - 1-2 sets - 10 reps  - Supine Bilateral Shoulder Protraction  - 2 x daily - 7 x weekly - 1-2 sets - 10 reps  - Supine Transversus Abdominis Bracing with Heel Slide  - 2 x daily - 7 x weekly - 1-2 sets - 10 reps      Charges: Thera -e x 2, Man Therapy -1       Total Timed Treatment: 45 min  Total Treatment Time: 45 min

## 2024-09-09 ENCOUNTER — PATIENT MESSAGE (OUTPATIENT)
Dept: FAMILY MEDICINE CLINIC | Facility: CLINIC | Age: 44
End: 2024-09-09

## 2024-09-10 ENCOUNTER — APPOINTMENT (OUTPATIENT)
Dept: PHYSICAL THERAPY | Age: 44
End: 2024-09-10
Attending: NURSE PRACTITIONER
Payer: COMMERCIAL

## 2024-09-10 NOTE — TELEPHONE ENCOUNTER
From: Nola De Paz  To: Julissa Carlos  Sent: 9/9/2024 4:04 PM CDT  Subject: Referral question     Hello, I want to double check and make sure that my MRI of the Liver I had done at [facility] was pre authorized because [facility] sent me a bill saying it wasn’t pre authorized but I know you guys had put in for it to be and I checked in here on my chart before having it done and called and it had said it was Authorized. Also I want to see if physical therapy for my feet ordered by Dr. Aldana has been authorized and notify you that Dr. Holly has ordered a Liver Biopsy to now be done and so I will need a referral and prior auth for that as well. Let me know if there’s any issue/questions     Thank you   Nola

## 2024-09-11 ENCOUNTER — PATIENT MESSAGE (OUTPATIENT)
Dept: PAIN CLINIC | Facility: CLINIC | Age: 44
End: 2024-09-11

## 2024-09-11 DIAGNOSIS — M79.7 FIBROMYALGIA: ICD-10-CM

## 2024-09-11 RX ORDER — GABAPENTIN 100 MG/1
100 CAPSULE ORAL 3 TIMES DAILY
Qty: 270 CAPSULE | Refills: 0 | Status: SHIPPED | OUTPATIENT
Start: 2024-09-11

## 2024-09-11 NOTE — TELEPHONE ENCOUNTER
Future Appointments   Date Time Provider Department Center   9/12/2024  1:30 PM Seble Maguire MD [facility] OB/GYN P [facility] [address]   9/17/2024  6:00 PM Elizabeth Garcia, PT PF PT [facility]   9/24/2024  8:20 AM Julissa Gonzalez MD EMG [address] [facility] [address]   9/24/2024  2:30 PM Frankie Helm MD EEMG Pulm EM[facility] [place]   9/24/2024  3:45 PM Evans Aldana DPLALIT [id number] EC PLFD   9/24/2024  6:00 PM Elizabeth Garcia, PT PF PT [facility]   10/1/2024  5:15 PM Elizabeth Garcia, PT PF PT [facility]   10/4/2024 11:00 AM [facility] IVS RM4 IR [facility] IVS EM [facility]   10/11/2024 10:00 AM Mushtaq Winchester MD EMGOTONAPER [id number]   10/11/2024 10:15 AM EMG AUDIO NAPER EMGAUDIONAPE [id number]   11/8/2024 11:00 AM REF SO PFLD REF EMG17 Ref PFLD 17   12/6/2024  2:00 PM Solange David, TONIA EEMGWLCPL EMG [address]   12/20/2024 12:30 [id number]   3/10/2025  2:30 PM Azra Shirley DO [id number] EMG [address]     Last office visit: 6/19/2024    Last fill: 6/19/2024 270 cap, 0 refills

## 2024-09-11 NOTE — TELEPHONE ENCOUNTER
From: Nola De Paz  To: Vick Neal  Sent: 9/11/2024 11:02 AM CDT  Subject: Question regarding MRI of lower lumbar spine authorization     Hello,   I would like to confirm that the lower lumbar spine MRI I had done had the proper prior authorization done, because it had said authorized on my chart under referrals section and Now [facility] has sent me a bill saying it wasn’t authorized so I’m just trying to see what happened. Please confirm that this was authorized with me  Thank you  Nola

## 2024-09-12 ENCOUNTER — OFFICE VISIT (OUTPATIENT)
Dept: OBGYN CLINIC | Facility: CLINIC | Age: 44
End: 2024-09-12
Payer: COMMERCIAL

## 2024-09-12 ENCOUNTER — HOSPITAL ENCOUNTER (OUTPATIENT)
Facility: HOSPITAL | Age: 44
Setting detail: HOSPITAL OUTPATIENT SURGERY
Discharge: HOME | End: 2024-09-12
Attending: ANESTHESIOLOGY | Admitting: ANESTHESIOLOGY
Payer: COMMERCIAL

## 2024-09-12 ENCOUNTER — APPOINTMENT (OUTPATIENT)
Dept: GENERAL RADIOLOGY | Facility: HOSPITAL | Age: 44
End: 2024-09-12
Attending: ANESTHESIOLOGY
Payer: COMMERCIAL

## 2024-09-12 VITALS
HEIGHT: 62 IN | HEART RATE: 79 BPM | SYSTOLIC BLOOD PRESSURE: 100 MMHG | WEIGHT: 270.13 LBS | DIASTOLIC BLOOD PRESSURE: 62 MMHG | BODY MASS INDEX: 49.71 KG/M2

## 2024-09-12 VITALS
OXYGEN SATURATION: 99 % | TEMPERATURE: 98 F | RESPIRATION RATE: 16 BRPM | HEART RATE: 71 BPM | DIASTOLIC BLOOD PRESSURE: 65 MMHG | SYSTOLIC BLOOD PRESSURE: 116 MMHG

## 2024-09-12 DIAGNOSIS — N64.4 BREAST TENDERNESS IN FEMALE: ICD-10-CM

## 2024-09-12 DIAGNOSIS — N93.9 ABNORMAL UTERINE BLEEDING (AUB): Primary | ICD-10-CM

## 2024-09-12 DIAGNOSIS — R23.2 VASOMOTOR FLUSHING: ICD-10-CM

## 2024-09-12 DIAGNOSIS — G47.00 INSOMNIA, UNSPECIFIED TYPE: ICD-10-CM

## 2024-09-12 DIAGNOSIS — R53.83 OTHER FATIGUE: ICD-10-CM

## 2024-09-12 LAB — B-HCG UR QL: NEGATIVE

## 2024-09-12 PROCEDURE — 99215 OFFICE O/P EST HI 40 MIN: CPT | Performed by: OBSTETRICS & GYNECOLOGY

## 2024-09-12 PROCEDURE — 3074F SYST BP LT 130 MM HG: CPT | Performed by: OBSTETRICS & GYNECOLOGY

## 2024-09-12 PROCEDURE — 3E0U3BZ INTRODUCTION OF ANESTHETIC AGENT INTO JOINTS, PERCUTANEOUS APPROACH: ICD-10-PCS | Performed by: ANESTHESIOLOGY

## 2024-09-12 PROCEDURE — 3078F DIAST BP <80 MM HG: CPT | Performed by: OBSTETRICS & GYNECOLOGY

## 2024-09-12 PROCEDURE — 3E0U33Z INTRODUCTION OF ANTI-INFLAMMATORY INTO JOINTS, PERCUTANEOUS APPROACH: ICD-10-PCS | Performed by: ANESTHESIOLOGY

## 2024-09-12 PROCEDURE — 3008F BODY MASS INDEX DOCD: CPT | Performed by: OBSTETRICS & GYNECOLOGY

## 2024-09-12 PROCEDURE — 27096 INJECT SACROILIAC JOINT: CPT | Performed by: ANESTHESIOLOGY

## 2024-09-12 RX ORDER — BUPIVACAINE HYDROCHLORIDE 5 MG/ML
INJECTION, SOLUTION EPIDURAL; INTRACAUDAL
Status: DISCONTINUED | OUTPATIENT
Start: 2024-09-12 | End: 2024-09-12

## 2024-09-12 RX ORDER — METHYLPREDNISOLONE ACETATE 40 MG/ML
INJECTION, SUSPENSION INTRA-ARTICULAR; INTRALESIONAL; INTRAMUSCULAR; SOFT TISSUE
Status: DISCONTINUED | OUTPATIENT
Start: 2024-09-12 | End: 2024-09-12

## 2024-09-12 RX ORDER — DOXYCYCLINE 100 MG/1
CAPSULE ORAL
COMMUNITY
Start: 2024-09-06

## 2024-09-12 RX ORDER — NALOXONE HYDROCHLORIDE 0.4 MG/ML
0.08 INJECTION, SOLUTION INTRAMUSCULAR; INTRAVENOUS; SUBCUTANEOUS AS NEEDED
Status: DISCONTINUED | OUTPATIENT
Start: 2024-09-12 | End: 2024-09-12

## 2024-09-12 RX ORDER — LIDOCAINE HYDROCHLORIDE 10 MG/ML
INJECTION, SOLUTION EPIDURAL; INFILTRATION; INTRACAUDAL; PERINEURAL
Status: DISCONTINUED | OUTPATIENT
Start: 2024-09-12 | End: 2024-09-12

## 2024-09-12 RX ORDER — MISOPROSTOL 200 UG/1
400 TABLET ORAL ONCE
Qty: 2 TABLET | Refills: 0 | Status: SHIPPED | OUTPATIENT
Start: 2024-09-12 | End: 2024-09-12

## 2024-09-12 NOTE — PATIENT INSTRUCTIONS
Please make an office visit for endometrial biopsy   Please complete your recommended ultrasound imaging and lab testing before your next visit      Please take Tylenol 1000 mg by mouth at least 1-2 hours prior to your procedure      Please take the medication Cytotec (misoprostol) 400 mcg intravaginally with one dose to be placed 2-4 hours prior to procedure visit       This medication may cause cramping abdominal pain and you make take Ibuprofen as needed for pain.

## 2024-09-12 NOTE — DISCHARGE INSTRUCTIONS
Home Care Instructions Following Your Pain Procedure     Nola,  It has been a pleasure to have you as our patient. To help you at home, you must follow these general discharge instructions. We will review these with you before you are discharged. It is our hope that you have a complete and uneventful recovery from our procedure.     General Instructions:  What to Expect:  Bandages from your procedure today can be removed when you get home.  Please avoid soaking and/or swimming for 24 hours.  Showering is okay  It is normal to have increased pain symptoms and/or pain at injection site for up to 3-5 days after procedure, you can use heat or ice (20 minutes on 20 minutes off) for comfort.  You may experience some temporary side effects which may include restlessness or insomnia, flushing of the face, or heart palpitations.  Please contact the provider if these symptoms do not resolve within 3-4 days.  Lightheadedness or nausea may occur and should resolve within 24 to 48 hours.  If you develop a headache after treatment, rest, drink fluids (with caffeine, if possible) and take mild over-the-counter pain medication.  If the headache does not improve with the above treatment, contact the physician.  Home Medications:  Resume all previously prescribed medication.  Please avoid taking NSAIDs (Non-Steriodal Anti-Inflammatory Drugs) such as:  Ibuprofen ( Advil, Motrin) Aleve (Naproxen), Diclofenac, Meloxicam for 6 hours after procedure.   If you are on Coumadin (Warfarin) or any other anti-coagulant (or \"blood thinning\") medication such as Plavix (Clopidogrel), Xarelto (Rivaroxaban), Eliquis (Apixaban), Effient (Prasugrel) etc., restart on the following day from the procedure unless otherwise directed by your provider.  If you are a diabetic, please increase the frequency of your glucose monitoring after the procedure as steroids may cause a temporary (2-3 day) increase in your blood sugar.  Contact your primary care  physician if your blood sugar remains elevated as you may require some medication adjustment.  Diet:  Resume your regular diet as tolerated.  Activity:  We recommend that you relax and rest during the rest of your procedure day.  If you feel weakness in your arms or legs do not drive.  Follow-up Appointment  Please schedule a follow-up visit within 3 to 4 weeks after your last procedure date.  Question or Concerns:  Feel free to call our office with any questions or concerns at [phone] (option #2)    Nola  Thank you for coming to [facility] for your procedure.  The nurses try very hard to make sure you receive the best care possible.  Your trust in them as well as us is greatly appreciated.    Thanks so much,   Dr. Vick Neal

## 2024-09-12 NOTE — H&P
History & Physical Examination    Patient Name: Nola De Paz  MRN: [id number]  [facility]: [id number]  YOB: 1980    Pre-Operative Diagnosis:  Chronic right SI joint pain [M53.3, G89.29]    Present Illness: SI joint pain    ASA: 2  MP class: 1  Sedation: Local      Facility-Administered Medications Prior to Admission   Medication Dose Route Frequency Provider Last Rate Last Admin    [COMPLETED] lidocaine (Xylocaine) 1 % injection  10 mL Intradermal Once    10 mL at 24    [COMPLETED] triamcinolone acetonide (Kenalog-40) 40 MG/ML injection 40 mg  40 mg Intramuscular Once    40 mg at 24    [COMPLETED] bupivacaine PF (Marcaine) 0.5% injection  9 mL Injection Once    9 mL at 24     Medications Prior to Admission   Medication Sig Dispense Refill Last Dose    doxycycline 100 MG Oral Cap TAKE 1 CAPSULE BY MOUTH TWICE DAILY ON A FULL STOMACH. DO NOT LAY FLAT 1 HOUR AFTER       miSOPROStol 200 MCG Oral Tab Place 2 tablets (400 mcg total) vaginally one time for 1 dose. Insert vaginally at least 2 hours prior to procedure visit 2 tablet 0     GABAPENTIN 100 MG Oral Cap TAKE 1 CAPSULE(100 MG) BY MOUTH THREE TIMES DAILY 270 capsule 0     Tirzepatide-Weight Management (ZEPBOUND) 2.5 MG/0.5ML Subcutaneous Solution Auto-injector Inject 2.5 mg into the skin once a week. 2 mL 0     topiramate 25 MG Oral Tab Take 1 tablet (25 mg total) by mouth 2 (two) times daily. 180 tablet 1     CUSTOM MEDICATION Tirzepatide/Niacinamide 2.5mg    8/2mg /mL  Inject 31unit/0.31mL into skin q weekly    Disp: 2.5mL 2.5 each 0     ezetimibe 10 MG Oral Tab Take 1 tablet (10 mg total) by mouth daily. 90 tablet 3     [] traMADol 50 MG Oral Tab Take 1 tablet (50 mg total) by mouth 2 (two) times daily as needed for Pain. 60 tablet 0     hydroxychloroquine 200 MG Oral Tab Take 1 tablet (200 mg total) by mouth 2 (two) times daily. 180 tablet 1     cyclobenzaprine 10 MG Oral Tab Take 1 tablet (10 mg total) by mouth  nightly as needed for Muscle spasms. 30 tablet 0     levothyroxine 175 MCG Oral Tab Take 1 tablet (175 mcg total) by mouth before breakfast. 90 tablet 3     Omeprazole 40 MG Oral Capsule Delayed Release Take 1 capsule (40 mg total) by mouth daily. 90 capsule 1     venlafaxine  MG Oral Capsule SR 24 Hr Take 1 capsule (150 mg total) by mouth daily. 90 capsule 1     rosuvastatin 10 MG Oral Tab Take 1 tablet (10 mg total) by mouth nightly. (Patient taking differently: Take 1 tablet (10 mg total) by mouth daily.) 90 tablet 0     Meloxicam 7.5 MG Oral Tab Take 1 tablet (7.5 mg total) by mouth daily. (Patient not taking: Reported on 9/12/2024)   More than a month    traZODone 50 MG Oral Tab Take 1 tablet (50 mg total) by mouth nightly.       clindamycin 1 % External Lotion APPLY TOPICALLY TO THE AFFECTED AREA EVERY DAY BEFORE NOON       LORazepam 0.5 MG Oral Tab Take 1 tablet (0.5 mg total) by mouth 2 (two) times daily as needed. 40 tablet 0     fexofenadine 180 MG Oral Tab Take 1 tablet (180 mg total) by mouth daily.       Multiple Vitamin (MULTIVITAMIN ADULT OR) Apply topically.       ondansetron 4 MG Oral Tablet Dispersible Take 1 tablet (4 mg total) by mouth every 8 (eight) hours as needed for Nausea. 30 tablet 0     Nystatin [id number] UNIT/GM External Powder Apply 1 Application topically 4 (four) times daily. 1 Bottle 0     clotrimazole-betamethasone 1-0.05 % External Cream Apply 1 Application topically 2 (two) times daily as needed (rash). 60 g 3     Albuterol Sulfate HFA (PROAIR HFA) 108 (90 Base) MCG/ACT Inhalation Aero Soln Inhale 2 puffs into the lungs every 4 (four) hours as needed for Wheezing or Shortness of Breath. 1 Inhaler 3     Triamcinolone Acetonide 55 MCG/ACT Nasal Aerosol by Nasal route daily as needed.        No current facility-administered medications for this encounter.       Allergies:   Allergies   Allergen Reactions    Penicillins HIVES and RASH       Past Medical History:    Allergic  rhinitis    Anxiety    Arthritis    Back problem    DDD    Blood disorder    anemia    Depression    Disorder of thyroid    Esophageal reflux    Fibromyalgia    High cholesterol    Hyperlipidemia    Hypothyroidism    IBS (irritable bowel syndrome)    Migraines    Obesity    BLESSING (obstructive sleep apnea)    AHI 19 REM AHI 57 Supine AHI 46 non-supine AHI 11 [place] 71%    Osteoarthritis    Pneumonia due to organism    Shortness of breath    Sleep apnea    cpap    Thyroid disease     Past Surgical History:   Procedure Laterality Date    Cholecystectomy  2013    Colonoscopy N/A 08/28/2020    Procedure: COLONOSCOPY;  Surgeon: Tone Steiner MD;  Location:  ENDOSCOPY    Other surgical history  12/21/2020    gastric sleeve    Removal gallbladder  2013    [place]    Surgical bariatrics - internal  12/21/2020    [place] teeth removed  2013     Family History   Problem Relation Age of Onset    Heart Disease Mother     High Cholesterol Mother     Other (DDD) Mother     Anemia Mother     Heart Disorder Mother         Heart disease, high blood pressure, stent, high cholesterol    Hypertension Mother     Obesity Mother     Hypertension Father     Heart Disorder Father         Heart murmur, high blood pressure    Obesity Father     Other (lymphoma) Maternal Grandmother     Cancer Maternal Grandmother         Lymphoma    Other (cancer) Paternal Grandmother         liver     Asthma Paternal Grandmother         Asthma and emphysema, copd, non smoker    Cancer Paternal Grandmother         Liver and lung cancer    Diabetes Paternal Grandmother     Prostate Cancer Paternal Grandfather         in 80s    Cancer Paternal Grandfather         Prostate cancer    Stroke Paternal Grandfather      Social History     Tobacco Use    Smoking status: Never    Smokeless tobacco: Never   Substance Use Topics    Alcohol use: Not Currently     Comment: 1-2 a month       SYSTEM Check if Review is Normal Check if Physical Exam is Normal If not  normal, please explain:   HEENT [x ] [x ]    NECK & BACK [x ] [x ]    HEART [x ] [x ]    LUNGS [x ] [x ]    ABDOMEN [x ] [x ]    UROGENITAL [x ] [x ]    EXTREMITIES [x ] [x ]    OTHER        [ x ] I have discussed the risks and benefits and alternatives with the patient/family.  They understand and agree to proceed with plan of care.  [ x ] I have reviewed the History and Physical done within the last 30 days.  Any changes noted above.    Vick Neal MD

## 2024-09-12 NOTE — PROGRESS NOTES
[facility]  Obstetrics and Gynecology  Follow Up Progress Note    Subjective:     Nola De Paz is a 44 year old  female who was last seen in office 2023 for vaginal bleeding and presents with c/o insomnia and sleep cycles are irregular. The patient reports chronic fatigue, insomnia and hot flushes where she wakes up drenched in sweat at night. Painful and tender spot between her left axilla and breast. Unsure if normal tissue versus feeling mass. Present for past few months. Has not worsened or improved. Denies nipple discharge. No right breast complaints. Reports pelvic floor PT 2/2 dyspareunia. Reports she is still menstruating. Menses in August but before that last menses it was 7 months for menses. She has not had regular menses.     Review of Systems:  General: no complaints per category. See HPI for additional information.   Breast: no complaints per category. See HPI for additional information.   Respiratory: no complaints per category. See HPI for additional information.   Cardiovascular: no complaints per category. See HPI for additional information.   GI: no complaints per category. See HPI for additional information.   : no complaints per category. See HPI for additional information.   Heme: no complaints per category. See HPI for additional information.     OB History    Para Term  AB Living   0 0 0 0 0 0   SAB IAB Ectopic Multiple Live Births   0 0 0 0 0         Gyne History:     Patient's last menstrual period was 2024 (approximate).    NILM, HPV neg 2022  denies history of STDs (non-HPV).   Sexual history: Active? yes  Birth control? None    Meds:  Current Outpatient Medications on File Prior to Visit   Medication Sig Dispense Refill    doxycycline 100 MG Oral Cap TAKE 1 CAPSULE BY MOUTH TWICE DAILY ON A FULL STOMACH. DO NOT LAY FLAT 1 HOUR AFTER      GABAPENTIN 100 MG Oral Cap TAKE 1 CAPSULE(100 MG) BY MOUTH THREE TIMES DAILY 270 capsule 0     Tirzepatide-Weight Management (ZEPBOUND) 2.5 MG/0.5ML Subcutaneous Solution Auto-injector Inject 2.5 mg into the skin once a week. 2 mL 0    topiramate 25 MG Oral Tab Take 1 tablet (25 mg total) by mouth 2 (two) times daily. 180 tablet 1    CUSTOM MEDICATION Tirzepatide/Niacinamide 2.5mg    8/2mg /mL  Inject 31unit/0.31mL into skin q weekly    Disp: 2.5mL 2.5 each 0    ezetimibe 10 MG Oral Tab Take 1 tablet (10 mg total) by mouth daily. 90 tablet 3    hydroxychloroquine 200 MG Oral Tab Take 1 tablet (200 mg total) by mouth 2 (two) times daily. 180 tablet 1    cyclobenzaprine 10 MG Oral Tab Take 1 tablet (10 mg total) by mouth nightly as needed for Muscle spasms. 30 tablet 0    levothyroxine 175 MCG Oral Tab Take 1 tablet (175 mcg total) by mouth before breakfast. 90 tablet 3    Omeprazole 40 MG Oral Capsule Delayed Release Take 1 capsule (40 mg total) by mouth daily. 90 capsule 1    venlafaxine  MG Oral Capsule SR 24 Hr Take 1 capsule (150 mg total) by mouth daily. 90 capsule 1    rosuvastatin 10 MG Oral Tab Take 1 tablet (10 mg total) by mouth nightly. (Patient taking differently: Take 1 tablet (10 mg total) by mouth daily.) 90 tablet 0    traZODone 50 MG Oral Tab Take 1 tablet (50 mg total) by mouth nightly.      clindamycin 1 % External Lotion APPLY TOPICALLY TO THE AFFECTED AREA EVERY DAY BEFORE NOON      LORazepam 0.5 MG Oral Tab Take 1 tablet (0.5 mg total) by mouth 2 (two) times daily as needed. 40 tablet 0    fexofenadine 180 MG Oral Tab Take 1 tablet (180 mg total) by mouth daily.      Multiple Vitamin (MULTIVITAMIN ADULT OR) Apply topically.      ondansetron 4 MG Oral Tablet Dispersible Take 1 tablet (4 mg total) by mouth every 8 (eight) hours as needed for Nausea. 30 tablet 0    Nystatin 365442 UNIT/GM External Powder Apply 1 Application topically 4 (four) times daily. 1 Bottle 0    clotrimazole-betamethasone 1-0.05 % External Cream Apply 1 Application topically 2 (two) times daily as needed (rash).  60 g 3    Albuterol Sulfate HFA (PROAIR HFA) 108 (90 Base) MCG/ACT Inhalation Aero Soln Inhale 2 puffs into the lungs every 4 (four) hours as needed for Wheezing or Shortness of Breath. 1 Inhaler 3    Triamcinolone Acetonide 55 MCG/ACT Nasal Aerosol by Nasal route daily as needed.      Meloxicam 7.5 MG Oral Tab Take 1 tablet (7.5 mg total) by mouth daily. (Patient not taking: Reported on 2024)       No current facility-administered medications on file prior to visit.       All:  Allergies   Allergen Reactions    Penicillins HIVES and RASH       PMH:  Past Medical History:    Allergic rhinitis    Anxiety    Arthritis    Back problem    DDD    Blood disorder    anemia    Depression    Disorder of thyroid    Esophageal reflux    Fibromyalgia    High cholesterol    Hyperlipidemia    Hypothyroidism    IBS (irritable bowel syndrome)    Migraines    Obesity    BLESSING (obstructive sleep apnea)    AHI 19 REM AHI 57 Supine AHI 46 non-supine AHI 11 [place] 71%    Osteoarthritis    Pneumonia due to organism    Shortness of breath    Sleep apnea    cpap    Thyroid disease       PSH:  Past Surgical History:   Procedure Laterality Date    Cholecystectomy  2013    Colonoscopy N/A 2020    Procedure: COLONOSCOPY;  Surgeon: Tone Steiner MD;  Location:  ENDOSCOPY    Other surgical history  2020    gastric sleeve    Removal gallbladder  2013    [place]    Surgical bariatrics - internal  2020    [place] teeth removed           Objective:     Vitals:    24 1339   BP: 100/62   Pulse: 79   Weight: 270 lb 2 oz (122.5 kg)   Height: 62\"         Body mass index is 49.41 kg/m².    General: AAO.NAD.   CVS exam: normal peripheral perfusion  Chest: non-labored breathing, no tachypnea   Breast: symmetric, no dominant or suspicious mass, no skin or nipple changes and no axillary adenopathy  Abdominal exam: deferred  Pelvic exam: deferred        Assessment:     Nola De Paz is a 44 year old  female who  presents for AUB and breast tenderness        Plan:     Problem List Items Addressed This Visit          Genitourinary and Reproductive    Abnormal uterine bleeding (AUB) - Primary    Relevant Medications    miSOPROStol 200 MCG Oral Tab    Other Relevant Orders    FSH    LH (Luteinizing Hormone) [E]    Estradiol    Prolactin    TSH W Reflex To Free T4    Hemoglobin A1C     Other Visit Diagnoses       Breast tenderness in female        Relevant Medications    doxycycline 100 MG Oral Cap    Other Relevant Orders    ADRIÁN ELIOT 2D+3D DIAGNOSTIC ADRIÁN  BILAT (MRY=[id number])    Vasomotor flushing        Other fatigue        Insomnia, unspecified type                  AUB  -labs reviewed and additional labs ordered  -pelvic US in office  Recommend negative biopsy  - discussion held with the patient regarding EMB procedure including risks, benefits and alternatives  - discussion included but not limited to risk of infection, injury, bleeding and inadequate sampling or limited sampling   - pt agreeable for EMB procedure   - pre-procedure instructions and medication including Cytotec provided to the patient   -d/w patient management options including conservative versus medical versus surgical including risks, benefits and alternatives  -Follow-up in the office for further consultation and results review  Breast tenderness  -No abnormal findings noted on exam  -Diagnostic mammogram ordered  -Precautions provided  Vasomotor symptoms  -Discussed with patient possible HRT  -However, patient at risk for possible endometrial hyperplasia and therefore recommend evaluation for abnormal uterine bleeding prior to initiating HRT  Fatigue  -associated with insomnia   -labs ordered  -follow up with PCP    All of the findings and plan were discussed with the patient.  She notes understanding and agrees with the plan of care.  All questions were answered to the best of my ability at this time.      Total patient time was 40 minutes in  evaluation, consultation, and coordination of care. This included face to face and non-face to face actions. The patient's questions and concerns were addressed.       RTC following labs and pelvic ultrasound for endometrial biopsy or sooner if needed    Seble Maguire MD   [facility] - OBGYN       Discussed with patient that there will not be further notification of normal or benign results other than receiving results on mychart. A mychart message or telephone call will be placed by the physician and/or office staff if results are abnormal.     Note to patient and family   The 21st Century Cures Act makes medical notes available to patients in the interest of transparency.  However, please be advised that this is a medical document.  It is intended as [id number] communication.  It is written and medical language may contain abbreviations or verbiage that are technical and unfamiliar.  It may appear blunt or direct.  Medical documents are intended to carry relevant information, facts as evident, and the clinical opinion of the practitioner.        This note could include assistance by Dragon voice recognition. Errors in content may be related to improper recognition by the system; efforts to review and correct have been done but errors may still exist.

## 2024-09-12 NOTE — OPERATIVE REPORT
[facility]  Operative Report  2024     Nola De Paz Patient Status:  Hospital Outpatient Surgery    3/16/1980 MRN [id number]   Location [facility] Attending Vick Neal MD   Hosp Day # 0 PCP Julissa Gonzalez MD     Indication: Nola is a 44 year old female with SI joint pain    Preoperative Diagnosis:  Chronic right SI joint pain [M53.3, G89.29]    Postoperative Diagnosis: Same as above.    Procedure performed: RIGHT SACROILIAC JOINT INJECTION with local    Anesthesia: Local      EBL: Less than 1 ml.    Procedure Description:  After reviewing the patient's history and performing a focused physical examination, the diagnosis was confirmed and contraindications such as infection and coagulopathy were ruled out.  Following review of potential side effects and complications, including but not necessarily limited to infection, allergic reaction, local tissue breakdown, nerve injury, and paresis, the patient indicated they understood and agreed to proceed.  After obtaining the informed consent, the patient was brought to the procedure room and monitored.      The patient was placed prone on the procedure table.  The lumbar and sacral areas were prepped and draped in sterile fashion.  Subcutaneous lidocaine was instilled into the superficial soft tissues for local anesthesia.  Under fluoroscopic guidance, the anterior and posterior aspects of the sacroiliac joint were overlapped.  A 22-gauge, Quincke spinal needle was advanced into the middle portion of the corresponding sacroiliac joint. After the needle  positions were confirmed by AP and lateral views of fluoroscopy, 1 cc Omnipaque-240 was injected into the sacroiliac joint. There was a nice sacroiliac joint arthrogram revealed. After that   methylprednisolone 80 mg with 2 mL of 1% lidocaine was injected.  The needle was flushed with 1 mL of 1% lidocaine.  The needle was removed with stylet in situ.  The patient tolerated the  procedure very well.  There was no subjective or objective loss of motor strength.  The patient was observed until discharge criteria met.  Discharge instructions were given and patient was released to a responsible adult.    Complications: None.    Follow up: The patient was followed in the pain clinic as needed basis.      Vick Neal MD

## 2024-09-13 ENCOUNTER — TELEPHONE (OUTPATIENT)
Dept: PAIN CLINIC | Facility: CLINIC | Age: 44
End: 2024-09-13

## 2024-09-13 NOTE — TELEPHONE ENCOUNTER
Courtesy called placed to patient for post procedure follow up. Patient stated she is experiencing post injection pain. Informed patient that soreness is to be expected after the procedure. Educated patient that it takes 3-5 days for the steroid to be effective and to allow adequate time for medication to work. Encouraged patient to alternate ice and heat and to take medications as prescribed. Pt verbalized understanding to call with any questions or concerns.      Procedure:   RIGHT SACROILIAC JOINT INJECTION with local     Date: 9/12/24  Follow up Visit Scheduled: 9/24/24 for knee injection

## 2024-09-17 ENCOUNTER — APPOINTMENT (OUTPATIENT)
Dept: PHYSICAL THERAPY | Age: 44
End: 2024-09-17
Attending: NURSE PRACTITIONER
Payer: COMMERCIAL

## 2024-09-23 NOTE — PROGRESS NOTES
Unable to leave pre-procedure instructions, no verbal release in patients chart to leave messages.

## 2024-09-24 ENCOUNTER — TELEPHONE (OUTPATIENT)
Dept: FAMILY MEDICINE CLINIC | Facility: CLINIC | Age: 44
End: 2024-09-24

## 2024-09-24 ENCOUNTER — HOSPITAL ENCOUNTER (OUTPATIENT)
Facility: HOSPITAL | Age: 44
Setting detail: HOSPITAL OUTPATIENT SURGERY
Discharge: HOME | End: 2024-09-24
Attending: ANESTHESIOLOGY | Admitting: ANESTHESIOLOGY
Payer: COMMERCIAL

## 2024-09-24 ENCOUNTER — APPOINTMENT (OUTPATIENT)
Dept: GENERAL RADIOLOGY | Facility: HOSPITAL | Age: 44
End: 2024-09-24
Attending: ANESTHESIOLOGY
Payer: COMMERCIAL

## 2024-09-24 ENCOUNTER — APPOINTMENT (OUTPATIENT)
Dept: PHYSICAL THERAPY | Age: 44
End: 2024-09-24
Attending: NURSE PRACTITIONER
Payer: COMMERCIAL

## 2024-09-24 ENCOUNTER — OFFICE VISIT (OUTPATIENT)
Facility: CLINIC | Age: 44
End: 2024-09-24
Payer: COMMERCIAL

## 2024-09-24 VITALS
TEMPERATURE: 97 F | RESPIRATION RATE: 16 BRPM | BODY MASS INDEX: 50.42 KG/M2 | DIASTOLIC BLOOD PRESSURE: 70 MMHG | HEART RATE: 70 BPM | SYSTOLIC BLOOD PRESSURE: 116 MMHG | HEIGHT: 62 IN | WEIGHT: 274 LBS | OXYGEN SATURATION: 98 %

## 2024-09-24 VITALS
OXYGEN SATURATION: 100 % | HEIGHT: 62 IN | DIASTOLIC BLOOD PRESSURE: 71 MMHG | WEIGHT: 270 LBS | RESPIRATION RATE: 18 BRPM | TEMPERATURE: 98 F | SYSTOLIC BLOOD PRESSURE: 124 MMHG | HEART RATE: 64 BPM | BODY MASS INDEX: 49.69 KG/M2

## 2024-09-24 DIAGNOSIS — G47.33 OSA (OBSTRUCTIVE SLEEP APNEA): ICD-10-CM

## 2024-09-24 DIAGNOSIS — G47.19 DAYTIME HYPERSOMNOLENCE: ICD-10-CM

## 2024-09-24 DIAGNOSIS — E66.01 CLASS 3 SEVERE OBESITY DUE TO EXCESS CALORIES WITHOUT SERIOUS COMORBIDITY WITH BODY MASS INDEX (BMI) OF 40.0 TO 44.9 IN ADULT (HCC): Primary | ICD-10-CM

## 2024-09-24 LAB — B-HCG UR QL: NEGATIVE

## 2024-09-24 PROCEDURE — 3078F DIAST BP <80 MM HG: CPT | Performed by: INTERNAL MEDICINE

## 2024-09-24 PROCEDURE — 77002 NEEDLE LOCALIZATION BY XRAY: CPT | Performed by: ANESTHESIOLOGY

## 2024-09-24 PROCEDURE — 3074F SYST BP LT 130 MM HG: CPT | Performed by: ANESTHESIOLOGY

## 2024-09-24 PROCEDURE — 20610 DRAIN/INJ JOINT/BURSA W/O US: CPT | Performed by: ANESTHESIOLOGY

## 2024-09-24 PROCEDURE — 3008F BODY MASS INDEX DOCD: CPT | Performed by: INTERNAL MEDICINE

## 2024-09-24 PROCEDURE — 3008F BODY MASS INDEX DOCD: CPT | Performed by: ANESTHESIOLOGY

## 2024-09-24 PROCEDURE — 3074F SYST BP LT 130 MM HG: CPT | Performed by: INTERNAL MEDICINE

## 2024-09-24 PROCEDURE — 3078F DIAST BP <80 MM HG: CPT | Performed by: ANESTHESIOLOGY

## 2024-09-24 PROCEDURE — 3E0U3GC INTRODUCTION OF OTHER THERAPEUTIC SUBSTANCE INTO JOINTS, PERCUTANEOUS APPROACH: ICD-10-PCS | Performed by: ANESTHESIOLOGY

## 2024-09-24 PROCEDURE — 99204 OFFICE O/P NEW MOD 45 MIN: CPT | Performed by: INTERNAL MEDICINE

## 2024-09-24 RX ORDER — METHYLPREDNISOLONE ACETATE 40 MG/ML
INJECTION, SUSPENSION INTRA-ARTICULAR; INTRALESIONAL; INTRAMUSCULAR; SOFT TISSUE
Status: DISCONTINUED | OUTPATIENT
Start: 2024-09-24 | End: 2024-09-24

## 2024-09-24 RX ORDER — LIDOCAINE HYDROCHLORIDE 10 MG/ML
INJECTION, SOLUTION EPIDURAL; INFILTRATION; INTRACAUDAL; PERINEURAL
Status: DISCONTINUED | OUTPATIENT
Start: 2024-09-24 | End: 2024-09-24

## 2024-09-24 RX ORDER — NALOXONE HYDROCHLORIDE 0.4 MG/ML
0.08 INJECTION, SOLUTION INTRAMUSCULAR; INTRAVENOUS; SUBCUTANEOUS AS NEEDED
Status: DISCONTINUED | OUTPATIENT
Start: 2024-09-24 | End: 2024-09-24

## 2024-09-24 NOTE — PATIENT INSTRUCTIONS
Plan:    Schedule Split night sleep study to be interpreted by Dr. Frankie Helm, will then arrange a NEW CPAP machine   Advised about weight loss   Advised against drowsy driving and to avoid alcoholic beverage and respiratory depressants as these may worsen sleep apnea      Follow up: 3  months       Frankie Helm MD      Obstructive Sleep Apnea  Obstructive sleep apnea is a condition caused by air passages becoming narrowed or blocked during sleep. As a result, breathing stops for short periods. Your body wakes up enough for breathing to start again. But you don't remember it. The cycle of stopped breathing and brief awakenings can repeat dozens of times a night. This prevents the body from getting to the deeper stages of sleep that are needed for good rest.   Signs of sleep apnea include loud snoring, noisy breathing, and gasping sounds during sleep. People with sleep apnea often find they use the bathroom many times during the night. Daytime symptoms include waking up tired after a full night's sleep and waking up with headaches. They can also include feeling very sleepy or falling asleep during the day, and having problems with memory or concentration.   Risk factors for sleep apnea include:  Being overweight  Being assigned male at birth, or being in menopause  Smoking  Using alcohol or sedating medicines  Having enlarged structures in the nose or throat such as enlarged tonsils or adenoids, or extra tissue in the airway  Home care  Lifestyle changes that can help treat snoring and sleep apnea include:   If you're overweight, talk with your healthcare provider about a weight-loss plan for you.  Don't drink alcohol for 3 to 4 hours before bedtime.  Don't take sedating medicines. Ask your healthcare provider about the medicines you take.  If you smoke, talk to your provider about ways to quit. It's important to stay away from secondhand smoke. Don't use e-cigarettes because of their harmful side effects.  Sleep  on your side. This can help prevent gravity from pulling relaxed throat tissues into your breathing passages.  If you have allergies or sinus problems that block your nose, ask your provider for help.  Use positive airway pressure (PAP). Discuss with your provider the benefits of using PAP at home. And talk about the type of PAP that's best for you.  Follow-up care  Follow up with your healthcare provider, or as advised. A diagnosis of sleep apnea is made with a sleep study. Your provider can tell you more about this test.   When to get medical care  See your healthcare provider if you have daytime symptoms of sleep apnea. These include:   Waking up tired after a full night's sleep  Waking up with a headache  Feeling very sleepy or falling asleep during the day  Having problems with memory or concentration  Also talk with your provider if your partner tells you that you snore, gasp for air, or stop breathing while you sleep.   Seeing your provider is important because sleep apnea can make you more likely to have certain health problems. These include high blood pressure, heart attack, stroke, and sexual dysfunction. If you have sleep apnea, talk with your healthcare provider about the best treatments for you.   [employer] last reviewed this educational content on 5/1/2022 © 2000-2023 [employer]. All rights reserved. This information is not intended as a substitute for professional medical care. Always follow your healthcare professional's instructions.        Continuous Positive Air Pressure (CPAP)     A mask over the nose gently directs air into the throat to keep the airway open.     Continuous positive air pressure (CPAP) uses gentle air pressure to hold the airway open. CPAP is often the most effective treatment for sleep apnea. It works very well as a treatment for adults diagnosed with obstructive sleep apnea with a lot of sleepiness. But keep in mind that it can take several adjustments before  the setup is right for you.   How CPAP works  The CPAP machine  is a small portable pump that sits beside the bed. The pump sends air through a hose, which is held over your nose alone, or nose and mouth by a mask. Mild air pressure is gently pushed through your airway. The air pressure nudges sagging tissues aside. This widens the airway so you can breathe better. CPAP may be combined with other kinds of therapy for sleep apnea.   Types of air pressure treatments  There are different types of CPAP. Your doctor or CPAP technician will help you decide which type is best for you:   Basic CPAP keeps the pressure constant all night long.  A bilevel device (BiPAP) provides more pressure when you breathe in and less when you breathe out. A BiPAP machine also may be set to provide automatic breaths to maintain breathing if you stop breathing while sleeping.  An autoCPAP device automatically adjusts pressure throughout the night and in response to changes such as body position, sleep stage, and snoring.  [employer] last reviewed this educational content on 7/1/2019  © 4686-9716 [employer]. All rights reserved. This information is not intended as a substitute for professional medical care. Always follow your healthcare professional's instructions.        medications/rest

## 2024-09-24 NOTE — H&P (VIEW-ONLY)
History & Physical Examination    Patient Name: Nola De Paz  MRN: [id number]  [facility]: [id number]  YOB: 1980    Pre-Operative Diagnosis:  Chronic pain of both knees [M25.561, M25.562, G89.29]    Present Illness: Knee pain    ASA: 2  MP class: 1  Sedation: Local  Facility-Administered Medications Prior to Admission   Medication Dose Route Frequency Provider Last Rate Last Admin    [COMPLETED] lidocaine (Xylocaine) 1 % injection  10 mL Intradermal Once    10 mL at 2416    [COMPLETED] triamcinolone acetonide (Kenalog-40) 40 MG/ML injection 40 mg  40 mg Intramuscular Once    40 mg at 24    [COMPLETED] bupivacaine PF (Marcaine) 0.5% injection  9 mL Injection Once    9 mL at 24     Medications Prior to Admission   Medication Sig Dispense Refill Last Dose    Meloxicam 7.5 MG Oral Tab Take 1 tablet (7.5 mg total) by mouth daily.   Past Month    miSOPROStol 200 MCG Oral Tab INSERT 2 TABLETS VAGINALLY ONCE 2 HOURS PRIOR TO PROCEDURE VISIT FOR 1 DOSE       doxycycline 100 MG Oral Cap TAKE 1 CAPSULE BY MOUTH TWICE DAILY ON A FULL STOMACH. DO NOT LAY FLAT 1 HOUR AFTER       [] miSOPROStol 200 MCG Oral Tab Place 2 tablets (400 mcg total) vaginally one time for 1 dose. Insert vaginally at least 2 hours prior to procedure visit 2 tablet 0     GABAPENTIN 100 MG Oral Cap TAKE 1 CAPSULE(100 MG) BY MOUTH THREE TIMES DAILY 270 capsule 0     Tirzepatide-Weight Management (ZEPBOUND) 2.5 MG/0.5ML Subcutaneous Solution Auto-injector Inject 2.5 mg into the skin once a week. 2 mL 0     topiramate 25 MG Oral Tab Take 1 tablet (25 mg total) by mouth 2 (two) times daily. 180 tablet 1     CUSTOM MEDICATION Tirzepatide/Niacinamide 2.5mg    8/2mg /mL  Inject 31unit/0.31mL into skin q weekly    Disp: 2.5mL 2.5 each 0     ezetimibe 10 MG Oral Tab Take 1 tablet (10 mg total) by mouth daily. 90 tablet 3     [] traMADol 50 MG Oral Tab Take 1 tablet (50 mg total) by mouth 2 (two) times daily as  needed for Pain. 60 tablet 0     hydroxychloroquine 200 MG Oral Tab Take 1 tablet (200 mg total) by mouth 2 (two) times daily. 180 tablet 1     cyclobenzaprine 10 MG Oral Tab Take 1 tablet (10 mg total) by mouth nightly as needed for Muscle spasms. 30 tablet 0     levothyroxine 175 MCG Oral Tab Take 1 tablet (175 mcg total) by mouth before breakfast. 90 tablet 3     Omeprazole 40 MG Oral Capsule Delayed Release Take 1 capsule (40 mg total) by mouth daily. 90 capsule 1     venlafaxine  MG Oral Capsule SR 24 Hr Take 1 capsule (150 mg total) by mouth daily. 90 capsule 1     rosuvastatin 10 MG Oral Tab Take 1 tablet (10 mg total) by mouth nightly. (Patient taking differently: Take 1 tablet (10 mg total) by mouth daily.) 90 tablet 0     traZODone 50 MG Oral Tab Take 1 tablet (50 mg total) by mouth nightly.       clindamycin 1 % External Lotion APPLY TOPICALLY TO THE AFFECTED AREA EVERY DAY BEFORE NOON       LORazepam 0.5 MG Oral Tab Take 1 tablet (0.5 mg total) by mouth 2 (two) times daily as needed. 40 tablet 0     fexofenadine 180 MG Oral Tab Take 1 tablet (180 mg total) by mouth daily.       Multiple Vitamin (MULTIVITAMIN ADULT OR) Apply topically.       ondansetron 4 MG Oral Tablet Dispersible Take 1 tablet (4 mg total) by mouth every 8 (eight) hours as needed for Nausea. 30 tablet 0     Nystatin 411841 UNIT/GM External Powder Apply 1 Application topically 4 (four) times daily. 1 Bottle 0     clotrimazole-betamethasone 1-0.05 % External Cream Apply 1 Application topically 2 (two) times daily as needed (rash). 60 g 3     Albuterol Sulfate HFA (PROAIR HFA) 108 (90 Base) MCG/ACT Inhalation Aero Soln Inhale 2 puffs into the lungs every 4 (four) hours as needed for Wheezing or Shortness of Breath. 1 Inhaler 3     Triamcinolone Acetonide 55 MCG/ACT Nasal Aerosol by Nasal route daily as needed.        Current Facility-Administered Medications   Medication Dose Route Frequency    hylan G-F 20 (Synvisc-One) 48 MG/6ML  intra-articular injection 96 mg  12 mL Intra-articular Once       Allergies:   Allergies   Allergen Reactions    Penicillins HIVES and RASH       Past Medical History:    Allergic rhinitis    Anxiety    Arthritis    Back problem    DDD    Blood disorder    anemia    Depression    Disorder of thyroid    Esophageal reflux    Fibromyalgia    High cholesterol    Hyperlipidemia    Hypothyroidism    IBS (irritable bowel syndrome)    Migraines    Obesity    BLESSING (obstructive sleep apnea)    AHI 19 REM AHI 57 Supine AHI 46 non-supine AHI 11 [place] 71%    Osteoarthritis    Pneumonia due to organism    Shortness of breath    Sleep apnea    cpap    Thyroid disease     Past Surgical History:   Procedure Laterality Date    Cholecystectomy  2013    Colonoscopy N/A 08/28/2020    Procedure: COLONOSCOPY;  Surgeon: Tone Steiner MD;  Location:  ENDOSCOPY    Other surgical history  12/21/2020    gastric sleeve    Removal gallbladder  2013    [place]    Surgical bariatrics - internal  12/21/2020    [place] teeth removed  2013     Family History   Problem Relation Age of Onset    Heart Disease Mother     High Cholesterol Mother     Other (DDD) Mother     Anemia Mother     Heart Disorder Mother         Heart disease, high blood pressure, stent, high cholesterol    Hypertension Mother     Obesity Mother     Hypertension Father     Heart Disorder Father         Heart murmur, high blood pressure    Obesity Father     Other (lymphoma) Maternal Grandmother     Cancer Maternal Grandmother         Lymphoma    Other (cancer) Paternal Grandmother         liver     Asthma Paternal Grandmother         Asthma and emphysema, copd, non smoker    Cancer Paternal Grandmother         Liver and lung cancer    Diabetes Paternal Grandmother     Prostate Cancer Paternal Grandfather         in 80s    Cancer Paternal Grandfather         Prostate cancer    Stroke Paternal Grandfather      Social History     Tobacco Use    Smoking status: Never     Smokeless tobacco: Never   Substance Use Topics    Alcohol use: Not Currently     Comment: 1-2 a month       SYSTEM Check if Review is Normal Check if Physical Exam is Normal If not normal, please explain:   HEENT [x ] [x ]    NECK & BACK [x ] [x ]    HEART [x ] [x ]    LUNGS [x ] [x ]    ABDOMEN [x ] [x ]    UROGENITAL [x ] [x ]    EXTREMITIES [x ] [x ]    OTHER        [ x ] I have discussed the risks and benefits and alternatives with the patient/family.  They understand and agree to proceed with plan of care.  [ x ] I have reviewed the History and Physical done within the last 30 days.  Any changes noted above.    Vick Neal MD

## 2024-09-24 NOTE — DISCHARGE INSTRUCTIONS
Home Care Instructions Following Your Pain Procedure     Nola,  It has been a pleasure to have you as our patient. To help you at home, you must follow these general discharge instructions. We will review these with you before you are discharged. It is our hope that you have a complete and uneventful recovery from our procedure.     General Instructions:  What to Expect:  Bandages from your procedure today can be removed when you get home.  Please avoid soaking and/or swimming for 24 hours.  Showering is okay  It is normal to have increased pain symptoms and/or pain at injection site for up to 3-5 days after procedure, you can use heat or ice (20 minutes on 20 minutes off) for comfort.  You may experience some temporary side effects which may include restlessness or insomnia, flushing of the face, or heart palpitations.  Please contact the provider if these symptoms do not resolve within 3-4 days.  Lightheadedness or nausea may occur and should resolve within 24 to 48 hours.  If you develop a headache after treatment, rest, drink fluids (with caffeine, if possible) and take mild over-the-counter pain medication.  If the headache does not improve with the above treatment, contact the physician.  Home Medications:  Resume all previously prescribed medication.  Please avoid taking NSAIDs (Non-Steriodal Anti-Inflammatory Drugs) such as:  Ibuprofen ( Advil, Motrin) Aleve (Naproxen), Diclofenac, Meloxicam for 6 hours after procedure.   If you are on Coumadin (Warfarin) or any other anti-coagulant (or \"blood thinning\") medication such as Plavix (Clopidogrel), Xarelto (Rivaroxaban), Eliquis (Apixaban), Effient (Prasugrel) etc., restart on the following day from the procedure unless otherwise directed by your provider.  If you are a diabetic, please increase the frequency of your glucose monitoring after the procedure as steroids may cause a temporary (2-3 day) increase in your blood sugar.  Contact your primary care  physician if your blood sugar remains elevated as you may require some medication adjustment.  Diet:  Resume your regular diet as tolerated.  Activity:  We recommend that you relax and rest during the rest of your procedure day.  If you feel weakness in your arms or legs do not drive.  Follow-up Appointment  Please schedule a follow-up visit within 3 to 4 weeks after your last procedure date.  Question or Concerns:  Feel free to call our office with any questions or concerns at [phone] (option #[phone])    Nola  Thank you for coming to [facility] for your procedure.  The nurses try very hard to make sure you receive the best care possible.  Your trust in them as well as us is greatly appreciated.    Thanks so much,   Dr. Vick Neal

## 2024-09-24 NOTE — H&P
History & Physical Examination    Patient Name: Nola De Paz  MRN: [id number]  [facility]: [id number]  YOB: 1980    Pre-Operative Diagnosis:  Chronic pain of both knees [M25.561, M25.562, G89.29]    Present Illness: Knee pain    ASA: 2  MP class: 1  Sedation: Local  Facility-Administered Medications Prior to Admission   Medication Dose Route Frequency Provider Last Rate Last Admin    [COMPLETED] lidocaine (Xylocaine) 1 % injection  10 mL Intradermal Once    10 mL at 2416    [COMPLETED] triamcinolone acetonide (Kenalog-40) 40 MG/ML injection 40 mg  40 mg Intramuscular Once    40 mg at 24    [COMPLETED] bupivacaine PF (Marcaine) 0.5% injection  9 mL Injection Once    9 mL at 24     Medications Prior to Admission   Medication Sig Dispense Refill Last Dose    Meloxicam 7.5 MG Oral Tab Take 1 tablet (7.5 mg total) by mouth daily.   Past Month    miSOPROStol 200 MCG Oral Tab INSERT 2 TABLETS VAGINALLY ONCE 2 HOURS PRIOR TO PROCEDURE VISIT FOR 1 DOSE       doxycycline 100 MG Oral Cap TAKE 1 CAPSULE BY MOUTH TWICE DAILY ON A FULL STOMACH. DO NOT LAY FLAT 1 HOUR AFTER       [] miSOPROStol 200 MCG Oral Tab Place 2 tablets (400 mcg total) vaginally one time for 1 dose. Insert vaginally at least 2 hours prior to procedure visit 2 tablet 0     GABAPENTIN 100 MG Oral Cap TAKE 1 CAPSULE(100 MG) BY MOUTH THREE TIMES DAILY 270 capsule 0     Tirzepatide-Weight Management (ZEPBOUND) 2.5 MG/0.5ML Subcutaneous Solution Auto-injector Inject 2.5 mg into the skin once a week. 2 mL 0     topiramate 25 MG Oral Tab Take 1 tablet (25 mg total) by mouth 2 (two) times daily. 180 tablet 1     CUSTOM MEDICATION Tirzepatide/Niacinamide 2.5mg    8/2mg /mL  Inject 31unit/0.31mL into skin q weekly    Disp: 2.5mL 2.5 each 0     ezetimibe 10 MG Oral Tab Take 1 tablet (10 mg total) by mouth daily. 90 tablet 3     [] traMADol 50 MG Oral Tab Take 1 tablet (50 mg total) by mouth 2 (two) times daily as  needed for Pain. 60 tablet 0     hydroxychloroquine 200 MG Oral Tab Take 1 tablet (200 mg total) by mouth 2 (two) times daily. 180 tablet 1     cyclobenzaprine 10 MG Oral Tab Take 1 tablet (10 mg total) by mouth nightly as needed for Muscle spasms. 30 tablet 0     levothyroxine 175 MCG Oral Tab Take 1 tablet (175 mcg total) by mouth before breakfast. 90 tablet 3     Omeprazole 40 MG Oral Capsule Delayed Release Take 1 capsule (40 mg total) by mouth daily. 90 capsule 1     venlafaxine  MG Oral Capsule SR 24 Hr Take 1 capsule (150 mg total) by mouth daily. 90 capsule 1     rosuvastatin 10 MG Oral Tab Take 1 tablet (10 mg total) by mouth nightly. (Patient taking differently: Take 1 tablet (10 mg total) by mouth daily.) 90 tablet 0     traZODone 50 MG Oral Tab Take 1 tablet (50 mg total) by mouth nightly.       clindamycin 1 % External Lotion APPLY TOPICALLY TO THE AFFECTED AREA EVERY DAY BEFORE NOON       LORazepam 0.5 MG Oral Tab Take 1 tablet (0.5 mg total) by mouth 2 (two) times daily as needed. 40 tablet 0     fexofenadine 180 MG Oral Tab Take 1 tablet (180 mg total) by mouth daily.       Multiple Vitamin (MULTIVITAMIN ADULT OR) Apply topically.       ondansetron 4 MG Oral Tablet Dispersible Take 1 tablet (4 mg total) by mouth every 8 (eight) hours as needed for Nausea. 30 tablet 0     Nystatin 008831 UNIT/GM External Powder Apply 1 Application topically 4 (four) times daily. 1 Bottle 0     clotrimazole-betamethasone 1-0.05 % External Cream Apply 1 Application topically 2 (two) times daily as needed (rash). 60 g 3     Albuterol Sulfate HFA (PROAIR HFA) 108 (90 Base) MCG/ACT Inhalation Aero Soln Inhale 2 puffs into the lungs every 4 (four) hours as needed for Wheezing or Shortness of Breath. 1 Inhaler 3     Triamcinolone Acetonide 55 MCG/ACT Nasal Aerosol by Nasal route daily as needed.        Current Facility-Administered Medications   Medication Dose Route Frequency    hylan G-F 20 (Synvisc-One) 48 MG/6ML  intra-articular injection 96 mg  12 mL Intra-articular Once       Allergies:   Allergies   Allergen Reactions    Penicillins HIVES and RASH       Past Medical History:    Allergic rhinitis    Anxiety    Arthritis    Back problem    DDD    Blood disorder    anemia    Depression    Disorder of thyroid    Esophageal reflux    Fibromyalgia    High cholesterol    Hyperlipidemia    Hypothyroidism    IBS (irritable bowel syndrome)    Migraines    Obesity    BLESSING (obstructive sleep apnea)    AHI 19 REM AHI 57 Supine AHI 46 non-supine AHI 11 [place] 71%    Osteoarthritis    Pneumonia due to organism    Shortness of breath    Sleep apnea    cpap    Thyroid disease     Past Surgical History:   Procedure Laterality Date    Cholecystectomy  2013    Colonoscopy N/A 08/28/2020    Procedure: COLONOSCOPY;  Surgeon: Tone Steiner MD;  Location:  ENDOSCOPY    Other surgical history  12/21/2020    gastric sleeve    Removal gallbladder  2013    [facility]    Surgical bariatrics - internal  12/21/2020    Merkel teeth removed  2013     Family History   Problem Relation Age of Onset    Heart Disease Mother     High Cholesterol Mother     Other (DDD) Mother     Anemia Mother     Heart Disorder Mother         Heart disease, high blood pressure, stent, high cholesterol    Hypertension Mother     Obesity Mother     Hypertension Father     Heart Disorder Father         Heart murmur, high blood pressure    Obesity Father     Other (lymphoma) Maternal Grandmother     Cancer Maternal Grandmother         Lymphoma    Other (cancer) Paternal Grandmother         liver     Asthma Paternal Grandmother         Asthma and emphysema, copd, non smoker    Cancer Paternal Grandmother         Liver and lung cancer    Diabetes Paternal Grandmother     Prostate Cancer Paternal Grandfather         in 80s    Cancer Paternal Grandfather         Prostate cancer    Stroke Paternal Grandfather      Social History     Tobacco Use    Smoking status: Never     Smokeless tobacco: Never   Substance Use Topics    Alcohol use: Not Currently     Comment: 1-2 a month       SYSTEM Check if Review is Normal Check if Physical Exam is Normal If not normal, please explain:   HEENT [x ] [x ]    NECK & BACK [x ] [x ]    HEART [x ] [x ]    LUNGS [x ] [x ]    ABDOMEN [x ] [x ]    UROGENITAL [x ] [x ]    EXTREMITIES [x ] [x ]    OTHER        [ x ] I have discussed the risks and benefits and alternatives with the patient/family.  They understand and agree to proceed with plan of care.  [ x ] I have reviewed the History and Physical done within the last 30 days.  Any changes noted above.    Vick Neal MD

## 2024-09-24 NOTE — PROGRESS NOTES
Diagnosis:   Urinary urgency (R39.15)  Constipation, unspecified constipation type (K59.00)  , Pelvic floor dysfunction      Referring Provider: Chrissy Fairchild  Date of Evaluation:    7/11/2024    Precautions:  None Next MD visit:   none scheduled  Date of Surgery: n/a   Insurance Primary/Secondary: [employer] [place] / N/A     # Auth Visits: 8               Subjective: My tailbone hurts today. Since last PT visit, I have been very tense, my whole body was tense, I was in a lot of stress. The SIJ pain moved to my low back.    Eval:c/o urge urinary incontinence, difficulty with bowel movement, pain with sexual activity, nocturnal incontinence, anterior pelvic tightness, low back pain.    Pain:  5/10- tailbone, B SIJ - 0/10    Objective: See flow sheet for details,Sahrmann Level 2 was challenging due to onset of SIJ pain.     Posture: FHP, rounded shoulders, increased central girth, increased lumbar lordosis, + trendelenburg  Pelvic Alignment: WNL  Deep Tendon Reflexes:  Patella: not done     Achilles: not done  Gait: pt ambulates on level ground with normal mechanics     External Palpation: Non tender on abdominal region  Scars (location/surgery): none  Abdominal Wall Integrity: Poor  Diastasis Recti: (finger width depth while contracted)- none     Range Of Motion  Lumbar AROM screen: WNL for flexion, 75% LOM for lumbar extension with pain, WNL for lateral flexion   LE AROM screen: WFL     Strength (MMT)   Transverse Abdominis: 3/5  Hip muscles - B hip muscles graded 5/5     Flexibility Summary: Mild tightness of B hamstrings, moderate tightness ob B piriformis, severe tightness of iliopsoas muscles     Special Tests  ASLR - fair lumbar loading transfers     Informed consent for internal pelvic evaluation given: Yes     External Observation:   Voluntary contraction: present   Voluntary relaxation: present  Involuntary contraction: absent  Involuntary relaxation: absent     Mons pubis: WNL  Labia majora: WNL  Labia minora:  WNL  Urethral meatus: WNL  Introitus: other: tight  Perineal body: WNL     Sensory/Reflex:  Vestibule: normal bilaterally  Anal [place]: hypo bilateral     Internal Examination   Scar: none     Pelvic Floor Muscle strength: (PERF= Power/Endurance/Reps/Fast) MMT: 3/3/10/10, vaginal and rectal  External Anal Sphincter: not tested  Accessory Muscle Use: adductors     Tissue Laxity Test:None  Anterior Wall: WNL  Posterior Wall: WNL  Apical: WNL     Eccentric lengthening contraction: poor        Internal Palpation: WNL except Superficial Transverse Perineal B mild restrictions   Deep Transverse Perineal B mild restriction, pain, and tenderness  Pubococcygeus/Pubovaginalis B severe restriction, pain, and tenderness  Puborectalis muscles - mild tightness on B, Grade 1 tenderness, mild STRs  Iliococcygeus B mild restriction, pain, and tenderness  Coccygeus B mild restriction, pain, and tenderness  Obturator Internus B moderate restriction         Assessment: Discussed about using diaphragmatic breathing to promote activation of PNS for whole body relaxation. Added new exercises to promote relaxation and core strength. Discussed importance of core strength to improve lumbopelvic stability. Patient to perform contraction to determine state of PF and have increased awareness for PF relaxation.    Goals:  (to be met in 10 visits)  Patient will have increased awareness for PF muscles contractions and relaxation to improve ability to promote relaxation and decrease pain by 50% during PF assessment (6 visits) - MET, and 90% (10 v)- progressing as of 8/27/24  Patient will demonstrate decreased PF muscles tightness, decreased STRs to mild, to facilitate soft tissues mobility and allow PF muscles to relax and accommodate penile tissue during sexual activity, and digital PF assessment, also improve relaxation to facilitate passage of stool with defecation -progressing as of 8/27/24  Patient will exhibit improvement of hips, core,  abdominal muscles strength to 4/5  to improve lumbar stability, improve ability of patient to perform daily and work related activities with no apprehension for pain or difficulty. -progressing as of 8/27/24  Patient will have Marinoff score of 0-1. - progressing as of 8/27/24  Patient will have improvement of PF muscles strength using the Laycock Scale to 4/10/10//10, to improve efficiency of PF contractions to decrease UUI symptoms and be able to reach the bathroom on time,decrease incidence of nocturnal UI,  improve PF stabilization, and report decrease anterior pelvic tightness.- progressing as of 8/27/24  Patient will demonstrate improved coordination of core and PF muscles, including proper respiration to facilitate bowel, bladder, sexual functions and minimize symptoms.- progressing as of 8/27/24  Patient will verbalize understanding of PF functions, knowledge of normal bowel, bladder sexual mechanics, and utilize an established HEP to manage symptoms - progressing as of 8/27/24    Plan: Continue PT as per established POC. Advance TE, MT as needed.   Date: 8/6/2024  TX#: 2/10 Date:  8/13/2024                TX#: 3/8 Date:  8/20/2024                TX#: 4/8 Date:  8/27/2024                TX#: 5/10 Date: 9/3/2024   Tx#: 6/10   Nu step L1, 5 mins  Calf stretches 2x 30 secs  Supine adductors stretches 2x 30 secs (pat did not feel stretches on the adductors, she felt more on the anterior hip)  Modified Saúl stretches in supine 1x 30 secs (did not feel stretch on hip flexors)  Standing psoas stretches on chair 2x 30 secs (felt increased tightness, cues to decrease intensity if pain starts)  Mermaid stretch over the bed 1x 30 secs (patient preferred)  Diaphragmatic breathing x 5 mins  Thera ex - 35 mins  Man therapy: 10 mins  STM for pelvic floor muscles, PF muscles relaxation with short duration contractions and 10 secs relaxations, and diaphragmatic breathing       Nu step L1, 5 mins  Calf stretches 2x 30  secs  Mermaid stretch over the bed 2x 30 secs   LTR x 10  Diaphragmatic breathing x 5 mins  Thera ex - 28 mins  Man therapy: 13 mins  Prone A/P LS and SIJ mobs x 5 mins  STM and stretching of PF muscles rectal route x 8 mins- decreased tightness Nu step L5, 5 mins  Calf stretches 2x 30 secs  Mermaid stretch over the bed 2x 30 secs   LTR x 10  Supine protractions x 10 with exhalation.   TA brace x 10 with 5  secs  Sahrmann Level 1 x 10  Diaphragmatic breathing x 2 mins  Thera ex - 32 mins  Man therapy: 13 mins  Prone A/P LS and SIJ mobs x 5 mins  STM and stretching of PF muscles rectal route x 8  mins Nu step L5, 5 mins  Calf stretches 2x 30 secs  Mermaid stretch over the bed 2x 30 secs   LTR x 10  Supine protractions x 10 with exhalation.   TA brace x 10 with 5  secs  Sahrmann Level 1 x 10  Thera ex - 32 mins   NMR for 10 mins - for Pelvic Floor (PF) muscles and Transversus Abdominis (TA) muscles control, improve coordination of PF and TA, improve awareness for contractions and relaxations, improve coordination with respiration using digital/tactile cues, verbal cues, digital insertion, vaginal and rectal  Isolated PF contractions x 10 reps with 5 secs hold, 10 secs relaxations, Isolated rapid contractions x 10  Manual Therapy - STM for the Levator ani muscles x 3 mins Nu step L5, 5 mins  Calf stretches 2x 30 secs  Mermaid stretch over the bed 2x 30 secs   Child's pose 3x 30 secs with DBE  LTR x 20  Supine protractions x 10 with exhalation.   TA brace x 10 with 5  secs  TA brace with MA push down x 10 with 5 secs  Sahrmann Level 1, 2 x 10  Thera ex - 32 mins   NMR for 10 mins - for Pelvic Floor (PF) muscles and Transversus Abdominis (TA) muscles control, improve coordination of PF and TA, improve awareness for contractions and relaxations, improve coordination with respiration using digital/tactile cues, verbal cues, digital insertion, vaginal and rectal  Isolated PF contractions x 10 reps with 5 secs hold, 10 secs  relaxations, Isolated rapid contractions x 10  Manual Therapy - Coccyx mobilization, external and internal STM for the Levator ani muscles x 5 mins                        HEP: Access Code: [email or web address]  URL: [email or web address]  Date: 08/21/2024  Prepared by: Elizabeth Garcia    Exercises  - Supine Lower Trunk Rotation  - 2 x daily - 7 x weekly - 1-2 sets - 10 reps  - Seated Table Piriformis Stretch  - 2 x daily - 7 x weekly - 1-2 sets - 3 reps - 30 hold  - Supine Transversus Abdominis Bracing - Hands on Stomach  - 2 x daily - 7 x weekly - 1-2 sets - 10 reps - 3-5 hold  - Gastroc Stretch on Wall  - 1 x daily - 7 x weekly - 1 sets - 2 reps - 30 hold  - Clamshell  - 1 x daily - 7 x weekly - 1-2 sets - 10 reps  - Supine Diaphragmatic Breathing  - 2 x daily - 7 x weekly - 1-2 sets - 10 reps - 5 hold  - Seated Diaphragmatic Breathing  - 2 x daily - 7 x weekly - 1-2 sets - 10 reps - 5 hold  - Diaphragmatic Breathing in Child's Pose with Pelvic Floor Relaxation  - 2 x daily - 7 x weekly - 1-2 sets - 10 reps - 5 hold  - Shoulder Extension with Resistance  - 2 x daily - 7 x weekly - 1-2 sets - 10 reps  - Supine Bilateral Shoulder Protraction  - 2 x daily - 7 x weekly - 1-2 sets - 10 reps  - Supine Transversus Abdominis Bracing with Heel Slide  - 2 x daily - 7 x weekly - 1-2 sets - 10 reps      Charges: Thera -e x 2, Man Therapy -1       Total Timed Treatment: 45 min  Total Treatment Time: 45 min

## 2024-09-24 NOTE — PROGRESS NOTES
Pulmonary/Critical Care/Sleep Medicine    Consult Note     PCP: Julissa Gonzalez MD   Phone: [phone]   Fax: [phone]     Ref Provider: Julissa Gonzalez     Chief Complaint   Patient presents with    New Patient     Ref by Dr. Gonzalez.  Pt here for sleep consult. Pt does not have machine or had a S/S performed. Pt states drowsiness and fatigue during the day for the past six months.        HPI  I had the pleasure of seeing Nola De Paz who is a pleasant 44 year old female who presents for evaluation of BLESSING        The patient states that she has been sleepy during her high school days, she also snored at home in past and was noted to have witnessed apneas,she had diagnosis of BLESSING and was on CPAP in 2019, but she stopped using CPAP in 2020 after bariatric surgery, the pressure was too high.. She states that her sleepiness is more severe in the last 6 months, so she presents for sleep evaluation.  She has gained weight back.    She states bed time around 9-11 PM . It takes 5 min to fall asleep and leaves bed around 545 AM. She wakes up sometimes 1 times a night.  She is sleepy and fatigued during the daytime.  She admits to tossing and turning at night.  She denies nightmares, sleep talking or sleep walking.  She admits to occasional  AM headaches.  She symptoms sleep attacks, denies  hypnagogic hallucinations, sleep paralysis, or cataplexy.    The patient admits to kicking legs at night. admits to teeth grinding.       She drinks 1 cups of caffeine coffee daily,  and 1  caffeine cans of soda daily.       She has pets 3 cats  that do sleep in bed.         Hx of tobacco use: She  reports that she has never smoked. She has never used smokeless tobacco.    Past Medical History:    Allergic rhinitis    Anxiety    Arthritis    Back problem    DDD    Blood disorder    anemia    Depression    Disorder of thyroid    Esophageal reflux    Fibromyalgia    High cholesterol    Hyperlipidemia    Hypothyroidism    IBS  (irritable bowel syndrome)    Migraines    Obesity    BLESSING (obstructive sleep apnea)    AHI 19 REM AHI 57 Supine AHI 46 non-supine AHI 11 [place] 71%    Osteoarthritis    Pneumonia due to organism    Shortness of breath    Sleep apnea    cpap    Thyroid disease      Past Surgical History:   Procedure Laterality Date    Cholecystectomy  2013    Colonoscopy N/A 08/28/2020    Procedure: COLONOSCOPY;  Surgeon: Tone Steiner MD;  Location:  ENDOSCOPY    Other surgical history  12/21/2020    gastric sleeve    Removal gallbladder  2013    [place]    Surgical bariatrics - internal  12/21/2020    [place] teeth removed  2013     Allergies   Allergen Reactions    Penicillins HIVES and RASH     Current Outpatient Medications   Medication Sig Dispense Refill    miSOPROStol 200 MCG Oral Tab INSERT 2 TABLETS VAGINALLY ONCE 2 HOURS PRIOR TO PROCEDURE VISIT FOR 1 DOSE      doxycycline 100 MG Oral Cap TAKE 1 CAPSULE BY MOUTH TWICE DAILY ON A FULL STOMACH. DO NOT LAY FLAT 1 HOUR AFTER      GABAPENTIN 100 MG Oral Cap TAKE 1 CAPSULE(100 MG) BY MOUTH THREE TIMES DAILY 270 capsule 0    Tirzepatide-Weight Management (ZEPBOUND) 2.5 MG/0.5ML Subcutaneous Solution Auto-injector Inject 2.5 mg into the skin once a week. 2 mL 0    topiramate 25 MG Oral Tab Take 1 tablet (25 mg total) by mouth 2 (two) times daily. 180 tablet 1    CUSTOM MEDICATION Tirzepatide/Niacinamide 2.5mg    8/2mg /mL  Inject 31unit/0.31mL into skin q weekly    Disp: 2.5mL 2.5 each 0    ezetimibe 10 MG Oral Tab Take 1 tablet (10 mg total) by mouth daily. 90 tablet 3    hydroxychloroquine 200 MG Oral Tab Take 1 tablet (200 mg total) by mouth 2 (two) times daily. 180 tablet 1    cyclobenzaprine 10 MG Oral Tab Take 1 tablet (10 mg total) by mouth nightly as needed for Muscle spasms. 30 tablet 0    levothyroxine 175 MCG Oral Tab Take 1 tablet (175 mcg total) by mouth before breakfast. 90 tablet 3    Omeprazole 40 MG Oral Capsule Delayed Release Take 1 capsule (40 mg total)  by mouth daily. 90 capsule 1    venlafaxine  MG Oral Capsule SR 24 Hr Take 1 capsule (150 mg total) by mouth daily. 90 capsule 1    rosuvastatin 10 MG Oral Tab Take 1 tablet (10 mg total) by mouth nightly. (Patient taking differently: Take 1 tablet (10 mg total) by mouth daily.) 90 tablet 0    Meloxicam 7.5 MG Oral Tab Take 1 tablet (7.5 mg total) by mouth daily.      traZODone 50 MG Oral Tab Take 1 tablet (50 mg total) by mouth nightly.      clindamycin 1 % External Lotion APPLY TOPICALLY TO THE AFFECTED AREA EVERY DAY BEFORE NOON      LORazepam 0.5 MG Oral Tab Take 1 tablet (0.5 mg total) by mouth 2 (two) times daily as needed. 40 tablet 0    fexofenadine 180 MG Oral Tab Take 1 tablet (180 mg total) by mouth daily.      Multiple Vitamin (MULTIVITAMIN ADULT OR) Apply topically.      ondansetron 4 MG Oral Tablet Dispersible Take 1 tablet (4 mg total) by mouth every 8 (eight) hours as needed for Nausea. 30 tablet 0    Nystatin 508536 UNIT/GM External Powder Apply 1 Application topically 4 (four) times daily. 1 Bottle 0    clotrimazole-betamethasone 1-0.05 % External Cream Apply 1 Application topically 2 (two) times daily as needed (rash). 60 g 3    Albuterol Sulfate HFA (PROAIR HFA) 108 (90 Base) MCG/ACT Inhalation Aero Soln Inhale 2 puffs into the lungs every 4 (four) hours as needed for Wheezing or Shortness of Breath. 1 Inhaler 3    Triamcinolone Acetonide 55 MCG/ACT Nasal Aerosol by Nasal route daily as needed.        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     Social Determinants of Health     Physical Activity: Inactive (11/2/2020)    [facility]    Exercise Vital Sign     Days of Exercise per Week: 0 days     Minutes of Exercise per Session: 0 min      Immunization History   Administered Date(s) Administered    Covid-19 Vaccine Moderna 100 mcg/0.5 ml 01/15/2021, 02/12/2021    FLULAVAL 6 months & older 0.5 ml Prefilled syringe (08459) 12/12/2019, 12/22/2020    FLUZONE 6 months and older PFS 0.5 ml (95879) 12/22/2020    TDAP 11/08/2012, 07/14/2023      Family History   Problem Relation Age of Onset    Heart Disease Mother     High Cholesterol Mother     Other (DDD) Mother     Anemia Mother     Heart Disorder Mother         Heart disease, high blood pressure, stent, high cholesterol    Hypertension Mother     Obesity Mother     Hypertension Father     Heart Disorder Father         Heart murmur, high blood pressure    Obesity Father     Other (lymphoma) Maternal Grandmother     Cancer Maternal Grandmother         Lymphoma    Other (cancer) Paternal Grandmother         liver     Asthma Paternal Grandmother         Asthma and emphysema, copd, non smoker    Cancer Paternal Grandmother         Liver and lung cancer    Diabetes Paternal Grandmother     Prostate Cancer Paternal Grandfather         in 80s    Cancer Paternal Grandfather         Prostate cancer    Stroke Paternal Grandfather         Review of Systems   Constitutional:  Positive for fatigue. Negative for fever and unexpected weight change.   HENT:  Positive for congestion. Negative for mouth sores, nosebleeds, postnasal drip, rhinorrhea, sore throat and trouble swallowing.    Eyes:  Negative for visual disturbance.   Respiratory:  Negative for apnea, cough, choking, chest tightness, shortness  of breath and wheezing.    Cardiovascular:  Negative for chest pain, palpitations and leg swelling.   Gastrointestinal:  Negative for abdominal pain, constipation, diarrhea, nausea and vomiting.   Genitourinary:  Negative for difficulty urinating.   Musculoskeletal:  Positive for myalgias. Negative for arthralgias, back pain and gait problem.   Neurological:  Positive for headaches. Negative for dizziness and weakness.   Psychiatric/Behavioral:  Positive for sleep disturbance.         Vitals:    09/24/24 1429   BP: 116/70   Pulse: 70   Resp: 16   Temp: 96.9 °F (36.1 °C)      SpO2: 98 %  Ht Readings from Last 1 Encounters:   09/24/24 5' 2\" (1.575 m)     Wt Readings from Last 1 Encounters:   09/24/24 274 lb (124.3 kg)     Body mass index is 50.12 kg/m².     Physical Exam  Constitutional:       General: She is not in acute distress.     Appearance: Normal appearance. She is obese. She is not ill-appearing or diaphoretic.   HENT:      Head: Normocephalic and atraumatic.      Nose: Nose normal. No congestion or rhinorrhea.      Comments: Narrow nares bilaterally      Mouth/Throat:      Mouth: Mucous membranes are moist.      Pharynx: Oropharynx is clear. No oropharyngeal exudate or posterior oropharyngeal erythema.      Comments: Mallampati class III palate   Eyes:      Extraocular Movements: Extraocular movements intact.      Pupils: Pupils are equal, round, and reactive to light.   Cardiovascular:      Rate and Rhythm: Normal rate.      Pulses: Normal pulses.      Heart sounds: Normal heart sounds. No murmur heard.  Pulmonary:      Effort: Pulmonary effort is normal. No respiratory distress.      Breath sounds: Normal breath sounds. No wheezing or rhonchi.   Chest:      Chest wall: No tenderness.   Abdominal:      General: Abdomen is flat. Bowel sounds are normal.      Palpations: Abdomen is soft.   Musculoskeletal:         General: Normal range of motion.   Skin:     General: Skin is warm.   Neurological:       General: No focal deficit present.      Mental Status: She is alert and oriented to person, place, and time.   Psychiatric:         Mood and Affect: Mood normal.         Behavior: Behavior normal.         Thought Content: Thought content normal.         Judgment: Judgment normal.             Labs:  Last BMP  Lab Results   Component Value Date    GLU 85 04/30/2024    BUN 14 04/30/2024    CREATSERUM 0.55 04/30/2024    BUNCREA 25.5 (H) 04/30/2024    ANIONGAP <0 (L) 04/30/2024    GFRAA 115 07/16/2022    GFRNAA 100 07/16/2022    CA 9.2 04/30/2024     04/30/2024    K 3.8 04/30/2024     04/30/2024    CO2 30.0 04/30/2024    OSMOCALC 290 04/30/2024      Last CBC  Lab Results   Component Value Date    WBC 5.2 04/30/2024    RBC 4.11 04/30/2024    HGB 12.9 04/30/2024    HCT 37.1 04/30/2024    MCV 90.3 04/30/2024    MCH 31.4 04/30/2024    MCHC 34.8 04/30/2024    RDW 11.4 04/30/2024    .0 04/30/2024    MPV 10.7 01/17/2013      Last CMP  Lab Results   Component Value Date    GLU 85 04/30/2024    BUN 14 04/30/2024    BUNCREA 25.5 (H) 04/30/2024    CREATSERUM 0.55 04/30/2024    ANIONGAP <0 (L) 04/30/2024    GFRNAA 100 07/16/2022    GFRAA 115 07/16/2022    CA 9.2 04/30/2024    OSMOCALC 290 04/30/2024    ALKPHO 65 04/30/2024    AST 34 04/30/2024    ALT 31 04/30/2024    BILT 0.6 04/30/2024    TP 6.6 04/30/2024    ALB 3.7 04/30/2024    GLOBULIN 2.9 04/30/2024    AGRATIO 1.2 08/08/2020     04/30/2024    K 3.8 04/30/2024     04/30/2024    CO2 30.0 04/30/2024      Last Thyroid Function  Lab Results   Component Value Date    T4F 1.0 02/10/2024    TSH 2.330 02/10/2024    TSHT4 0.91 08/08/2020        Imaging:  XR FLUOROSCOPIC GUIDANCE NEEDLE PLACEMENT (CPT=77002)    Result Date: 9/24/2024  CONCLUSION:  Please see the procedure/operative report for further details.    LOCATION:  [id number]    Dictated by (CST): Stromberg, LeRoy, MD on 9/24/2024 at 1:22 PM     Finalized by (CST): Stromberg, LeRoy, MD on 9/24/2024 at  1:22 PM                         [facility]       Accredited by the American Academy of Sleep Medicine (AASM)     PATIENT'S NAME:        STEPHEN RAZO  ATTENDING PHYSICIAN:   Darwin Lawton M.D.  REFERRING PHYSICIAN:   Darwin Lawton M.D.  PATIENT ACCOUNT #:     [id number]        LOCATION:       [facility]  MEDICAL RECORD #:      [id number]        YOB: 1980  DATE OF STUDY:         09/29/2019     SLEEP STUDY REPORT     STUDY TYPE:  CPAP titration.     PATIENT DEMOGRAPHICS:  Age 39.  Gender female.  Height 5 feet 2 inches, weight 363 pounds, BMI of 66.8.  Neck circumference is 17 inches.     BRIEF CLINICAL HISTORY:  The patient is a 39-year-old with history of hypothyroidism, dyslipidemia, GERD, morbid obesity, and recently diagnosed obstructive sleep apnea with a baseline AHI of 19, REM AHI of 57, oxygen saturation heather of 71% who presents now for CPAP titration.     CPAP TITRATION RECORDING PARAMETER:  The patient underwent a formal CPAP titration evaluation at the [facility].  The study was acquired in compliance with the AASM Manual for the Scoring of Sleep and Associated Events.  The following parameters were monitored: EEG, EOG, ECG, chin EMG, left and right tibialis EMG, snore microphone, respiratory inductance plethysmography, and oxygen saturation via continuous pulse oximetry.  In accordance with AASM recommendations, hypopnea events are scored based on an oxygen saturation more than or equal to 4 percent.  Body position is documented via technician notes every 15 minutes.  There is an additional channel for measurement of CPAP flow for the titration of positive airway pressure.     SLEEP ARCHITECTURE:  Total recording time 471 minutes, total sleep time 267 minutes sleep.  Sleep onset latency 6 minutes, REM onset latency 87 minutes.  Wake after sleep onset 20 minutes for a sleep efficiency of 78%.      SLEEP STAGING:  Stage 1, 5%; stage 2, 47%; stage 3, 23%; stage REM  25%.     RESPIRATORY MEASURES:  The patient was initiated on CPAP at 6 cm of water pressure and titrated up to a final pressure of 10 cm of water.  On this setting, patient was able to achieve prolonged periods of stage REM sleep, however, no REM was achieved in the supine position.  While on this setting, patient had an apnea-hypopnea index of 0.3 and an oxygen saturation heather of 91%.     PERIODIC LIMB MOVEMENTS:  PLM index of 5.4.  PLM arousal index of 2.0.     EKG:  Normal sinus rhythm throughout.     EEG:  Based on limited recording montage, there were no significant EEG abnormalities noted.     IMPRESSION:  Successful CPAP titration with resolution of obstructive apneic and hypopneic episodes as well as oxyhemoglobin desaturations.     RECOMMENDATIONS:    1.       The patient should be initiated on CPAP at 10 cm of water pressure with C-Flex level 2, humidity level 3.  During this study, patient used a Cruz and Paykel Eson nasal mask size small.  2.       The patient should avoid alcohol and sedative medications as these may worsen severity of BLESSING.  Patient should avoid drowsy driving.  3.       The patient may follow up in the Pulmonary Sleep Clinic 6 to 8 weeks after initiation of CPAP for ongoing clinical care.     Thank you for allowing me to participate in this patient's care.  Please do not hesitate to call me with any additional questions.     Dictated By Darwin Lawton M.D.  d:10/08/2019 06:28:11      [facility]       Accredited by the American Academy of Sleep Medicine (AASM)     PATIENT'S NAME:        STEPHEN RAZO  ATTENDING PHYSICIAN:   Darwin Lawton M.D.  REFERRING PHYSICIAN:   Marika Gaming M.D.  PATIENT ACCOUNT #:     [id number]        LOCATION:       [facility]  MEDICAL RECORD #:      [id number]        YOB: 1980  DATE OF STUDY:         08/23/2019     SLEEP STUDY REPORT     STUDY TYPE:  Diagnostic polysomnogram.     PATIENT DEMOGRAPHICS:  Age 39.  Gender female.   Height 5 feet 2 inches.  Weight 363 pounds.  BMI of 66.8.  Neck circumference of 17 inches.     BRIEF CLINICAL HISTORY:  Patient is a 39-year-old with history of hypothyroidism, dyslipidemia, gastroesophageal reflux disease, morbid obesity, snoring, and fatigue, as well as recurring headaches who presents now for baseline polysomnography.      DIAGNOSTIC RECORDING PARAMETERS:  The patient underwent a formal polysomnographic evaluation at the [facility]. The study was acquired in compliance with the AASM Manual for the Scoring of Sleep and Associated Events. The following parameters were monitored: EOG, EEG, ECG, chin EMG, left and right tibialis EMG, snore microphone, an oronasal thermal sensor, nasal pressure transducer, respiratory inductance plethysmography, and oxygen saturation via continuous pulse oximetry. In accordance with AASM recommendations, hypopnea events are scored based on an oxygen saturation more than or equal to 4 percent.  Body position is documented via technician notes every 15 minutes.      SLEEP ARCHITECTURE:  Total recording time 405 minutes.  Total sleep time 324 minutes.  Sleep onset latency 15 minutes.  REM onset latency 54 minutes.  Wake after sleep onset 67 minutes, for a sleep efficiency of 80%.       SLEEP STAGING:  Stage 1, 2%; stage 2, 83%; stage 3, 1%; stage REM 15%.      RESPIRATORY MEASURES:  Patient had a total of 101 obstructive apneic or hypopneic episodes, yielding an overall apnea-hypopnea index of 19.  Supine AHI was 46.  Lateral AHI was 11.  REM AHI was 57.  Oxygen saturation heather was 71%, and patient spent 5% of sleep time with oxygen levels below 90%.     PERIODIC LIMB MOVEMENTS:  PLM index of 1.5.     EKG:  Normal sinus rhythm throughout.      EEG:  Based on the limited recording montage, there were no significant EEG abnormalities noted.      IMPRESSION:  This study revealed evidence of significant obstructive sleep apnea with associated oxyhemoglobin  desaturations.     RECOMMENDATIONS:    1.       Given severity of sleep-disordered breathing, it is recommend the patient pursue definitive therapy by undergoing CPAP desensitization followed by CPAP titration.  2.       Patient should avoid alcohol and sedative medications, as these may worsen severity of BLESSING.  Patient should avoid drowsy driving.  3.       At the request of the referring physician, patient will be offered formal pulmonary sleep consultation for ongoing clinical care.     Thank you for allowing me to participate in the patient's care.  Please do not hesitate to call me with any additional questions.       Dictated By Darwin Lawton M.D.  d:08/30/2019 15:11:09      [place] Sleepiness Scale: (ESS) score on today's visit is 21  out of 24.     Score total of 1-6    Normal sleep   Score total of 7-8    Average sleepiness   Score total of 9-24    Abnormal (possibly pathologic) sleepiness       Impression:    Obstructive sleep apnea syndrome (OSAS):  Attended sleep study performed on 8/23/2024  showed total of 101 obstructive apneic or hypopneic episodes, yielding an overall apnea-hypopnea index of 19.  Supine AHI was 46.  Lateral AHI was 11.  REM AHI was 57.  Oxygen saturation heather was 71%, and patient spent 5% of sleep time with oxygen levels below 90%. The patient was treated with CPAP at 6 [facility] for a year then she had bariatric surgery and felt high pressure on CPAP and stopped using. She is again symptomatic with BLESSING symptoms   Daytime hypersomnolence/fatigue, severe   Obesity: Class III    ;  Body mass index is 50.12 kg/m².  Allergic Rhinitis   Anxiety   Depression  GERD  Hypothyroidism  Fibromyalgia   IBS                              Plan:    Schedule Split night sleep study to be interpreted by Dr. Frankie Helm, will then arrange a NEW CPAP machine   Advised about weight loss   Advised against drowsy driving and to avoid alcoholic beverage and respiratory depressants as these may worsen sleep  apnea      Follow up: 3  months     Thank you for allowing me to participate in your patient care.    JASMYNE Helm MD, FACP, FCCP, [facility] - Pulmonary/Critical care/Sleep Medicine  Please contact our office if you have any questions or concerns at [phone]    Note to the patient: The 21st Century Cures Act makes medical notes like these available to patients in the interest of transparency. However, be advised that this is a medical document. It is intended as peer to peer communication. It is written in medical language and may contain abbreviations or verbiage that are unfamiliar. It may appear blunt or direct. Medical documents are intended to carry relevant information, facts as evident, and clinical opinion of the practitioner.      Disclaimer: Components of this note were documented using voice recognition system and are subject to errors not corrected at proofreading. Contact the author of this note for any clarifications

## 2024-09-24 NOTE — OPERATIVE REPORT
[facility]  Operative Report  2024     Nola De Paz Patient Status:  Hospital Outpatient Surgery    3/16/1980 MRN [id number]   Location [facility] Attending Vick Neal MD   Hosp Day # 0 PCP Julissa Gonzalez MD     Indication: Nola is a 44 year old female with chronic knee pain    Preoperative Diagnosis:  Chronic pain of both knees [M25.561, M25.562, G89.29]    Postoperative Diagnosis: Same as above.    Procedure performed: SYNVISC INJECTION OF BILATERAL KNEE JOINT with local    Anesthesia: Local  .    EBL: Less than 1 ml.    Procedure Description:   After reviewing the patient's history and performing a focused physical examination, the diagnosis was confirmed and contraindications such as infection and coagulopathy were ruled out.  Following review of potential side effects and complications, including but not necessarily limited to infection, allergic reaction, local tissue breakdown, nerve injury, and paresis, the patient indicated they understood and agreed to proceed. After obtaining the informed consent, the patient was brought to the procedure room and monitored.      The patient was brought to the procedure room and positioned supine with affected knee supported.   Fluoroscopy of the right knee joint was taken in AP view.  The lateral aspect of the patellar tendon was marked.  A skin well was raised with 1% lidocaine.  Subsequently a 22-gauge 3.5 Quincke spinal needle was introduced under direct fluoroscopy under AP view.  The needle's position was confirmed by AP and lateral fluoroscopic view.  Thereafter, 1 mL Omnipaque 180 was injected.  After a good arthrogram was obtained, 6 mL of Synvisc was injected after repeated aspiration.  The needle was then flushed with 1 mL 1% lidocaine.  The similar procedure was repeated on the other side.  The patient tolerated the procedure very well and was discharged home after recovery criteria were met.  The patient was  advised to follow up with us in a one-week interval for subsequent injections, up to three.    Complications: None.    Follow up:  Clinic    Vick Neal MD

## 2024-09-25 ENCOUNTER — TELEPHONE (OUTPATIENT)
Dept: PAIN CLINIC | Facility: CLINIC | Age: 44
End: 2024-09-25

## 2024-09-25 NOTE — TELEPHONE ENCOUNTER
Follow-up call post pain procedure. Left message informing patient to contact the pain clinic at [phone] option #3 regarding any questions or concerns about recent pain procedure.       Procedure: SYNVISC INJECTION OF BILATERAL KNEE JOINT with local   Date: 09/24/24  Follow up Visit Scheduled:

## 2024-10-01 ENCOUNTER — APPOINTMENT (OUTPATIENT)
Dept: PHYSICAL THERAPY | Age: 44
End: 2024-10-01
Attending: NURSE PRACTITIONER
Payer: COMMERCIAL

## 2024-10-04 ENCOUNTER — HOSPITAL ENCOUNTER (OUTPATIENT)
Dept: INTERVENTIONAL RADIOLOGY/VASCULAR | Facility: HOSPITAL | Age: 44
Discharge: HOME | End: 2024-10-04
Attending: INTERNAL MEDICINE | Admitting: RADIOLOGY
Payer: COMMERCIAL

## 2024-10-04 VITALS
HEIGHT: 62 IN | RESPIRATION RATE: 13 BRPM | OXYGEN SATURATION: 97 % | HEART RATE: 54 BPM | TEMPERATURE: 97 F | SYSTOLIC BLOOD PRESSURE: 115 MMHG | BODY MASS INDEX: 48.58 KG/M2 | WEIGHT: 264 LBS | DIASTOLIC BLOOD PRESSURE: 56 MMHG

## 2024-10-04 DIAGNOSIS — K75.81 METABOLIC DYSFUNCTION-ASSOCIATED STEATOHEPATITIS (MASH): ICD-10-CM

## 2024-10-04 LAB
ALBUMIN SERPL-MCNC: 3.6 G/DL (ref 3.2–4.8)
ALBUMIN/GLOB SERPL: 1.3 {RATIO} (ref 1–2)
ALP LIVER SERPL-CCNC: 63 U/L
ALT SERPL-CCNC: 17 U/L
ANION GAP SERPL CALC-SCNC: 4 MMOL/L (ref 0–18)
AST SERPL-CCNC: 20 U/L (ref ?–34)
BASOPHILS # BLD AUTO: 0.03 X10(3) UL (ref 0–0.2)
BASOPHILS # BLD AUTO: 0.04 X10(3) UL (ref 0–0.2)
BASOPHILS NFR BLD AUTO: 0.5 %
BASOPHILS NFR BLD AUTO: 0.6 %
BILIRUB SERPL-MCNC: 0.6 MG/DL (ref 0.3–1.2)
BUN BLD-MCNC: 19 MG/DL (ref 9–23)
BUN/CREAT SERPL: 29.7 (ref 10–20)
CALCIUM BLD-MCNC: 8.9 MG/DL (ref 8.7–10.4)
CHLORIDE SERPL-SCNC: 110 MMOL/L (ref 98–112)
CO2 SERPL-SCNC: 29 MMOL/L (ref 21–32)
CREAT BLD-MCNC: 0.64 MG/DL
DEPRECATED RDW RBC AUTO: 40.7 FL (ref 35.1–46.3)
DEPRECATED RDW RBC AUTO: 41.1 FL (ref 35.1–46.3)
EGFRCR SERPLBLD CKD-EPI 2021: 113 ML/MIN/1.73M2 (ref 60–?)
EOSINOPHIL # BLD AUTO: 0.36 X10(3) UL (ref 0–0.7)
EOSINOPHIL # BLD AUTO: 0.36 X10(3) UL (ref 0–0.7)
EOSINOPHIL NFR BLD AUTO: 5.5 %
EOSINOPHIL NFR BLD AUTO: 5.6 %
ERYTHROCYTE [DISTWIDTH] IN BLOOD BY AUTOMATED COUNT: 12.1 % (ref 11–15)
ERYTHROCYTE [DISTWIDTH] IN BLOOD BY AUTOMATED COUNT: 12.2 % (ref 11–15)
FASTING STATUS PATIENT QL REPORTED: YES
GLOBULIN PLAS-MCNC: 2.7 G/DL (ref 2–3.5)
GLUCOSE BLD-MCNC: 88 MG/DL (ref 70–99)
HCT VFR BLD AUTO: 34.8 %
HCT VFR BLD AUTO: 35.5 %
HGB BLD-MCNC: 11.9 G/DL
HGB BLD-MCNC: 12 G/DL
IMM GRANULOCYTES # BLD AUTO: 0.02 X10(3) UL (ref 0–1)
IMM GRANULOCYTES # BLD AUTO: 0.02 X10(3) UL (ref 0–1)
IMM GRANULOCYTES NFR BLD: 0.3 %
IMM GRANULOCYTES NFR BLD: 0.3 %
INR BLD: 0.97 (ref 0.8–1.2)
LYMPHOCYTES # BLD AUTO: 2.01 X10(3) UL (ref 1–4)
LYMPHOCYTES # BLD AUTO: 2.01 X10(3) UL (ref 1–4)
LYMPHOCYTES NFR BLD AUTO: 30.5 %
LYMPHOCYTES NFR BLD AUTO: 31.5 %
MCH RBC QN AUTO: 31 PG (ref 26–34)
MCH RBC QN AUTO: 31.9 PG (ref 26–34)
MCHC RBC AUTO-ENTMCNC: 33.5 G/DL (ref 31–37)
MCHC RBC AUTO-ENTMCNC: 34.5 G/DL (ref 31–37)
MCV RBC AUTO: 92.4 FL
MCV RBC AUTO: 92.6 FL
MONOCYTES # BLD AUTO: 0.65 X10(3) UL (ref 0.1–1)
MONOCYTES # BLD AUTO: 0.7 X10(3) UL (ref 0.1–1)
MONOCYTES NFR BLD AUTO: 10.2 %
MONOCYTES NFR BLD AUTO: 10.6 %
NEUTROPHILS # BLD AUTO: 3.31 X10 (3) UL (ref 1.5–7.7)
NEUTROPHILS # BLD AUTO: 3.31 X10(3) UL (ref 1.5–7.7)
NEUTROPHILS # BLD AUTO: 3.45 X10 (3) UL (ref 1.5–7.7)
NEUTROPHILS # BLD AUTO: 3.45 X10(3) UL (ref 1.5–7.7)
NEUTROPHILS NFR BLD AUTO: 51.9 %
NEUTROPHILS NFR BLD AUTO: 52.5 %
OSMOLALITY SERPL CALC.SUM OF ELEC: 298 MOSM/KG (ref 275–295)
PLATELET # BLD AUTO: 176 10(3)UL (ref 150–450)
PLATELET # BLD AUTO: 188 10(3)UL (ref 150–450)
POTASSIUM SERPL-SCNC: 3.3 MMOL/L (ref 3.5–5.1)
PROT SERPL-MCNC: 6.3 G/DL (ref 5.7–8.2)
PROTHROMBIN TIME: 13.5 SECONDS (ref 11.6–14.8)
RBC # BLD AUTO: 3.76 X10(6)UL
RBC # BLD AUTO: 3.84 X10(6)UL
SODIUM SERPL-SCNC: 143 MMOL/L (ref 136–145)
WBC # BLD AUTO: 6.4 X10(3) UL (ref 4–11)
WBC # BLD AUTO: 6.6 X10(3) UL (ref 4–11)

## 2024-10-04 PROCEDURE — 47000 NEEDLE BIOPSY OF LIVER PERQ: CPT | Performed by: RADIOLOGY

## 2024-10-04 PROCEDURE — 99152 MOD SED SAME PHYS/QHP 5/>YRS: CPT | Performed by: RADIOLOGY

## 2024-10-04 PROCEDURE — 0FD03ZX EXTRACTION OF LIVER, PERCUTANEOUS APPROACH, DIAGNOSTIC: ICD-10-PCS | Performed by: RADIOLOGY

## 2024-10-04 PROCEDURE — 85610 PROTHROMBIN TIME: CPT | Performed by: RADIOLOGY

## 2024-10-04 PROCEDURE — 88307 TISSUE EXAM BY PATHOLOGIST: CPT | Performed by: INTERNAL MEDICINE

## 2024-10-04 PROCEDURE — 80053 COMPREHEN METABOLIC PANEL: CPT | Performed by: RADIOLOGY

## 2024-10-04 PROCEDURE — 85025 COMPLETE CBC W/AUTO DIFF WBC: CPT | Performed by: RADIOLOGY

## 2024-10-04 PROCEDURE — 76942 ECHO GUIDE FOR BIOPSY: CPT | Performed by: RADIOLOGY

## 2024-10-04 PROCEDURE — 88313 SPECIAL STAINS GROUP 2: CPT | Performed by: INTERNAL MEDICINE

## 2024-10-04 RX ORDER — SODIUM CHLORIDE 9 MG/ML
INJECTION, SOLUTION INTRAVENOUS CONTINUOUS
Status: DISCONTINUED | OUTPATIENT
Start: 2024-10-04 | End: 2024-10-04

## 2024-10-04 RX ORDER — MIDAZOLAM HYDROCHLORIDE 1 MG/ML
INJECTION INTRAMUSCULAR; INTRAVENOUS
Status: COMPLETED
Start: 2024-10-04 | End: 2024-10-04

## 2024-10-04 RX ADMIN — SODIUM CHLORIDE: 9 INJECTION, SOLUTION INTRAVENOUS at 10:30:00

## 2024-10-04 NOTE — INTERVAL H&P NOTE
The above referenced H&P was reviewed by Scar Zarate MD on 10/4/2024, the patient was examined and no significant changes have occurred in the patient's condition since the H&P was performed.  Risks, benefits, alternative treatments and consequences of no treatment were discussed.  We will proceed with procedure as planned.      Scar Zarate MD  10/4/2024  12:04 PM

## 2024-10-04 NOTE — BRIEF PROCEDURE NOTE
Patient in holding room 9 for bedside liver biopsy. Patient is alert and oriented x 3. MD to bedside for procedure consent and explanation. Patient positioned supine , resting comfortably. Patient prepped and draped in sterile fashion. Time-out performed, all staff agree. Patient received 10 mL lidocaine subcutaneously for local anesthesia. Patient received 1 mg versed and 50 mcg fentanyl IVP for moderate sedation. Patient tolerated procedure well, vital signs stable. Specimen collected and placed in formulin. Specimen sent to lab by RN. Site appears clean, dry, intact without bleeding, swelling, or hematoma. Sterile bandage applied to site. Report given to JERRI Licea.

## 2024-10-04 NOTE — IVS NOTE
DISCHARGE NOTE     Pt is able to sit up and ambulate without difficulty.   Pt voided and tolerated fluids and food.   Procedural site remains dry and intact.  No signs and symptoms of bleeding/hematoma noted.   IV access removed  Instruction provided, patient/family verbalizes understanding.   Dr. Zarate spoke with patient/family post procedure.     Pt discharge via wheelchair to [facility]     Follow up Appointment: n/a    New Prescription: n/a

## 2024-10-04 NOTE — DISCHARGE INSTRUCTIONS
INTERVENTIONAL RADIOLOGY  [facility]  [phone]     Patient Name:  Nola De Paz    Procedure:  IR LIVER BIOPSY    Site Care: Remove your band-aid or dressing in 24 hours.  Gently wash area with soap and water.                                       Activity/Diet  No heavy lifting or strenuous activity for 48 hours.  Drink plenty of fluids, unless you have otherwise been told to restrict your fluid intake.  Do not drink alcohol for 24 hours.  Do not drive,  operate heavy machinery, make important decisions or sign legal documents today.    Medications:  Take acetaminophen if needed for pain. Do not exceed 4000mg of acetaminophen in a 24-hour period. and Make no changes to your existing medications.    Contact Interventional Radiology at [phone] if you have severe/unrelieved pain, fever, chills, dizziness/lightheadedness, or drainage/bleeding from your incision site.

## 2024-10-09 ENCOUNTER — TELEPHONE (OUTPATIENT)
Dept: SURGERY | Facility: CLINIC | Age: 44
End: 2024-10-09

## 2024-10-09 NOTE — TELEPHONE ENCOUNTER
Liver biopsy with no steatosis, no fibrosis. No evidence per report that the two cores were fragmented. Suspect the mild dilation to be an artifact. Notably recent MRI also noted normal liver size without evidence of steatosis.     Again suspect strongly that her presentation in May was likely from choledocholithiasis/ biliary sludge which can still happen post-cholecystectomy. Notably she remains at risk for MASH and therefore should continue to work on lifestyle modifications and medications for weight loss.    Called patient to review above, busy, no option to leave voicemail. Will send My Chart message. RTC-liver PRN.       Dea Holly MD

## 2024-10-10 ENCOUNTER — TELEPHONE (OUTPATIENT)
Dept: INTERNAL MEDICINE CLINIC | Facility: CLINIC | Age: 44
End: 2024-10-10

## 2024-10-10 DIAGNOSIS — Z86.69 HISTORY OF EAR INFECTION: Primary | ICD-10-CM

## 2024-10-10 NOTE — TELEPHONE ENCOUNTER
Tatiana from [facility] called. She states pt has appointment for hearing test tomorrow and she needs a referral to see the audiologist at their office.     Dr. Gonzalez - referral pended if agreeable

## 2024-10-11 ENCOUNTER — OFFICE VISIT (OUTPATIENT)
Facility: LOCATION | Age: 44
End: 2024-10-11
Payer: COMMERCIAL

## 2024-10-11 DIAGNOSIS — J32.8 OTHER CHRONIC SINUSITIS: Primary | ICD-10-CM

## 2024-10-11 DIAGNOSIS — H93.293 ABNORMAL AUDITORY PERCEPTION OF BOTH EARS: Primary | ICD-10-CM

## 2024-10-11 DIAGNOSIS — R42 VERTIGO: ICD-10-CM

## 2024-10-11 PROCEDURE — 92557 COMPREHENSIVE HEARING TEST: CPT | Performed by: AUDIOLOGIST

## 2024-10-11 PROCEDURE — 99204 OFFICE O/P NEW MOD 45 MIN: CPT | Performed by: OTOLARYNGOLOGY

## 2024-10-11 PROCEDURE — 92567 TYMPANOMETRY: CPT | Performed by: AUDIOLOGIST

## 2024-10-11 NOTE — PROGRESS NOTES
Nola De Paz was seen for an audiometric evaluation and tympanogram today. Referred back to physician.    Angeles Brooke MS, CCC-A

## 2024-10-11 NOTE — PROGRESS NOTES
Nola De Paz is a 44 year old female.   Chief Complaint   Patient presents with    Ear Problem     HPI:   She has had ear complaints.  She tends to get fullness and pressure in her ears and ear infections.  She has also had recurrent tonsillitis more so in the past.  She has difficulty sleeping.  She does not undergo a sleep test.  She gets chronic congestion and drainage at times and gives her sore throat.  She does use Allegra and Flonase.  Current Outpatient Medications   Medication Sig Dispense Refill    miSOPROStol 200 MCG Oral Tab INSERT 2 TABLETS VAGINALLY ONCE 2 HOURS PRIOR TO PROCEDURE VISIT FOR 1 DOSE      doxycycline 100 MG Oral Cap TAKE 1 CAPSULE BY MOUTH TWICE DAILY ON A FULL STOMACH. DO NOT LAY FLAT 1 HOUR AFTER      GABAPENTIN 100 MG Oral Cap TAKE 1 CAPSULE(100 MG) BY MOUTH THREE TIMES DAILY 270 capsule 0    Tirzepatide-Weight Management (ZEPBOUND) 2.5 MG/0.5ML Subcutaneous Solution Auto-injector Inject 2.5 mg into the skin once a week. 2 mL 0    topiramate 25 MG Oral Tab Take 1 tablet (25 mg total) by mouth 2 (two) times daily. 180 tablet 1    CUSTOM MEDICATION Tirzepatide/Niacinamide 2.5mg    8/2mg /mL  Inject 31unit/0.31mL into skin q weekly    Disp: 2.5mL 2.5 each 0    ezetimibe 10 MG Oral Tab Take 1 tablet (10 mg total) by mouth daily. 90 tablet 3    hydroxychloroquine 200 MG Oral Tab Take 1 tablet (200 mg total) by mouth 2 (two) times daily. 180 tablet 1    cyclobenzaprine 10 MG Oral Tab Take 1 tablet (10 mg total) by mouth nightly as needed for Muscle spasms. 30 tablet 0    levothyroxine 175 MCG Oral Tab Take 1 tablet (175 mcg total) by mouth before breakfast. 90 tablet 3    Omeprazole 40 MG Oral Capsule Delayed Release Take 1 capsule (40 mg total) by mouth daily. 90 capsule 1    venlafaxine  MG Oral Capsule SR 24 Hr Take 1 capsule (150 mg total) by mouth daily. 90 capsule 1    rosuvastatin 10 MG Oral Tab Take 1 tablet (10 mg total) by mouth nightly. (Patient taking differently: Take 1  tablet (10 mg total) by mouth daily.) 90 tablet 0    Meloxicam 7.5 MG Oral Tab Take 1 tablet (7.5 mg total) by mouth daily.      traZODone 50 MG Oral Tab Take 1 tablet (50 mg total) by mouth nightly.      clindamycin 1 % External Lotion APPLY TOPICALLY TO THE AFFECTED AREA EVERY DAY BEFORE NOON      LORazepam 0.5 MG Oral Tab Take 1 tablet (0.5 mg total) by mouth 2 (two) times daily as needed. 40 tablet 0    fexofenadine 180 MG Oral Tab Take 1 tablet (180 mg total) by mouth daily.      Multiple Vitamin (MULTIVITAMIN ADULT OR) Apply topically.      ondansetron 4 MG Oral Tablet Dispersible Take 1 tablet (4 mg total) by mouth every 8 (eight) hours as needed for Nausea. 30 tablet 0    Nystatin 224653 UNIT/GM External Powder Apply 1 Application topically 4 (four) times daily. 1 Bottle 0    clotrimazole-betamethasone 1-0.05 % External Cream Apply 1 Application topically 2 (two) times daily as needed (rash). 60 g 3    Albuterol Sulfate HFA (PROAIR HFA) 108 (90 Base) MCG/ACT Inhalation Aero Soln Inhale 2 puffs into the lungs every 4 (four) hours as needed for Wheezing or Shortness of Breath. 1 Inhaler 3    Triamcinolone Acetonide 55 MCG/ACT Nasal Aerosol by Nasal route daily as needed.        Past Medical History:    Allergic rhinitis    Anxiety    Arthritis    Back problem    DDD    Blood disorder    anemia    Depression    Disorder of thyroid    Esophageal reflux    Fibromyalgia    High cholesterol    Hyperlipidemia    Hypothyroidism    IBS (irritable bowel syndrome)    Migraines    Obesity    BLESSING (obstructive sleep apnea)    AHI 19 REM AHI 57 Supine AHI 46 non-supine AHI 11 [place] 71%    Osteoarthritis    Pneumonia due to organism    Shortness of breath    Sleep apnea    cpap    Thyroid disease      Social History:  Social History     Socioeconomic History    Marital status: Single   Occupational History    Occupation: [employer]     Employer: [employer]     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     Social Drivers of Health     Physical Activity: Inactive (11/2/2020)    Received from [facility]    Exercise Vital Sign     Days of Exercise per Week: 0 days     Minutes of Exercise per Session: 0 min      Past Surgical History:   Procedure Laterality Date    Cholecystectomy  2013    Colonoscopy N/A 08/28/2020    Procedure: COLONOSCOPY;  Surgeon: Tone Steiner MD;  Location:  ENDOSCOPY    Other surgical history  12/21/2020    gastric sleeve    Removal gallbladder  2013    [facility]    Surgical bariatrics - internal  12/21/2020    [place] teeth removed  2013         REVIEW OF SYSTEMS:   GENERAL HEALTH: feels well otherwise  GENERAL : denies fever, chills, sweats, weight loss, weight gain  SKIN: denies any unusual skin lesions or rashes  RESPIRATORY: denies shortness of breath with exertion  NEURO: denies headaches    EXAM:   LMP 08/26/2024 (Approximate)     System Findings Details   Constitutional  Overall appearance - Normal.   Psychiatric  Orientation - Oriented to time, place, person & situation. Appropriate mood and affect.   Head/Face  Facial features -- Normal. Skull - Normal.   Eyes  Pupils equal ,round ,react to light and accomidate   Ears, Nose, Throat, Neck  Ears clear no fluid nose narrow nasal vault with some erythema and congestion oral cavity clear oropharynx narrowing with +2/4 tonsils neck no masses   Neurological  Memory - Normal. Cranial nerves - Cranial nerves II through XII grossly intact.   Lymph  Detail  Submental. Submandibular. Anterior cervical. Posterior cervical. Supraclavicular.       ASSESSMENT AND PLAN:   1. Other chronic sinusitis  She does have a narrow airway.  Is a narrow nasal vault narrow oropharynx and a large tonsils which likely contribute to her upper airway obstruction.  I will await results of the sleep study.  She also gets eustachian tube dysfunction.  She will undergo a dedicated CT scan of the sinuses and then she will see me back after that and the sleep study.  She does have a component of nasal vestibulitis and she will continue with nasal saline and add some bacitracin ointment.  - [facility] (CPT=70486); Future      The patient indicates understanding of these issues and agrees to the plan.    No follow-ups on file.    Mushtaq Winchester MD  10/11/2024  11:42 AM

## 2024-10-16 ENCOUNTER — MED REC SCAN ONLY (OUTPATIENT)
Dept: FAMILY MEDICINE CLINIC | Facility: CLINIC | Age: 44
End: 2024-10-16

## 2024-10-17 ENCOUNTER — TELEPHONE (OUTPATIENT)
Dept: FAMILY MEDICINE CLINIC | Facility: CLINIC | Age: 44
End: 2024-10-17

## 2024-10-17 NOTE — TELEPHONE ENCOUNTER
Will see her at her appointment and the liver biopsy results should be discussed by the specialist with the patient.

## 2024-10-17 NOTE — TELEPHONE ENCOUNTER
Triage call transferred.   Spoke with pt stating saw Hepatology- Dr. Keene and completed liver bx and was told to see PCP to further discuss:  Final Diagnosis:    Liver; core biopsy:   Liver with mildly dilated sinusoids, minimal portal inflammation, (Zenobia and Beni grade 1) and no increased fibrosis (Zenobia and Bein grade 0).     Comment:  Evaluation of the liver biopsy demonstrates ten portal tracts present for evaluation.  Portal tracts demonstrate minimal inflammation, comprised of lymphocytes, neutrophils, and plasma cells.  No significant interface hepatitis is noted.  Sinusoids are mildly dilated.  No steatosis is noted.  Trichrome stain (with appropriately reactive control) demonstrates no increased portal or pericellular fibrosis.  Iron stain (with appropriately reactive control) demonstrates increased iron deposition, primarily within Kupffer cells.  Reticulin stain (with appropriately reactive control) demonstrates normal thickness of hepatic plates.  PAS and PAS-D stains (with appropriately reactive controls) show no evidence of PAS positive, diastase resistant globules.      For  purposes, this case was reviewed by a second pathologist who concurred with the diagnosis.     Pt has f/u scheduled:  Future Appointments   Date Time Provider Department Center   11/1/2024  8:00 AM Julissa Gonzalez MD [facility]     Pt inquiring if could wait until OV to further discuss or does PCP wants to do anything sooner or if wants to see pt sooner.  No further questions or concerns. Pt verbalized understanding and agreed with POC.    Please advise. Thank you.

## 2024-10-19 ENCOUNTER — IMMUNIZATION (OUTPATIENT)
Dept: LAB | Age: 44
End: 2024-10-19
Attending: EMERGENCY MEDICINE
Payer: COMMERCIAL

## 2024-10-19 DIAGNOSIS — Z23 NEED FOR VACCINATION: Primary | ICD-10-CM

## 2024-10-19 PROCEDURE — 90471 IMMUNIZATION ADMIN: CPT

## 2024-10-19 PROCEDURE — 90480 ADMN SARSCOV2 VAC 1/ONLY CMP: CPT

## 2024-10-19 PROCEDURE — 90656 IIV3 VACC NO PRSV 0.5 ML IM: CPT

## 2024-10-25 ENCOUNTER — HOSPITAL ENCOUNTER (OUTPATIENT)
Dept: CT IMAGING | Age: 44
Discharge: HOME | End: 2024-10-25
Attending: OTOLARYNGOLOGY
Payer: COMMERCIAL

## 2024-10-25 ENCOUNTER — OFFICE VISIT (OUTPATIENT)
Facility: LOCATION | Age: 44
End: 2024-10-25
Payer: COMMERCIAL

## 2024-10-25 DIAGNOSIS — M77.42 METATARSALGIA, LEFT FOOT: ICD-10-CM

## 2024-10-25 DIAGNOSIS — M72.2 BILATERAL PLANTAR FASCIITIS: Primary | ICD-10-CM

## 2024-10-25 DIAGNOSIS — M62.461 GASTROCNEMIUS EQUINUS OF RIGHT LOWER EXTREMITY: ICD-10-CM

## 2024-10-25 DIAGNOSIS — J32.8 OTHER CHRONIC SINUSITIS: ICD-10-CM

## 2024-10-25 DIAGNOSIS — M76.812 ANTERIOR TIBIALIS TENDINITIS, LEFT: ICD-10-CM

## 2024-10-25 DIAGNOSIS — Q66.70 HIGH ARCHES: ICD-10-CM

## 2024-10-25 DIAGNOSIS — M76.822 POSTERIOR TIBIAL TENDINITIS, LEFT: ICD-10-CM

## 2024-10-25 DIAGNOSIS — M62.462 GASTROCNEMIUS EQUINUS OF LEFT LOWER EXTREMITY: ICD-10-CM

## 2024-10-25 PROCEDURE — 70486 CT MAXILLOFACIAL W/O DYE: CPT | Performed by: OTOLARYNGOLOGY

## 2024-10-25 PROCEDURE — 99213 OFFICE O/P EST LOW 20 MIN: CPT | Performed by: PODIATRIST

## 2024-10-25 NOTE — PROGRESS NOTES
[facility]  Progress Note  10/25/2024    Nola De Paz is a 44 year old female.   Chief Complaint   Patient presents with    Foot Pain     F/u bilateral plantar fasciitis pain 5/10. Left  is worse than right.         HPI:     Patient is a pleasant 45-year-old female who is returning to clinic today for recheck on bilateral feet.  Patient was last seen in May of this year due to bilateral plan fasciitis with left lower extremity posterior tibial and anterior tibial tendinitis, as well as metatarsalgia of both feet.  Patient states that she is continuing to have heel pain in both feet (left worse than right).  She was prescribed physical therapy at last visit, but patient was unable to do that as she was already in therapy for something else.  Patient did obtain over-the-counter inserts, which she states has been beneficial.  She does also state that she has been doing at home stretching.  Rates her pain 5/10.  Patient states that she did try the offloading pads in her shoes and on her feet, but they tend to move around when walking.  She states that the felt beneficial at the time.  She is denying any recent injuries or other pedal complaints.  She is here today for further evaluation and care.  Denies recent nausea, vomiting, fevers, chills.      Allergies: Penicillins   Current Outpatient Medications   Medication Sig Dispense Refill    miSOPROStol 200 MCG Oral Tab INSERT 2 TABLETS VAGINALLY ONCE 2 HOURS PRIOR TO PROCEDURE VISIT FOR 1 DOSE      doxycycline 100 MG Oral Cap TAKE 1 CAPSULE BY MOUTH TWICE DAILY ON A FULL STOMACH. DO NOT LAY FLAT 1 HOUR AFTER      GABAPENTIN 100 MG Oral Cap TAKE 1 CAPSULE(100 MG) BY MOUTH THREE TIMES DAILY 270 capsule 0    Tirzepatide-Weight Management (ZEPBOUND) 2.5 MG/0.5ML Subcutaneous Solution Auto-injector Inject 2.5 mg into the skin once a week. 2 mL 0    topiramate 25 MG Oral Tab Take 1 tablet (25 mg total) by mouth 2 (two) times daily. 180 tablet 1    CUSTOM  MEDICATION Tirzepatide/Niacinamide 2.5mg    8/2mg /mL  Inject 31unit/0.31mL into skin q weekly    Disp: 2.5mL 2.5 each 0    ezetimibe 10 MG Oral Tab Take 1 tablet (10 mg total) by mouth daily. 90 tablet 3    hydroxychloroquine 200 MG Oral Tab Take 1 tablet (200 mg total) by mouth 2 (two) times daily. 180 tablet 1    cyclobenzaprine 10 MG Oral Tab Take 1 tablet (10 mg total) by mouth nightly as needed for Muscle spasms. 30 tablet 0    levothyroxine 175 MCG Oral Tab Take 1 tablet (175 mcg total) by mouth before breakfast. 90 tablet 3    Omeprazole 40 MG Oral Capsule Delayed Release Take 1 capsule (40 mg total) by mouth daily. 90 capsule 1    venlafaxine  MG Oral Capsule SR 24 Hr Take 1 capsule (150 mg total) by mouth daily. 90 capsule 1    rosuvastatin 10 MG Oral Tab Take 1 tablet (10 mg total) by mouth nightly. (Patient taking differently: Take 1 tablet (10 mg total) by mouth daily.) 90 tablet 0    Meloxicam 7.5 MG Oral Tab Take 1 tablet (7.5 mg total) by mouth daily.      traZODone 50 MG Oral Tab Take 1 tablet (50 mg total) by mouth nightly.      clindamycin 1 % External Lotion APPLY TOPICALLY TO THE AFFECTED AREA EVERY DAY BEFORE NOON      LORazepam 0.5 MG Oral Tab Take 1 tablet (0.5 mg total) by mouth 2 (two) times daily as needed. 40 tablet 0    fexofenadine 180 MG Oral Tab Take 1 tablet (180 mg total) by mouth daily.      Multiple Vitamin (MULTIVITAMIN ADULT OR) Apply topically.      ondansetron 4 MG Oral Tablet Dispersible Take 1 tablet (4 mg total) by mouth every 8 (eight) hours as needed for Nausea. 30 tablet 0    Nystatin 468220 UNIT/GM External Powder Apply 1 Application topically 4 (four) times daily. 1 Bottle 0    clotrimazole-betamethasone 1-0.05 % External Cream Apply 1 Application topically 2 (two) times daily as needed (rash). 60 g 3    Albuterol Sulfate HFA (PROAIR HFA) 108 (90 Base) MCG/ACT Inhalation Aero Soln Inhale 2 puffs into the lungs every 4 (four) hours as needed for Wheezing or  Shortness of Breath. 1 Inhaler 3    Triamcinolone Acetonide 55 MCG/ACT Nasal Aerosol by Nasal route daily as needed.        Past Medical History:    Allergic rhinitis    Anxiety    Arthritis    Back problem    DDD    Blood disorder    anemia    Depression    Disorder of thyroid    Esophageal reflux    Fibromyalgia    High cholesterol    Hyperlipidemia    Hypothyroidism    IBS (irritable bowel syndrome)    Migraines    Obesity    BLESSING (obstructive sleep apnea)    AHI 19 REM AHI 57 Supine AHI 46 non-supine AHI 11 [place] 71%    Osteoarthritis    Pneumonia due to organism    Shortness of breath    Sleep apnea    cpap    Thyroid disease      Past Surgical History:   Procedure Laterality Date    Cholecystectomy  2013    Colonoscopy N/A 08/28/2020    Procedure: COLONOSCOPY;  Surgeon: Tone Steiner MD;  Location:  ENDOSCOPY    Other surgical history  12/21/2020    gastric sleeve    Rem[place] gallbladder  2013    [place]    Surgical bariatrics - internal  12/21/2020    [place] teeth removed  2013      Family History   Problem Relation Age of Onset    Heart Disease Mother     High Cholesterol Mother     Other (DDD) Mother     Anemia Mother     Heart Disorder Mother         Heart disease, high blood pressure, stent, high cholesterol    Hypertension Mother     Obesity Mother     Hypertension Father     Heart Disorder Father         Heart murmur, high blood pressure    Obesity Father     Other (lymphoma) Maternal Grandmother     Cancer Maternal Grandmother         Lymphoma    Other (cancer) Paternal Grandmother         liver     Asthma Paternal Grandmother         Asthma and emphysema, copd, non smoker    Cancer Paternal Grandmother         Liver and lung cancer    Diabetes Paternal Grandmother     Prostate Cancer Paternal Grandfather         in 80s    Cancer Paternal Grandfather         Prostate cancer    Stroke Paternal Grandfather       Social History     Socioeconomic History    Marital status: Single   Occupational  History    Occupation: [employer]     Employer: [employer]     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       EXAM:     GENERAL: well developed, well nourished, in no apparent distress  EXTREMITIES:  1. Integument: Skin appears moist, warm, and supple with positive hair growth. There are no color changes. No open lesions. No macerations. No Hyperkeratotic lesions.   2. Vascular: Dorsalis pedis 2/4 bilateral and posterior tibial pulses 2/4 bilateral, capillary refill normal.  3. Neurological: Gross sensation intact via light touch bilaterally.  Normal sharp/dull sensation  4. Musculoskeletal: All muscle groups are graded 5/5 in the foot and ankle with no pain elicited with inversion and dorsiflexion against resistance to the left lower extremity.  Patient does have pain with palpation to bilateral plantar medial aspects of the heels near the origin of the plantar fascia and into the medial longitudinal arch (left worse than right).  No pain with side-to-side heel squeeze, bilaterally.  Patient does not have discomfort at the insertion of the posterior tibial tendon on the navicular tuberosity of the left lower extremity.  Patient also has no pain with palpation along the course of the distal anterior tibial tendon of the left lower extremity.  Pain elicited to second metatarsal head of the left foot.  No acute deformities noted to bilateral feet.   Patient does have decreased ankle joint dorsiflexion bilaterally consistent with equinus deformity.  Overall increase in arch height noted to bilateral feet       ASSESSMENT AND PLAN:   Diagnoses and all orders for this visit:    Bilateral plantar fasciitis  -     [facility] [place] PROC FOR MANAGED CARE AUTH    Metatarsalgia, left foot  -     [facility] [place] PROC FOR MANAGED CARE AUTH    Gastrocnemius equinus of left lower extremity  -     [facility] [place] PROC FOR MANAGED CARE AUTH    Gastrocnemius equinus of right lower extremity  -     [facility] [place] PROC FOR MANAGED CARE AUTH    Anterior tibialis tendinitis, left  -     [facility] [place] PROC FOR MANAGED CARE AUTH    Posterior tibial tendinitis, left  -     [facility] [place] PROC FOR MANAGED CARE AUTH    High arches  -     [facility] PROC FOR MANAGED CARE AUTH        Plan:   -Patient was seen and evaluated today in clinic.  Chart history reviewed.    Discussed clinical exam findings with patient today, which is consistent with continued plantar fasciitis of bilateral lower extremities, with some continued discomfort to plantar left second metatarsal head, which has improved.    Discussed further treatment recommendations.  A physical therapy order was placed last visit.  I do recommend patient begin formal physical therapy.  Patient is in agreement.  A printout of the order was given to patient today.    Discussed custom orthotics with patient today.  I do believe that they are medically necessary for patient's bilateral foot deformities with ongoing plantar fasciitis.  A preauthorization was sent to the patient's insurance today.    Patient was provided with new metatarsal pads to her over-the-counter inserts today.  She did verbalize that they felt comfortable.  She was also educated on supportive shoe gear and I recommend patient look into new balance shoes    Recommend patient rest, ice, elevate, and avoid any activities that increase pain.  Recommend she continue with at  home stretching    If patient does not continue to improve, will consider advanced imaging.    -All of the patient's questions and concerns were addressed.  They indicated their understanding of these issues and agrees to the plan.    Time spent reviewing pertinent information from patient's chart, reviewing any pertinent imaging, obtaining history and physical exam, discussing and mutually agreeing on a treatment plan, and documenting encounter: 20 minutes    RTC 4 to 6 weeks from when starting physical therapy      Juliano Aldana DPM        [address] [address]   [email or web address]            [employer] speech recognition software was used to prepare this note.  Errors in word recognition may occur.  Please contact me with any questions/concerns with this note.

## 2024-10-29 ENCOUNTER — OFFICE VISIT (OUTPATIENT)
Dept: PHYSICAL THERAPY | Age: 44
End: 2024-10-29
Attending: NURSE PRACTITIONER
Payer: COMMERCIAL

## 2024-10-29 PROCEDURE — 97110 THERAPEUTIC EXERCISES: CPT | Performed by: PHYSICAL THERAPIST

## 2024-10-29 PROCEDURE — 97140 MANUAL THERAPY 1/> REGIONS: CPT | Performed by: PHYSICAL THERAPIST

## 2024-10-29 NOTE — PROGRESS NOTES
Diagnosis:   Urinary urgency (R39.15)  Constipation, unspecified constipation type (K59.00)  , Pelvic floor dysfunction      Referring Provider: Chrissy Fairchild  Date of Evaluation:    7/11/2024    Precautions:  None Next MD visit:   none scheduled  Date of Surgery: n/a   Insurance Primary/Secondary: [place] / N/A     # Auth Visits: 8            Progress Summary  Pt has attended 7 visits in Physical Therapy.      Subjective: Patient reports having tailbone and SI pain and rectal pain again. She has not been able to come to PT for over a month. She has been struggling all over. She gets massages and pain is relieved very temporarily. She is getting ready for school in January. Has more stress recently. Patient reports that her BMs are moderately ok, sometimes not emptying all the way, but not straining anymore, she does rocking. She tried the squatty potty but does not feel that it helps because of her height. She had 1-2 episodes of urinary leakages, tightness on the anterior pelvic but not as bad as it used to be. At night 1x to go to the bathroom. No noctrnal incontinence recently.      Eval:c/o urge urinary incontinence, difficulty with bowel movement, pain with sexual activity, nocturnal incontinence, anterior pelvic tightness, low back pain.    Pain:  6/10- tailbone, B SIJ - 3/10    Objective: See flow sheet for details,  Posture: FHP, rounded shoulders, increased central girth, increased lumbar lordosis, + trendelenburg, endomorph body type  Pelvic Alignment: WNL  Gait: pt ambulates on level ground with normal mechanics     External Palpation: Non tender on abdominal region  Scars (location/surgery): none  Abdominal Wall Integrity: Poor  Diastasis Recti: (finger width depth while contracted)- none     Range Of Motion  Lumbar AROM screen: WNL for flexion, 75% LOM for lumbar extension with pain, WNL for lateral flexion   LE AROM screen: WFL     Strength (MMT)   Transverse Abdominis: 3/5  Hip muscles - B hip  muscles graded 5/5     Flexibility Summary: Mild tightness of B hamstrings, moderate tightness ob B piriformis, severe tightness of iliopsoas muscles     Special Tests  ASLR - fair lumbar loading transfers     No internal assessment done today per patient's request, she has her period    9/3/24  Informed consent for internal pelvic evaluation given: Yes     External Observation:   Voluntary contraction: present   Voluntary relaxation: present  Involuntary contraction: absent  Involuntary relaxation: absent     Mons pubis: WNL  Labia majora: WNL  Labia minora: WNL  Urethral meatus: WNL  Introitus: other: tight  Perineal body: WNL     Sensory/Reflex:  Vestibule: normal bilaterally  Anal [place]: hypo bilateral     Internal Examination   Scar: none     Pelvic Floor Muscle strength: (PERF= Power/Endurance/Reps/Fast) MMT: 3/3/10/10, vaginal and rectal  External Anal Sphincter: not tested  Accessory Muscle Use: adductors     Tissue Laxity Test:None  Anterior Wall: WNL  Posterior Wall: WNL  Apical: WNL     Eccentric lengthening contraction: poor        Internal Palpation: WNL except Superficial Transverse Perineal B mild restrictions   Deep Transverse Perineal B mild restriction, pain, and tenderness  Pubococcygeus/Pubovaginalis B severe restriction, pain, and tenderness  Puborectalis muscles - mild tightness on B, Grade 1 tenderness, mild STRs  Iliococcygeus B mild restriction, pain, and tenderness  Coccygeus B mild restriction, pain, and tenderness  Obturator Internus B moderate restriction         Assessment: Patient has progressed well with PT treatments, responds well with PT manual therapy for puborectalis muscles. When patient was able to come regularly and perform manual therapy, patent reported relief of rectal pain. Also discussed about using diaphragmatic breathing to promote activation of PNS for whole body relaxation and encouraged patient to do activities that can promote whole body relaxation and decrease  stress. Discussed importance of core strength to improve lumbopelvic stability. Patient to perform contraction to determine state of PF and have increased awareness for PF relaxation.Patient will benefit from further skilled PT intervention to achieve goals set at IE.    Goals:  (to be met in 10 visits)  Patient will have increased awareness for PF muscles contractions and relaxation to improve ability to promote relaxation and decrease pain by 50% during PF assessment (6 visits) - MET, and 90% (10 v)- progressing as of 10/29/24  Patient will demonstrate decreased PF muscles tightness, decreased STRs to mild, to facilitate soft tissues mobility and allow PF muscles to relax and accommodate penile tissue during sexual activity, and digital PF assessment, also improve relaxation to facilitate passage of stool with defecation -progressing as of 10/29/24  Patient will exhibit improvement of hips, core, abdominal muscles strength to 4/5  to improve lumbar stability, improve ability of patient to perform daily and work related activities with no apprehension for pain or difficulty. -progressing as of 10/29/24  Patient will have Marinoff score of 0-1. - progressing as of 10/29/24  Patient will have improvement of PF muscles strength using the Laycock Scale to 4/10/10//10, to improve efficiency of PF contractions to decrease UUI symptoms and be able to reach the bathroom on time,decrease incidence of nocturnal UI,  improve PF stabilization, and report decrease anterior pelvic tightness.- progressing as of 10/29/24  Patient will demonstrate improved coordination of core and PF muscles, including proper respiration to facilitate bowel, bladder, sexual functions and minimize symptoms.- progressing as of 10/29/24  Patient will verbalize understanding of PF functions, knowledge of normal bowel, bladder sexual mechanics, and utilize an established HEP to manage symptoms - progressing as of 10/29/24    Plan: Continue PT as per  updated  POC. Advance TE, [place] next visit.   Date: 10/29/2024  Tx # : 7/10         Calf stretches 2x 30 secs  Mermaid stretch over the bed 2x 30 secs   Child's pose 2x 30 secs with DBE  LTR x 10  TA brace x 10 with 5 secs  Quadruped A/P, M/L pelvic tilts   Thera ex - 25 mins  Manual therapy:   SIJ PA/ mobs, lumbar PA mibs, long axis distractions for B LE x 10 mins  Reassessment                            HEP: Access Code: [email or web address]  URL: [email or web address]  Date: 08/21/2024  Prepared by: Elizabeth Garcia    Exercises  - Supine Lower Trunk Rotation  - 2 x daily - 7 x weekly - 1-2 sets - 10 reps  - Seated Table Piriformis Stretch  - 2 x daily - 7 x weekly - 1-2 sets - 3 reps - 30 hold  - Supine Transversus Abdominis Bracing - Hands on Stomach  - 2 x daily - 7 x weekly - 1-2 sets - 10 reps - 3-5 hold  - Gastroc Stretch on Wall  - 1 x daily - 7 x weekly - 1 sets - 2 reps - 30 hold  - Clamshell  - 1 x daily - 7 x weekly - 1-2 sets - 10 reps  - Supine Diaphragmatic Breathing  - 2 x daily - 7 x weekly - 1-2 sets - 10 reps - 5 hold  - Seated Diaphragmatic Breathing  - 2 x daily - 7 x weekly - 1-2 sets - 10 reps - 5 hold  - Diaphragmatic Breathing in Child's Pose with Pelvic Floor Relaxation  - 2 x daily - 7 x weekly - 1-2 sets - 10 reps - 5 hold  - Shoulder Extension with Resistance  - 2 x daily - 7 x weekly - 1-2 sets - 10 reps  - Supine Bilateral Shoulder Protraction  - 2 x daily - 7 x weekly - 1-2 sets - 10 reps  - Supine Transversus Abdominis Bracing with Heel Slide  - 2 x daily - 7 x weekly - 1-2 sets - 10 reps      Plan: Continue skilled Physical Therapy 1-2 x/week or a total of 6 visits over a 90 day period. Treatment will include: Therapeutic exercises, manual therapy, Therapeutic activities, NMR, HEP education       Patient/Family/Caregiver was advised of these findings, precautions, and treatment options and has agreed to actively participate in planning and for this course of care.    Thank you  for your referral. If you have any questions, please contact me at Dept: [phone].    Sincerely,  Electronically signed by therapist: Elizabeth Garcia PT     Physician's certification required:  Yes  Please co-sign or sign and return this letter via fax as soon as possible to [phone].   I certify the need for these services furnished under this plan of treatment and while under my care.    X___________________________________________________ Date____________________    Certification From: 10/30/2024  To:1/28/2025      Charges: Thera -e x 2, Man Therapy -1       Total Timed Treatment: 45 min  Total Treatment Time: 45 min

## 2024-11-01 ENCOUNTER — OFFICE VISIT (OUTPATIENT)
Dept: FAMILY MEDICINE CLINIC | Facility: CLINIC | Age: 44
End: 2024-11-01
Payer: COMMERCIAL

## 2024-11-01 ENCOUNTER — ULTRASOUND ENCOUNTER (OUTPATIENT)
Dept: OBGYN CLINIC | Facility: CLINIC | Age: 44
End: 2024-11-01
Payer: COMMERCIAL

## 2024-11-01 ENCOUNTER — LAB ENCOUNTER (OUTPATIENT)
Dept: LAB | Age: 44
End: 2024-11-01
Attending: FAMILY MEDICINE
Payer: COMMERCIAL

## 2024-11-01 VITALS
SYSTOLIC BLOOD PRESSURE: 110 MMHG | BODY MASS INDEX: 52.81 KG/M2 | HEIGHT: 62 IN | DIASTOLIC BLOOD PRESSURE: 70 MMHG | HEART RATE: 81 BPM | TEMPERATURE: 98 F | RESPIRATION RATE: 18 BRPM | OXYGEN SATURATION: 98 % | WEIGHT: 287 LBS

## 2024-11-01 DIAGNOSIS — Z01.84 IMMUNITY STATUS TESTING: ICD-10-CM

## 2024-11-01 DIAGNOSIS — Z02.0 SCHOOL PHYSICAL EXAM: Primary | ICD-10-CM

## 2024-11-01 DIAGNOSIS — N93.9 ABNORMAL UTERINE BLEEDING (AUB): Primary | ICD-10-CM

## 2024-11-01 DIAGNOSIS — K80.50 CHOLEDOCHOLITHIASIS: ICD-10-CM

## 2024-11-01 LAB
HBV SURFACE AB SER QL: REACTIVE
HBV SURFACE AB SERPL IA-ACNC: 107.62 MIU/ML
RUBV IGG SER QL: POSITIVE
RUBV IGG SER-ACNC: 31 IU/ML (ref 10–?)

## 2024-11-01 PROCEDURE — 86735 MUMPS ANTIBODY: CPT

## 2024-11-01 PROCEDURE — 99214 OFFICE O/P EST MOD 30 MIN: CPT | Performed by: FAMILY MEDICINE

## 2024-11-01 PROCEDURE — 86762 RUBELLA ANTIBODY: CPT

## 2024-11-01 PROCEDURE — 36415 COLL VENOUS BLD VENIPUNCTURE: CPT

## 2024-11-01 PROCEDURE — 86787 VARICELLA-ZOSTER ANTIBODY: CPT

## 2024-11-01 PROCEDURE — 86765 RUBEOLA ANTIBODY: CPT

## 2024-11-01 PROCEDURE — 76856 US EXAM PELVIC COMPLETE: CPT | Performed by: OBSTETRICS & GYNECOLOGY

## 2024-11-01 PROCEDURE — 3008F BODY MASS INDEX DOCD: CPT | Performed by: FAMILY MEDICINE

## 2024-11-01 PROCEDURE — 76830 TRANSVAGINAL US NON-OB: CPT | Performed by: OBSTETRICS & GYNECOLOGY

## 2024-11-01 PROCEDURE — 3078F DIAST BP <80 MM HG: CPT | Performed by: FAMILY MEDICINE

## 2024-11-01 PROCEDURE — 86706 HEP B SURFACE ANTIBODY: CPT

## 2024-11-01 PROCEDURE — 3074F SYST BP LT 130 MM HG: CPT | Performed by: FAMILY MEDICINE

## 2024-11-01 NOTE — PROGRESS NOTES
Family Medicine Progress Note    Nola De Paz is a 44 year old female.  ASSESSMENT AND PLAN:  Nola was seen today for school physical.    Diagnoses and all orders for this visit:    School physical exam       - school form to be completed after titre's are done.    Immunity status testing  -     Hepatitis B Surface Antibody [E]; Future  -     Immunity Profile (MMR and Varicella) [E]; Future    Choledocholithiasis  -     Gastro Referral - In Network      The patient indicates understanding of these issues and agrees to the plan.  Follow-Up: The patient is asked to return in Return in about 2 weeks (around 11/15/2024) for med f/u.  .     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    Needs referral to see Gastro for ERCP for possible bile duct stone, patient has bile duct dilatation on MRI, recommended by .    PAST MEDICAL HISTORY:     Past Medical History:    Allergic rhinitis    Anxiety    Arthritis    Back problem    DDD    Blood disorder    anemia    Depression    Disorder of thyroid    Esophageal reflux    Fibromyalgia    High cholesterol    Hyperlipidemia    Hypothyroidism    IBS (irritable bowel syndrome)    Migraines    Obesity    BLESSING (obstructive sleep apnea)    AHI 19 REM AHI 57 Supine AHI 46 non-supine AHI 11 [place] 71%    Osteoarthritis    Pneumonia due to organism    Shortness of breath    Sleep apnea    cpap    Thyroid disease     PAST SURGICAL HISTORY:     Past Surgical History:   Procedure Laterality Date    Cholecystectomy  2013    Colonoscopy N/A 08/28/2020    Procedure: COLONOSCOPY;  Surgeon: Tone Steiner MD;  Location:  ENDOSCOPY    Other surgical history  12/21/2020    gastric sleeve    Removal  gallbladder  2013    [place]    Surgical bariatrics - internal  12/21/2020    [place] teeth removed  2013     ALLERGY:   Allergies[1]  MEDICATIONS:     Current Outpatient Medications   Medication Sig Dispense Refill    miSOPROStol 200 MCG Oral Tab INSERT 2 TABLETS VAGINALLY ONCE 2 HOURS PRIOR TO PROCEDURE VISIT FOR 1 DOSE      doxycycline 100 MG Oral Cap TAKE 1 CAPSULE BY MOUTH TWICE DAILY ON A FULL STOMACH. DO NOT LAY FLAT 1 HOUR AFTER      GABAPENTIN 100 MG Oral Cap TAKE 1 CAPSULE(100 MG) BY MOUTH THREE TIMES DAILY 270 capsule 0    Tirzepatide-Weight Management (ZEPBOUND) 2.5 MG/0.5ML Subcutaneous Solution Auto-injector Inject 2.5 mg into the skin once a week. 2 mL 0    topiramate 25 MG Oral Tab Take 1 tablet (25 mg total) by mouth 2 (two) times daily. 180 tablet 1    CUSTOM MEDICATION Tirzepatide/Niacinamide 2.5mg    8/2mg /mL  Inject 31unit/0.31mL into skin q weekly    Disp: 2.5mL 2.5 each 0    ezetimibe 10 MG Oral Tab Take 1 tablet (10 mg total) by mouth daily. 90 tablet 3    hydroxychloroquine 200 MG Oral Tab Take 1 tablet (200 mg total) by mouth 2 (two) times daily. 180 tablet 1    cyclobenzaprine 10 MG Oral Tab Take 1 tablet (10 mg total) by mouth nightly as needed for Muscle spasms. 30 tablet 0    levothyroxine 175 MCG Oral Tab Take 1 tablet (175 mcg total) by mouth before breakfast. 90 tablet 3    Omeprazole 40 MG Oral Capsule Delayed Release Take 1 capsule (40 mg total) by mouth daily. 90 capsule 1    venlafaxine  MG Oral Capsule SR 24 Hr Take 1 capsule (150 mg total) by mouth daily. 90 capsule 1    rosuvastatin 10 MG Oral Tab Take 1 tablet (10 mg total) by mouth nightly. (Patient taking differently: Take 1 tablet (10 mg total) by mouth daily.) 90 tablet 0    Meloxicam 7.5 MG Oral Tab Take 1 tablet (7.5 mg total) by mouth daily.      traZODone 50 MG Oral Tab Take 1 tablet (50 mg total) by mouth nightly.      clindamycin 1 % External Lotion APPLY TOPICALLY TO THE AFFECTED AREA EVERY DAY BEFORE NOON       LORazepam 0.5 MG Oral Tab Take 1 tablet (0.5 mg total) by mouth 2 (two) times daily as needed. 40 tablet 0    fexofenadine 180 MG Oral Tab Take 1 tablet (180 mg total) by mouth daily.      Multiple Vitamin (MULTIVITAMIN ADULT OR) Apply topically.      ondansetron 4 MG Oral Tablet Dispersible Take 1 tablet (4 mg total) by mouth every 8 (eight) hours as needed for Nausea. 30 tablet 0    Nystatin 651693 UNIT/GM External Powder Apply 1 Application topically 4 (four) times daily. 1 Bottle 0    clotrimazole-betamethasone 1-0.05 % External Cream Apply 1 Application topically 2 (two) times daily as needed (rash). 60 g 3    Albuterol Sulfate HFA (PROAIR HFA) 108 (90 Base) MCG/ACT Inhalation Aero Soln Inhale 2 puffs into the lungs every 4 (four) hours as needed for Wheezing or Shortness of Breath. 1 Inhaler 3    Triamcinolone Acetonide 55 MCG/ACT Nasal Aerosol by Nasal route daily as needed.       FAMILY HISTORY:     Family History   Problem Relation Age of Onset    Heart Disease Mother     High Cholesterol Mother     Other (DDD) Mother     Anemia Mother     Heart Disorder Mother         Heart disease, high blood pressure, stent, high cholesterol    Hypertension Mother     Obesity Mother     Hypertension Father     Heart Disorder Father         Heart murmur, high blood pressure    Obesity Father     Other (lymphoma) Maternal Grandmother     Cancer Maternal Grandmother         Lymphoma    Other (cancer) Paternal Grandmother         liver     Asthma Paternal Grandmother         Asthma and emphysema, copd, non smoker    Cancer Paternal Grandmother         Liver and lung cancer    Diabetes Paternal Grandmother     Prostate Cancer Paternal Grandfather         in 80s    Cancer Paternal Grandfather         Prostate cancer    Stroke Paternal Grandfather        SOCIAL HISTORY:     Social History     Socioeconomic History    Marital status: Single   Occupational History    Occupation: [employer]     Employer: [employer]      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     Social Drivers of Health     Physical Activity: Inactive (11/2/2020)    Received from [facility], [facility]    Exercise Vital Sign     Days of Exercise per Week: 0 days     Minutes of Exercise per Session: 0 min     REVIEW OF SYSTEMS:   Review of Systems   Constitutional:  Positive for fatigue. Negative for appetite change, fever and unexpected weight change.   HENT:  Negative for congestion, ear pain, hearing loss and sore throat.    Eyes:  Negative for discharge, redness and visual disturbance.   Respiratory:  Negative for cough, chest tightness and shortness of breath.    Cardiovascular:  Negative for chest pain and palpitations.   Gastrointestinal:  Negative for abdominal pain, blood in stool, constipation and nausea.   Endocrine: Negative for cold intolerance, heat intolerance and polyuria.   Genitourinary:  Negative for difficulty urinating, dysuria, frequency and urgency.   Musculoskeletal:  Positive for myalgias. Negative for arthralgias, gait problem and joint swelling.   Skin:  Negative for rash.   Allergic/Immunologic: Negative for food allergies.   Neurological:  Negative for dizziness, weakness, numbness and headaches.   Hematological:  Negative for adenopathy. Does not bruise/bleed easily.   Psychiatric/Behavioral:  Negative for dysphoric mood and sleep disturbance. The patient is not nervous/anxious.         PHYSICAL EXAM:   /70   Pulse 81   Temp 97.9  °F (36.6 °C) (Temporal)   Resp 18   Ht 5' 2\" (1.575 m)   Wt 287 lb (130.2 kg)   LMP 10/28/2024 (Approximate)   SpO2 98%   BMI 52.49 kg/m²     Physical Exam  Constitutional:       General: She is not in acute distress.     Appearance: Normal appearance. She is well-developed. She is obese.   HENT:      Head: Normocephalic and atraumatic.      Right Ear: Tympanic membrane, ear canal and external ear normal.      Left Ear: Tympanic membrane, ear canal and external ear normal.      Nose: Nose normal.      Mouth/Throat:      Mouth: Mucous membranes are moist.      Pharynx: Oropharynx is clear.   Eyes:      Extraocular Movements: Extraocular movements intact.      Conjunctiva/sclera: Conjunctivae normal.      Pupils: Pupils are equal, round, and reactive to light.   Neck:      Thyroid: No thyromegaly.   Cardiovascular:      Rate and Rhythm: Normal rate and regular rhythm.      Heart sounds: Normal heart sounds. No murmur heard.  Pulmonary:      Effort: Pulmonary effort is normal. No respiratory distress.      Breath sounds: Normal breath sounds.   Chest:      Chest wall: No tenderness.   Abdominal:      General: Bowel sounds are normal. There is no distension.      Palpations: Abdomen is soft. There is no mass.      Tenderness: There is no abdominal tenderness.      Comments: Difficult to assess HSM due to patient body habitus   Musculoskeletal:         General: Normal range of motion.      Cervical back: Normal range of motion and neck supple.   Lymphadenopathy:      Cervical: No cervical adenopathy.   Skin:     General: Skin is warm.      Findings: No rash.   Neurological:      Mental Status: She is alert and oriented to person, place, and time.      Deep Tendon Reflexes: Reflexes are normal and symmetric.   Psychiatric:         Behavior: Behavior normal.         Thought Content: Thought content normal.         Judgment: Judgment normal.             The 21st Century Cures Act makes medical notes like these  available to patients in the interest of transparency. Please be advised this is a medical document. Medical documents are intended to carry relevant information, facts as evident, and the clinical opinion of the practitioner. The medical note is intended as peer to peer communication and may appear blunt or direct. It is written in medical language and may contain abbreviations or verbiage that are unfamiliar.     Julissa Gonzalez MD         [1]   Allergies  Allergen Reactions    Penicillins HIVES and RASH

## 2024-11-04 LAB
MEV IGG SER-ACNC: >300 AU/ML (ref 16.5–?)
MUV IGG SER IA-ACNC: 66.9 AU/ML (ref 11–?)
VZV IGG SER IA-ACNC: 5.02 (ref 1–?)

## 2024-11-05 ENCOUNTER — PATIENT MESSAGE (OUTPATIENT)
Dept: OBGYN CLINIC | Facility: CLINIC | Age: 44
End: 2024-11-05

## 2024-11-05 NOTE — TELEPHONE ENCOUNTER
Recommend to follow up for EMB for further evaluation of her AUB. We can discuss her US findings and further management at her next appointment.

## 2024-11-07 ENCOUNTER — OFFICE VISIT (OUTPATIENT)
Dept: SLEEP CENTER | Age: 44
End: 2024-11-07
Attending: OBSTETRICS & GYNECOLOGY
Payer: OTHER MISCELLANEOUS

## 2024-11-07 ENCOUNTER — TELEPHONE (OUTPATIENT)
Dept: HEMATOLOGY/ONCOLOGY | Facility: HOSPITAL | Age: 44
End: 2024-11-07

## 2024-11-07 DIAGNOSIS — D50.9 IRON DEFICIENCY ANEMIA, UNSPECIFIED IRON DEFICIENCY ANEMIA TYPE: Primary | ICD-10-CM

## 2024-11-07 DIAGNOSIS — G47.33 OSA (OBSTRUCTIVE SLEEP APNEA): ICD-10-CM

## 2024-11-07 PROCEDURE — 95810 POLYSOM 6/> YRS 4/> PARAM: CPT

## 2024-11-07 NOTE — TELEPHONE ENCOUNTER
[facility] message sent to patient. Orders in the system and instructed FU is due after labs.   Verified pt read the message.

## 2024-11-07 NOTE — TELEPHONE ENCOUNTER
Pt called stated Dr. Baker ordered labs for her in April and wanted her to repeat the labs in 6months. Pt has an appt tomorrow 11/8/24, but she no longer sees the orders in the system.    Please submit orders, thank you

## 2024-11-08 ENCOUNTER — HOSPITAL ENCOUNTER (OUTPATIENT)
Dept: ULTRASOUND IMAGING | Age: 44
Discharge: HOME | End: 2024-11-08
Attending: OBSTETRICS & GYNECOLOGY
Payer: COMMERCIAL

## 2024-11-08 ENCOUNTER — PATIENT MESSAGE (OUTPATIENT)
Facility: CLINIC | Age: 44
End: 2024-11-08

## 2024-11-08 ENCOUNTER — LAB ENCOUNTER (OUTPATIENT)
Dept: LAB | Age: 44
End: 2024-11-08
Attending: OBSTETRICS & GYNECOLOGY
Payer: COMMERCIAL

## 2024-11-08 ENCOUNTER — HOSPITAL ENCOUNTER (OUTPATIENT)
Dept: GENERAL RADIOLOGY | Age: 44
Discharge: HOME | End: 2024-11-08
Attending: INTERNAL MEDICINE
Payer: COMMERCIAL

## 2024-11-08 ENCOUNTER — HOSPITAL ENCOUNTER (OUTPATIENT)
Dept: MAMMOGRAPHY | Age: 44
Discharge: HOME | End: 2024-11-08
Attending: OBSTETRICS & GYNECOLOGY
Payer: COMMERCIAL

## 2024-11-08 DIAGNOSIS — N64.4 BREAST TENDERNESS IN FEMALE: ICD-10-CM

## 2024-11-08 DIAGNOSIS — R20.2 HAND TINGLING: ICD-10-CM

## 2024-11-08 DIAGNOSIS — D50.9 IRON DEFICIENCY ANEMIA, UNSPECIFIED IRON DEFICIENCY ANEMIA TYPE: ICD-10-CM

## 2024-11-08 DIAGNOSIS — M54.2 NECK PAIN: ICD-10-CM

## 2024-11-08 LAB
BASOPHILS # BLD AUTO: 0.05 X10(3) UL (ref 0–0.2)
BASOPHILS NFR BLD AUTO: 0.7 %
DEPRECATED HBV CORE AB SER IA-ACNC: 52 NG/ML
EOSINOPHIL # BLD AUTO: 0.68 X10(3) UL (ref 0–0.7)
EOSINOPHIL NFR BLD AUTO: 10.2 %
ERYTHROCYTE [DISTWIDTH] IN BLOOD BY AUTOMATED COUNT: 11.8 %
HCT VFR BLD AUTO: 38.7 %
HGB BLD-MCNC: 13.1 G/DL
IMM GRANULOCYTES # BLD AUTO: 0.02 X10(3) UL (ref 0–1)
IMM GRANULOCYTES NFR BLD: 0.3 %
IRON SATN MFR SERPL: 25 %
IRON SERPL-MCNC: 84 UG/DL
LYMPHOCYTES # BLD AUTO: 2.33 X10(3) UL (ref 1–4)
LYMPHOCYTES NFR BLD AUTO: 34.9 %
MCH RBC QN AUTO: 31.3 PG (ref 26–34)
MCHC RBC AUTO-ENTMCNC: 33.9 G/DL (ref 31–37)
MCV RBC AUTO: 92.6 FL
MONOCYTES # BLD AUTO: 0.68 X10(3) UL (ref 0.1–1)
MONOCYTES NFR BLD AUTO: 10.2 %
NEUTROPHILS # BLD AUTO: 2.92 X10 (3) UL (ref 1.5–7.7)
NEUTROPHILS # BLD AUTO: 2.92 X10(3) UL (ref 1.5–7.7)
NEUTROPHILS NFR BLD AUTO: 43.7 %
PLATELET # BLD AUTO: 209 10(3)UL (ref 150–450)
RBC # BLD AUTO: 4.18 X10(6)UL
TOTAL IRON BINDING CAPACITY: 339 UG/DL (ref 250–425)
TRANSFERRIN SERPL-MCNC: 255 MG/DL (ref 250–380)
WBC # BLD AUTO: 6.7 X10(3) UL (ref 4–11)

## 2024-11-08 PROCEDURE — 72040 X-RAY EXAM NECK SPINE 2-3 VW: CPT | Performed by: INTERNAL MEDICINE

## 2024-11-08 PROCEDURE — 36415 COLL VENOUS BLD VENIPUNCTURE: CPT

## 2024-11-08 PROCEDURE — 77062 BREAST TOMOSYNTHESIS BI: CPT | Performed by: OBSTETRICS & GYNECOLOGY

## 2024-11-08 PROCEDURE — 83540 ASSAY OF IRON: CPT

## 2024-11-08 PROCEDURE — 77066 DX MAMMO INCL CAD BI: CPT | Performed by: OBSTETRICS & GYNECOLOGY

## 2024-11-08 PROCEDURE — 85025 COMPLETE CBC W/AUTO DIFF WBC: CPT

## 2024-11-08 PROCEDURE — 83550 IRON BINDING TEST: CPT

## 2024-11-08 PROCEDURE — 76642 ULTRASOUND BREAST LIMITED: CPT | Performed by: OBSTETRICS & GYNECOLOGY

## 2024-11-08 PROCEDURE — 82728 ASSAY OF FERRITIN: CPT

## 2024-11-10 DIAGNOSIS — E78.00 PURE HYPERCHOLESTEROLEMIA: ICD-10-CM

## 2024-11-10 DIAGNOSIS — K20.90 ESOPHAGITIS DETERMINED BY ENDOSCOPY: ICD-10-CM

## 2024-11-11 NOTE — TELEPHONE ENCOUNTER
Semaglutide and Tirzepatide are different molecules, so it is difficult to know the exact dose when switching from one to another    Also, Tirzepatide is no longer on the medication shortage list so unable to get it compounded    We are going to have to switch back to compound semaglutide    Another option would be going through [facility] for zepbound

## 2024-11-12 ENCOUNTER — OFFICE VISIT (OUTPATIENT)
Dept: PHYSICAL THERAPY | Age: 44
End: 2024-11-12
Attending: NURSE PRACTITIONER
Payer: COMMERCIAL

## 2024-11-12 PROCEDURE — 97110 THERAPEUTIC EXERCISES: CPT | Performed by: PHYSICAL THERAPIST

## 2024-11-12 PROCEDURE — 97140 MANUAL THERAPY 1/> REGIONS: CPT | Performed by: PHYSICAL THERAPIST

## 2024-11-12 RX ORDER — OMEPRAZOLE 40 MG/1
40 CAPSULE, DELAYED RELEASE ORAL DAILY
Qty: 90 CAPSULE | Refills: 3 | Status: SHIPPED | OUTPATIENT
Start: 2024-11-12 | End: 2024-11-15

## 2024-11-12 RX ORDER — ROSUVASTATIN CALCIUM 10 MG/1
10 TABLET, COATED ORAL NIGHTLY
Qty: 90 TABLET | Refills: 3 | Status: SHIPPED | OUTPATIENT
Start: 2024-11-12 | End: 2024-11-15

## 2024-11-12 NOTE — TELEPHONE ENCOUNTER
Refill passed per [facility] protocol.  Requested Prescriptions   Pending Prescriptions Disp Refills    OMEPRAZOLE 40 MG Oral Capsule Delayed Release [Pharmacy Med Name: OMEPRAZOLE 40MG CAPSULES] 90 capsule 1     Sig: TAKE 1 CAPSULE(40 MG) BY MOUTH DAILY       Gastrointestional Medication Protocol Passed - 11/12/2024  2:22 PM        Passed - In person appointment or virtual visit in the past 12 mos or appointment in next 3 mos     Recent Outpatient Visits              5 days ago BLESSING (obstructive sleep apnea)    [facility] [place]    Office Visit    1 week ago School physical exam    [address] Julissa Gonzalez MD    Office Visit    2 weeks ago     [facility] [place] Elizabeth Garcia, AMY    Office Visit    2 weeks ago Bilateral plantar fasciitis    [address] Evans Aldana DPM    Office Visit    1 month ago Abnormal auditory perception of both ears    [facility] Angeles Brooke, MS, CCC-A    Office Visit          Future Appointments         Provider Department Appt Notes    Today Elizabeth Garcia, PT [facility] [place] 15 visits auth 7/10 to 12/31  [facility] HMO  no c/p    In 3 days Julissa Gonzalez MD [address] follow up    In 3 weeks Sheba Vines, PT [facility] [place] 15 visits auth 7/10 to 12/31  [employer] HMO  no c/p    In 3 weeks Elizabeth Garcia PT [facility] [place] 15 visits auth 7/10 to 12/31  [employer] HMO  no c/p    In 3 weeks Seble Maguire MD [address] - OB/GYN EMB    In 3 weeks Evans Aldana DPM [address]     In 4 weeks Sheba Vines PT [facility] [place] 15 visits auth 7/10 to 12/31  [facility] HMO  no c/p    In 1 month  Elizabeth Garcia PT [facility] [place] 15 visits auth 7/10 to 12/31  [facility]  [facility]    In 1 month Sheba Vines PT [facility] [place] 15 visits auth 7/10 to 12/31  [facility]  [facility]    In 1 month Elizabeth Garcia PT [facility] [place] Auth???  [facility]  [facility]    In 1 month Fernanda Sosa RD [facility]     In 1 month Marcie Santiago, PT, DPT, Cert.MDT [facility] [place]     In 1 month Marcie Santiago, PT, DPT, Cert.MDT [facility] [place]     In 1 month Elizabeth Garcia PT [facility] [place] 15 visits from 7/10 to 10/31  [facility]  [facility]    In 1 month Sheba Vines PT [facility] [place] 15 visits auth 7/10 to 12/31  [facility]  [facility]    In 2 months Solange David PA-C [address] FOLLOW UP    In 3 months Azra Shirley DO [facility] [place], [address] [place] [address] fu                      ROSUVASTATIN 10 MG Oral Tab [Pharmacy Med Name: ROSUVASTATIN 10MG TABLETS] 90 tablet 0     Sig: TAKE 1 TABLET(10 MG) BY MOUTH EVERY NIGHT       Cholesterol Medication Protocol Passed - 11/12/2024  2:22 PM        Passed - ALT < 80     Lab Results   Component Value Date    ALT 17 10/04/2024             Passed - ALT resulted within past year        Passed - Lipid panel within past 12 months     Lab Results   Component Value Date    CHOLEST 213 (H) 04/30/2024    TRIG 36 04/30/2024    HDL 49 04/30/2024     (H) 04/30/2024    VLDL 7 04/30/2024    TCHDLRATIO 3.5 08/08/2020    NONHDLC 164 (H) 04/30/2024             Passed - In person appointment or virtual visit in the past 12 mos or appointment in next 3 mos     Recent Outpatient Visits              5 days ago BLESSING (obstructive sleep apnea)    [facility] [place]    Office Visit    1 week ago School physical exam    [facility]  [facility], [address] [place] [address] Julissa Gonzalez MD    Office Visit    2 weeks ago     [facility] [place] Elizabeth Garcia, AMY    Office Visit    2 weeks ago Bilateral plantar fasciitis    [facility], [address] [place] [address] Evans Aldana DPM    Office Visit    1 month ago Abnormal auditory perception of both ears    [facility] [place], [place] Mendy, [place] WREN, MS, CCC-A    Office Visit          Future Appointments         Provider Department Appt Notes    Today Elizabeth Garcia, PT [facility] [place] 15 visits auth 7/10 to 12/31  [employer] HMO  no c/p    In 3 days Julissa Gonzalez MD [address] follow up    In 3 weeks Sheba Vines, PT [facility] [place] 15 visits auth 7/10 to 12/31  [employer] HMO  no c/p    In 3 weeks Elizabeth Garcia PT [facility] [place] 15 visits auth 7/10 to 12/31  [employer] HMO  no c/p    In 3 weeks Seble Maguire MD [address] - OB/GYN EMB    In 3 weeks Evans Aldana DPM [facility], [address] [place] [address]     In 4 weeks Sheba Vines PT [facility] [place] 15 visits auth 7/10 to 12/31  [employer] HMO  no c/p    In 1 month Elizabeth Garcia PT [facility] [place] 15 visits auth 7/10 to 12/31  [employer] HMO  no c/p    In 1 month Sheba Vines PT [facility] [place] 15 visits auth 7/10 to 12/31  [employer] HMO  no c/p    In 1 month Elizabeth Garcia PT [facility] [place] Auth???  [employer] HMO  no c/p    In 1 month Fernanda Sosa RD [facility]     In 1 month Marcie Santiago PT, DPT, Cert.MDT [facility] [place]     In 1 month Marcie Santiago PT, DPT, Cert.MDT [facility] [place]     In 1 month Elizabeth Garcia, PT [facility] in  [place] 15 visits from 7/10 to 10/31  [employer] HMO  no c/p    In 1 month Sheba Vines, PT [facility] [place] 15 visits auth 7/10 to 12/31  [employer] HMO  no c/p    In 2 months Solange David PA-C [facility], [address] [place] [address] FOLLOW UP    In 3 months Azra Shirley DO [facility], [address] [place] [address] fu                       Recent Outpatient Visits              5 days ago BLESSING (obstructive sleep apnea)    [facility] [place]    Office Visit    1 week ago School physical exam    [address] Julissa Gonzalez MD    Office Visit    2 weeks ago     [facility] [place] Elizabeth Garcia, AMY    Office Visit    2 weeks ago Bilateral plantar fasciitis    [facility], [address] [place] [address] Evans Aldana DPM    Office Visit    1 month ago Abnormal auditory perception of both ears    [facility] [place], CCC-A    Office Visit          Future Appointments         Provider Department Appt Notes    Today Elizabeth Garcia, PT [facility] [place] 15 visits auth 7/10 to 12/31  [employer] HMO  no c/p    In 3 days Julissa Gonzalez MD [address] follow up    In 3 weeks Sheba Vines, PT [facility] [place] 15 visits auth 7/10 to 12/31  [employer] HMO  no c/p    In 3 weeks Elizabeth Garcia, PT [facility] [place] 15 visits auth 7/10 to 12/31  [employer] HMO  no c/p    In 3 weeks Seble Maguire MD [address] - OB/GYN EMB    In 3 weeks Evans Aldana DPM [address]     In 4 weeks Sheba Vines, PT [facility] [place] 15 visits auth 7/10 to 12/31  [employer] HMO  no c/p    In 1 month Elizabeth Garcia PT [facility]  [place] 15 visits auth 7/10 to 12/31  [facility]    In 1 month Sheba Vines, PT [facility] [place] 15 visits auth 7/10 to 12/31  [facility]  [facility]    In 1 month Elizabeth Garcia PT [facility] [place] Auth???  [employer] O  no c/p    In 1 month Fernanda Sosa RD [facility]     In 1 month Marcie Santiago, PT, DPT, Cert.MDT [facility] [place]     In 1 month Marcie Santiago, PT, DPT, Cert.MDT [facility] [place]     In 1 month Elziabeth Garcia PT [facility] [place] 15 visits from 7/10 to 10/31  [facility]  [facility]    In 1 month Sheba Vines, PT [facility] [place] 15 visits auth 7/10 to 12/31  [facility]  [facility]    In 2 months Solange David, PA-C [facility], [address] [place] [address] FOLLOW UP    In 3 months Azra Shirley DO [facility], [address] [place] [address] fu

## 2024-11-13 NOTE — PROGRESS NOTES
Diagnosis:   Urinary urgency (R39.15)  Constipation, unspecified constipation type (K59.00)  , Pelvic floor dysfunction      Referring Provider: Chrissy Fairchild  Date of Evaluation:    7/11/2024    Precautions:  None Next MD visit:   none scheduled  Date of Surgery: n/a   Insurance Primary/Secondary: [employer] H[place] / N/A     # Auth Visits: 8            Subjective: Patient reports that after last PT session, she had increased pain on the tailbone and the rectal area. She went to have 3 massages to help ease the pain. Today, she had a lot of pain on the R hip, she did sit longer than usual today.      Eval:c/o urge urinary incontinence, difficulty with bowel movement, pain with sexual activity, nocturnal incontinence, anterior pelvic tightness, low back pain.    Pain:  7/10- R anterior hip/ inguinal area    Objective: See flow sheet for details,  Posture: FHP, rounded shoulders, increased central girth, increased lumbar lordosis, + trendelenburg, endomorph body type  Pelvic Alignment: WNL  Gait: pt ambulates on level ground with normal mechanics     External Palpation: Non tender on abdominal region  Scars (location/surgery): none  Abdominal Wall Integrity: Poor  Diastasis Recti: (finger width depth while contracted)- none     Range Of Motion  Lumbar AROM screen: WNL for flexion, 75% LOM for lumbar extension with pain, WNL for lateral flexion   LE AROM screen: WFL     Strength (MMT)   Transverse Abdominis: 3/5  Hip muscles - B hip muscles graded 5/5     Flexibility Summary: Mild tightness of B hamstrings, moderate tightness of B piriformis, severe tightness of iliopsoas muscles     Special Tests  ASLR - fair lumbar loading transfers     Informed consent for internal pelvic evaluation given: Yes     External Observation:   Voluntary contraction: present   Voluntary relaxation: present  Involuntary contraction: absent  Involuntary relaxation: absent     Mons pubis: WNL  Labia majora: WNL  Labia minora: WNL  Urethral  meatus: WNL  Introitus: other: tight  Perineal body: WNL     Sensory/Reflex:  Vestibule: normal bilaterally  Anal [place]: hypo bilateral     Internal Examination   Scar: none     Pelvic Floor Muscle strength: (PERF= Power/Endurance/Reps/Fast) MMT: 3/3/10//10 rectal  External Anal Sphincter: not tested  Accessory Muscle Use: none     Tissue Laxity Test:None  Anterior Wall: WNL  Posterior Wall: WNL  Apical: WNL     Eccentric lengthening contraction: poor        Internal Palpation: WNL except Superficial Transverse Perineal B moderate restrictions   Deep Transverse Perineal B mild restriction, pain, and tenderness  Pubococcygeus/Pubovaginalis - NT  Puborectalis muscles - Severe restrictions and tenderness and pain  Iliococcygeus B moderate restriction, pain, and tenderness  Coccygeus B moderate restriction, pain, and tenderness  Obturator Internus B moderate restriction         Assessment: Intervention adjusted to patient's tolerance due to pain on the R hip pain. Performed exercises to promote hip joint mobility, decrease anterior hip tension. After exercises, patient verbalized increased tension in the whole lumbar/ pelvic area, hard for her to isolate the muscles. Responded well to manual therapy,  noted improvement of posture and gait pattern after therapy session.       Goals:  (to be met in 16 visits)- updated  Patient will have increased awareness for PF muscles contractions and relaxation to improve ability to promote relaxation and decrease pain by 50% during PF assessment (6 visits) - MET, and 90% (10 v)- progressing as of 10/29/24  Patient will demonstrate decreased PF muscles tightness, decreased STRs to mild, to facilitate soft tissues mobility and allow PF muscles to relax and accommodate penile tissue during sexual activity, and digital PF assessment, also improve relaxation to facilitate passage of stool with defecation -progressing as of 10/29/24  Patient will exhibit improvement of hips, core, abdominal  muscles strength to 4/5  to improve lumbar stability, improve ability of patient to perform daily and work related activities with no apprehension for pain or difficulty. -progressing as of 10/29/24  Patient will have Marinoff score of 0-1. - progressing as of 10/29/24  Patient will have improvement of PF muscles strength using the Laycock Scale to 4/10/10//10, to improve efficiency of PF contractions to decrease UUI symptoms and be able to reach the bathroom on time,decrease incidence of nocturnal UI,  improve PF stabilization, and report decrease anterior pelvic tightness.- progressing as of 10/29/24  Patient will demonstrate improved coordination of core and PF muscles, including proper respiration to facilitate bowel, bladder, sexual functions and minimize symptoms.- progressing as of 10/29/24  Patient will verbalize understanding of PF functions, knowledge of normal bowel, bladder sexual mechanics, and utilize an established HEP to manage symptoms - progressing as of 10/29/24    Plan: Continue PT as per updated  POC. Advance TE, MT next visit.   Date: 10/29/2024  Tx # : 7/10 Date: 11/12/2024   Tx #: 8/10        Calf stretches 2x 30 secs  Mermaid stretch over the bed 2x 30 secs   Child's pose 2x 30 secs with DBE  LTR x 10  TA brace x 10 with 5 secs  Quadruped A/P, M/L pelvic tilts   Thera ex - 25 mins  Manual therapy:   SIJ PA/ mobs, lumbar PA mibs, long axis distractions for B LE x 10 mins  Reassessment Nu step , L3, 2.5 mins- increase in R anterior hip pain  Calf stretches 2x 30 secs  Standing lumbar extension against parallel bar x 5  Standing R hip openers x 10  Standing R hip extension x 10  Child's pose 2x 30 secs with DBE  Attempted R psoas stretches in supine with overpressure, but patient felt increased tightness instead of stretching  LTR x 10  TA brace x 10 with 5 secs  Quadruped A/P, M/L pelvic tilts x 20 each  TA brace with bridge x 20  Sahrmann Level 1 x 20  SL clamshells x 20  Thera ex - 37 mins    Manual therapy: STM and stretching of PF muscles x 8 mins                           HEP: Access Code: [email or web address]  URL: [email or web address]  Date: 08/21/2024  Prepared by: Elizabeth Garcia    Exercises  - Supine Lower Trunk Rotation  - 2 x daily - 7 x weekly - 1-2 sets - 10 reps  - Seated Table Piriformis Stretch  - 2 x daily - 7 x weekly - 1-2 sets - 3 reps - 30 hold  - Supine Transversus Abdominis Bracing - Hands on Stomach  - 2 x daily - 7 x weekly - 1-2 sets - 10 reps - 3-5 hold  - Gastroc Stretch on Wall  - 1 x daily - 7 x weekly - 1 sets - 2 reps - 30 hold  - Clamshell  - 1 x daily - 7 x weekly - 1-2 sets - 10 reps  - Supine Diaphragmatic Breathing  - 2 x daily - 7 x weekly - 1-2 sets - 10 reps - 5 hold  - Seated Diaphragmatic Breathing  - 2 x daily - 7 x weekly - 1-2 sets - 10 reps - 5 hold  - Diaphragmatic Breathing in Child's Pose with Pelvic Floor Relaxation  - 2 x daily - 7 x weekly - 1-2 sets - 10 reps - 5 hold  - Shoulder Extension with Resistance  - 2 x daily - 7 x weekly - 1-2 sets - 10 reps  - Supine Bilateral Shoulder Protraction  - 2 x daily - 7 x weekly - 1-2 sets - 10 reps  - Supine Transversus Abdominis Bracing with Heel Slide  - 2 x daily - 7 x weekly - 1-2 sets - 10 reps      Charges: Thera -e x 2, Man Therapy -1       Total Timed Treatment: 45 min  Total Treatment Time: 45 min

## 2024-11-15 ENCOUNTER — OFFICE VISIT (OUTPATIENT)
Dept: FAMILY MEDICINE CLINIC | Facility: CLINIC | Age: 44
End: 2024-11-15
Payer: COMMERCIAL

## 2024-11-15 VITALS
HEART RATE: 78 BPM | WEIGHT: 287 LBS | HEIGHT: 62 IN | DIASTOLIC BLOOD PRESSURE: 76 MMHG | SYSTOLIC BLOOD PRESSURE: 110 MMHG | BODY MASS INDEX: 52.81 KG/M2 | TEMPERATURE: 98 F | OXYGEN SATURATION: 98 % | RESPIRATION RATE: 18 BRPM

## 2024-11-15 DIAGNOSIS — E78.2 MIXED HYPERLIPIDEMIA: Primary | ICD-10-CM

## 2024-11-15 DIAGNOSIS — F41.1 GAD (GENERALIZED ANXIETY DISORDER): ICD-10-CM

## 2024-11-15 DIAGNOSIS — K20.90 ESOPHAGITIS DETERMINED BY ENDOSCOPY: ICD-10-CM

## 2024-11-15 DIAGNOSIS — R41.840 ATTENTION AND CONCENTRATION DEFICIT: ICD-10-CM

## 2024-11-15 PROCEDURE — 3008F BODY MASS INDEX DOCD: CPT | Performed by: FAMILY MEDICINE

## 2024-11-15 PROCEDURE — 3074F SYST BP LT 130 MM HG: CPT | Performed by: FAMILY MEDICINE

## 2024-11-15 PROCEDURE — 3078F DIAST BP <80 MM HG: CPT | Performed by: FAMILY MEDICINE

## 2024-11-15 PROCEDURE — 99214 OFFICE O/P EST MOD 30 MIN: CPT | Performed by: FAMILY MEDICINE

## 2024-11-15 RX ORDER — LORAZEPAM 0.5 MG/1
0.5 TABLET ORAL NIGHTLY PRN
Qty: 40 TABLET | Refills: 0 | Status: SHIPPED | OUTPATIENT
Start: 2024-11-15

## 2024-11-15 RX ORDER — EZETIMIBE 10 MG/1
10 TABLET ORAL DAILY
Qty: 90 TABLET | Refills: 1 | Status: SHIPPED | OUTPATIENT
Start: 2024-11-15

## 2024-11-15 RX ORDER — ROSUVASTATIN CALCIUM 10 MG/1
10 TABLET, COATED ORAL NIGHTLY
Qty: 90 TABLET | Refills: 1 | Status: SHIPPED | OUTPATIENT
Start: 2024-11-15

## 2024-11-15 RX ORDER — OMEPRAZOLE 40 MG/1
40 CAPSULE, DELAYED RELEASE ORAL DAILY
Qty: 90 CAPSULE | Refills: 1 | Status: SHIPPED | OUTPATIENT
Start: 2024-11-15

## 2024-11-15 NOTE — PROGRESS NOTES
Family Medicine Progress Note  ASSESSMENT AND PLAN:  Nola De Paz is a 44 year old female who is here for:     Nola was seen today for follow - up.    Diagnoses and all orders for this visit:    Attention and concentration deficit  -     Psychology Referral - In Network    Mixed hyperlipidemia controlled  -     ezetimibe 10 MG Oral Tab; Take 1 tablet (10 mg total) by mouth daily.  -     rosuvastatin 10 MG Oral Tab; Take 1 tablet (10 mg total) by mouth nightly.  -     continue the same dose of medication    Esophagitis determined by endoscopy   -     Omeprazole 40 MG Oral Capsule Delayed Release; Take 1 capsule (40 mg total) by mouth daily.  -     continue the same dose of medication    KATELYNN (generalized anxiety disorder) controlled  -     LORazepam 0.5 MG Oral Tab; Take 1 tablet (0.5 mg total) by mouth nightly as needed.  -     continue the same dose of medication         The patient indicates understanding of these issues and agrees to the plan.  Follow-Up: The patient is asked to return in Return in about 6 months (around 5/15/2025).  .     Julissa Gonzalez MD   11/15/24      CC:   Chief Complaint   Patient presents with    Follow - Up       HPI:   Nola De Paz is a 44 year old female who presents for   Chief Complaint   Patient presents with    Follow - Up     SVT on Holter, followed up with cardiologist , will repeat holter and follow up with electrophysiologist at [facility].    Struggling with weight loss as compounded Trizepatide is not available and only has 3 more doses left, follows up with the weight loss clinic, has gained weight and feels part of it is due to her poor diet and lack of physical activity.    Compliant with a cholesterol medication and diet.    GERD symptoms are well controlled with the current dose of medication.    Anxiety well controlled, takes Lorazepam as needed.    ALLERGY:     Allergies as of 11/15/2024 - Review Complete 11/15/2024   Allergen Reaction Noted    Penicillins HIVES  and RASH 01/14/2014     MEDICATIONS:         Current Outpatient Medications   Medication Sig Dispense Refill    Omeprazole 40 MG Oral Capsule Delayed Release Take 1 capsule (40 mg total) by mouth daily. 90 capsule 3    rosuvastatin 10 MG Oral Tab Take 1 tablet (10 mg total) by mouth nightly. 90 tablet 3    doxycycline 100 MG Oral Cap TAKE 1 CAPSULE BY MOUTH TWICE DAILY ON A FULL STOMACH. DO NOT LAY FLAT 1 HOUR AFTER      GABAPENTIN 100 MG Oral Cap TAKE 1 CAPSULE(100 MG) BY MOUTH THREE TIMES DAILY 270 capsule 0    Tirzepatide-Weight Management (ZEPBOUND) 2.5 MG/0.5ML Subcutaneous Solution Auto-injector Inject 2.5 mg into the skin once a week. 2 mL 0    topiramate 25 MG Oral Tab Take 1 tablet (25 mg total) by mouth 2 (two) times daily. 180 tablet 1    CUSTOM MEDICATION Tirzepatide/Niacinamide 2.5mg    8/2mg /mL  Inject 31unit/0.31mL into skin q weekly    Disp: 2.5mL 2.5 each 0    ezetimibe 10 MG Oral Tab Take 1 tablet (10 mg total) by mouth daily. 90 tablet 3    hydroxychloroquine 200 MG Oral Tab Take 1 tablet (200 mg total) by mouth 2 (two) times daily. 180 tablet 1    cyclobenzaprine 10 MG Oral Tab Take 1 tablet (10 mg total) by mouth nightly as needed for Muscle spasms. 30 tablet 0    levothyroxine 175 MCG Oral Tab Take 1 tablet (175 mcg total) by mouth before breakfast. 90 tablet 3    venlafaxine  MG Oral Capsule SR 24 Hr Take 1 capsule (150 mg total) by mouth daily. 90 capsule 1    Meloxicam 7.5 MG Oral Tab Take 1 tablet (7.5 mg total) by mouth daily.      traZODone 50 MG Oral Tab Take 1 tablet (50 mg total) by mouth nightly.      clindamycin 1 % External Lotion APPLY TOPICALLY TO THE AFFECTED AREA EVERY DAY BEFORE NOON      LORazepam 0.5 MG Oral Tab Take 1 tablet (0.5 mg total) by mouth 2 (two) times daily as needed. 40 tablet 0    fexofenadine 180 MG Oral Tab Take 1 tablet (180 mg total) by mouth daily.      Multiple Vitamin (MULTIVITAMIN ADULT OR) Apply topically.      ondansetron 4 MG Oral Tablet  Dispersible Take 1 tablet (4 mg total) by mouth every 8 (eight) hours as needed for Nausea. 30 tablet 0    Nystatin 819632 UNIT/GM External Powder Apply 1 Application topically 4 (four) times daily. 1 Bottle 0    clotrimazole-betamethasone 1-0.05 % External Cream Apply 1 Application topically 2 (two) times daily as needed (rash). 60 g 3    Albuterol Sulfate HFA (PROAIR HFA) 108 (90 Base) MCG/ACT Inhalation Aero Soln Inhale 2 puffs into the lungs every 4 (four) hours as needed for Wheezing or Shortness of Breath. 1 Inhaler 3    Triamcinolone Acetonide 55 MCG/ACT Nasal Aerosol by Nasal route daily as needed.        Past Medical History:    Allergic rhinitis    Anxiety    Arthritis    Back problem    DDD    Blood disorder    anemia    Depression    Disorder of thyroid    Esophageal reflux    Fibromyalgia    High cholesterol    Hyperlipidemia    Hypothyroidism    IBS (irritable bowel syndrome)    Migraines    Obesity    BLESSING (obstructive sleep apnea)    AHI 19 REM AHI 57 Supine AHI 46 non-supine AHI 11 [place] 71%    Osteoarthritis    Pneumonia due to organism    Shortness of breath    Sleep apnea    cpap    Thyroid disease      Social History:  Social History     Socioeconomic History    Marital status: Single   Occupational History    Occupation: [employer]     Employer: [employer]     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     Social Drivers of Health     Physical Activity: Inactive (11/2/2020)    Received from [facility] [employer],  [facility]    Exercise Vital Sign     Days of Exercise per Week: 0 days     Minutes of Exercise per Session: 0 min        REVIEW OF SYSTEMS:   A comprehensive 10 point review of systems was completed.  Pertinent positives and negatives noted in the the HPI.    EXAM:   Temp 98 °F (36.7 °C) (Temporal)   Resp 18   Ht 5' 2\" (1.575 m)   Wt 287 lb (130.2 kg)   LMP 10/28/2024 (Approximate)   SpO2 98%   BMI 52.49 kg/m²   GENERAL: well developed, well nourished,in no apparent distress  NECK: supple,  LUNGS: clear to auscultation  CARDIO: RRR without murmur  GI: good BS's,no masses, HSM or tenderness  EXTREMITIES: no cyanosis, clubbing or edema  PSYCH: well groomed, appropriate mood and affect, good eye contact, normal speech and no thought disorder.      NOTE TO PATIENT: The 21st Century Cures Act makes clinical notes like these available to patients in the interest of transparency. Clinical notes are medical documents used by physicians and care providers to communicate with each other. These documents include medical language and terminology, abbreviations, and treatment information that may sound technical and at times possibly unfamiliar. In addition, at times, the verbiage may appear blunt or direct. These documents are one tool providers use to communicate relevant information and clinical opinions of the care providers in a way that allows common understanding of the clinical context.      Julissa Gonzalez MD    11/15/24 12:47 PM

## 2024-11-19 ENCOUNTER — TELEPHONE (OUTPATIENT)
Facility: CLINIC | Age: 44
End: 2024-11-19

## 2024-11-19 ENCOUNTER — SLEEP STUDY (OUTPATIENT)
Facility: CLINIC | Age: 44
End: 2024-11-19
Payer: COMMERCIAL

## 2024-11-19 DIAGNOSIS — G47.30 SLEEP APNEA, UNSPECIFIED TYPE: Primary | ICD-10-CM

## 2024-11-19 DIAGNOSIS — G47.33 OSA (OBSTRUCTIVE SLEEP APNEA): Primary | ICD-10-CM

## 2024-11-19 PROCEDURE — 95810 POLYSOM 6/> YRS 4/> PARAM: CPT | Performed by: INTERNAL MEDICINE

## 2024-11-20 NOTE — ADDENDUM NOTE
Addended by: KAYLEEN LISA on: 11/20/2024 10:35 AM     Modules accepted: Orders, Level of Service

## 2024-11-21 ENCOUNTER — TELEPHONE (OUTPATIENT)
Dept: PHYSICAL THERAPY | Facility: HOSPITAL | Age: 44
End: 2024-11-21

## 2024-12-01 ENCOUNTER — HOSPITAL ENCOUNTER (OUTPATIENT)
Dept: CT IMAGING | Age: 44
Discharge: HOME | End: 2024-12-01
Attending: INTERNAL MEDICINE

## 2024-12-01 DIAGNOSIS — Z13.6 SCREENING FOR CARDIOVASCULAR CONDITION: ICD-10-CM

## 2024-12-03 ENCOUNTER — APPOINTMENT (OUTPATIENT)
Dept: PHYSICAL THERAPY | Age: 44
End: 2024-12-03
Attending: PODIATRIST
Payer: COMMERCIAL

## 2024-12-06 ENCOUNTER — APPOINTMENT (OUTPATIENT)
Dept: PHYSICAL THERAPY | Age: 44
End: 2024-12-06
Attending: NURSE PRACTITIONER
Payer: COMMERCIAL

## 2024-12-10 ENCOUNTER — TELEPHONE (OUTPATIENT)
Dept: PHYSICAL THERAPY | Facility: HOSPITAL | Age: 44
End: 2024-12-10

## 2024-12-10 ENCOUNTER — APPOINTMENT (OUTPATIENT)
Dept: PHYSICAL THERAPY | Age: 44
End: 2024-12-10
Attending: PODIATRIST
Payer: COMMERCIAL

## 2024-12-12 ENCOUNTER — APPOINTMENT (OUTPATIENT)
Dept: PHYSICAL THERAPY | Age: 44
End: 2024-12-12
Attending: NURSE PRACTITIONER
Payer: COMMERCIAL

## 2024-12-16 DIAGNOSIS — F43.10 PTSD (POST-TRAUMATIC STRESS DISORDER): ICD-10-CM

## 2024-12-16 DIAGNOSIS — M79.7 FIBROMYALGIA: ICD-10-CM

## 2024-12-16 RX ORDER — GABAPENTIN 100 MG/1
100 CAPSULE ORAL 3 TIMES DAILY
Qty: 270 CAPSULE | Refills: 0 | Status: SHIPPED | OUTPATIENT
Start: 2024-12-16

## 2024-12-16 NOTE — TELEPHONE ENCOUNTER
Future Appointments   Date Time Provider Department Center   12/17/2024  6:45 PM Sheba Vines, PT PF PT [place]   12/19/2024  6:00 PM Elizabeth Garcia, PT PF PT [place]   12/30/2024  6:00 PM Marcie Santiago, PT, DPT, Cert.MDT PF PT [place]   1/2/2025  6:45 PM Elizabeth Garcia, PT PF PT [place]   1/2/2025  9:00 PM Y SLEEP ROOMS [facility]   1/6/2025  4:30 PM Vick Neal MD ENIPain EMG [place]   1/7/2025  6:00 PM Sheba Vines, PT PF PT [place]   1/14/2025  6:00 PM Sheba Vines, PT PF PT [place]   1/17/2025  9:00 AM Tom Casey MD SGINP ECC SUB GI   1/17/2025 11:00 AM Fernanda Sosa, RD [id number]   1/22/2025  4:00 PM Evans Aldana, DPM [id number] EC PLFD   1/24/2025 11:30 AM Seble Maguire MD EMG OB/GYN N EMG [place]   1/29/2025  3:00 PM Frankie Helm MD EEMG Pulm EMG [place]   2/4/2025  3:00 PM Solange David PA-C EEMGWLCPL EM[facility] [address]   3/10/2025  4:00 PM Azra Shirley DO EMGRHEUMPLFD EMG [address]   5/16/2025  2:00 PM Julissa Gonzalez MD EMG [address] [facility]     Last office visit: 6/19/2024    Last fill: 9/11/2024 270 tab, 0 refills

## 2024-12-17 ENCOUNTER — APPOINTMENT (OUTPATIENT)
Dept: PHYSICAL THERAPY | Age: 44
End: 2024-12-17
Attending: PODIATRIST
Payer: COMMERCIAL

## 2024-12-19 ENCOUNTER — APPOINTMENT (OUTPATIENT)
Dept: PHYSICAL THERAPY | Age: 44
End: 2024-12-19
Attending: NURSE PRACTITIONER
Payer: COMMERCIAL

## 2024-12-20 RX ORDER — VENLAFAXINE HYDROCHLORIDE 150 MG/1
150 CAPSULE, EXTENDED RELEASE ORAL DAILY
Qty: 90 CAPSULE | Refills: 0 | Status: SHIPPED | OUTPATIENT
Start: 2024-12-20

## 2024-12-20 NOTE — TELEPHONE ENCOUNTER
Refill passed per [facility] protocol. However please review high alert warning:      High  Drug-Drug: traZODone and venlafaxine ERSerotonergic effects of trazodone and serotonin/norepinephrine reuptake inhibitors (SNRIs) may be additive. The risk of serotonin syndrome/toxicity may be increased    Requested Prescriptions   Pending Prescriptions Disp Refills    VENLAFAXINE  MG Oral Capsule SR 24 Hr [Pharmacy Med Name: VENLAFAXINE ER 150MG CAPSULES] 90 capsule 1     Sig: TAKE 1 CAPSULE(150 MG) BY MOUTH DAILY       Psychiatric Non-Scheduled (Anti-Anxiety) Passed - 12/19/2024  8:42 PM        Passed - In person appointment or virtual visit in the past 6 mos or appointment in next 3 mos     Recent Outpatient Visits              1 month ago Mixed hyperlipidemia    [facility], [address] [place] [address] Julissa Berkowitz MD    Office Visit    1 month ago     [facility]    Office Visit    1 month ago BLESSING (obstructive sleep apnea)    [facility] [place]    Office Visit    1 month ago School physical exam    [address], Julissa Berkowitz MD    Office Visit    1 month ago     [facility] in [place] Elizabeth Garcia, PT    Office Visit          Future Appointments         Provider Department Appt Notes    In 2 weeks Elizabeth Garcia PT [facility]??  BCBS O  no c/p    In 2 weeks YK SLEEP ROOMS [facility] [place]     In 2 weeks Vick Neal MD [facility], [facility] both knees    In 2 weeks Sheba Vines, PT [facility]??  BCBS HMO  no c/p    In 3 weeks Sheba Vines, PT [facility]??  BCBS HMO  no c/p    In 4 weeks Tom Casey MD [facility],  [facility] 11/14/24 for Choledocholithiasis with  [facility]  [facility]    In 4 weeks Fernanda Sosa RD [facility]     In 1 month Evans Aldana DPM [facility], [address] [place] [address]     In 1 month Seble Maguire MD [facility], [facility] - OB/GYN EMB    In 1 month Frankie Helm MD [facility], [facility] POSTPONE to 31-90? pt did a PSG sleep study > being sent for CPAP TX due to Severe REM.    In 1 month Solange David, PAFunmilayoC [facility], [address] [place] [address] FOLLOW UP    In 2 months Azra Shirley DO [facility], [address] [place] [address] fu    In 4 months Julissa Gonzalez MD [facility], [address] [place] [address] 6 month follow up                    Passed - Depression Screening completed within the past 12 months

## 2024-12-23 ENCOUNTER — APPOINTMENT (OUTPATIENT)
Dept: PHYSICAL THERAPY | Age: 44
End: 2024-12-23
Attending: PODIATRIST
Payer: COMMERCIAL

## 2024-12-27 ENCOUNTER — TELEPHONE (OUTPATIENT)
Facility: LOCATION | Age: 44
End: 2024-12-27

## 2024-12-27 NOTE — TELEPHONE ENCOUNTER
Patient calling to check on the status of her insurance coverage of  custom inserts. Per patient, Dr. Aldana was going to contact her insurance. Please call.

## 2024-12-30 ENCOUNTER — APPOINTMENT (OUTPATIENT)
Dept: PHYSICAL THERAPY | Age: 44
End: 2024-12-30
Attending: PODIATRIST
Payer: COMMERCIAL

## 2025-01-02 ENCOUNTER — APPOINTMENT (OUTPATIENT)
Dept: PHYSICAL THERAPY | Age: 45
End: 2025-01-02
Attending: NURSE PRACTITIONER
Payer: COMMERCIAL

## 2025-01-02 ENCOUNTER — OFFICE VISIT (OUTPATIENT)
Dept: SLEEP CENTER | Age: 45
End: 2025-01-02
Attending: INTERNAL MEDICINE
Payer: COMMERCIAL

## 2025-01-02 DIAGNOSIS — G47.33 OSA (OBSTRUCTIVE SLEEP APNEA): ICD-10-CM

## 2025-01-02 PROCEDURE — 95811 POLYSOM 6/>YRS CPAP 4/> PARM: CPT

## 2025-01-06 ENCOUNTER — SLEEP STUDY (OUTPATIENT)
Facility: CLINIC | Age: 45
End: 2025-01-06
Payer: COMMERCIAL

## 2025-01-06 DIAGNOSIS — G47.33 OBSTRUCTIVE SLEEP APNEA: Primary | ICD-10-CM

## 2025-01-06 PROCEDURE — 95811 POLYSOM 6/>YRS CPAP 4/> PARM: CPT | Performed by: INTERNAL MEDICINE

## 2025-01-08 ENCOUNTER — PATIENT MESSAGE (OUTPATIENT)
Facility: CLINIC | Age: 45
End: 2025-01-08

## 2025-01-14 ENCOUNTER — APPOINTMENT (OUTPATIENT)
Dept: PHYSICAL THERAPY | Age: 45
End: 2025-01-14
Attending: PODIATRIST
Payer: COMMERCIAL

## 2025-01-15 ENCOUNTER — TELEPHONE (OUTPATIENT)
Dept: SURGERY | Facility: CLINIC | Age: 45
End: 2025-01-15

## 2025-01-15 NOTE — TELEPHONE ENCOUNTER
Called pt to r/s appt from 1/17/25- NK   Modified Advancement Flap Text: The defect edges were debeveled with a #15 scalpel blade.  Given the location of the defect, shape of the defect and the proximity to free margins a modified advancement flap was deemed most appropriate.  Using a sterile surgical marker, an appropriate advancement flap was drawn incorporating the defect and placing the expected incisions within the relaxed skin tension lines where possible.    The area thus outlined was incised deep to adipose tissue with a #15 scalpel blade.  The skin margins were undermined to an appropriate distance in all directions utilizing iris scissors.

## 2025-01-24 ENCOUNTER — OFFICE VISIT (OUTPATIENT)
Dept: OBGYN CLINIC | Facility: CLINIC | Age: 45
End: 2025-01-24
Payer: COMMERCIAL

## 2025-01-24 VITALS
BODY MASS INDEX: 51.55 KG/M2 | HEIGHT: 62 IN | HEART RATE: 62 BPM | SYSTOLIC BLOOD PRESSURE: 118 MMHG | WEIGHT: 280.13 LBS | DIASTOLIC BLOOD PRESSURE: 72 MMHG

## 2025-01-24 DIAGNOSIS — N92.6 IRREGULAR MENSES: ICD-10-CM

## 2025-01-24 DIAGNOSIS — N92.6 IRREGULAR MENSTRUAL CYCLE: ICD-10-CM

## 2025-01-24 DIAGNOSIS — N93.9 ABNORMAL UTERINE BLEEDING (AUB): Primary | ICD-10-CM

## 2025-01-24 DIAGNOSIS — Z01.812 PRE-PROCEDURAL LABORATORY EXAMINATION: ICD-10-CM

## 2025-01-24 LAB
CONTROL LINE PRESENT WITH A CLEAR BACKGROUND (YES/NO): YES YES/NO
KIT EXPIRATION DATE: NORMAL DATE
KIT LOT #: NORMAL NUMERIC
PREGNANCY TEST, URINE: NEGATIVE

## 2025-01-24 PROCEDURE — 88305 TISSUE EXAM BY PATHOLOGIST: CPT | Performed by: OBSTETRICS & GYNECOLOGY

## 2025-01-24 PROCEDURE — 3078F DIAST BP <80 MM HG: CPT | Performed by: OBSTETRICS & GYNECOLOGY

## 2025-01-24 PROCEDURE — 58100 BIOPSY OF UTERUS LINING: CPT | Performed by: OBSTETRICS & GYNECOLOGY

## 2025-01-24 PROCEDURE — 99215 OFFICE O/P EST HI 40 MIN: CPT | Performed by: OBSTETRICS & GYNECOLOGY

## 2025-01-24 PROCEDURE — 81025 URINE PREGNANCY TEST: CPT | Performed by: OBSTETRICS & GYNECOLOGY

## 2025-01-24 PROCEDURE — 3074F SYST BP LT 130 MM HG: CPT | Performed by: OBSTETRICS & GYNECOLOGY

## 2025-01-24 PROCEDURE — 3008F BODY MASS INDEX DOCD: CPT | Performed by: OBSTETRICS & GYNECOLOGY

## 2025-01-24 NOTE — PATIENT INSTRUCTIONS
[facility] Department of OB/GYN  After Care Instructions for Endometrial Biopsy      Biopsy Results   You will receive a phone call with your biopsy results in 7 business days.  If you have not received your results in 7 days, please contact our office.  The results of your biopsy will determine if further treatment will be necessary.    Bleeding   You may have some light bleeding or blackish clumpy discharge for several days after your biopsy.    Restrictions    You should avoid intercourse or tampon use for 1 day after your biopsy.    Pain    You may experience mild menstrual cramping after your biopsy.  You may use Ibuprofen, Aleve or Tylenol to relieve your discomfort.  If you experience severe or persistent pain contact our office.      If you have any additional questions, please call us at [phone].

## 2025-01-24 NOTE — PROGRESS NOTES
[facility]  Obstetrics and Gynecology  Follow Up Progress Note    Subjective:     Nola De Paz is a 44 year old  female who was last seen in office 2024 for insomnia and sleep cycles are irregular and presents with c/o AUB. The patient reports AUB with irregular menses. She reports last regular menses was in 10/2024. Skipped November and December she had 2 days of menses. She reports menses are less painful and less heavy. Pelvic US 24 and overall wnl with simple cyst 2.8 cm. She reports pain on right side but not consistently with menses.     Review of Systems:  General: no complaints per category. See HPI for additional information.   Breast: no complaints per category. See HPI for additional information.   Respiratory: no complaints per category. See HPI for additional information.   Cardiovascular: no complaints per category. See HPI for additional information.   GI: no complaints per category. See HPI for additional information.   : no complaints per category. See HPI for additional information.   Heme: no complaints per category. See HPI for additional information.     OB History    Para Term  AB Living   0 0 0 0 0 0   SAB IAB Ectopic Multiple Live Births   0 0 0 0 0         Gyne History:     Patient's last menstrual period was 2024 (exact date).      Meds:  Medications Ordered Prior to Encounter[1]    All:  Allergies[2]    PMH:  Past Medical History:    Abdominal hernia    Allergic rhinitis    Anxiety    Arthritis    Back problem    DDD    Blood disorder    anemia    Blood in urine    Notes on tests for dr ellis    Depression    Disorder of thyroid    Esophageal reflux    Fatigue    Fibromayalgia    Fibromyalgia    Headache disorder    Migraines    Heartburn    Hemorrhoids    High cholesterol    Hyperlipidemia    Hypothyroidism    IBS (irritable bowel syndrome)    Indigestion    Migraines    Obesity    BLESSING (obstructive sleep apnea)    AHI 19 REM AHI 57 Supine  AHI 46 non-supine AHI 11 [place] 71%    Osteoarthritis    Pneumonia due to organism    Shortness of breath    Sleep apnea    cpap    Sleep disturbance    Sleep apnea with cpap    Thyroid disease       PSH:  Past Surgical History:   Procedure Laterality Date    Cholecystectomy  2013    Colonoscopy N/A 08/28/2020    Procedure: COLONOSCOPY;  Surgeon: Tone Steiner MD;  Location:  ENDOSCOPY    Gastric bypass,obesity,sb reconstruc  12/21/2020    Did not have bypass but had the sleeve done    Other surgical history  12/21/2020    gastric sleeve    Removal gallbladder  2013    [facility]    Surgical bariatrics - internal  12/21/2020    [place] teeth removed  2013         Objective:     Vitals:    01/24/25 1201   BP: 118/72   Pulse: 62   Weight: 280 lb 2 oz (127.1 kg)   Height: 62\"         Body mass index is 51.24 kg/m².    General: AAO.NAD.   CVS exam: normal peripheral perfusion  Chest: non-labored breathing, no tachypnea   Abdominal exam: deferred   Pelvic exam:   VULVA: normal appearing vulva with no masses, tenderness or lesions  PERINEUM:  normal appearing, no lesions   URETHRAL MEATUS:  normal appearing, no lesions   VAGINA: normal appearing vagina with normal color and discharge, no lesions  CERVIX: normal appearing cervix without discharge or lesions  UTERUS: sounded to 8 cm   ADNEXA: deferred  PERIRECTAL: normal appearing, no lesions   Ext: non-tender, no edema    Labs:    Imaging:  Left Ovary   Length 3.32 cm 3.32 avg.   Width 2.68 cm 2.68 avg.   Height 2.32 cm 2.32 avg.   Volume 10.808 cm³ 10.808     Right Ovary   Length 2.74 cm 2.74 avg.   Width 2.01 cm 2.01 avg.   Height 1.86 cm 1.86 avg.   Volume 5.364 cm³ 5.364     Uterus   Length 7.67 cm 7.67 avg.   Width 5.79 cm 5.79 avg.   Height 4.48 cm 4.48 avg.   Volume 104.172 cm³ 104.172   Endo.Thickness 2.77 mm 2.77 avg.       Comment   uterus is anteverted and wnl   endometrial echo appears wnl   lt ov cyst seen appears simple 26.8 mm 21.6 mm 24.2 mm   rt ov  appears wnl   finney abd and vaginal performed limited abd views     Reviewed and electronically signed by: Seble Maguire MD, 24, 4:16 PM          Assessment:     Nola De Paz is a 44 year old  female who presents for AUB        Plan:     Problem List Items Addressed This Visit    None  Visit Diagnoses       Pre-procedural laboratory examination    -  Primary    Relevant Orders    Urine Preg Test [[id number]] (Completed)              AUB  -labs reviewed and discussed with patient  -pelvic US reviewed discussed with patient, noted for small simple ovarian cyst and no further follow-up indicated  -EMB recommended, refer to procedure note  -d/w patient management options including conservative versus medical versus surgical including risks, benefits and alternatives  -d/w patient medical options including OCP/POP, ring, patch, Depo Provera, Mirena IUD, Lysteda, and GNRH agonist/antagonist   -after further discussion, patient requested time to consider options but will likely continue with conservative management for now  -However, patient may consider Mirena IUD  - discussion held with the patient about risks, benefits and alternatives of IUD including but not limited to irregular bleeding, infection, injury and pregnancy   - advised patient to consider options, information provided  - advised to contact office if she desires IUD placement  - d/w patient optimal placement plan including cytotec therapy, placement during menses, prior pregnancy test if no menses during placement and use of NSAID prior to placement   -Discussion held with patient regarding risk of endometrial hyperplasia and recommendation for medical management to decrease the risk of endometrial hyperplasia which could increase her risk of uterine cancer  -Precautions provided    All of the findings and plan were discussed with the patient.  She notes understanding and agrees with the plan of care.  All questions were answered to the best of  my ability at this time.      Total patient time was 41 minutes in evaluation, consultation, and coordination of care for AUB and further management including possible medical management. This included face to face and non-face to face actions. The patient's questions and concerns were addressed.  This was a separate E/M service.  Additional 15 minutes including face-to-face and non-face-to-face actions were required for endometrial biopsy procedure refer to procedure note.           RTC in 3 months for well woman exam or sooner if needed     Seble Maguire MD   [facility] - OBGYN       Discussed with patient that there will not be further notification of normal or benign results other than receiving results on [employer]. A [employer] message or telephone call will be placed by the physician and/or office staff if results are abnormal.     Note to patient and family   The 21st Century Cures Act makes medical notes available to patients in the interest of transparency.  However, please be advised that this is a medical document.  It is intended as iwzs-sx-ojqy communication.  It is written and medical language may contain abbreviations or verbiage that are technical and unfamiliar.  It may appear blunt or direct.  Medical documents are intended to carry relevant information, facts as evident, and the clinical opinion of the practitioner.        This note could include assistance by Dragon voice recognition. Errors in content may be related to improper recognition by the system; efforts to review and correct have been done but errors may still exist.          [1]   Current Outpatient Medications on File Prior to Visit   Medication Sig Dispense Refill    traMADol 50 MG Oral Tab       venlafaxine  MG Oral Capsule SR 24 Hr TAKE 1 CAPSULE(150 MG) BY MOUTH DAILY 90 capsule 0    GABAPENTIN 100 MG Oral Cap TAKE 1 CAPSULE(100 MG) BY MOUTH THREE TIMES DAILY 270 capsule 0    CUSTOM MEDICATION Semaglutide  0.5mg    Semaglutide/Cyanocobalamin 1mg/0.5 ml    Inject 50 units/0.5ml subcutaneous weekly     Disp: 2.5ml 2.5 each 0    ezetimibe 10 MG Oral Tab Take 1 tablet (10 mg total) by mouth daily. 90 tablet 1    LORazepam 0.5 MG Oral Tab Take 1 tablet (0.5 mg total) by mouth nightly as needed. 40 tablet 0    Omeprazole 40 MG Oral Capsule Delayed Release Take 1 capsule (40 mg total) by mouth daily. 90 capsule 1    rosuvastatin 10 MG Oral Tab Take 1 tablet (10 mg total) by mouth nightly. 90 tablet 1    doxycycline 100 MG Oral Cap TAKE 1 CAPSULE BY MOUTH TWICE DAILY ON A FULL STOMACH. DO NOT LAY FLAT 1 HOUR AFTER      Tirzepatide-Weight Management (ZEPBOUND) 2.5 MG/0.5ML Subcutaneous Solution Auto-injector Inject 2.5 mg into the skin once a week. 2 mL 0    topiramate 25 MG Oral Tab Take 1 tablet (25 mg total) by mouth 2 (two) times daily. 180 tablet 1    CUSTOM MEDICATION Tirzepatide/Niacinamide 2.5mg    8/2mg /mL  Inject 31unit/0.31mL into skin q weekly    Disp: 2.5mL 2.5 each 0    hydroxychloroquine 200 MG Oral Tab Take 1 tablet (200 mg total) by mouth 2 (two) times daily. 180 tablet 1    cyclobenzaprine 10 MG Oral Tab Take 1 tablet (10 mg total) by mouth nightly as needed for Muscle spasms. 30 tablet 0    levothyroxine 175 MCG Oral Tab Take 1 tablet (175 mcg total) by mouth before breakfast. 90 tablet 3    Meloxicam 7.5 MG Oral Tab Take 1 tablet (7.5 mg total) by mouth daily.      traZODone 50 MG Oral Tab Take 1 tablet (50 mg total) by mouth nightly.      clindamycin 1 % External Lotion APPLY TOPICALLY TO THE AFFECTED AREA EVERY DAY BEFORE NOON      fexofenadine 180 MG Oral Tab Take 1 tablet (180 mg total) by mouth daily.      Multiple Vitamin (MULTIVITAMIN ADULT OR) Apply topically.      ondansetron 4 MG Oral Tablet Dispersible Take 1 tablet (4 mg total) by mouth every 8 (eight) hours as needed for Nausea. 30 tablet 0    Nystatin 738711 UNIT/GM External Powder Apply 1 Application topically 4 (four) times daily. 1 Bottle  0    clotrimazole-betamethasone 1-0.05 % External Cream Apply 1 Application topically 2 (two) times daily as needed (rash). 60 g 3    Albuterol Sulfate HFA (PROAIR HFA) 108 (90 Base) MCG/ACT Inhalation Aero Soln Inhale 2 puffs into the lungs every 4 (four) hours as needed for Wheezing or Shortness of Breath. 1 Inhaler 3    Triamcinolone Acetonide 55 MCG/ACT Nasal Aerosol by Nasal route daily as needed.       No current facility-administered medications on file prior to visit.   [2]   Allergies  Allergen Reactions    Penicillins HIVES and RASH

## 2025-01-24 NOTE — PROCEDURES
Procedure: Endometrial biopsy     Date of Procedure: 25    Pre-procedure diagnosis:   AUB    Post-procedure diagnosis:    AUB    Indications:   44 year old female  who presents for endometrial biopsy / AUB    Procedure details:  The procedure, risks, benefits and alternatives were discussed with the patient. The patient was informed of risks including but not limited to the risk of bleeding, infection, injury and insufficient tissue collection. All questions and concerns were addressed. The patient provided verbal and written consent.     The patient was placed in a supine position with feet positioned into stirrups. A sterile speculum was placed into the vagina and the cervix was visualized with findings noted below. The cervix was cleaned and prepped with betadine.  Lidocaine gel was placed on the anterior lip of the cervix. The endometrial Pipelle was then advanced through the cervical canal. The uterus sounded to 8 cm. The Pipelle was then engaged with suction force noted and advance in various angles along the uterine cavity. A small amount of endometrial tissue was noted and collected to be sent to pathology. This was repeated for 1 more pass(es).  All instruments were removed. Good hemostasis noted. The patient tolerated the procedure well.     Findings:   Normal cervix, no lesions   Normal uterus, sounded to 8 cm   small amount of endometrial tissue   S/p EMB  Good hemostasis     Disposition: Stable    Complications: None    Follow up:  as needed     Seble Maguire MD   [facility] - OBGYN      Discussed with patient that there will not be further notification of normal or benign results other than receiving results on [employer]. A [employer] message or telephone call will be placed by the physician and/or office staff if results are abnormal.     Note to patient and family   The 21st Century Cures Act makes medical notes available to patients in the interest of transparency.  However, please be advised  that this is a medical document.  It is intended as faaz-ev-tfck communication.  It is written and medical language may contain abbreviations or verbiage that are technical and unfamiliar.  It may appear blunt or direct.  Medical documents are intended to carry relevant information, facts as evident, and the clinical opinion of the practitioner.      This note could include assistance by Dragon voice recognition. Errors in content may be related to improper recognition by the system; efforts to review and correct have been done but errors may still exist.

## 2025-01-29 ENCOUNTER — OFFICE VISIT (OUTPATIENT)
Facility: LOCATION | Age: 45
End: 2025-01-29
Payer: COMMERCIAL

## 2025-01-29 ENCOUNTER — OFFICE VISIT (OUTPATIENT)
Dept: PAIN CLINIC | Facility: CLINIC | Age: 45
End: 2025-01-29
Payer: COMMERCIAL

## 2025-01-29 VITALS — SYSTOLIC BLOOD PRESSURE: 114 MMHG | HEART RATE: 79 BPM | DIASTOLIC BLOOD PRESSURE: 64 MMHG | OXYGEN SATURATION: 99 %

## 2025-01-29 DIAGNOSIS — M62.462 GASTROCNEMIUS EQUINUS OF LEFT LOWER EXTREMITY: ICD-10-CM

## 2025-01-29 DIAGNOSIS — G89.29 CHRONIC RIGHT SHOULDER PAIN: Primary | ICD-10-CM

## 2025-01-29 DIAGNOSIS — M72.2 BILATERAL PLANTAR FASCIITIS: Primary | ICD-10-CM

## 2025-01-29 DIAGNOSIS — M25.511 CHRONIC RIGHT SHOULDER PAIN: Primary | ICD-10-CM

## 2025-01-29 DIAGNOSIS — Q66.70 HIGH ARCHES: ICD-10-CM

## 2025-01-29 DIAGNOSIS — M62.461 GASTROCNEMIUS EQUINUS OF RIGHT LOWER EXTREMITY: ICD-10-CM

## 2025-01-29 PROCEDURE — 99214 OFFICE O/P EST MOD 30 MIN: CPT | Performed by: ANESTHESIOLOGY

## 2025-01-29 PROCEDURE — 3078F DIAST BP <80 MM HG: CPT | Performed by: ANESTHESIOLOGY

## 2025-01-29 PROCEDURE — 99213 OFFICE O/P EST LOW 20 MIN: CPT | Performed by: PODIATRIST

## 2025-01-29 PROCEDURE — G2211 COMPLEX E/M VISIT ADD ON: HCPCS | Performed by: ANESTHESIOLOGY

## 2025-01-29 PROCEDURE — 3074F SYST BP LT 130 MM HG: CPT | Performed by: ANESTHESIOLOGY

## 2025-01-29 RX ORDER — METHYLPREDNISOLONE 4 MG/1
TABLET ORAL
Qty: 21 TABLET | Refills: 0 | Status: SHIPPED | OUTPATIENT
Start: 2025-01-29

## 2025-01-29 NOTE — PROGRESS NOTES
Name: Nola De Paz   : 3/16/1980   DOS: 2025     Pain Clinic Follow Up Visit:     Chief Complaint   Patient presents with    Follow - Up     Both knees       Nola De Paz is a 44 year old female with a history of knee and shoulder pain here for follow-up.  From a shoulder perspective, complains of pain with decreased range of motion.  Rates the pain as 5 out of 10.  This is treated in August with ultrasound-guided intra-articular shoulder injection which is led to excellent relief.  Patient now feels the pain has returned.  She is interested in repeat shoulder injection.    Additionally, also has a history of chronic knee pain.  This is treated with Synvisc in 2024.  Still has good relief.    Pt denies any chills, fever, or weakness. There is no bladder or bowel incontinence associated with the pain.    REVIEW OF SYSTEMS:  A ten point review of systems was performed with pertinent positives and negatives in the HPI.    Allergies[1]    Current Outpatient Medications   Medication Sig Dispense Refill    traMADol 50 MG Oral Tab as needed.      venlafaxine  MG Oral Capsule SR 24 Hr TAKE 1 CAPSULE(150 MG) BY MOUTH DAILY 90 capsule 0    GABAPENTIN 100 MG Oral Cap TAKE 1 CAPSULE(100 MG) BY MOUTH THREE TIMES DAILY 270 capsule 0    CUSTOM MEDICATION Semaglutide 0.5mg    Semaglutide/Cyanocobalamin 1mg/0.5 ml    Inject 50 units/0.5ml subcutaneous weekly     Disp: 2.5ml 2.5 each 0    ezetimibe 10 MG Oral Tab Take 1 tablet (10 mg total) by mouth daily. 90 tablet 1    LORazepam 0.5 MG Oral Tab Take 1 tablet (0.5 mg total) by mouth nightly as needed. 40 tablet 0    Omeprazole 40 MG Oral Capsule Delayed Release Take 1 capsule (40 mg total) by mouth daily. 90 capsule 1    rosuvastatin 10 MG Oral Tab Take 1 tablet (10 mg total) by mouth nightly. 90 tablet 1    doxycycline 100 MG Oral Cap TAKE 1 CAPSULE BY MOUTH TWICE DAILY ON A FULL STOMACH. DO NOT LAY FLAT 1 HOUR AFTER      topiramate 25 MG Oral Tab Take 1  tablet (25 mg total) by mouth 2 (two) times daily. 180 tablet 1    hydroxychloroquine 200 MG Oral Tab Take 1 tablet (200 mg total) by mouth 2 (two) times daily. 180 tablet 1    cyclobenzaprine 10 MG Oral Tab Take 1 tablet (10 mg total) by mouth nightly as needed for Muscle spasms. 30 tablet 0    levothyroxine 175 MCG Oral Tab Take 1 tablet (175 mcg total) by mouth before breakfast. 90 tablet 3    Meloxicam 7.5 MG Oral Tab Take 1 tablet (7.5 mg total) by mouth as needed.      traZODone 50 MG Oral Tab Take 1 tablet (50 mg total) by mouth nightly.      clindamycin 1 % External Lotion APPLY TOPICALLY TO THE AFFECTED AREA EVERY DAY BEFORE NOON      fexofenadine 180 MG Oral Tab Take 1 tablet (180 mg total) by mouth daily.      Multiple Vitamin (MULTIVITAMIN ADULT OR) Apply topically.      ondansetron 4 MG Oral Tablet Dispersible Take 1 tablet (4 mg total) by mouth every 8 (eight) hours as needed for Nausea. 30 tablet 0    Nystatin [id number] UNIT/GM External Powder Apply 1 Application topically 4 (four) times daily. 1 Bottle 0    clotrimazole-betamethasone 1-0.05 % External Cream Apply 1 Application topically 2 (two) times daily as needed (rash). 60 g 3    Albuterol Sulfate HFA (PROAIR HFA) 108 (90 Base) MCG/ACT Inhalation Aero Soln Inhale 2 puffs into the lungs every 4 (four) hours as needed for Wheezing or Shortness of Breath. 1 Inhaler 3    Triamcinolone Acetonide 55 MCG/ACT Nasal Aerosol by Nasal route daily as needed.      methylPREDNISolone 4 MG Oral Tablet Therapy Pack Take per package insert (instructions). (Patient not taking: Reported on 1/29/2025) 21 tablet 0         EXAM:   /64   Pulse 79   LMP 12/29/2024 (Exact Date)   SpO2 99%   General:  Patient is a(n) 44 year old year old female in no acute distress.  Neurologic:: WNL-Orientation to time, place and person, normal mood & affect, concentration & attention span intact.   Inspection:  Ambulates with well-coordinated, fluid, non-antalgic gait.  Gait is  normal.  Neck: Full range of motion.  Mildly decreased abduction of right shoulder  Cranial nerves: Grossly intact  Respiratory: Nonlabored  Back: Gait intact    IMAGES:   Shoulder x-ray reviewed with mild osteoarthritic changes      ASSESSMENT AND PLAN:     1. Chronic right shoulder pain      The patient is a pleasant 44-year-old female with a history of knee and shoulder pain issues.  From a knee pain perspective, patient treated with Synvisc in September 2024.  This can be repeated in March.  Patient will call the office and move forward at that at the appropriate time.  In terms of shoulder pain, does have some osteoarthritis in the shoulder joint.  Did well with previous ultrasound-guided shoulder injection.  This is completed in August and has provided 5 months of pain relief.  Repeat ultrasound-guided shoulder injection ordered.    Orders:  Orders Placed This Encounter   Procedures    [facility] PAIN NAVIGATOR         Radiology orders and consultations:None  The patient indicates understanding of these issues and agrees to the plan.  No follow-ups on file.    Vick Neal MD, 1/29/2025, 4:13 PM              [1]   Allergies  Allergen Reactions    Penicillins HIVES and RASH

## 2025-01-29 NOTE — PROGRESS NOTES
[facility]  Progress Note      Nola De Paz is a 44 year old female.   Chief Complaint   Patient presents with    Foot Pain     F/u bilateral plantar fasciitis pain 5/10. Left  is worse than right. She was unable to start Physical Therapy do to her schedule.         HPI:     Patient is a pleasant 44-year-old female who is returning to clinic today for recheck of bilateral foot pain.  Patient has a history of plantar fasciitis and is continuing to have pain in both heels (left worse than right).  Rates her pain 5/10.  Patient has still been unable to start physical therapy as she works a full-time job and is currently in nursing school.  She states that the physical therapy locations have been unable to accommodate her busy schedule.  Patient did obtain over-the-counter Spenco inserts, which has been beneficial to the right foot.  She is also ambulating in supportive shoe gear today.  She is inquiring about custom orthotics.  She is denying any other pains to her feet today.  No other concerns.      Allergies: Penicillins   Current Outpatient Medications   Medication Sig Dispense Refill    traMADol 50 MG Oral Tab       venlafaxine  MG Oral Capsule SR 24 Hr TAKE 1 CAPSULE(150 MG) BY MOUTH DAILY 90 capsule 0    GABAPENTIN 100 MG Oral Cap TAKE 1 CAPSULE(100 MG) BY MOUTH THREE TIMES DAILY 270 capsule 0    CUSTOM MEDICATION Semaglutide 0.5mg    Semaglutide/Cyanocobalamin 1mg/0.5 ml    Inject 50 units/0.5ml subcutaneous weekly     Disp: 2.5ml 2.5 each 0    ezetimibe 10 MG Oral Tab Take 1 tablet (10 mg total) by mouth daily. 90 tablet 1    LORazepam 0.5 MG Oral Tab Take 1 tablet (0.5 mg total) by mouth nightly as needed. 40 tablet 0    Omeprazole 40 MG Oral Capsule Delayed Release Take 1 capsule (40 mg total) by mouth daily. 90 capsule 1    rosuvastatin 10 MG Oral Tab Take 1 tablet (10 mg total) by mouth nightly. 90 tablet 1    doxycycline 100 MG Oral Cap TAKE 1 CAPSULE BY MOUTH TWICE DAILY ON A FULL  STOMACH. DO NOT LAY FLAT 1 HOUR AFTER      Tirzepatide-Weight Management (ZEPBOUND) 2.5 MG/0.5ML Subcutaneous Solution Auto-injector Inject 2.5 mg into the skin once a week. 2 mL 0    topiramate 25 MG Oral Tab Take 1 tablet (25 mg total) by mouth 2 (two) times daily. 180 tablet 1    CUSTOM MEDICATION Tirzepatide/Niacinamide 2.5mg    8/2mg /mL  Inject 31unit/0.31mL into skin q weekly    Disp: 2.5mL 2.5 each 0    hydroxychloroquine 200 MG Oral Tab Take 1 tablet (200 mg total) by mouth 2 (two) times daily. 180 tablet 1    cyclobenzaprine 10 MG Oral Tab Take 1 tablet (10 mg total) by mouth nightly as needed for Muscle spasms. 30 tablet 0    levothyroxine 175 MCG Oral Tab Take 1 tablet (175 mcg total) by mouth before breakfast. 90 tablet 3    Meloxicam 7.5 MG Oral Tab Take 1 tablet (7.5 mg total) by mouth daily.      traZODone 50 MG Oral Tab Take 1 tablet (50 mg total) by mouth nightly.      clindamycin 1 % External Lotion APPLY TOPICALLY TO THE AFFECTED AREA EVERY DAY BEFORE NOON      fexofenadine 180 MG Oral Tab Take 1 tablet (180 mg total) by mouth daily.      Multiple Vitamin (MULTIVITAMIN ADULT OR) Apply topically.      ondansetron 4 MG Oral Tablet Dispersible Take 1 tablet (4 mg total) by mouth every 8 (eight) hours as needed for Nausea. 30 tablet 0    Nystatin 451714 UNIT/GM External Powder Apply 1 Application topically 4 (four) times daily. 1 Bottle 0    clotrimazole-betamethasone 1-0.05 % External Cream Apply 1 Application topically 2 (two) times daily as needed (rash). 60 g 3    Albuterol Sulfate HFA (PROAIR HFA) 108 (90 Base) MCG/ACT Inhalation Aero Soln Inhale 2 puffs into the lungs every 4 (four) hours as needed for Wheezing or Shortness of Breath. 1 Inhaler 3    Triamcinolone Acetonide 55 MCG/ACT Nasal Aerosol by Nasal route daily as needed.        Past Medical History:    Abdominal hernia    Allergic rhinitis    Anxiety    Arthritis    Back problem    DDD    Blood disorder    anemia    Blood in urine     Notes on tests for dr ellis    Depression    Disorder of thyroid    Esophageal reflux    Fatigue    Fibromayalgia    Fibromyalgia    Headache disorder    Migraines    Heartburn    Hemorrhoids    High cholesterol    Hyperlipidemia    Hypothyroidism    IBS (irritable bowel syndrome)    Indigestion    Migraines    Obesity    BLESSING (obstructive sleep apnea)    AHI 19 REM AHI 57 Supine AHI 46 non-supine AHI 11 [place] 71%    Osteoarthritis    Pneumonia due to organism    Shortness of breath    Sleep apnea    cpap    Sleep disturbance    Sleep apnea with cpap    Thyroid disease      Past Surgical History:   Procedure Laterality Date    Cholecystectomy  2013    Colonoscopy N/A 08/28/2020    Procedure: COLONOSCOPY;  Surgeon: Tone Steiner MD;  Location:  ENDOSCOPY    Gastric bypass,obesity,sb reconstruc  12/21/2020    Did not have bypass but had the sleeve done    Other surgical history  12/21/2020    gastric sleeve    Removal gallbladder  2013    [place]    Surgical bariatrics - internal  12/21/2020    [place] teeth removed  2013      Family History   Problem Relation Age of Onset    Heart Disease Mother     High Cholesterol Mother     Other (DDD) Mother     Anemia Mother     Heart Disorder Mother         Heart disease, high blood pressure, stent, high cholesterol    Hypertension Mother     Obesity Mother     Colon Polyps Mother     Hypertension Father     Heart Disorder Father         Heart murmur, high blood pressure    Obesity Father     Other (lymphoma) Maternal Grandmother     Cancer Maternal Grandmother         Lymphoma    Other (cancer) Paternal Grandmother         liver     Asthma Paternal Grandmother         Asthma and emphysema, copd, non smoker    Cancer Paternal Grandmother         Liver and lung cancer    Diabetes Paternal Grandmother     Prostate Cancer Paternal Grandfather         in 80s    Cancer Paternal Grandfather         Prostate cancer    Stroke Paternal Grandfather     Alcohol and Other  Disorders Associated Paternal Uncle         Alcoholic    Mental Disorder Paternal Uncle       Social History     Socioeconomic History    Marital status: Single   Occupational History    Occupation: [employer]     Employer: [employer]     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      EXAM:     GENERAL: well developed, well nourished, in no apparent distress  EXTREMITIES:  1. Integument: Skin appears moist, warm, and supple with positive hair growth. There are no color changes. No open lesions. No macerations. No Hyperkeratotic lesions.   2. Vascular: Dorsalis pedis 2/4 bilateral and posterior tibial pulses 2/4 bilateral, capillary refill normal.  3. Neurological: Gross sensation intact via light touch bilaterally.  Normal sharp/dull sensation  4. Musculoskeletal: All muscle groups are graded 5/5 in the foot and ankle with no pain elicited with inversion and dorsiflexion against resistance to the left lower extremity.  Patient does have pain with palpation to bilateral plantar medial aspects of the heels near the origin of the plantar fascia and into the medial longitudinal arch (left worse than right).  No pain with side-to-side heel squeeze, bilaterally.  No pain with palpation elsewhere to bilateral feet.  No acute deformities noted to bilateral feet.  Patient does have decreased ankle joint dorsiflexion bilaterally consistent with equinus deformity.  Overall increase in arch height  noted to bilateral feet with a palpably taut plantar fascia noted bilaterally       ASSESSMENT AND PLAN:   Diagnoses and all orders for this visit:    Bilateral plantar fasciitis  -     DME - EXTERNAL   -     Physical Therapy Referral - External    Gastrocnemius equinus of left lower extremity  -     DME - EXTERNAL   -     Physical Therapy Referral - External    Gastrocnemius equinus of right lower extremity  -     DME - EXTERNAL   -     Physical Therapy Referral - External    High arches  -     DME - EXTERNAL   -     Physical Therapy Referral - External        Plan:   -Patient was seen and evaluated today in clinic.  Chart history reviewed.    Discussed clinical exam findings with patient today, consistent with continued plantar fasciitis to bilateral feet.  No longer experiencing pain elsewhere in the feet.    Discussed importance of rehab for the plantar fasciitis.  Recommend external physical therapy order as they may be able to accommodate her busy schedule and work with her on weekends.  New order was placed.    Recommend patient continue with at home exercises/stretching daily, adding massaging with frozen water bottle.    Recommend patient continue to ambulate as tolerated in supportive shoe gear with her over-the-counter supportive inserts.    A new order for custom orthotics placed today through  prosthetics.    Rx: Medrol Dosepak    Advised patient to avoid any activities that elicits pain.  Recommend icing, elevating, and resting as needed    Pending improvements, we will consider advanced imaging    -All of the patient's questions and concerns were addressed.  They indicated their understanding of these issues and agrees to the plan.      RTC 4 to 6 weeks from when starting physical therapy    Juliano Aldana DPM, AACFAS        1/29/2025    [facility]  [address] [place] [id number]   [email or web address]            [employer] speech recognition software was used  to prepare this note.  Errors in word recognition may occur.  Please contact me with any questions/concerns with this note.

## 2025-01-29 NOTE — PROGRESS NOTES
Patient presents in office today with reported pain in both knees and right shoulder     Current pain level reported = 2/10 in knees and 5/10 in right shoulder     Last reported dose of NA      Narcotic Contract renewal NA    Urine Drug screen NA

## 2025-02-04 ENCOUNTER — TELEMEDICINE (OUTPATIENT)
Facility: CLINIC | Age: 45
End: 2025-02-04
Payer: COMMERCIAL

## 2025-02-04 DIAGNOSIS — E78.2 MIXED HYPERLIPIDEMIA: ICD-10-CM

## 2025-02-04 DIAGNOSIS — Z98.84 S/P LAPAROSCOPIC SLEEVE GASTRECTOMY: ICD-10-CM

## 2025-02-04 DIAGNOSIS — Z51.81 THERAPEUTIC DRUG MONITORING: Primary | ICD-10-CM

## 2025-02-04 DIAGNOSIS — R73.03 PREDIABETES: ICD-10-CM

## 2025-02-04 DIAGNOSIS — G47.33 OSA ON CPAP: ICD-10-CM

## 2025-02-04 DIAGNOSIS — M79.7 FIBROMYALGIA: ICD-10-CM

## 2025-02-04 DIAGNOSIS — E66.813 CLASS 3 SEVERE OBESITY WITH SERIOUS COMORBIDITY AND BODY MASS INDEX (BMI) OF 45.0 TO 49.9 IN ADULT, UNSPECIFIED OBESITY TYPE (HCC): ICD-10-CM

## 2025-02-04 DIAGNOSIS — E66.01 CLASS 3 SEVERE OBESITY WITH SERIOUS COMORBIDITY AND BODY MASS INDEX (BMI) OF 45.0 TO 49.9 IN ADULT, UNSPECIFIED OBESITY TYPE (HCC): ICD-10-CM

## 2025-02-04 DIAGNOSIS — F50.819 BINGE EATING DISORDER, UNSPECIFIED SEVERITY: ICD-10-CM

## 2025-02-04 RX ORDER — TOPIRAMATE 50 MG/1
50 TABLET, FILM COATED ORAL 2 TIMES DAILY
Qty: 180 TABLET | Refills: 1 | Status: SHIPPED | OUTPATIENT
Start: 2025-02-04

## 2025-02-04 RX ORDER — TIRZEPATIDE 2.5 MG/.5ML
2.5 INJECTION, SOLUTION SUBCUTANEOUS WEEKLY
Qty: 2 ML | Refills: 0 | Status: SHIPPED | OUTPATIENT
Start: 2025-02-04

## 2025-02-04 NOTE — PROGRESS NOTES
This visit is conducted using Telemedicine with live, interactive video and audio.    Patient has been referred to the [facility] website at [email or web address] to review the yearly Consent to Treat document.    Patient understands and accepts financial responsibility for any deductible, co-insurance and/or co-pays associated with this service.      HISTORY OF PRESENT ILLNESS  Chief Complaint   Patient presents with    Weight Check       Nola De Paz is a 44 year old female here for follow up in medical weight loss program.   276lbs  Has not been taking the semaglutide, has been taking the topamax but only once a day  Denies chest pain, shortness of breath, dizziness, blurred vision, headache, paresthesia, nausea/vomiting.     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  Sleep: 4 hours/night uninterrupted - overall getting better, BLESSING with CPAP    WLC Follow Up    General Information  Success Moment: Realizing I was eating food more for nutrition value than   for the taste of it  Challenging Moment: Cravings for carbs/sugar,  Nutrition Recall  Breakfast: 1 Greek yogurt,1 premade protein shake and espresso Lunch: 1 P3     Dinner: 1 taco bowl: Bismati rice, lettuce, chicken, tomatoes, onion,   sweet corn, sour cream, Snacks: Cheeze it snack mix, fiber one granola bar     Fluids: 1 root beer can, 48 oz water Dining Out: 2   Exercise   Patient stated exercises # days/week: 3  Patient stated perceived level of   exertion: 3    Aerobic Days: 3   Patient stated average level of stress: 8  Sleep   Patient stated # hours uninterrupted sleep: 4   Patient stated feels   restful: No      Cause of disruption of sleep: Pain, body temperature cold or hot and/or   dry mouth/thirsty   Goals: 3month supply of meds at cheaper cost? Or different strategies/meds   seeing a dietician to help with  reestablishing better diet and counselor   for help with time and stress management              Wt Readings from Last 6 Encounters:   01/24/25 280 lb 2 oz (127.1 kg)   02/07/25 280 lb (127 kg)   01/17/25 280 lb (127 kg)   11/15/24 287 lb (130.2 kg)   11/01/24 287 lb (130.2 kg)   09/10/24 264 lb (119.7 kg)            Breakfast Lunch Dinner Snacks Fluids   Reviewed           REVIEW OF SYSTEMS  GENERAL HEALTH: feels well otherwise, denied any fevers chills or night sweats   RESPIRATORY: denies shortness of breath   CARDIOVASCULAR: denies chest pain  GI: denies abdominal pain    EXAM  LMP 12/29/2024 (Exact Date)   GENERAL: well developed, well nourished,in no apparent distress, A/O x3  SKIN: no rashes,no suspicious lesions on visible skin  HEENT: atraumatic, normocephalic  LUNGS: no increased effort or work with breathing   NEURO: speaking fluently and in clear sentences    Lab Results   Component Value Date    WBC 6.7 11/08/2024    RBC 4.18 11/08/2024    HGB 13.1 11/08/2024    HCT 38.7 11/08/2024    MCV 92.6 11/08/2024    MCH 31.3 11/08/2024    MCHC 33.9 11/08/2024    RDW 11.8 11/08/2024    .0 11/08/2024    MPV 10.7 01/17/2013     Lab Results   Component Value Date    GLU 88 10/04/2024    BUN 19 10/04/2024    BUNCREA 29.7 (H) 10/04/2024    CREATSERUM 0.64 10/04/2024    ANIONGAP 4 10/04/2024    GFRNAA 100 07/16/2022    GFRAA 115 07/16/2022    CA 8.9 10/04/2024    OSMOCALC 298 (H) 10/04/2024    ALKPHO 63 10/04/2024    AST 20 10/04/2024    ALT 17 10/04/2024    BILT 0.6 10/04/2024    TP 6.3 10/04/2024    ALB 3.6 10/04/2024    GLOBULIN 2.7 10/04/2024    AGRATIO 1.2 08/08/2020     10/04/2024    K 3.3 (L) 10/04/2024     10/04/2024    CO2 29.0 10/04/2024     Lab Results   Component Value Date     06/22/2021    A1C 5.4 06/22/2021     Lab Results   Component Value Date    CHOLEST 213 (H) 04/30/2024    TRIG 36 04/30/2024    HDL 49 04/30/2024     (H) 04/30/2024    VLDL 7 04/30/2024    TCHDLRATIO  3.5 08/08/2020    NONHDLC 164 (H) 04/30/2024     Lab Results   Component Value Date    T4F 1.0 02/10/2024    TSH 2.330 02/10/2024    TSHT4 0.91 08/08/2020     Lab Results   Component Value Date    B12 492 06/22/2023    VITB12 480 08/08/2020     Lab Results   Component Value Date    VITD 30.8 04/30/2024       Medications Ordered Prior to Encounter[1]    ASSESSMENT  Analyzed weight data:       Diagnoses and all orders for this visit:    Therapeutic drug monitoring    Class 3 severe obesity with serious comorbidity and body mass index (BMI) of 45.0 to 49.9 in adult, unspecified obesity type (HCC)    S/P laparoscopic sleeve gastrectomy    BLESSING on CPAP  -     Tirzepatide-Weight Management (ZEPBOUND) 2.5 MG/0.5ML Subcutaneous Solution Auto-injector; Inject 2.5 mg into the skin once a week. (Patient taking differently: Inject 2.5 mg into the skin once a week. Patient has not started)  -     [facility] Dietician Referral  ([facility] USE ONLY)    Binge eating disorder, unspecified severity    Prediabetes    Mixed hyperlipidemia    Fibromyalgia    Other orders  -     topiramate 50 MG Oral Tab; Take 1 tablet (50 mg total) by mouth 2 (two) times daily.        PLAN  Preop Weight:    367.8    66.2                         DOS:12/21/20 - SLEEVE GASTRECTOMY  2/11/21              312.5    57.1  3/18/21              299.5    54.8  Today's weight : 268  lbs  Net loss - + 24lbs/Net loss 100lbs    Will begin Zepbound 2.5mg weekly - denies any personal or family history of pancreatitis, pancreatic cancer, thyroid cancer, MEN2 - discussed MOA, advised side effects and adverse effects of medication.  Will continue and increase topamax - advised side effects and adverse effects of medication   BLESSING - CPAP compliance, zepbound  HLD - stable on current medication zetia and rosuvastatin, will continue, will continue to work on lifestyle modifications  Prediabetes - low carb diet  Fibromyalgia - lower impact exercise  Consistency with logging foods - protein  and produce  Nutrition: low carb diet/ recommended to eat breakfast daily/ regular protein intake  Medication use and side effects reviewed with patient.  Medication contraindications: metformin  Follow up with dietitian and psychologist as recommended.  Discussed the role of sleep and stress in weight management.  Counseled on comprehensive weight loss plan including attention to nutrition, exercise and behavior/stress management for success. See patient instruction below for more details.  Discussed strategies to overcome barriers to successful weight loss and weight maintenance  FITTE: ACSM recommendations (150-300 minutes/ week in active weight loss)   Weight Loss consent to treat reviewed and signed     There are no Patient Instructions on file for this visit.    No follow-ups on file.    Patient verbalizes understanding.    Solange David PA-C  2/4/2025    Please note that the following visit was completed using two-way, real-time interactive audio and video communication.  This has been done in good greg to provide continuity of care in the best interest of the provider-patient relationship, due to the ongoing public health crisis/national emergency and because of restrictions of visitation.  There are limitations of this visit as no physical exam could be performed.  Every conscious effort was taken to allow for sufficient and adequate time.  This billing was spent on reviewing labs, medications, radiology tests and decision making.  Appropriate medical decision-making and tests are ordered as detailed in the plan of care above    Solange David PA-C           [1]   Current Outpatient Medications on File Prior to Visit   Medication Sig Dispense Refill    methylPREDNISolone 4 MG Oral Tablet Therapy Pack Take per package insert (instructions). 21 tablet 0    traMADol 50 MG Oral Tab as needed.      venlafaxine  MG Oral Capsule SR 24 Hr TAKE 1 CAPSULE(150 MG) BY MOUTH DAILY 90 capsule 0    GABAPENTIN  100 MG Oral Cap TAKE 1 CAPSULE(100 MG) BY MOUTH THREE TIMES DAILY 270 capsule 0    ezetimibe 10 MG Oral Tab Take 1 tablet (10 mg total) by mouth daily. 90 tablet 1    LORazepam 0.5 MG Oral Tab Take 1 tablet (0.5 mg total) by mouth nightly as needed. 40 tablet 0    Omeprazole 40 MG Oral Capsule Delayed Release Take 1 capsule (40 mg total) by mouth daily. 90 capsule 1    rosuvastatin 10 MG Oral Tab Take 1 tablet (10 mg total) by mouth nightly. 90 tablet 1    doxycycline 100 MG Oral Cap 2 (two) times daily as needed.      hydroxychloroquine 200 MG Oral Tab Take 1 tablet (200 mg total) by mouth 2 (two) times daily. 180 tablet 1    cyclobenzaprine 10 MG Oral Tab Take 1 tablet (10 mg total) by mouth nightly as needed for Muscle spasms. 30 tablet 0    levothyroxine 175 MCG Oral Tab Take 1 tablet (175 mcg total) by mouth before breakfast. 90 tablet 3    Meloxicam 7.5 MG Oral Tab Take 1 tablet (7.5 mg total) by mouth as needed.      traZODone 50 MG Oral Tab Take 1 tablet (50 mg total) by mouth nightly.      clindamycin 1 % External Lotion APPLY TOPICALLY TO THE AFFECTED AREA EVERY DAY BEFORE NOON      fexofenadine 180 MG Oral Tab Take 1 tablet (180 mg total) by mouth daily.      Multiple Vitamin (MULTIVITAMIN ADULT OR) Apply topically.      ondansetron 4 MG Oral Tablet Dispersible Take 1 tablet (4 mg total) by mouth every 8 (eight) hours as needed for Nausea. 30 tablet 0    Nystatin [id number] UNIT/GM External Powder Apply 1 Application topically 4 (four) times daily. 1 Bottle 0    clotrimazole-betamethasone 1-0.05 % External Cream Apply 1 Application topically 2 (two) times daily as needed (rash). 60 g 3    Albuterol Sulfate HFA (PROAIR HFA) 108 (90 Base) MCG/ACT Inhalation Aero Soln Inhale 2 puffs into the lungs every 4 (four) hours as needed for Wheezing or Shortness of Breath. 1 Inhaler 3    Triamcinolone Acetonide 55 MCG/ACT Nasal Aerosol by Nasal route daily as needed.       No current facility-administered medications on  file prior to visit.

## 2025-02-12 ENCOUNTER — MED REC SCAN ONLY (OUTPATIENT)
Dept: FAMILY MEDICINE CLINIC | Facility: CLINIC | Age: 45
End: 2025-02-12

## 2025-02-13 ENCOUNTER — PATIENT MESSAGE (OUTPATIENT)
Facility: CLINIC | Age: 45
End: 2025-02-13

## 2025-02-18 ENCOUNTER — ANESTHESIA (OUTPATIENT)
Dept: ENDOSCOPY | Facility: HOSPITAL | Age: 45
End: 2025-02-18
Payer: COMMERCIAL

## 2025-02-18 ENCOUNTER — HOSPITAL ENCOUNTER (OUTPATIENT)
Facility: HOSPITAL | Age: 45
Setting detail: HOSPITAL OUTPATIENT SURGERY
Discharge: HOME | End: 2025-02-18
Attending: INTERNAL MEDICINE | Admitting: INTERNAL MEDICINE
Payer: COMMERCIAL

## 2025-02-18 ENCOUNTER — ANESTHESIA EVENT (OUTPATIENT)
Dept: ENDOSCOPY | Facility: HOSPITAL | Age: 45
End: 2025-02-18
Payer: COMMERCIAL

## 2025-02-18 VITALS
OXYGEN SATURATION: 99 % | BODY MASS INDEX: 51.53 KG/M2 | WEIGHT: 280 LBS | HEIGHT: 62 IN | RESPIRATION RATE: 16 BRPM | TEMPERATURE: 98 F | SYSTOLIC BLOOD PRESSURE: 114 MMHG | HEART RATE: 59 BPM | DIASTOLIC BLOOD PRESSURE: 54 MMHG

## 2025-02-18 DIAGNOSIS — R10.11 RUQ PAIN: ICD-10-CM

## 2025-02-18 LAB — B-HCG UR QL: NEGATIVE

## 2025-02-18 PROCEDURE — 81025 URINE PREGNANCY TEST: CPT

## 2025-02-18 PROCEDURE — 88305 TISSUE EXAM BY PATHOLOGIST: CPT | Performed by: INTERNAL MEDICINE

## 2025-02-18 PROCEDURE — 0DB48ZX EXCISION OF ESOPHAGOGASTRIC JUNCTION, VIA NATURAL OR ARTIFICIAL OPENING ENDOSCOPIC, DIAGNOSTIC: ICD-10-PCS | Performed by: INTERNAL MEDICINE

## 2025-02-18 PROCEDURE — 0DB68ZX EXCISION OF STOMACH, VIA NATURAL OR ARTIFICIAL OPENING ENDOSCOPIC, DIAGNOSTIC: ICD-10-PCS | Performed by: INTERNAL MEDICINE

## 2025-02-18 PROCEDURE — 0DB58ZX EXCISION OF ESOPHAGUS, VIA NATURAL OR ARTIFICIAL OPENING ENDOSCOPIC, DIAGNOSTIC: ICD-10-PCS | Performed by: INTERNAL MEDICINE

## 2025-02-18 PROCEDURE — 0DB98ZX EXCISION OF DUODENUM, VIA NATURAL OR ARTIFICIAL OPENING ENDOSCOPIC, DIAGNOSTIC: ICD-10-PCS | Performed by: INTERNAL MEDICINE

## 2025-02-18 RX ORDER — SODIUM CHLORIDE, SODIUM LACTATE, POTASSIUM CHLORIDE, CALCIUM CHLORIDE 600; 310; 30; 20 MG/100ML; MG/100ML; MG/100ML; MG/100ML
INJECTION, SOLUTION INTRAVENOUS CONTINUOUS
Status: DISCONTINUED | OUTPATIENT
Start: 2025-02-18 | End: 2025-02-18

## 2025-02-18 RX ORDER — MULTIVIT-MIN/IRON/FOLIC ACID/K 18-600-40
CAPSULE ORAL
COMMUNITY

## 2025-02-18 RX ADMIN — SODIUM CHLORIDE, SODIUM LACTATE, POTASSIUM CHLORIDE, CALCIUM CHLORIDE: 600; 310; 30; 20 INJECTION, SOLUTION INTRAVENOUS at 09:45:00

## 2025-02-18 NOTE — ANESTHESIA PREPROCEDURE EVALUATION
PRE-OP EVALUATION    Patient Name: Nola De Paz    Admit Diagnosis: RUQ pain [R10.11]    Pre-op Diagnosis: RUQ pain [R10.11]    ESOPHAGOGASTRODUODENOSCOPY (EGD) AND UPPER ENDOSCOPIC ULTRASOUND    Anesthesia Procedure: ESOPHAGOGASTRODUODENOSCOPY (EGD) AND UPPER ENDOSCOPIC ULTRASOUND  .    Surgeons and Role:     * Tom Casey MD - Primary    Pre-op vitals reviewed.  Temp: 98.3 °F (36.8 °C)  Pulse: 63  Resp: 20  BP: 127/57  SpO2: 100 %  Body mass index is 51.21 kg/m².    Current medications reviewed.  Hospital Medications:   lactated ringers infusion   Intravenous Continuous       Outpatient Medications:   Prescriptions Prior to Admission[1]    Allergies: Penicillins      Anesthesia Evaluation        Anesthetic Complications           GI/Hepatic/Renal      (+) GERD       (-) chronic renal disease   (-) liver disease                 Cardiovascular        Exercise tolerance: good     MET: >4    (+) obesity  (-) hypertension     (-) CAD  (-) past MI  (-) CABG/stent  (-) pacemaker/AICD  (-) valvular problems/murmurs     (-) dysrhythmias   (-) CHF  (-) angina     (-) LAMBERT         Endo/Other  Comment: lupus    (-) diabetes                            Pulmonary      (+) asthma              (+) sleep apnea and CPAP      Neuro/Psych  Comment: fibromyalgia        (-) CVA     (-) seizures                       Past Surgical History:   Procedure Laterality Date    Cholecystectomy  2013    Colonoscopy N/A 08/28/2020    Procedure: COLONOSCOPY;  Surgeon: Tone Steiner MD;  Location:  ENDOSCOPY    Gastric bypass,obesity,sb reconstruc  12/21/2020    Did not have bypass but had the sleeve done    Other surgical history  12/21/2020    gastric sleeve    [place]bladder  2013    [place]    Surgical bariatrics - internal  12/21/2020    Upper gi endoscopy,exam      [place] teeth removed  2013     Social History     Socioeconomic History    Marital status: Single   Occupational History    Occupation: [employer]     Employer: [employer]     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               Airway      Mallampati: II  Mouth opening: 3 FB  TM distance: 4 - 6 cm  Neck ROM: full Cardiovascular    Cardiovascular exam normal.         Dental    Dentition appears grossly intact         Pulmonary    Pulmonary exam normal.                 Other findings              ASA: 4   Plan: MAC  NPO status verified and patient meets guidelines.          Plan/risks discussed with: patient                Present on Admission:  **None**             [1]   Medications Prior to Admission   Medication Sig Dispense Refill Last Dose/Taking    Cholecalciferol (VITAMIN D) 50 MCG (2000 UT) Oral Cap Take by mouth. Unsure of dose   2/17/2025    Calcium 200 MG Oral Tab Take by mouth. Unsure of dose   chewable   2/17/2025    topiramate 50 MG Oral Tab Take 1 tablet (50 mg total) by mouth 2 (two) times daily. 180 tablet 1 2/17/2025    traMADol 50 MG Oral Tab as needed.   Past Month    venlafaxine  MG Oral Capsule SR 24 Hr TAKE 1 CAPSULE(150 MG) BY MOUTH DAILY 90 capsule 0 2/17/2025    ezetimibe 10 MG Oral Tab Take 1 tablet (10 mg total) by mouth daily. 90 tablet 1 2/17/2025    LORazepam 0.5 MG Oral Tab Take 1 tablet (0.5 mg total) by mouth nightly as needed. 40 tablet 0 2/17/2025    Omeprazole 40 MG Oral Capsule Delayed Release Take 1 capsule (40 mg total) by mouth daily. 90 capsule 1 2/17/2025    rosuvastatin 10 MG Oral Tab Take 1 tablet (10 mg total) by mouth  nightly. 90 tablet 1 2/17/2025    hydroxychloroquine 200 MG Oral Tab Take 1 tablet (200 mg total) by mouth 2 (two) times daily. 180 tablet 1 2/17/2025    cyclobenzaprine 10 MG Oral Tab Take 1 tablet (10 mg total) by mouth nightly as needed for Muscle spasms. 30 tablet 0 Past Month    levothyroxine 175 MCG Oral Tab Take 1 tablet (175 mcg total) by mouth before breakfast. 90 tablet 3 2/17/2025    traZODone 50 MG Oral Tab Take 1 tablet (50 mg total) by mouth nightly.   Past Month    fexofenadine 180 MG Oral Tab Take 1 tablet (180 mg total) by mouth daily.   Past Month    Multiple Vitamin (MULTIVITAMIN ADULT OR) Apply topically.   2/17/2025    Nystatin [id number] UNIT/GM External Powder Apply 1 Application topically 4 (four) times daily. 1 Bottle 0 2/18/2025 Morning    Tirzepatide-Weight Management (ZEPBOUND) 2.5 MG/0.5ML Subcutaneous Solution Auto-injector Inject 2.5 mg into the skin once a week. (Patient taking differently: Inject 2.5 mg into the skin once a week. Patient has not started) 2 mL 0 Unknown    methylPREDNISolone 4 MG Oral Tablet Therapy Pack Take per package insert (instructions). 21 tablet 0     GABAPENTIN 100 MG Oral Cap TAKE 1 CAPSULE(100 MG) BY MOUTH THREE TIMES DAILY 270 capsule 0 More than a month    doxycycline 100 MG Oral Cap 2 (two) times daily as needed.   More than a month    Meloxicam 7.5 MG Oral Tab Take 1 tablet (7.5 mg total) by mouth as needed.   More than a month    clindamycin 1 % External Lotion APPLY TOPICALLY TO THE AFFECTED AREA EVERY DAY BEFORE NOON   Unknown    ondansetron 4 MG Oral Tablet Dispersible Take 1 tablet (4 mg total) by mouth every 8 (eight) hours as needed for Nausea. 30 tablet 0 More than a month    clotrimazole-betamethasone 1-0.05 % External Cream Apply 1 Application topically 2 (two) times daily as needed (rash). 60 g 3 Unknown    Albuterol Sulfate HFA (PROAIR HFA) 108 (90 Base) MCG/ACT Inhalation Aero Soln Inhale 2 puffs into the lungs every 4 (four) hours as needed  for Wheezing or Shortness of Breath. 1 Inhaler 3 More than a month    Triamcinolone Acetonide 55 MCG/ACT Nasal Aerosol by Nasal route daily as needed.   More than a month

## 2025-02-18 NOTE — OPERATIVE REPORT
Nola De Paz Patient Status:  Hospital Outpatient Surgery    3/16/1980 MRN [id number]   [facility] ENDOSCOPY [facility]IN [facility] Attending oTm Casey MD   Date 2025 PCP Julissa Gonzalez MD     PREOPERATIVE DIAGNOSIS/INDICATION: RUQ pain  POSTOPERTATIVE DIAGNOSIS: Post surgical changes, esophagitis, hiatal hernia  PROCEDURE PERFORMED: EUS/EGD biopsy  TIME OUT WAS PERFORMED    SEDATION: MAC sedation provided by General Anesthesia    INFORMED CONSENT: Risks, benefits and alternatives to the procedure were explained to the patient including but not limited to bleeding, infection, perforation, adverse drug reactions, pancreatitis and the need for hospitalization and surgery if this occurs, the patient understands and agrees to procedure.  PROCEDURE DESCRIPTION: The upper endoscope and later the therapeutic side viewing echoendoscope was introduced into the patient’s mouth, hypo pharynx, esophagus, stomach and the first and second portion of the duodenum, straightening of the endoscope was performed to obtain a direct view of the major ampulla, retroflexion was performed in the stomach with the EGD cope only. Careful examination of the above described areas was performed on withdrawal of the endoscope. The patient tolerated the procedure well and there were no immediate complications noted during the procedure, the patient was transported to the recovery area in stable condition.  FINDINGS:  ESOPHAGUS: LA class B erosive esophagitis  GEJ: 2 cm hiatal hernia, irregular z line  STOMACH: Post sleeve gastrectomy changes  DUODENUM: Normal  MAJOR AMPULLA: Normal  ECHO: Imaging was performed through the esophagus, stomach and duodenum. The celiac axis and the left adrenal gland appear wnl, the visualized portions of the left hepatic lobe appear wnl, the visualized pancreatic parenchyma appear wnl, the main PD appear wnl, the biliary tree appear wnl, the confluence of the portal vein, superior mesenteric  vein and splenic vein appear wnl.  THERAPEUTICS: Cold forceps biopsies were performed from duodenum, esophagus, GEJ, stomach  RECOMMENDATIONS:   Post EUS/EGD precautions, watch for bleeding, infection, perforation, adverse drug reactions and pancreatitis.  Call status report post procedure.  Antireflux measures  Pantoprazole 40 mg PO q day    Tom Casey MD  2/18/2025  10:04 AM

## 2025-02-18 NOTE — ANESTHESIA POSTPROCEDURE EVALUATION
[facility]    Nola De Paz Patient Status:  Hospital Outpatient Surgery   Age/Gender 44 year old female MRN [id number]   Location [facility] ENDOSCOPY PAIN CENTER Attending Tom Casey MD   Hosp Day # 0 PCP Julissa Gonzalez MD       Anesthesia Post-op Note    ESOPHAGOGASTRODUODENOSCOPY (EGD) with BIOPSY AND UPPER ENDOSCOPIC ULTRASOUND    Procedure Summary       Date: 02/18/25 Room / Location:  ENDOSCOPY 02 /  ENDOSCOPY    Anesthesia Start: 0937 Anesthesia Stop: 1004    Procedures:       ESOPHAGOGASTRODUODENOSCOPY (EGD) with BIOPSY AND UPPER ENDOSCOPIC ULTRASOUND      ENDOSCOPIC ULTRASOUND Diagnosis:       RUQ pain      (Esophagitis, HIATAL HERNIA, SURGICAL CHANGES)    Surgeons: Tom Casey MD Anesthesiologist: Joshua Valle MD    Anesthesia Type: MAC ASA Status: 4            Anesthesia Type: MAC    Vitals Value Taken Time   /67 02/18/25 1005   Temp 98 02/18/25 1006   Pulse 78 02/18/25 1006   Resp 16 02/18/25 1005   SpO2 99 % 02/18/25 1006   Vitals shown include unfiled device data.        Patient Location: Endoscopy    Anesthesia Type: MAC    Airway Patency: patent    Postop Pain Control: adequate    Mental Status: preanesthetic baseline    Nausea/Vomiting: none    Cardiopulmonary/Hydration status: stable euvolemic    Complications: no apparent anesthesia related complications    Postop vital signs: stable    Dental Exam: Unchanged from Preop    Patient to be discharged from PACU when criteria met.

## 2025-02-18 NOTE — H&P
[facility]  Pre-op H and P    Nola De Paz Patient Status:  Hospital Outpatient Surgery    3/16/1980 MRN [id number]   Location [facility] ENDOSCOPY PAIN CENTER Attending Tom Casey MD   Date 2025 PCP Julissa Gonzalez MD     CC: Transient abnormal LFT's  RUQ pain  Prior sleeve  Prior cholecystectomy    History of Present Illness:  Nola De Paz is a a(n) 44 year old female. Transient abnormal LFT's  RUQ pain  Prior sleeve  Prior cholecystectomy    History:  Past Medical History:    Abdominal hernia    Allergic rhinitis    Anxiety    Arthritis    Asthma (HCC)    Back problem    DDD    Blood disorder    anemia    Blood in urine    Notes on tests for dr ellis    Depression    Disorder of thyroid    Esophageal reflux    Fatigue    Fibromayalgia    Fibromyalgia    Headache disorder    Migraines    Heartburn    Hemorrhoids    High cholesterol    Hyperlipidemia    Hypothyroidism    IBS (irritable bowel syndrome)    Indigestion    Lupus (systemic lupus erythematosus) (HCC)    Migraines    Obesity    BLESSING (obstructive sleep apnea)    AHI 19 REM AHI 57 Supine AHI 46 non-supine AHI 11 [place] 71%    Osteoarthritis    Pneumonia due to organism    Sleep apnea    cpap    Sleep disturbance    Sleep apnea with cpap    Thyroid disease    Visual impairment    glasses     Past Surgical History:   Procedure Laterality Date    Cholecystectomy  2013    Colonoscopy N/A 2020    Procedure: COLONOSCOPY;  Surgeon: Tone Steiner MD;  Location:  ENDOSCOPY    Gastric bypass,obesity,sb reconstruc  2020    Did not have bypass but had the sleeve done    Other surgical history  2020    gastric sleeve    Removal gallbladder  2013    [place]    Surgical bariatrics - internal  2020    Upper gi endoscopy,exam      [place] teeth removed  2013     Family History   Problem Relation Age of Onset    Heart Disease Mother     High Cholesterol Mother     Other (DDD) Mother     Anemia Mother     Heart  Disorder Mother         Heart disease, high blood pressure, stent, high cholesterol    Hypertension Mother     Obesity Mother     Colon Polyps Mother     Hypertension Father     Heart Disorder Father         Heart murmur, high blood pressure    Obesity Father     Other (lymphoma) Maternal Grandmother     Cancer Maternal Grandmother         Lymphoma    Other (cancer) Paternal Grandmother         liver     Asthma Paternal Grandmother         Asthma and emphysema, copd, non smoker    Cancer Paternal Grandmother         Liver and lung cancer    Diabetes Paternal Grandmother     Prostate Cancer Paternal Grandfather         in 80s    Cancer Paternal Grandfather         Prostate cancer    Stroke Paternal Grandfather     Alcohol and Other Disorders Associated Paternal Uncle         Alcoholic    Mental Disorder Paternal Uncle       reports that she has never smoked. She has never used smokeless tobacco. She reports that she does not currently use alcohol. She reports that she does not currently use drugs.    Allergies:  Allergies[1]    Medications:    Current Facility-Administered Medications:     lactated ringers infusion, , Intravenous, Continuous    Physical Exam:    Blood pressure 127/57, pulse 63, temperature 98.3 °F (36.8 °C), temperature source Temporal, resp. rate 20, height 5' 2\" (1.575 m), weight 280 lb (127 kg), last menstrual period 02/02/2025, SpO2 100%, not currently breastfeeding.    General: Appears alert, oriented x3 and in no acute distress.  CV: Normal rate   Lungs: Normal effort   Skin: Warm and dry.  Laboratory Data:       Imaging:    Assessment/Plan/Procedure:  Patient Active Problem List   Diagnosis    Mixed hyperlipidemia    DDD (degenerative disc disease), lumbar    Acquired hypothyroidism    Class 3 severe obesity due to excess calories without serious comorbidity with body mass index (BMI) of 40.0 to 44.9 in adult (HCC)    Migraine without status migrainosus, not intractable    BLESSING (obstructive  sleep apnea)    PTSD (post-traumatic stress disorder)    Morbid obesity with alveolar hypoventilation (HCC)    Esophagitis determined by endoscopy    RADHA (iron deficiency anemia)    Other irritable bowel syndrome    Pelvic pain    Abnormal uterine bleeding (AUB)    Asthma (HCC)    S/P laparoscopic sleeve gastrectomy    Menorrhagia with regular cycle    Fibromyalgia    Other forms of systemic lupus erythematosus (HCC)    Vitamin D deficiency    Psychophysiological insomnia    Daytime hypersomnolence       Transient abnormal LFT's  RUQ pain  Prior sleeve  Prior cholecystectomy    Plan:  EGD/EUS    Tom Casey MD  2/18/2025  8:58 AM       [1]   Allergies  Allergen Reactions    Penicillins HIVES and RASH

## 2025-02-18 NOTE — DISCHARGE INSTRUCTIONS
Home Care Instructions for Gastroscopy with Sedation    Diet:  - Resume your regular diet as tolerated unless otherwise instructed.  - Start with light meals to minimize bloating.  - Do not drink alcohol today.    Medication:  - If you have questions about resuming your normal medications, please contact your Primary Care Physician.    Activities:  - Take it easy today. Do not return to work today.  - Do not drive today.  - Do not operate any machinery today (including kitchen equipment).      Gastroscopy:  - You may have a sore throat for 2-3 days following the exam. This is normal. Gargling with warm salt water (1/2 tsp salt to 1 glass warm water) or using throat lozenges will help.  - If you experience any sharp pain in your neck, abdomen or chest, vomiting of blood, oral temperature over 100 degrees Fahrenheit, light-headedness or dizziness, or any other problems, contact your doctor.    **If unable to reach your doctor, please go to the [facility] Emergency Room**    - Your referring physician will receive a full report of your examination.  - If you do not hear from your doctor's office within two weeks of your biopsy, please call them for your results.    You may be able to see your laboratory results in [email or web address] between 4 and 7 business days.  In some cases, your physician may not have viewed the results before they are released to [email or web address].  If you have questions regarding your results contact the physician who ordered the test/exam by phone or via [email or web address] by choosing \"Ask a Medical Question.\"

## 2025-03-04 ENCOUNTER — APPOINTMENT (OUTPATIENT)
Dept: PHYSICAL THERAPY | Age: 45
End: 2025-03-04
Payer: COMMERCIAL

## 2025-03-06 ENCOUNTER — PATIENT MESSAGE (OUTPATIENT)
Dept: FAMILY MEDICINE CLINIC | Facility: CLINIC | Age: 45
End: 2025-03-06

## 2025-03-06 DIAGNOSIS — E66.01 CLASS 3 SEVERE OBESITY DUE TO EXCESS CALORIES WITHOUT SERIOUS COMORBIDITY WITH BODY MASS INDEX (BMI) OF 40.0 TO 44.9 IN ADULT (HCC): Primary | ICD-10-CM

## 2025-03-06 DIAGNOSIS — E66.813 CLASS 3 SEVERE OBESITY DUE TO EXCESS CALORIES WITHOUT SERIOUS COMORBIDITY WITH BODY MASS INDEX (BMI) OF 40.0 TO 44.9 IN ADULT (HCC): Primary | ICD-10-CM

## 2025-03-10 ENCOUNTER — PATIENT MESSAGE (OUTPATIENT)
Dept: FAMILY MEDICINE CLINIC | Facility: CLINIC | Age: 45
End: 2025-03-10

## 2025-03-10 ENCOUNTER — PATIENT MESSAGE (OUTPATIENT)
Dept: PAIN CLINIC | Facility: CLINIC | Age: 45
End: 2025-03-10

## 2025-03-10 ENCOUNTER — OFFICE VISIT (OUTPATIENT)
Dept: RHEUMATOLOGY | Facility: CLINIC | Age: 45
End: 2025-03-10
Payer: COMMERCIAL

## 2025-03-10 VITALS
WEIGHT: 285 LBS | DIASTOLIC BLOOD PRESSURE: 70 MMHG | SYSTOLIC BLOOD PRESSURE: 122 MMHG | HEIGHT: 62 IN | OXYGEN SATURATION: 97 % | HEART RATE: 78 BPM | BODY MASS INDEX: 52.44 KG/M2 | TEMPERATURE: 98 F | RESPIRATION RATE: 16 BRPM

## 2025-03-10 DIAGNOSIS — Z86.2 HISTORY OF IRON DEFICIENCY ANEMIA: ICD-10-CM

## 2025-03-10 DIAGNOSIS — R79.82 CRP ELEVATED: ICD-10-CM

## 2025-03-10 DIAGNOSIS — Z79.899 LONG-TERM USE OF HYDROXYCHLOROQUINE: ICD-10-CM

## 2025-03-10 DIAGNOSIS — E55.9 VITAMIN D DEFICIENCY: ICD-10-CM

## 2025-03-10 DIAGNOSIS — M79.7 FIBROMYALGIA: ICD-10-CM

## 2025-03-10 DIAGNOSIS — R70.0 ELEVATED SED RATE: ICD-10-CM

## 2025-03-10 DIAGNOSIS — G89.29 CHRONIC MIDLINE LOW BACK PAIN WITH BILATERAL SCIATICA: ICD-10-CM

## 2025-03-10 DIAGNOSIS — R76.8 DS DNA ANTIBODY POSITIVE: ICD-10-CM

## 2025-03-10 DIAGNOSIS — R20.2 HAND TINGLING: ICD-10-CM

## 2025-03-10 DIAGNOSIS — M32.9 SYSTEMIC LUPUS ERYTHEMATOSUS, UNSPECIFIED SLE TYPE, UNSPECIFIED ORGAN INVOLVEMENT STATUS (HCC): Primary | ICD-10-CM

## 2025-03-10 DIAGNOSIS — E66.01 CLASS 3 SEVERE OBESITY DUE TO EXCESS CALORIES WITH SERIOUS COMORBIDITY AND BODY MASS INDEX (BMI) OF 50.0 TO 59.9 IN ADULT (HCC): Primary | ICD-10-CM

## 2025-03-10 DIAGNOSIS — M54.42 CHRONIC MIDLINE LOW BACK PAIN WITH BILATERAL SCIATICA: ICD-10-CM

## 2025-03-10 DIAGNOSIS — E07.9 THYROID DISEASE: ICD-10-CM

## 2025-03-10 DIAGNOSIS — E66.813 CLASS 3 SEVERE OBESITY DUE TO EXCESS CALORIES WITH SERIOUS COMORBIDITY AND BODY MASS INDEX (BMI) OF 50.0 TO 59.9 IN ADULT (HCC): Primary | ICD-10-CM

## 2025-03-10 DIAGNOSIS — R53.82 CHRONIC FATIGUE: ICD-10-CM

## 2025-03-10 DIAGNOSIS — M54.41 CHRONIC MIDLINE LOW BACK PAIN WITH BILATERAL SCIATICA: ICD-10-CM

## 2025-03-10 PROCEDURE — 99214 OFFICE O/P EST MOD 30 MIN: CPT | Performed by: INTERNAL MEDICINE

## 2025-03-10 PROCEDURE — 3008F BODY MASS INDEX DOCD: CPT | Performed by: INTERNAL MEDICINE

## 2025-03-10 PROCEDURE — G2211 COMPLEX E/M VISIT ADD ON: HCPCS | Performed by: INTERNAL MEDICINE

## 2025-03-10 PROCEDURE — 3074F SYST BP LT 130 MM HG: CPT | Performed by: INTERNAL MEDICINE

## 2025-03-10 PROCEDURE — 3078F DIAST BP <80 MM HG: CPT | Performed by: INTERNAL MEDICINE

## 2025-03-10 RX ORDER — CYCLOBENZAPRINE HCL 10 MG
10 TABLET ORAL NIGHTLY PRN
Qty: 90 TABLET | Refills: 0 | Status: SHIPPED | OUTPATIENT
Start: 2025-03-10

## 2025-03-10 RX ORDER — HYDROXYCHLOROQUINE SULFATE 200 MG/1
200 TABLET, FILM COATED ORAL 2 TIMES DAILY
Qty: 180 TABLET | Refills: 1 | Status: SHIPPED | OUTPATIENT
Start: 2025-03-10

## 2025-03-10 NOTE — PROGRESS NOTES
RHEUMATOLOGY Follow up   Date of visit: 03/10/2025  ?  Chief Complaint   Patient presents with    SLE     8 month f/u. Feeling overall pretty well. 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 Have been having more joint pain. Some hand swelling. No new symptoms. Converted rapid score of 4.3     ASSESSMENT, DISCUSSION & PLAN   Assessment:  1. Systemic lupus erythematosus, unspecified SLE type, unspecified organ involvement status (HCC)    2. Fibromyalgia    3. Elevated sed rate    4. Ds DNA antibody positive    5. Hand tingling    6. Chronic fatigue    7. Long-term use of hydroxychloroquine    8. Thyroid disease    9. History of iron deficiency anemia    10. Vitamin D deficiency    11. CRP elevated    12. Chronic midline low back pain with bilateral sciatica          Discussion:  Ms. Nola De Paz is a 45 yo woman with complicated past medical history of obesity, depression/anxiety/PTSD, sleep apnea who is suffering from diffuse pain and fatigue.  At this time, it is unclear the etiology of her symptoms, I suspect that she has fibromyalgia however this does not explain the elevations in her inflammatory markers. Unfortunately, pt did suffer from pneumonia which seems to have cleared on repeat CT chest imaging. Unclear if recent elevations in inflammatory markers related to recent infection. Unclear if steroids truly made difference in inflammation or issues with lungs.     Her exam was more consistent with diffuse tender point positivity found in fibromyalgia.  However VISE testing showed MODE positivity and equivocal RF IgA as well as equivocal dsDNA which was negative on confirmatory testing.  Patient was started on Plaquenil and noticed maybe slight improvement of the stiffness and fatigue as well. She has also had bariatric surgery and had been doing well overall after her significant weight loss.    At prior visit, she had worsened symptoms with fatigue, joint pain and myalgias. She also had  recurrence of dsdna, rnp and smith positivity. She restarted plaquenil and had been feeling much better overall. She was only taking one pill daily at that time.  She continued to have symptoms of dizziness, chest pain, shortness of breath, and other symptoms. Unlikely lupus related given DOYLE panel negative and inflammatory markers/complements stable/improved.   She felt better overall since adjusting her diet and getting iron infusions previously    At this time, she continues to feel worse and reports continued weight gain. Difficult to ascertain if synovitis present d/t body habitus. But seems like a combination of both autoimmune/lupus flaring and fibro flaring.  Will have her get updated labs and go from there.   May benefit from steroid taper.   She has been taking hcq daily more regularly. Recommended she take the full 400mg daily.     Okay for pt to follow up in 6 months or sooner as needed   Encouraged to keep me updated in the meantime with symptoms.     Patient verbalized understanding of above instructions. No further questions at this time.    Code selection for this visit was based on time spent (30min) on date of service in preparing to see the patient, obtaining and/or reviewing separately obtained history, performing a medically appropriate examination, counseling and educating the patient/family/caregiver, ordering medications or testing, referring and communicating with other healthcare providers, documenting clinical information in the E HR, independently interpreting results and communicating results to the patient/family/caregiver and care coordination with the patient's other providers.    ?  Plan:  Diagnoses and all orders for this visit:    Systemic lupus erythematosus, unspecified SLE type, unspecified organ involvement status (HCC)  -     hydroxychloroquine 200 MG Oral Tab; Take 1 tablet (200 mg total) by mouth 2 (two) times daily.    Fibromyalgia  -     cyclobenzaprine 10 MG Oral Tab;  Take 1 tablet (10 mg total) by mouth nightly as needed for Muscle spasms.    Elevated sed rate    Ds DNA antibody positive  -     hydroxychloroquine 200 MG Oral Tab; Take 1 tablet (200 mg total) by mouth 2 (two) times daily.    Hand tingling  -     Iron And Tibc; Future  -     Ferritin; Future  -     Vitamin B12; Future  -     Vitamin D; Future  -     TSH and Free T4 [E]; Future    Chronic fatigue  -     Iron And Tibc; Future  -     Ferritin; Future  -     Vitamin B12; Future  -     Vitamin D; Future  -     TSH and Free T4 [E]; Future    Long-term use of hydroxychloroquine    Thyroid disease  -     TSH and Free T4 [E]; Future    History of iron deficiency anemia  -     Iron And Tibc; Future  -     Ferritin; Future    Vitamin D deficiency  -     Vitamin D; Future    CRP elevated  -     hydroxychloroquine 200 MG Oral Tab; Take 1 tablet (200 mg total) by mouth 2 (two) times daily.    Chronic midline low back pain with bilateral sciatica  -     cyclobenzaprine 10 MG Oral Tab; Take 1 tablet (10 mg total) by mouth nightly as needed for Muscle spasms.                Return in about 6 months (around 9/10/2025).  ?  HPI   Nola De Paz is a 44 year old female with the following active problems who is seen for medically necessary follow-up today. She was seen as a new patient virtually initially years ago for evaluation of diffuse pain and elevated CRP. She does have hx of depression/anxiety/PTSD and there was concern for fibromyalgia. Was dx with lupus based off labs and started on plaquenil. She presents for follow up today.   Pt late for appt but still seen.     Since her last visit, she has been doing okay.  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   Despite the increased stress, she has been doing well.     Does still having fatigue and some increased joint pain but states not severe.   Pain currently mostly in the right shoulder and hands/fingers bilaterally. But has diffusely at  times.   Also felt in the ankles and knees.   Changes from day to day except the right shoulder. (Pt she cares for is about is about 33# and unchanged). Denies obvious event that could've the right shoulder pain.  Had steroid injection in that shoulder about 8 months ago and thinks wearing off. Last injection was through Dr. Neal in pain management.   Also had synvic injection in September. Feels knee pain aggravated more with certain activities- like kneeling or going up/down stairs.     Prior palpitations, wore a few monitors since her last visit. Had some sinus tach and SV ectopic beats. Seen by EP in addition to Dr. Goodman. No medications recommended since symptoms had not come back. Did discuss beta blockade. No longer getting the dizziness and chest pain and generalized weakness like before. But did have some chest tightness a few nights ago without other symptoms- thought related to anxiety   + intermittent dizziness, short episodes and not as severe as before       Feels like she has to sleep throughout the day  Denies trouble sleeping at night. Waking up feeling rested.   Used to have BLESSING and after weight loss, told resolved. Stopped around 2021 due to improvement. Did have updated sleep study and told BLESSING again so restarting CPAP- Jan/Feb. May need a different mask.       Denies recent rashes.   Redness in the skin folds not as bad before. But can raw and tries topical nystatin or steroid and improves.     + nasal ulcers, stable. But feels worsened dryness with using CPAP and using saline gel.   Denies recent oral ulcers. But has a bump in the back of the mouth.   + numbness diffuse fingertips b/l.   + continued neck pain     Admits to not taking the 400mg daily- at least taking 200mg daily.   Gabapentin not taking regularly like before since feeling worsened fatigue. Tried taking at night but since taking flexeril at night.     Still with dry eyes and mild intermittent dry mouth. Is using OTC drops for the  eyes- Systane- using up to 4x/day     HS has been stable. Following with dermatology- using topicals. Has been told possibly not advanced enough to start biologic.       HPI from initial consultation  referred for rheumatologic evaluation due to diffuse pain and fatigue.      States she has been suffering for awhile. States she reports a generalized fatigue and overall not well feeling. She did have labs drawn last summer with elevations in her CRP.   She admits to sore muscle easily even with minimal activity or with stress. States can have difficulty moving by the end of most days. Denies specific location, stating it affects her all over. Does feel slightly worsened around her hips and thigh muscles.   + significant fatigue. Recently diagnosed with sleep apnea, has since been using a CPAP but states sleep has significantly improved. Does not nap during the day. Still wakes up feeling tired still.   + admits to pain in her back diffusely, worst over her lower back and neck. Neck being affected more recently. Back/neck more consistent. Also has pain in her hips and knees. Right shoulder pain intermittent.      Feels like pain changes on a day to day basis. Expresses that she feels like she's not functioning at a 100% due to feeling unwell in general.   Can have nausea, headaches intermittently.      Does have hx of depression, anxiety and PTSD from an event last summer. Does feel like symptoms are better controlled recently- had Effexor dose increased about 3 months.      Denies overt swelling but states she has sensation of swelling with tightness/stiffness of her hands, neck and back.      Can get discoloration of fingertips to purple/blue with cold exposure      Was recently admitted to hospital for pain and discomfort. COVID19 testing was negative. Told that she had early signs of arthritis of her neck. Had PE ruled out as well.      + reflux, improved with medication   + intermittent constipation but thought  related to medication   + hx of plantar fasciitis, with difficulty walking in the morning. Does wear inserts in her shoes. No formal workup for this.   + fevers per pt more often than should be usual (as high as 99.9 or less; baseline temp per pt is 96-97)  + feels lymph node enlargement in her neck frequently  + frequent sinus infections without epistaxis   + recent numbness/tingling in fingers and toes about one month ago, attributed to Vyvanse prescription.      For pain, she takes ibuprofen prn as well as heat pack with some relief. Has tried stretching which can help at times but then pain returns.      The patient denies hair loss, oral or nasal ulcers, photosensitive rash, elevated or scarring rashes, prior hematologic abnormality, prior renal or liver disease, or history of seizures.  No history of prior blood clot in the legs or lungs, strokes or ischemic phenomenon. Never been pregnant.   Denies nonhealing ulcers on the fingertips or trouble swallowing.  The patient denies any history of uveitis, crampy abdominal pain, diarrhea, bloody stools, Achilles heel pain, psoriatic lesions, spooning or pitting of the nails but + ridges.  There are no symptoms of severe dry eyes or dry mouth..  No chills, night sweats, unexpected weight loss, easy bruising bleeding, or unexplained weakness.  Denies chronic cough or hemoptysis.   Denies obvious blood in urine.      Family hx:   Denies known hx of autoimmune disease, no known lupus, RA, or psoriasis   Thinks father has some sort of inflammatory skin disease.   ?  Past Medical History:  Past Medical History:    Abdominal hernia    Allergic rhinitis    Anxiety    Arthritis    Asthma (HCC)    Back problem    DDD    Blood disorder    anemia    Blood in urine    Notes on tests for dr ellis    Depression    Disorder of thyroid    Esophageal reflux    Fatigue    Fibromayalgia    Fibromyalgia    Headache disorder    Migraines    Heartburn    Hemorrhoids    High cholesterol     Hyperlipidemia    Hypothyroidism    IBS (irritable bowel syndrome)    Indigestion    Lupus (systemic lupus erythematosus) (HCC)    Migraines    Obesity    BLESSING (obstructive sleep apnea)    AHI 19 REM AHI 57 Supine AHI 46 non-supine AHI 11 [place] 71%    Osteoarthritis    Pneumonia due to organism    Sleep apnea    cpap    Sleep disturbance    Sleep apnea with cpap    Thyroid disease    Visual impairment    glasses     Past Surgical History:  Past Surgical History:   Procedure Laterality Date    Cholecystectomy  2013    Colonoscopy N/A 08/28/2020    Procedure: COLONOSCOPY;  Surgeon: Tone Steiner MD;  Location:  ENDOSCOPY    Gastric bypass,obesity,sb reconstruc  12/21/2020    Did not have bypass but had the sleeve done    Other surgical history  12/21/2020    gastric sleeve    Removal gallbladder  2013    [facility]    Surgical bariatrics - internal  12/21/2020    Upper gi endoscopy,exam      [place] teeth removed  2013     Family History:  Family History   Problem Relation Age of Onset    Heart Disease Mother     High Cholesterol Mother     Other (DDD) Mother     Anemia Mother     Heart Disorder Mother         Heart disease, high blood pressure, stent, high cholesterol    Hypertension Mother     Obesity Mother     Colon Polyps Mother     Hypertension Father     Heart Disorder Father         Heart murmur, high blood pressure    Obesity Father     Other (lymphoma) Maternal Grandmother     Cancer Maternal Grandmother         Lymphoma    Other (cancer) Paternal Grandmother         liver     Asthma Paternal Grandmother         Asthma and emphysema, copd, non smoker    Cancer Paternal Grandmother         Liver and lung cancer    Diabetes Paternal Grandmother     Prostate Cancer Paternal Grandfather         in 80s    Cancer Paternal Grandfather         Prostate cancer    Stroke Paternal Grandfather     Alcohol and Other Disorders Associated Paternal Uncle         Alcoholic    Mental Disorder Paternal Uncle       Social History:  Social History     Socioeconomic History    Marital status: Single   Occupational History    Occupation: 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     Medications:  Outpatient Medications Marked as Taking for the 3/10/25 encounter (Office Visit) with Azra Shirley DO   Medication Sig Dispense Refill    hydroxychloroquine 200 MG Oral Tab Take 1 tablet (200 mg total) by mouth 2 (two) times daily. 180 tablet 1    cyclobenzaprine 10 MG Oral Tab Take 1 tablet (10 mg total) by mouth nightly as needed for Muscle spasms. 90 tablet 0    Cholecalciferol (VITAMIN D) 50 MCG (2000 UT) Oral Cap Take by mouth. Unsure of dose      Calcium 200 MG Oral Tab Take by mouth. Unsure of dose   chewable      pantoprazole 40 MG Oral Tab EC Take one tablet (40 mg total) by mouth once daily, 30 minutes prior to breakfast. 90 tablet 3    topiramate 50 MG Oral Tab Take 1 tablet (50 mg total) by mouth 2 (two) times daily. 180 tablet 1    traMADol 50 MG Oral Tab as needed.      venlafaxine  MG Oral Capsule SR 24 Hr TAKE 1 CAPSULE(150 MG) BY MOUTH DAILY 90 capsule 0    GABAPENTIN 100 MG Oral Cap TAKE 1 CAPSULE(100 MG) BY MOUTH THREE TIMES DAILY 270 capsule 0    ezetimibe 10 MG Oral Tab Take 1 tablet (10 mg total) by mouth daily. 90 tablet 1    LORazepam 0.5 MG Oral Tab Take 1 tablet (0.5 mg total) by mouth nightly as needed. 40 tablet 0    rosuvastatin 10 MG Oral Tab Take 1 tablet (10 mg total) by  mouth nightly. 90 tablet 1    doxycycline 100 MG Oral Cap 2 (two) times daily as needed.      levothyroxine 175 MCG Oral Tab Take 1 tablet (175 mcg total) by mouth before breakfast. 90 tablet 3    Meloxicam 7.5 MG Oral Tab Take 1 tablet (7.5 mg total) by mouth as needed.      traZODone 50 MG Oral Tab Take 1 tablet (50 mg total) by mouth nightly.      clindamycin 1 % External Lotion APPLY TOPICALLY TO THE AFFECTED AREA EVERY DAY BEFORE NOON      fexofenadine 180 MG Oral Tab Take 1 tablet (180 mg total) by mouth daily.      Multiple Vitamin (MULTIVITAMIN ADULT OR) Apply topically.      ondansetron 4 MG Oral Tablet Dispersible Take 1 tablet (4 mg total) by mouth every 8 (eight) hours as needed for Nausea. 30 tablet 0    Nystatin 941113 UNIT/GM External Powder Apply 1 Application topically 4 (four) times daily. 1 Bottle 0    clotrimazole-betamethasone 1-0.05 % External Cream Apply 1 Application topically 2 (two) times daily as needed (rash). 60 g 3    Albuterol Sulfate HFA (PROAIR HFA) 108 (90 Base) MCG/ACT Inhalation Aero Soln Inhale 2 puffs into the lungs every 4 (four) hours as needed for Wheezing or Shortness of Breath. 1 Inhaler 3    Triamcinolone Acetonide 55 MCG/ACT Nasal Aerosol by Nasal route daily as needed.       Modified Medications    Modified Medication Previous Medication    CYCLOBENZAPRINE 10 MG ORAL TAB cyclobenzaprine 10 MG Oral Tab       Take 1 tablet (10 mg total) by mouth nightly as needed for Muscle spasms.    Take 1 tablet (10 mg total) by mouth nightly as needed for Muscle spasms.    HYDROXYCHLOROQUINE 200 MG ORAL TAB hydroxychloroquine 200 MG Oral Tab       Take 1 tablet (200 mg total) by mouth 2 (two) times daily.    Take 1 tablet (200 mg total) by mouth 2 (two) times daily.     Medications Discontinued During This Encounter   Medication Reason    Omeprazole 40 MG Oral Capsule Delayed Release     Tirzepatide-Weight Management (ZEPBOUND) 2.5 MG/0.5ML Subcutaneous Solution Auto-injector      cyclobenzaprine 10 MG Oral Tab     hydroxychloroquine 200 MG Oral Tab        ?  ?  Allergies:  Allergies   Allergen Reactions    Penicillins HIVES and RASH     ?  REVIEW OF SYSTEMS   ?  Review of Systems   Constitutional:  Positive for malaise/fatigue. Negative for chills and fever.   HENT:  Positive for congestion. Negative for hearing loss, nosebleeds and tinnitus.    Eyes:  Negative for blurred vision, double vision, pain and redness.   Respiratory:  Negative for cough, hemoptysis and shortness of breath.    Cardiovascular:  Positive for leg swelling. Negative for chest pain and palpitations.   Gastrointestinal:  Positive for heartburn. Negative for abdominal pain, blood in stool, diarrhea and nausea.   Genitourinary:  Negative for dysuria, frequency, hematuria and urgency.   Musculoskeletal:  Positive for back pain, joint pain, myalgias and neck pain.   Skin:  Negative for itching and rash.   Neurological:  Positive for dizziness (increased intermittently). Negative for tingling, seizures, weakness and headaches.   Endo/Heme/Allergies:  Bruises/bleeds easily.   Psychiatric/Behavioral:  Negative for depression. The patient is nervous/anxious and has insomnia.      PHYSICAL EXAM   Today's Vitals:  Temperature Blood Pressure Heart Rate Resp Rate SpO2   Temp: 98 °F (36.7 °C) BP: 122/70 Pulse: 78 Resp: 16 SpO2: 97 %   ?  Current Weight Height BMI BSA Pain   Wt Readings from Last 1 Encounters:   03/10/25 285 lb (129.3 kg)    Height: 5' 2\" (157.5 cm) Body mass index is 52.13 kg/m². Body surface area is 2.22 meters squared.         Physical Exam  Vitals and nursing note reviewed.   Constitutional:       General: She is not in acute distress.     Appearance: She is well-developed. She is obese. She is not diaphoretic.   HENT:      Head: Normocephalic.   Eyes:      General: No scleral icterus.     Extraocular Movements: Extraocular movements intact.      Conjunctiva/sclera: Conjunctivae normal.   Neck:      Vascular: No  JVD.      Trachea: No tracheal deviation.   Cardiovascular:      Rate and Rhythm: Normal rate and regular rhythm.   Pulmonary:      Effort: Pulmonary effort is normal. No respiratory distress.      Breath sounds: Normal breath sounds. No wheezing.   Musculoskeletal:         General: Tenderness present. No swelling.      Cervical back: Neck supple.      Comments: No evidence of heberden or mayra nodes of any of the fingers, no basilar joint tenderness of the 1st CMC bilaterally.  Tenderness diffusely - improved; now over ankles and knees  No swelling, redness or restriction of motion of the DIPs, PIPs, MCPs, wrists, elbows, ankles, or joints of the feet.  Bilateral shoulders with full ROM.  Bilateral knees with medial joint line tenderness, no crepitus, no effusion.    (prior fibro exam)  Posterior Tender Point Exam  Occiput -/-  Trapezius +/+  Supraspinatus +/+  Gluteal deferred   Greater trochanter +/+  Anterior Tender Point Exam:  Low cervical +/+  Second rib +/+  Lateral epicondyle +/+  Knee +/+  14 tender points positive   Lymphadenopathy:      Cervical: No cervical adenopathy.   Skin:     General: Skin is warm and dry.      Findings: No erythema or rash.   Neurological:      Mental Status: She is alert and oriented to person, place, and time.      Cranial Nerves: No cranial nerve deficit.      Gait: Gait normal.   Psychiatric:      Comments: tearful       ?  Radiology review:     PROCEDURE:  CT CHEST PE AORTA (IV ONLY) (CPT=71260)     COMPARISON:  [place] , CT, CT ANGIOGRAPHY, CHEST (CPT=71275), 5/18/2020, 9:21 AM.  [place], CT, CT ANGIOGRAPHY, CHEST (CPT=71275), 5/27/2020, 2:48 PM.     INDICATIONS:  shortness of breath, denies fever or known contact with COVID. Recent pneumonia diagnosis     TECHNIQUE:  CT images were obtained with non-ionic intravenous contrast material. Dose reduction techniques were used. Dose information is transmitted to the ACR (American College of Radiology) NRDR (National  Radiology Data Registry) which includes the   Dose Index Registry.     PATIENT STATED HISTORY:(As transcribed by Technologist)  HENNY      CONTRAST USED:  100cc of Omnipaque 350     FINDINGS:       Preliminary reading provided by radiologist from [facility].      VASCULATURE:  Negative for acute pulmonary embolism.  No filling defects are seen centrally or peripherally within the pulmonary arterial system.     LUNGS/PLEURA:  Ground-glass opacities are present in the superior segment left lower lobe, left upper lobe, basal segments left lower lobe, minimal and subtle in the basal right lower lobe with sparing of the right upper lobe and middle lobe of this   process.  No pleural effusion or pneumothorax.  These changes have developed since the most recent CT scan of the chest from 5/18/2020.     THORACIC AORTA:  No thoracic aortic aneurysm.      MEDIASTINUM/LAURA:  No pathologically enlarged nodes.     CARDIAC:  Unremarkable.     CHEST WALL:  Unremarkable.     LIMITED ABDOMEN:  No acute process seen.     BONES:  No acute process seen.     OTHER:  None.        CONCLUSION:  Recurrent ground-glass opacities in the lung bilateral, suspicious for atypical including viral pneumonia, with other etiologies possible.  Negative for acute pulmonary embolism.      Dictated by: Jarred Manzo MD on 6/22/2020 at 6:16 AM     CONCLUSION:  Minimal osteoarthritic changes are likely present at the sacroiliac joints bilaterally.  If clinical symptoms persist then consider MRI.       Dictated by: Andrew Romero MD on 6/17/2020 at 7:07 PM      CONCLUSION:  Overall mild degenerative changes of the lumbar spine are present.  If clinical symptoms persist then recommend MRI.        Dictated by: Andrew Romero MD on 6/17/2020 at 7:06 PM       CONCLUSION:  Overall mild degenerative changes of the thoracic spine are present.  If clinical symptoms persist then recommend MRI.        Dictated by: Andrew Romero MD on 6/17/2020 at 7:05 PM         Labs:  Lab  Results   Component Value Date    WBC 6.7 11/08/2024    RBC 4.18 11/08/2024    HGB 13.1 11/08/2024    HCT 38.7 11/08/2024    .0 11/08/2024    MPV 10.7 01/17/2013    MCV 92.6 11/08/2024    MCH 31.3 11/08/2024    MCHC 33.9 11/08/2024    RDW 11.8 11/08/2024    NEPRELIM 2.92 11/08/2024    NEPERCENT 43.7 11/08/2024    LYPERCENT 34.9 11/08/2024    MOPERCENT 10.2 11/08/2024    EOPERCENT 10.2 11/08/2024    BAPERCENT 0.7 11/08/2024    NE 2.92 11/08/2024    LYMABS 2.33 11/08/2024    MOABSO 0.68 11/08/2024    EOABSO 0.68 11/08/2024    BAABSO 0.05 11/08/2024     Lab Results   Component Value Date    GLU 88 10/04/2024    BUN 19 10/04/2024    BUNCREA 29.7 (H) 10/04/2024    CREATSERUM 0.64 10/04/2024    ANIONGAP 4 10/04/2024    GFRNAA 100 07/16/2022    GFRAA 115 07/16/2022    CA 8.9 10/04/2024    OSMOCALC 298 (H) 10/04/2024    ALKPHO 63 10/04/2024    AST 20 10/04/2024    ALT 17 10/04/2024    BILT 0.6 10/04/2024    TP 6.3 10/04/2024    ALB 3.6 10/04/2024    GLOBULIN 2.7 10/04/2024    AGRATIO 1.2 08/08/2020     10/04/2024    K 3.3 (L) 10/04/2024     10/04/2024    CO2 29.0 10/04/2024       Additional Labs:  05/2024  ESR 23 borderline  CRP normal     04/2024  Iron 73  Ferritin 85.4  CRP normal  ESR 44 elevated (dx with sinus infection)  C3 121.8 normal  C4 36.5 elevated  Vit D 30.8 borderline    02/2024  Mitochondrial ab neg  LKM ab neg  Smooth muscle ab negative    11/2023  CRP normal  ESR 17 normal  C3 91.3 normal  C4 28.9 normal   Vit D 27.4 low     06/2023  CBC with WBC 5.0; Hgb 12.5; plt 180  CMP grossly normal except K 3.4; AST//193  MODE 1:160  Dsdna, SSA, SSB, Smith, U1RNP, RNP70, centromere, Scl70, Jo1 negative   CRP normal  ESR 23 borderline  C3 88.9 low  C4 29.7 normal   TSH normal  B12 492  Vit D 53 normal     05/2022  Espinoza 139 (N<100)   (N<100)  dsDNA 178 (N<100)  SSA, SSB, SCL 70, Ellyn 1, centromere, histone negative MODE by IFA 1: 640 speckled  C4 32.4 normal  C3 111 normal  ESR 27  CRP  normal  CMP grossly normal  CBC hemoglobin 11.9; otherwise grossly normal    12/2021  ESR 28  CRP normal  C3 99.4 normal  C4 29.9 normal     11/2021  CINDI positive (no titer provided)  Espinoza 196 (N<100)   (N<100)  Otherwise negative DOYLE panel   CRP normal  ESR 24 normal   C3 111 normal  C4 31.9 normal     04/2021  Espinoza 219 (N<100)   (N<100)  Cindi 1:320 speckled   RF negative  C3 129 normal  C4 38 normal   CRP 0.52  ESR 35    08/2020- [facility]  CINDI 1:320 homogenous  DsDNA positive but negative on confirmatory  PsPT IgM borderline positive  RF IgA equivocal  TPO +  Otherwise negative       CINDI 1:320 homogenous  DOYLE panel RNP equivocal   Aldolase normal  LDH normal  CK normal  Myositis antibody panel negative   ESR 72 elevated  CRP 2.24 elevated     CRP  04/18/2020 - 5.45  04/17/2020 - 3.68  04/16/2020 - 2.42  04/15/2020 - 2.55 (N<0.3)  08/24/2019 - 18.5 (N<8.0)     04/2020   normal  Ferritin normal   COVID19 negative      08/2019  CINDI 1:40 homogenous   CCP negative   RF negative   ESR 46 elevated     Azra Casper, DO  [facility]  03/10/2025

## 2025-03-11 ENCOUNTER — TELEPHONE (OUTPATIENT)
Dept: PAIN CLINIC | Facility: CLINIC | Age: 45
End: 2025-03-11

## 2025-03-11 ENCOUNTER — LAB ENCOUNTER (OUTPATIENT)
Dept: LAB | Age: 45
End: 2025-03-11
Attending: INTERNAL MEDICINE
Payer: COMMERCIAL

## 2025-03-11 DIAGNOSIS — M32.9 SYSTEMIC LUPUS ERYTHEMATOSUS, UNSPECIFIED SLE TYPE, UNSPECIFIED ORGAN INVOLVEMENT STATUS (HCC): ICD-10-CM

## 2025-03-11 DIAGNOSIS — Z86.2 HISTORY OF IRON DEFICIENCY ANEMIA: ICD-10-CM

## 2025-03-11 DIAGNOSIS — R53.82 CHRONIC FATIGUE: ICD-10-CM

## 2025-03-11 DIAGNOSIS — N93.9 ABNORMAL UTERINE BLEEDING (AUB): ICD-10-CM

## 2025-03-11 DIAGNOSIS — E55.9 VITAMIN D DEFICIENCY: ICD-10-CM

## 2025-03-11 DIAGNOSIS — E07.9 THYROID DISEASE: ICD-10-CM

## 2025-03-11 DIAGNOSIS — R70.0 ELEVATED SED RATE: ICD-10-CM

## 2025-03-11 DIAGNOSIS — R20.2 HAND TINGLING: ICD-10-CM

## 2025-03-11 DIAGNOSIS — R76.8 DS DNA ANTIBODY POSITIVE: ICD-10-CM

## 2025-03-11 LAB
CRP SERPL-MCNC: <0.4 MG/DL (ref ?–0.5)
DEPRECATED HBV CORE AB SER IA-ACNC: 76 NG/ML
ERYTHROCYTE [SEDIMENTATION RATE] IN BLOOD: 46 MM/HR
EST. AVERAGE GLUCOSE BLD GHB EST-MCNC: 103 MG/DL (ref 68–126)
ESTRADIOL SERPL-MCNC: 341.5 PG/ML
FSH SERPL-ACNC: 20 MIU/ML
HBA1C MFR BLD: 5.2 % (ref ?–5.7)
IGA SERPL-MCNC: 192.7 MG/DL (ref 40–350)
IGM SERPL-MCNC: 320.8 MG/DL (ref 50–300)
IMMUNOGLOBULIN PNL SER-MCNC: 1258 MG/DL (ref 650–1600)
IRON SATN MFR SERPL: 23 %
IRON SERPL-MCNC: 73 UG/DL
LH SERPL-ACNC: 30 MIU/ML
PROLACTIN SERPL-MCNC: 12.4 NG/ML
RHEUMATOID FACT SERPL-ACNC: 5 IU/ML (ref ?–14)
T4 FREE SERPL-MCNC: 0.8 NG/DL (ref 0.8–1.7)
TOTAL IRON BINDING CAPACITY: 317 UG/DL (ref 250–425)
TRANSFERRIN SERPL-MCNC: 235 MG/DL (ref 250–380)
TSI SER-ACNC: 25.68 UIU/ML (ref 0.55–4.78)
VIT B12 SERPL-MCNC: 626 PG/ML (ref 211–911)
VIT D+METAB SERPL-MCNC: 33.9 NG/ML (ref 30–100)

## 2025-03-11 PROCEDURE — 86235 NUCLEAR ANTIGEN ANTIBODY: CPT

## 2025-03-11 PROCEDURE — 86225 DNA ANTIBODY NATIVE: CPT

## 2025-03-11 PROCEDURE — 86039 ANTINUCLEAR ANTIBODIES (ANA): CPT

## 2025-03-11 PROCEDURE — 82607 VITAMIN B-12: CPT

## 2025-03-11 PROCEDURE — 83001 ASSAY OF GONADOTROPIN (FSH): CPT

## 2025-03-11 PROCEDURE — 85652 RBC SED RATE AUTOMATED: CPT

## 2025-03-11 PROCEDURE — 83002 ASSAY OF GONADOTROPIN (LH): CPT

## 2025-03-11 PROCEDURE — 86038 ANTINUCLEAR ANTIBODIES: CPT

## 2025-03-11 PROCEDURE — 86431 RHEUMATOID FACTOR QUANT: CPT

## 2025-03-11 PROCEDURE — 82670 ASSAY OF TOTAL ESTRADIOL: CPT

## 2025-03-11 PROCEDURE — 86140 C-REACTIVE PROTEIN: CPT

## 2025-03-11 PROCEDURE — 82728 ASSAY OF FERRITIN: CPT

## 2025-03-11 PROCEDURE — 36415 COLL VENOUS BLD VENIPUNCTURE: CPT

## 2025-03-11 PROCEDURE — 84443 ASSAY THYROID STIM HORMONE: CPT

## 2025-03-11 PROCEDURE — 83036 HEMOGLOBIN GLYCOSYLATED A1C: CPT

## 2025-03-11 PROCEDURE — 86200 CCP ANTIBODY: CPT

## 2025-03-11 PROCEDURE — 84439 ASSAY OF FREE THYROXINE: CPT

## 2025-03-11 PROCEDURE — 82306 VITAMIN D 25 HYDROXY: CPT

## 2025-03-11 PROCEDURE — 83540 ASSAY OF IRON: CPT

## 2025-03-11 PROCEDURE — 82784 ASSAY IGA/IGD/IGG/IGM EACH: CPT

## 2025-03-11 PROCEDURE — 84146 ASSAY OF PROLACTIN: CPT

## 2025-03-11 PROCEDURE — 83550 IRON BINDING TEST: CPT

## 2025-03-11 NOTE — TELEPHONE ENCOUNTER
Patient sent [employer] message -confirming 03/19/25 at 02:00 pm for right shoulder injection.

## 2025-03-11 NOTE — TELEPHONE ENCOUNTER
Order Questions    Question Answer   Anesthesia Type Local   Provider Peng   Location Office w/ Ultrasound   CPT (Hit enter after each entry) XR ASPIR/INJ MAJOR JOINT W/FLUORO RT(CPT=77002/47731)   Medical clearance requested (will send to Pain Navigator) No   Patient has Medicare coverage? No   Comments (Please list entire procedure name here.) right shoulder injection

## 2025-03-11 NOTE — TELEPHONE ENCOUNTER
Dr. Gonzalez- recommend Dr. Malik Jiang? Unsure if he does procedure she is referring to, but limited on where she can go with her insurance.

## 2025-03-12 NOTE — TELEPHONE ENCOUNTER
Dr. Gonzalez - There is no provider name on referral that is placed.. Pt's message is asking for a provider other than Dr. Curran as previously preferred. Is there another provider you recommend?

## 2025-03-13 ENCOUNTER — PATIENT MESSAGE (OUTPATIENT)
Dept: FAMILY MEDICINE CLINIC | Facility: CLINIC | Age: 45
End: 2025-03-13

## 2025-03-13 DIAGNOSIS — L73.2 HYDRADENITIS: Primary | ICD-10-CM

## 2025-03-13 LAB — CCP IGG SERPL-ACNC: 1.7 U/ML (ref 0–6.9)

## 2025-03-14 LAB
ANA NUCLEOLAR TITR SER IF: 160 {TITER}
DSDNA AB TITR SER: <10 {TITER}
NUCLEAR IGG TITR SER IF: POSITIVE {TITER}

## 2025-03-17 ENCOUNTER — OFFICE VISIT (OUTPATIENT)
Dept: FAMILY MEDICINE CLINIC | Facility: CLINIC | Age: 45
End: 2025-03-17
Payer: COMMERCIAL

## 2025-03-17 VITALS
SYSTOLIC BLOOD PRESSURE: 112 MMHG | HEART RATE: 75 BPM | WEIGHT: 285 LBS | DIASTOLIC BLOOD PRESSURE: 78 MMHG | BODY MASS INDEX: 52 KG/M2 | OXYGEN SATURATION: 96 % | TEMPERATURE: 99 F | RESPIRATION RATE: 16 BRPM

## 2025-03-17 DIAGNOSIS — J06.9 VIRAL URI: ICD-10-CM

## 2025-03-17 DIAGNOSIS — J02.9 SORE THROAT: Primary | ICD-10-CM

## 2025-03-17 DIAGNOSIS — J02.0 STREP PHARYNGITIS: ICD-10-CM

## 2025-03-17 LAB
CONTROL LINE PRESENT WITH A CLEAR BACKGROUND (YES/NO): YES YES/NO
KIT LOT #: NORMAL NUMERIC
OPERATOR ID: NORMAL
RAPID SARS-COV-2 BY PCR: NOT DETECTED
STREP GRP A CUL-SCR: POSITIVE

## 2025-03-17 RX ORDER — AMOXICILLIN 500 MG/1
500 CAPSULE ORAL 2 TIMES DAILY
Qty: 20 CAPSULE | Refills: 0 | Status: SHIPPED | OUTPATIENT
Start: 2025-03-17 | End: 2025-03-17 | Stop reason: ALTCHOICE

## 2025-03-17 RX ORDER — CEPHALEXIN 500 MG/1
500 CAPSULE ORAL 2 TIMES DAILY
Qty: 20 CAPSULE | Refills: 0 | Status: SHIPPED | OUTPATIENT
Start: 2025-03-17 | End: 2025-03-27

## 2025-03-17 NOTE — PROGRESS NOTES
CHIEF COMPLAINT:     Chief Complaint   Patient presents with    Sore Throat     A lot of congestion, sore throat, fluid in ears, swollen glands - Entered by patient  S/s for 2 days.  OTC meds/nasal rinse taken       HPI:   Nola De Paz is a 45 year old female who presents for upper respiratory symptoms for  2 days. Patient reports sore throat, congestion, ear pain, swollen glands . Symptoms have been worsening since onset.  Treating symptoms with otc meds.  Reports some body aches, fatigue. No known fevers. No wheezing/sob.   Associated symptoms include fullness in ears, nose is running.    Current Outpatient Medications   Medication Sig Dispense Refill    amoxicillin 500 MG Oral Cap Take 1 capsule (500 mg total) by mouth 2 (two) times daily for 10 days. 20 capsule 0    hydroxychloroquine 200 MG Oral Tab Take 1 tablet (200 mg total) by mouth 2 (two) times daily. 180 tablet 1    cyclobenzaprine 10 MG Oral Tab Take 1 tablet (10 mg total) by mouth nightly as needed for Muscle spasms. 90 tablet 0    Cholecalciferol (VITAMIN D) 50 MCG (2000 UT) Oral Cap Take by mouth. Unsure of dose      Calcium 200 MG Oral Tab Take by mouth. Unsure of dose   chewable      pantoprazole 40 MG Oral Tab EC Take one tablet (40 mg total) by mouth once daily, 30 minutes prior to breakfast. 90 tablet 3    topiramate 50 MG Oral Tab Take 1 tablet (50 mg total) by mouth 2 (two) times daily. 180 tablet 1    methylPREDNISolone 4 MG Oral Tablet Therapy Pack Take per package insert (instructions). 21 tablet 0    traMADol 50 MG Oral Tab as needed.      venlafaxine  MG Oral Capsule SR 24 Hr TAKE 1 CAPSULE(150 MG) BY MOUTH DAILY 90 capsule 0    GABAPENTIN 100 MG Oral Cap TAKE 1 CAPSULE(100 MG) BY MOUTH THREE TIMES DAILY 270 capsule 0    ezetimibe 10 MG Oral Tab Take 1 tablet (10 mg total) by mouth daily. 90 tablet 1    LORazepam 0.5 MG Oral Tab Take 1 tablet (0.5 mg total) by mouth nightly as needed. 40 tablet 0    rosuvastatin 10 MG Oral Tab Take  1 tablet (10 mg total) by mouth nightly. 90 tablet 1    doxycycline 100 MG Oral Cap 2 (two) times daily as needed.      levothyroxine 175 MCG Oral Tab Take 1 tablet (175 mcg total) by mouth before breakfast. 90 tablet 3    Meloxicam 7.5 MG Oral Tab Take 1 tablet (7.5 mg total) by mouth as needed.      traZODone 50 MG Oral Tab Take 1 tablet (50 mg total) by mouth nightly.      clindamycin 1 % External Lotion APPLY TOPICALLY TO THE AFFECTED AREA EVERY DAY BEFORE NOON      fexofenadine 180 MG Oral Tab Take 1 tablet (180 mg total) by mouth daily.      Multiple Vitamin (MULTIVITAMIN ADULT OR) Apply topically.      ondansetron 4 MG Oral Tablet Dispersible Take 1 tablet (4 mg total) by mouth every 8 (eight) hours as needed for Nausea. 30 tablet 0    Nystatin 225976 UNIT/GM External Powder Apply 1 Application topically 4 (four) times daily. 1 Bottle 0    clotrimazole-betamethasone 1-0.05 % External Cream Apply 1 Application topically 2 (two) times daily as needed (rash). 60 g 3    Albuterol Sulfate HFA (PROAIR HFA) 108 (90 Base) MCG/ACT Inhalation Aero Soln Inhale 2 puffs into the lungs every 4 (four) hours as needed for Wheezing or Shortness of Breath. 1 Inhaler 3    Triamcinolone Acetonide 55 MCG/ACT Nasal Aerosol by Nasal route daily as needed.        Past Medical History:    Abdominal hernia    Allergic rhinitis    Anxiety    Arthritis    Asthma (HCC)    Back problem    DDD    Blood disorder    anemia    Blood in urine    Notes on tests for dr ellis    Depression    Disorder of thyroid    Esophageal reflux    Fatigue    Fibromayalgia    Fibromyalgia    Headache disorder    Migraines    Heartburn    Hemorrhoids    High cholesterol    Hyperlipidemia    Hypothyroidism    IBS (irritable bowel syndrome)    Indigestion    Lupus (systemic lupus erythematosus) (HCC)    Migraines    Obesity    BLESSING (obstructive sleep apnea)    AHI 19 REM AHI 57 Supine AHI 46 non-supine AHI 11 [place] 71%    Osteoarthritis    Pneumonia due to  organism    Sleep apnea    cpap    Sleep disturbance    Sleep apnea with cpap    Thyroid disease    Visual impairment    glasses      Past Surgical History:   Procedure Laterality Date    Cholecystectomy  2013    Colonoscopy N/A 08/28/2020    Procedure: COLONOSCOPY;  Surgeon: Tone Steiner MD;  Location:  ENDOSCOPY    Gastric bypass,obesity,sb reconstruc  12/21/2020    Did not have bypass but had the sleeve done    Other surgical history  12/21/2020    gastric sleeve    Removal gallbladder  2013    [facility]    Surgical bariatrics - internal  12/21/2020    Upper gi endoscopy,exam      [place] teeth removed  2013         Social History     Socioeconomic History    Marital status: Single   Occupational History    Occupation: [employer]     Employer: [employer]     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         REVIEW OF SYSTEMS:   GENERAL: feels well otherwise,   ok appetite  SKIN: no rashes or abnormal skin lesions  HEENT: See HPI  LUNGS: denies shortness of breath or wheezing, See HPI  CARDIOVASCULAR: denies chest pain or palpitations   GI: denies N/V/C or abdominal pain  NEURO: Denies headaches    EXAM:   /78   Pulse 75   Temp 98.7 °F (37.1 °C) (Oral)   Resp 16   Wt 285 lb (129.3 kg)   LMP 03/16/2025 (Exact Date)   SpO2 96%   BMI 52.13 kg/m²   GENERAL: well developed, well nourished,in no apparent distress  SKIN: no rashes,no suspicious lesions  HEAD: atraumatic, normocephalic.  mild tenderness on palpation of frontal  sinuses  EYES: conjunctiva clear, EOM intact  EARS: TM's pearly, no  bulging, no retraction, + serous fluid fluid, bony landmarks visualized  NOSE: Nostrils patent, clear nasal discharge, nasal mucosa pink and moist  THROAT: Oral mucosa pink, moist. Posterior pharynx is mildly erythematous. no exudates. Tonsils 1/4.    NECK: Supple, non-tender  LUNGS: clear to auscultation bilaterally, no wheezes or rhonchi.  No crackles/rales, good air movement throughout. Breathing is non labored.  CARDIO: RRR without murmur  EXTREMITIES: no cyanosis, clubbing or edema  LYMPH:  No cervical lymphadenopathy.        ASSESSMENT AND PLAN:   Nola De Paz is a 45 year old female who presents with     ASSESSMENT:   Encounter Diagnoses   Name Primary?    Sore throat Yes    Viral URI     Strep pharyngitis        PLAN: + rapid strep.   D/w patient likely viral URI on top of strep as congestion/runny nose is not c/w strep illness. Meds as below.  Pt. Reports used to get all the time and has take Amoxicillin with out any problems many times. Requesting Amox, as she know she tolerates it well.   Follow up with PCP if no improvement in 3-5 days, sooner if worsening.  If any sob/wheezing seek emergent care.Comfort care as described in Patient Instructions    Meds & Refills for this Visit:  Requested Prescriptions     Signed Prescriptions Disp Refills    amoxicillin 500 MG Oral Cap 20 capsule 0     Sig: Take 1 capsule (500 mg total) by mouth 2 (two) times daily for 10 days.       Risks, benefits, and side effects of medication explained and discussed.    There are no Patient Instructions on file for this visit.    The patient indicates understanding of these issues and agrees to the plan.  The patient is asked to return if sx's persist or worsen.

## 2025-03-18 DIAGNOSIS — R70.0 ELEVATED SED RATE: ICD-10-CM

## 2025-03-18 DIAGNOSIS — M32.9 SYSTEMIC LUPUS ERYTHEMATOSUS, UNSPECIFIED SLE TYPE, UNSPECIFIED ORGAN INVOLVEMENT STATUS (HCC): ICD-10-CM

## 2025-03-18 DIAGNOSIS — R53.82 CHRONIC FATIGUE: ICD-10-CM

## 2025-03-18 DIAGNOSIS — R76.8 SCL-70 ANTIBODY POSITIVE: Primary | ICD-10-CM

## 2025-03-18 LAB
CENTROMERE IGG SER-ACNC: 0.6 U/ML
ENA JO1 AB SER IA-ACNC: 0.4 U/ML
ENA RNP IGG SER IA-ACNC: 0.6 U/ML
ENA SCL70 IGG SER IA-ACNC: 7 U/ML
ENA SM IGG SER IA-ACNC: <0.7 U/ML
ENA SS-A IGG SER IA-ACNC: 0.4 U/ML
ENA SS-B IGG SER IA-ACNC: 0.6 U/ML
U1 SNRNP IGG SER IA-ACNC: 1 U/ML

## 2025-03-25 ENCOUNTER — MED REC SCAN ONLY (OUTPATIENT)
Dept: FAMILY MEDICINE CLINIC | Facility: CLINIC | Age: 45
End: 2025-03-25

## 2025-03-26 NOTE — TELEPHONE ENCOUNTER
Please kindly review this medication  Medication request is marked high priority: patient requesting a refill. Orginally prescribed by Dr. Lawton at [facility]. Patient no longer sees him.    Patient states she has been using more lately due to burning leaves outside in area and recent strep/viral illness      [x] FAILS PROTOCOL            [] Controlled Substance             [] Medication not previously prescribed by Provider            [] Due for appointment- no future appointment scheduled            [] BP reading            [] Labs            [] Depression Screening            [x] Asthma (ACT recorded/ACT score)    [] HAS NO PROTOCOL ATTACHED

## 2025-03-26 NOTE — TELEPHONE ENCOUNTER
Patient called requesting refill of Albuterol, originally prescribed by Dr Lawton at [facility]. She no longer sees him.  Patient reports she has had to use Albuterol more frequently due to burning leaves outside in area, and recent strip/viral illness.     Rx pended.

## 2025-03-27 RX ORDER — ALBUTEROL SULFATE 90 UG/1
2 INHALANT RESPIRATORY (INHALATION) EVERY 4 HOURS PRN
Qty: 1 EACH | Refills: 3 | Status: SHIPPED | OUTPATIENT
Start: 2025-03-27

## 2025-03-31 ENCOUNTER — PATIENT MESSAGE (OUTPATIENT)
Dept: RHEUMATOLOGY | Facility: CLINIC | Age: 45
End: 2025-03-31

## 2025-03-31 ENCOUNTER — PATIENT MESSAGE (OUTPATIENT)
Dept: FAMILY MEDICINE CLINIC | Facility: CLINIC | Age: 45
End: 2025-03-31

## 2025-04-01 ENCOUNTER — TELEPHONE (OUTPATIENT)
Dept: RHEUMATOLOGY | Facility: CLINIC | Age: 45
End: 2025-04-01

## 2025-04-01 NOTE — TELEPHONE ENCOUNTER
LOV 11/15/24     Dr. Gonzalez - Please advise if you would complete disability form or if you want this sent to forms department

## 2025-04-07 ENCOUNTER — LAB ENCOUNTER (OUTPATIENT)
Dept: LAB | Age: 45
End: 2025-04-07
Attending: INTERNAL MEDICINE
Payer: COMMERCIAL

## 2025-04-07 ENCOUNTER — NURSE ONLY (OUTPATIENT)
Dept: PAIN CLINIC | Facility: CLINIC | Age: 45
End: 2025-04-07
Payer: COMMERCIAL

## 2025-04-07 ENCOUNTER — OFFICE VISIT (OUTPATIENT)
Dept: PAIN CLINIC | Facility: CLINIC | Age: 45
End: 2025-04-07
Payer: COMMERCIAL

## 2025-04-07 ENCOUNTER — APPOINTMENT (OUTPATIENT)
Dept: GENERAL RADIOLOGY | Age: 45
End: 2025-04-07
Attending: NURSE PRACTITIONER
Payer: COMMERCIAL

## 2025-04-07 ENCOUNTER — HOSPITAL ENCOUNTER (OUTPATIENT)
Age: 45
Discharge: HOME | End: 2025-04-07
Payer: COMMERCIAL

## 2025-04-07 VITALS
RESPIRATION RATE: 18 BRPM | DIASTOLIC BLOOD PRESSURE: 80 MMHG | HEART RATE: 64 BPM | TEMPERATURE: 99 F | OXYGEN SATURATION: 99 % | SYSTOLIC BLOOD PRESSURE: 129 MMHG

## 2025-04-07 VITALS — OXYGEN SATURATION: 97 % | HEART RATE: 63 BPM | DIASTOLIC BLOOD PRESSURE: 66 MMHG | SYSTOLIC BLOOD PRESSURE: 112 MMHG

## 2025-04-07 DIAGNOSIS — G89.29 CHRONIC RIGHT SHOULDER PAIN: Primary | ICD-10-CM

## 2025-04-07 DIAGNOSIS — R05.1 ACUTE COUGH: Primary | ICD-10-CM

## 2025-04-07 DIAGNOSIS — R70.0 ELEVATED SED RATE: ICD-10-CM

## 2025-04-07 DIAGNOSIS — M32.9 SYSTEMIC LUPUS ERYTHEMATOSUS, UNSPECIFIED SLE TYPE, UNSPECIFIED ORGAN INVOLVEMENT STATUS (HCC): ICD-10-CM

## 2025-04-07 DIAGNOSIS — R76.8 SCL-70 ANTIBODY POSITIVE: ICD-10-CM

## 2025-04-07 DIAGNOSIS — M25.511 CHRONIC RIGHT SHOULDER PAIN: Primary | ICD-10-CM

## 2025-04-07 DIAGNOSIS — R53.82 CHRONIC FATIGUE: ICD-10-CM

## 2025-04-07 DIAGNOSIS — R09.89 PULMONARY CONGESTION: ICD-10-CM

## 2025-04-07 DIAGNOSIS — R06.00 DYSPNEA, UNSPECIFIED TYPE: ICD-10-CM

## 2025-04-07 LAB
#MXD IC: 0.5 X10ˆ3/UL (ref 0.1–1)
BUN BLD-MCNC: 14 MG/DL (ref 7–18)
CHLORIDE BLD-SCNC: 107 MMOL/L (ref 98–112)
CO2 BLD-SCNC: 23 MMOL/L (ref 21–32)
CREAT BLD-MCNC: 0.7 MG/DL
EGFRCR SERPLBLD CKD-EPI 2021: 110 ML/MIN/1.73M2 (ref 60–?)
GLUCOSE BLD-MCNC: 102 MG/DL (ref 70–99)
HCT VFR BLD AUTO: 38 %
HCT VFR BLD CALC: 36 %
HGB BLD-MCNC: 12.3 G/DL
ISTAT IONIZED CALCIUM FOR CHEM 8: 1.2 MMOL/L (ref 1.12–1.32)
LYMPHOCYTES # BLD AUTO: 1.3 X10ˆ3/UL (ref 1–4)
LYMPHOCYTES NFR BLD AUTO: 26.3 %
MCH RBC QN AUTO: 29.4 PG (ref 26–34)
MCHC RBC AUTO-ENTMCNC: 32.4 G/DL (ref 31–37)
MCV RBC AUTO: 90.9 FL (ref 80–100)
MIXED CELL %: 10.2 %
NEUTROPHILS # BLD AUTO: 3 X10ˆ3/UL (ref 1.5–7.7)
NEUTROPHILS NFR BLD AUTO: 63.5 %
PLATELET # BLD AUTO: 247 X10ˆ3/UL (ref 150–450)
POTASSIUM BLD-SCNC: 4 MMOL/L (ref 3.6–5.1)
RBC # BLD AUTO: 4.18 X10ˆ6/UL
SODIUM BLD-SCNC: 142 MMOL/L (ref 136–145)
TROPONIN I BLD-MCNC: <0.02 NG/ML
WBC # BLD AUTO: 4.8 X10ˆ3/UL (ref 4–11)

## 2025-04-07 PROCEDURE — 99204 OFFICE O/P NEW MOD 45 MIN: CPT | Performed by: NURSE PRACTITIONER

## 2025-04-07 PROCEDURE — 84484 ASSAY OF TROPONIN QUANT: CPT | Performed by: NURSE PRACTITIONER

## 2025-04-07 PROCEDURE — 85025 COMPLETE CBC W/AUTO DIFF WBC: CPT | Performed by: NURSE PRACTITIONER

## 2025-04-07 PROCEDURE — 71046 X-RAY EXAM CHEST 2 VIEWS: CPT | Performed by: NURSE PRACTITIONER

## 2025-04-07 PROCEDURE — 80047 BASIC METABLC PNL IONIZED CA: CPT | Performed by: NURSE PRACTITIONER

## 2025-04-07 PROCEDURE — 86235 NUCLEAR ANTIGEN ANTIBODY: CPT

## 2025-04-07 PROCEDURE — 20611 DRAIN/INJ JOINT/BURSA W/US: CPT | Performed by: ANESTHESIOLOGY

## 2025-04-07 PROCEDURE — 93000 ELECTROCARDIOGRAM COMPLETE: CPT | Performed by: NURSE PRACTITIONER

## 2025-04-07 PROCEDURE — 3074F SYST BP LT 130 MM HG: CPT | Performed by: ANESTHESIOLOGY

## 2025-04-07 PROCEDURE — 3078F DIAST BP <80 MM HG: CPT | Performed by: ANESTHESIOLOGY

## 2025-04-07 PROCEDURE — 36415 COLL VENOUS BLD VENIPUNCTURE: CPT

## 2025-04-07 RX ORDER — TRIAMCINOLONE ACETONIDE 40 MG/ML
40 INJECTION, SUSPENSION INTRA-ARTICULAR; INTRAMUSCULAR ONCE
Status: COMPLETED | OUTPATIENT
Start: 2025-04-07 | End: 2025-04-07

## 2025-04-07 RX ORDER — BENZONATATE 100 MG/1
100 CAPSULE ORAL 3 TIMES DAILY PRN
Qty: 30 CAPSULE | Refills: 0 | Status: SHIPPED | OUTPATIENT
Start: 2025-04-07 | End: 2025-05-07

## 2025-04-07 RX ORDER — BUPIVACAINE HYDROCHLORIDE 5 MG/ML
5 INJECTION, SOLUTION EPIDURAL; INTRACAUDAL; PERINEURAL ONCE
Status: COMPLETED | OUTPATIENT
Start: 2025-04-07 | End: 2025-04-07

## 2025-04-07 RX ORDER — ALBUTEROL SULFATE 90 UG/1
2 INHALANT RESPIRATORY (INHALATION) EVERY 4 HOURS PRN
Qty: 1 EACH | Refills: 0 | Status: SHIPPED | OUTPATIENT
Start: 2025-04-07 | End: 2025-05-07

## 2025-04-07 RX ORDER — LIDOCAINE HYDROCHLORIDE 10 MG/ML
10 INJECTION, SOLUTION INFILTRATION; PERINEURAL ONCE
Status: COMPLETED | OUTPATIENT
Start: 2025-04-07 | End: 2025-04-07

## 2025-04-07 NOTE — PATIENT INSTRUCTIONS
Refill policies:    Allow 2-3 business days for refills; controlled substances may take longer.  Contact your pharmacy at least 5 days prior to running out of medication and have them send an electronic request or submit request through the “request refill” option in your [employer] account.  Refills are not addressed on weekends; covering physicians do not authorize routine medications on weekends.  No narcotics or controlled substances are refilled after noon on Fridays or by on call physicians.  By law, narcotics must be electronically prescribed.  A 30 day supply with no refills is the maximum allowed.  If your prescription is due for a refill, you may be due for a follow up appointment.  To best provide you care, patients receiving routine medications need to be seen at least once a year.  Patients receiving narcotic/controlled substance medications need to be seen at least once every 3 months.  In the event that your preferred pharmacy does not have the requested medication in stock (e.g. Backordered), it is your responsibility to find another pharmacy that has the requested medication available.  We will gladly send a new prescription to that pharmacy at your request.    Scheduling Tests:    If your physician has ordered radiology tests such as MRI or CT scans, please contact Central Scheduling at [phone] right away to schedule the test.  Once scheduled, the [facility] Centralized Referral Team will work with your insurance carrier to obtain pre-certification or prior authorization.  Depending on your insurance carrier, approval may take 3-10 days.  It is highly recommended patients assure they have received an authorization before having a test performed.  If test is done without insurance authorization, patient may be responsible for the entire amount billed.      Precertification and Prior Authorizations:  If your physician has recommended that you have a procedure or additional testing performed the [facility]  Centralized Referral Team will contact your insurance carrier to obtain pre-certification or prior authorization.    You are strongly encouraged to contact your insurance carrier to verify that your procedure/test has been approved and is a COVERED benefit.  Although the [facility] Centralized Referral Team does its due diligence, the insurance carrier gives the disclaimer that \"Although the procedure is authorized, this does not guarantee payment.\"    Ultimately the patient is responsible for payment.   Thank you for your understanding in this matter.  Paperwork Completion:  If you require FMLA or disability paperwork for your recovery, please make sure to either drop it off or have it faxed to our office at [phone]. Be sure the form has your name and date of birth on it.  The form will be faxed to our Forms Department and they will complete it for you.  There is a 25$ fee for all forms that need to be filled out.  Please be aware there is a 10-14 day turnaround time.  You will need to sign a release of information (ZARA) form if your paperwork does not come with one.  You may call the Forms Department with any questions at [phone].  Their fax number is [phone].

## 2025-04-07 NOTE — PROGRESS NOTES
Timeout completed prior to procedure @ 9844.  Participants present for timeout:  Dr. Neal, JERRI Ballard, and patient.

## 2025-04-07 NOTE — ED PROVIDER NOTES
Patient Seen in: [facility]      History     Chief Complaint   Patient presents with    Difficulty Breathing     Pain when breathing in deep and getting sob more easily and was recently sick - Entered by patient    Shortness Of Breath    Nasal Congestion     Stated Complaint: Difficulty Breathing - Pain when breathing in deep and getting sob more easily *    Subjective:   HPI      45-year-old female with lupus, fibromyalgia presents today with complaints of continued cough.  Patient states that she was sick last week with similar symptoms but feels like the cough is getting deeper and she has shortness of breath with ambulation.  Patient states she has been more fatigued with this cough.  Patient denies any chest discomfort.    Objective:     Past Medical History:    Abdominal hernia    Allergic rhinitis    Anxiety    Arthritis    Asthma (HCC)    Back problem    DDD    Blood disorder    anemia    Blood in urine    Notes on tests for dr ellis    Depression    Disorder of thyroid    Esophageal reflux    Fatigue    Fibromayalgia    Fibromyalgia    Headache disorder    Migraines    Heartburn    Hemorrhoids    High cholesterol    Hyperlipidemia    Hypothyroidism    IBS (irritable bowel syndrome)    Indigestion    Lupus (systemic lupus erythematosus) (HCC)    Migraines    Obesity    BLESSING (obstructive sleep apnea)    AHI 19 REM AHI 57 Supine AHI 46 non-supine AHI 11 [place] 71%    Osteoarthritis    Pneumonia due to organism    Sleep apnea    cpap    Sleep disturbance    Sleep apnea with cpap    Thyroid disease    Visual impairment    glasses              Past Surgical History:   Procedure Laterality Date    Cholecystectomy  2013    Colonoscopy N/A 08/28/2020    Procedure: COLONOSCOPY;  Surgeon: Tone Steiner MD;  Location:  ENDOSCOPY    Gastric bypass,obesity,sb reconstruc  12/21/2020    Did not have bypass but had the sleeve done    Other surgical history  12/21/2020    gastric sleeve    Removal  gallbladder  2013    [facility]    Surgical bariatrics - internal  12/21/2020    Upper gi endoscopy,exam      [place] teeth removed  2013                Social History     Socioeconomic History    Marital status: Single   Occupational History    Occupation: [employer]     Employer: [employer]     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              Review of Systems   Constitutional:  Positive for fatigue.   HENT: Negative.     Eyes: Negative.    Respiratory:  Positive for cough.    Cardiovascular: Negative.    Gastrointestinal: Negative.    Endocrine: Negative.    Genitourinary: Negative.    Musculoskeletal: Negative.    Skin: Negative.    Allergic/Immunologic: Negative.    Neurological: Negative.    Hematological: Negative.    Psychiatric/Behavioral: Negative.         Positive for stated complaint: Difficulty Breathing - Pain when breathing in deep and getting sob more easily *  Other systems are as noted in HPI.  Constitutional and vital signs reviewed.      All other systems reviewed and negative except as noted above.    Physical Exam     ED Triage Vitals [04/07/25 1619]   /80   Pulse 64   Resp 18   Temp 98.5 °F (36.9 °C)   Temp src Oral   SpO2 99 %   O2 Device None (Room air)       Current Vitals:   Vital Signs  BP: 129/80  Pulse: 64  Resp: 18  Temp: 98.5 °F (36.9 °C)  Temp src: Oral    Oxygen Therapy  SpO2: 99 %  O2 Device: None (Room air)        Physical Exam  Vitals and nursing note reviewed.   Constitutional:        Appearance: She is obese.   HENT:      Head: Normocephalic and atraumatic.   Eyes:      Extraocular Movements: Extraocular movements intact.      Pupils: Pupils are equal, round, and reactive to light.   Cardiovascular:      Rate and Rhythm: Normal rate and regular rhythm.   Pulmonary:      Effort: Pulmonary effort is normal.      Breath sounds: Normal breath sounds.   Musculoskeletal:      Cervical back: Normal range of motion and neck supple.   Neurological:      General: No focal deficit present.      Mental Status: She is alert.          ED Course     Labs Reviewed   POCT ISTAT CHEM8 CARTRIDGE - Abnormal; Notable for the following components:       Result Value    ISTAT Glucose 102 (*)     All other components within normal limits   ISTAT TROPONIN - Normal   POCT CBC                   MDM      45-year-old female with lupus, fibromyalgia presents today with complaints of continued cough.  Patient states that she was sick last week with similar symptoms but feels like the cough is getting deeper and she has shortness of breath with ambulation.  Patient states she has been more fatigued with this cough.  Patient denies any chest discomfort.  Vital signs: Please see EMR.  Physical exam: Please see exam.  Differential diagnosis: Pneumonia, bronchitis, URI.  XR CHEST PA + LAT CHEST (CPT=71046)    Result Date: 4/7/2025  CONCLUSION:  Borderline pulmonary vascular congestion may represent mild CHF or fluid overload.   LOCATION:  [facility]   Dictated by (CST): Rodolfo Morejon MD on 4/07/2025 at 5:23 PM     Finalized by (CST): Rodolfo Morejon MD on 4/07/2025 at 5:25 PM      Adding to my differential is cardiac event or CHF exacerbation.  After further discussion with patient patient states that she is fatigued with indigestion.  Patient states that her lower extremities have had dependency edema.  Patient states that she did see a cardiologist last year for palpitations.  Chart review shows that patient had echocardiogram with  ejection fraction of 70% in 2023.  Recent Results (from the past 24 hours)   EKG 12 Lead    Collection Time: 04/07/25  5:36 PM   Result Value Ref Range    Ventricular rate 54 BPM    Atrial rate 54 BPM    P-R Interval 116 ms    QRS Duration 84 ms    Q-T Interval 420 ms    QTC Calculation (Bezet) 398 ms    P Axis 0 degrees    R Axis 32 degrees    T Axis 41 degrees   POCT CBC    Collection Time: 04/07/25  5:57 PM   Result Value Ref Range    WBC IC 4.8 4.0 - 11.0 x10ˆ3/uL    RBC IC 4.18 3.80 - 5.30 X10ˆ6/uL    HGB IC 12.3 12.0 - 16.0 g/dL    HCT IC 38.0 35.0 - 48.0 %    MCV IC 90.9 80.0 - 100.0 fL    MCH IC 29.4 26.0 - 34.0 pg    MCHC IC 32.4 31.0 - 37.0 g/dL    PLT .0 150.0 - 450.0 X10ˆ3/uL    # Neutrophil 3.0 1.5 - 7.7 X10ˆ3/uL    # Lymphocyte 1.3 1.0 - 4.0 X10ˆ3/uL    # Mixed Cells 0.5 0.1 - 1.0 X10ˆ3/uL    Neutrophil % 63.5 %    Lymphocyte % 26.3 %    Mixed Cell % 10.2 %   POCT ISTAT chem8 cartridge    Collection Time: 04/07/25  6:00 PM   Result Value Ref Range    ISTAT Sodium 142 136 - 145 mmol/L    ISTAT BUN 14 7 - 18 mg/dL    ISTAT Potassium 4.0 3.6 - 5.1 mmol/L    ISTAT Chloride 107 98 - 112 mmol/L    ISTAT Ionized Calcium 1.20 1.12 - 1.32 mmol/L    ISTAT Hematocrit 36 34 - 50 %    ISTAT Glucose 102 (H) 70 - 99 mg/dL    ISTAT TCO2 23 21 - 32 mmol/L    ISTAT Creatinine 0.70 0.55 - 1.02 mg/dL    eGFR-Cr 110 >=60 mL/min/1.73m2   ISTAT Troponin    Collection Time: 04/07/25  6:10 PM   Result Value Ref Range    ISTAT Troponin <0.02 <0.045 ng/mL ng/mL     Consulting [facility] cardiology.  [facility] staff member states that the patient will be called tomorrow to set up an appointment.  Will diagnose patient with fatigue and dyspnea and pulmonary congestion.  Patient is to follow-up with primary care provider within 2 to 3 days.  Patient instructed to go to the ER if her symptoms worsen.      Medical Decision Making  45-year-old female with lupus, fibromyalgia presents today with complaints of continued cough.  Patient states  that she was sick last week with similar symptoms but feels like the cough is getting deeper and she has shortness of breath with ambulation.  Patient states she has been more fatigued with this cough.  Patient denies any chest discomfort.    Problems Addressed:  Acute cough: acute illness or injury  Dyspnea, unspecified type: acute illness or injury  Pulmonary congestion: acute illness or injury    Amount and/or Complexity of Data Reviewed  Labs: ordered.     Details: Recent Results (from the past 24 hours)  -EKG 12 Lead:   Collection Time: 04/07/25  5:36 PM       Result                      Value             Ref Range           Ventricular rate            54                BPM                 Atrial rate                 54                BPM                 P-R Interval                116               ms                  QRS Duration                84                ms                  Q-T Interval                420               ms                  QTC Calculation (Bezet)     398               ms                  P Axis                      0                 degrees             R Axis                      32                degrees             T Axis                      41                degrees        -POCT CBC:   Collection Time: 04/07/25  5:57 PM       Result                      Value             Ref Range           WBC IC                      4.8               4.0 - 11.0 x*       RBC IC                      4.18              3.80 - 5.30 *       HGB IC                      12.3              12.0 - 16.0 *       HCT IC                      38.0              35.0 - 48.0 %       MCV IC                      90.9              80.0 - 100.0*       MCH IC                      29.4              26.0 - 34.0 *       MCHC IC                     32.4              31.0 - 37.0 *       PLT IC                      247.0             150.0 - 450.*       # Neutrophil                3.0               1.5 - 7.7 X1*       #  Lymphocyte                1.3               1.0 - 4.0 X1*       # Mixed Cells               0.5               0.1 - 1.0 X1*       Neutrophil %                63.5              %                   Lymphocyte %                26.3              %                   Mixed Cell %                10.2              %              -POCT ISTAT chem8 cartridge:   Collection Time: 04/07/25  6:00 PM       Result                      Value             Ref Range           ISTAT Sodium                142               136 - 145 mm*       ISTAT BUN                   14                7 - 18 mg/dL        ISTAT Potassium             4.0               3.6 - 5.1 mm*       ISTAT Chloride              107               98 - 112 mmo*       ISTAT Ionized Calcium       1.20              1.12 - 1.32 *       ISTAT Hematocrit            36                34 - 50 %           ISTAT Glucose               102 (H)           70 - 99 mg/dL       ISTAT TCO2                  23                21 - 32 mmol*       ISTAT Creatinine            0.70              0.55 - 1.02 *       eGFR-Cr                     110               >=60 mL/min/*  -ISTAT Troponin:   Collection Time: 04/07/25  6:10 PM       Result                      Value             Ref Range           ISTAT Troponin              <0.02             <0.045 ng/mL*    Radiology: ordered. Decision-making details documented in ED Course.     Details: XR CHEST PA + LAT CHEST (CPT=71046)    Result Date: 4/7/2025  CONCLUSION:  Borderline pulmonary vascular congestion may represent mild CHF or fluid overload.   LOCATION:  [facility]   Dictated by (CST): Rodolfo Morejon MD on 4/07/2025 at 5:23 PM     Finalized by (CST): Rodolfo Morejon MD on 4/07/2025 at 5:25 PM         ECG/medicine tests: ordered.    Risk  Prescription drug management.        Disposition and Plan     Clinical Impression:  1. Acute cough    2. Dyspnea, unspecified type    3. Pulmonary congestion         Disposition:  Discharge  4/7/2025  6:27  pm    Follow-up:  Asa Menard MD  [address] [address]  [phone]    Schedule an appointment as soon as possible for a visit             Medications Prescribed:  Current Discharge Medication List        START taking these medications    Details   benzonatate 100 MG Oral Cap Take 1 capsule (100 mg total) by mouth 3 (three) times daily as needed for cough.  Qty: 30 capsule, Refills: 0      !! albuterol 108 (90 Base) MCG/ACT Inhalation Aero Soln Inhale 2 puffs into the lungs every 4 (four) hours as needed for Wheezing.  Qty: 1 each, Refills: 0       !! - Potential duplicate medications found. Please discuss with provider.              Supplementary Documentation:

## 2025-04-07 NOTE — PROCEDURES
Date of procedure: April 7, 2025    Pre-op diagnosis: Shoulder pain    Postop diagnosis: Same    Procedure: Ultrasound-guided intra-articular shoulder injection    Surgeon: Vick Neal    Anesthesia: Lidocaine next    EBL: 0    Indication: Patient is a 45-year-old with a history of shoulder pain    Procedure detail: Informed consent obtained.  Timeout done.  Skin overlying the shoulder was prepped in usual sterile fashion.  Subsequently sterile ultrasound imaging was used to identify the glenohumeral joint.  After adequate skin analgesia, a 22-gauge needle was used into the joint.  After negative aspiration for heme, 40 mg of triamcinolone with 5 cc 1% lidocaine was then injected after repeat negative aspiration.  The needle was then removed tip intact.  Patient Toller procedure well.  No objective or subjective weakness.    Vick Neal MD

## 2025-04-07 NOTE — DISCHARGE INSTRUCTIONS
[facility] cardiology will reach out to you tomorrow to schedule an appointment.  If you develop any worsening shortness of breath or chest pain please go to the emergency room.

## 2025-04-07 NOTE — PROGRESS NOTES
Patient presents in office today with reported pain in right shoulder    Current pain level reported = 5/10    Last reported dose of NA      Narcotic Contract renewal NA    Urine Drug screen NA

## 2025-04-07 NOTE — ED INITIAL ASSESSMENT (HPI)
Pt was seen around 3/17 for a sore throat and congestion. Dx with strep and given 10 days of keflex, which she completed. Still has congestion and feels more sob than usual, especially while doing things she normally does not get sob doing.

## 2025-04-08 LAB
ATRIAL RATE: 54 BPM
ENA SCL70 IGG SER IA-ACNC: 9.1 U/ML
P AXIS: 0 DEGREES
P-R INTERVAL: 116 MS
Q-T INTERVAL: 420 MS
QRS DURATION: 84 MS
QTC CALCULATION (BEZET): 398 MS
R AXIS: 32 DEGREES
T AXIS: 41 DEGREES
VENTRICULAR RATE: 54 BPM

## 2025-04-09 ENCOUNTER — PATIENT MESSAGE (OUTPATIENT)
Dept: FAMILY MEDICINE CLINIC | Facility: CLINIC | Age: 45
End: 2025-04-09

## 2025-04-09 DIAGNOSIS — R09.89 PULMONARY VASCULAR CONGESTION: Primary | ICD-10-CM

## 2025-04-09 NOTE — TELEPHONE ENCOUNTER
Order for ECHO placed, I do not see a  with [facility], please clarify the name of physician with patient.

## 2025-04-09 NOTE — TELEPHONE ENCOUNTER
Last OV 11/15/24  Next appt 4/29/25    [facility] BLUE [employer]CISION HMO   See 4/7/25 UC encounter.  Pt requesting cardiology referral to follow up post UC visit.  UC recommended follow up Dr. Asa Menard (interventional cardiologist).  Pt requesting referral for Dr. Alonso Yoo (electrophysiologist).  Please advise.

## 2025-04-10 NOTE — TELEPHONE ENCOUNTER
Dr. Shirley    Please sign off on form if you agree to: disability for Systemic Lupus Erytheatosus and Fibromyalgia    -Signature page will be the first page scanned  -From your Inbasket, Highlight the patient and click Chart   -Double click the 4/1/25 Forms Completion telephone encounter  -Scroll down to the Media section   -Click the blue Hyperlink: Americans with Disabilities Act  Dr. Shirley  4/10/25  -Click Acknowledge located in the top right ribbon/menu   -Drag the mouse into the blank space of the document and a + sign will appear. Left click to   electronically sign the document.  -Once signed, simply exit out of the screen and you signature will be saved.     Thank you,    Javier DODGE

## 2025-04-10 NOTE — TELEPHONE ENCOUNTER
Disability form completed and signed by provider. Faxed to disability services at [facility] [phone] confirmation received.

## 2025-04-10 NOTE — TELEPHONE ENCOUNTER
Patient is seeing Dr Alonso Yoo, Electrophysiologist at [facility].     Referral pended.

## 2025-04-11 NOTE — TELEPHONE ENCOUNTER
Spoke to pt about her upcoming appt, she was anticipating a new mask (struggling a bit with hers) but DME reports she isn't eligible until 4-. She was made aware to call back to [facility] on that day or after for the new mask style. She plans to keep her scheduled appt for 5-6-2025.

## 2025-04-14 ENCOUNTER — LAB ENCOUNTER (OUTPATIENT)
Dept: LAB | Facility: HOSPITAL | Age: 45
End: 2025-04-14
Attending: INTERNAL MEDICINE
Payer: COMMERCIAL

## 2025-04-14 DIAGNOSIS — K75.81 METABOLIC DYSFUNCTION-ASSOCIATED STEATOHEPATITIS (MASH): ICD-10-CM

## 2025-04-14 DIAGNOSIS — R00.2 PALPITATIONS: Primary | ICD-10-CM

## 2025-04-14 DIAGNOSIS — E03.9 HYPOTHYROIDISM: ICD-10-CM

## 2025-04-14 LAB
ALBUMIN SERPL-MCNC: 4.2 G/DL (ref 3.2–4.8)
ALBUMIN/GLOB SERPL: 1.4 {RATIO} (ref 1–2)
ALP LIVER SERPL-CCNC: 70 U/L (ref 37–98)
ALT SERPL-CCNC: 24 U/L (ref 10–49)
ANION GAP SERPL CALC-SCNC: 8 MMOL/L (ref 0–18)
AST SERPL-CCNC: 19 U/L (ref ?–34)
BASOPHILS # BLD AUTO: 0.04 X10(3) UL (ref 0–0.2)
BASOPHILS NFR BLD AUTO: 0.4 %
BILIRUB SERPL-MCNC: 0.6 MG/DL (ref 0.3–1.2)
BUN BLD-MCNC: 12 MG/DL (ref 9–23)
CALCIUM BLD-MCNC: 9.5 MG/DL (ref 8.7–10.6)
CHLORIDE SERPL-SCNC: 110 MMOL/L (ref 98–112)
CO2 SERPL-SCNC: 26 MMOL/L (ref 21–32)
CREAT BLD-MCNC: 0.82 MG/DL (ref 0.55–1.02)
CRP SERPL-MCNC: <0.4 MG/DL (ref ?–0.5)
EGFRCR SERPLBLD CKD-EPI 2021: 91 ML/MIN/1.73M2 (ref 60–?)
EOSINOPHIL # BLD AUTO: 0.3 X10(3) UL (ref 0–0.7)
EOSINOPHIL NFR BLD AUTO: 2.9 %
ERYTHROCYTE [DISTWIDTH] IN BLOOD BY AUTOMATED COUNT: 12.8 %
FASTING STATUS PATIENT QL REPORTED: NO
GLOBULIN PLAS-MCNC: 2.9 G/DL (ref 2–3.5)
GLUCOSE BLD-MCNC: 87 MG/DL (ref 70–99)
HCT VFR BLD AUTO: 36.4 % (ref 35–48)
HGB BLD-MCNC: 12.4 G/DL (ref 12–16)
IMM GRANULOCYTES # BLD AUTO: 0.04 X10(3) UL (ref 0–1)
IMM GRANULOCYTES NFR BLD: 0.4 %
INR BLD: 1.04 (ref 0.8–1.2)
LYMPHOCYTES # BLD AUTO: 2.54 X10(3) UL (ref 1–4)
LYMPHOCYTES NFR BLD AUTO: 24.4 %
MCH RBC QN AUTO: 30.9 PG (ref 26–34)
MCHC RBC AUTO-ENTMCNC: 34.1 G/DL (ref 31–37)
MCV RBC AUTO: 90.8 FL (ref 80–100)
MONOCYTES # BLD AUTO: 0.89 X10(3) UL (ref 0.1–1)
MONOCYTES NFR BLD AUTO: 8.5 %
NEUTROPHILS # BLD AUTO: 6.62 X10 (3) UL (ref 1.5–7.7)
NEUTROPHILS # BLD AUTO: 6.62 X10(3) UL (ref 1.5–7.7)
NEUTROPHILS NFR BLD AUTO: 63.4 %
OSMOLALITY SERPL CALC.SUM OF ELEC: 297 MOSM/KG (ref 275–295)
PLATELET # BLD AUTO: 266 10(3)UL (ref 150–450)
POTASSIUM SERPL-SCNC: 3.3 MMOL/L (ref 3.5–5.1)
PROT SERPL-MCNC: 7.1 G/DL (ref 5.7–8.2)
PROTHROMBIN TIME: 13.7 SECONDS (ref 11.6–14.8)
RBC # BLD AUTO: 4.01 X10(6)UL (ref 3.8–5.3)
SODIUM SERPL-SCNC: 144 MMOL/L (ref 136–145)
WBC # BLD AUTO: 10.4 X10(3) UL (ref 4–11)

## 2025-04-14 PROCEDURE — 36415 COLL VENOUS BLD VENIPUNCTURE: CPT

## 2025-04-14 PROCEDURE — 85025 COMPLETE CBC W/AUTO DIFF WBC: CPT

## 2025-04-14 PROCEDURE — 85610 PROTHROMBIN TIME: CPT

## 2025-04-14 PROCEDURE — 86140 C-REACTIVE PROTEIN: CPT

## 2025-04-14 PROCEDURE — 80053 COMPREHEN METABOLIC PANEL: CPT

## 2025-04-14 NOTE — TELEPHONE ENCOUNTER
I see that she saw  this year and had neuro psych testing, not sure if that was initial intake and if they need another referral for further evaluation, can you please contact his office [phone].

## 2025-04-15 ENCOUNTER — HOSPITAL ENCOUNTER (OUTPATIENT)
Dept: CV DIAGNOSTICS | Facility: HOSPITAL | Age: 45
Discharge: HOME | End: 2025-04-15
Attending: FAMILY MEDICINE
Payer: COMMERCIAL

## 2025-04-15 DIAGNOSIS — R09.89 PULMONARY VASCULAR CONGESTION: ICD-10-CM

## 2025-04-15 PROCEDURE — 93306 TTE W/DOPPLER COMPLETE: CPT | Performed by: FAMILY MEDICINE

## 2025-04-29 ENCOUNTER — OFFICE VISIT (OUTPATIENT)
Dept: FAMILY MEDICINE CLINIC | Facility: CLINIC | Age: 45
End: 2025-04-29
Payer: COMMERCIAL

## 2025-04-29 VITALS
OXYGEN SATURATION: 98 % | TEMPERATURE: 98 F | BODY MASS INDEX: 51.34 KG/M2 | WEIGHT: 279 LBS | HEART RATE: 60 BPM | DIASTOLIC BLOOD PRESSURE: 80 MMHG | RESPIRATION RATE: 18 BRPM | HEIGHT: 62 IN | SYSTOLIC BLOOD PRESSURE: 130 MMHG

## 2025-04-29 DIAGNOSIS — E03.9 ACQUIRED HYPOTHYROIDISM: Primary | ICD-10-CM

## 2025-04-29 DIAGNOSIS — R06.02 SHORTNESS OF BREATH: ICD-10-CM

## 2025-04-29 DIAGNOSIS — K21.9 GASTROESOPHAGEAL REFLUX DISEASE WITHOUT ESOPHAGITIS: ICD-10-CM

## 2025-04-29 DIAGNOSIS — F43.10 PTSD (POST-TRAUMATIC STRESS DISORDER): ICD-10-CM

## 2025-04-29 DIAGNOSIS — R00.2 PALPITATIONS: ICD-10-CM

## 2025-04-29 DIAGNOSIS — J34.89 NASAL SORE: ICD-10-CM

## 2025-04-29 DIAGNOSIS — E66.813 CLASS 3 SEVERE OBESITY DUE TO EXCESS CALORIES WITH SERIOUS COMORBIDITY AND BODY MASS INDEX (BMI) OF 50.0 TO 59.9 IN ADULT: ICD-10-CM

## 2025-04-29 DIAGNOSIS — J45.20 MILD INTERMITTENT ASTHMA WITHOUT COMPLICATION (HCC): ICD-10-CM

## 2025-04-29 DIAGNOSIS — R41.840 ATTENTION OR CONCENTRATION DEFICIT: ICD-10-CM

## 2025-04-29 DIAGNOSIS — R07.89 CHEST DISCOMFORT: ICD-10-CM

## 2025-04-29 DIAGNOSIS — E78.2 MIXED HYPERLIPIDEMIA: ICD-10-CM

## 2025-04-29 PROCEDURE — 3075F SYST BP GE 130 - 139MM HG: CPT | Performed by: FAMILY MEDICINE

## 2025-04-29 PROCEDURE — 3008F BODY MASS INDEX DOCD: CPT | Performed by: FAMILY MEDICINE

## 2025-04-29 PROCEDURE — 99214 OFFICE O/P EST MOD 30 MIN: CPT | Performed by: FAMILY MEDICINE

## 2025-04-29 PROCEDURE — 3079F DIAST BP 80-89 MM HG: CPT | Performed by: FAMILY MEDICINE

## 2025-04-29 RX ORDER — EZETIMIBE 10 MG/1
10 TABLET ORAL DAILY
Qty: 90 TABLET | Refills: 1 | Status: SHIPPED | OUTPATIENT
Start: 2025-04-29

## 2025-04-29 RX ORDER — LEVOTHYROXINE SODIUM 175 UG/1
175 TABLET ORAL
Qty: 90 TABLET | Refills: 3 | Status: CANCELLED | OUTPATIENT
Start: 2025-04-29

## 2025-04-29 RX ORDER — ROSUVASTATIN CALCIUM 10 MG/1
10 TABLET, COATED ORAL NIGHTLY
Qty: 90 TABLET | Refills: 1 | Status: SHIPPED | OUTPATIENT
Start: 2025-04-29

## 2025-04-29 RX ORDER — VENLAFAXINE HYDROCHLORIDE 150 MG/1
150 CAPSULE, EXTENDED RELEASE ORAL DAILY
Qty: 90 CAPSULE | Refills: 1 | Status: SHIPPED | OUTPATIENT
Start: 2025-04-29

## 2025-04-29 RX ORDER — LEVOTHYROXINE SODIUM 200 UG/1
200 TABLET ORAL
Qty: 90 TABLET | Refills: 0 | Status: SHIPPED | OUTPATIENT
Start: 2025-04-29

## 2025-04-29 NOTE — PROGRESS NOTES
Family Medicine Progress Note  ASSESSMENT AND PLAN:  Nola De Paz is a 45 year old female who is here for:     Nola was seen today for medication follow-up and other.    Diagnoses and all orders for this visit:    Acquired hypothyroidism uncontrolled  Thyroid function tests indicate the need for an increased levothyroxine dosage.  - Increase levothyroxine to 200 mcg.  - Recheck thyroid function tests in 6 weeks.  -     levothyroxine 200 MCG Oral Tab; Take 1 tablet (200 mcg total) by mouth before breakfast.  -     TSH and Free T4 [E]; Future    PTSD (post-traumatic stress disorder)   Stable on the current dose of medication.  Continue the same dose of medication  -     venlafaxine  MG Oral Capsule SR 24 Hr; Take 1 capsule (150 mg total) by mouth daily.    Mixed hyperlipidemia  -     ezetimibe 10 MG Oral Tab; Take 1 tablet (10 mg total) by mouth daily.  -     rosuvastatin 10 MG Oral Tab; Take 1 tablet (10 mg total) by mouth nightly.    Nasal sore  Nasal inflammation and epistaxis, possibly related to lupus.  - Continue bacitracin application.  - Refer to ENT specialist for further evaluation.  -     ENT Referral - In Network    Mild intermittent asthma without complication (HCC)  Discussed with patient SOB could be due to asthma, if cardiac workup is negative recommend following up with a cardiologist.    Shortness of breath    Attention or concentration deficit  Initial intake done and needs to follow up for testing for ADD.  -     Psychology Referral - In Network    Class 3 severe obesity due to excess calories with serious comorbidity and body mass index (BMI) of 50.0 to 59.9 in adult    Gastroesophageal reflux disease without esophagitis  GERD with Fairchild's esophagus with increased heartburn despite medication.  - Continue omeprazole in the morning.  - Add famotidine (Pepcid) at night to help control reflux symptoms.    Palpitations    Chest discomfort  Fibromyalgia may contribute to chest pain and  tightness, possibly related to muscle spasms.  - Take muscle relaxant consistently for 1-2 weeks to assess for improvement in symptoms.         The patient indicates understanding of these issues and agrees to the plan.  Follow-Up: The patient is asked to return in Return in about 6 months (around 10/29/2025) for Hypothyroidism f/u, anxiety, hyperlipidemia.  .     Julissa Gonzalez MD        CC: Medication Follow-Up      The following individual(s) verbally consented to be recorded using ambient AI listening technology and understand that they can each withdraw their consent to this listening technology at any point by asking the clinician to turn off or pause the recording:    Patient name: Nola De Paz    HPI:     History of Present Illness  Nola De Paz is a 45 year old female with hypothyroidism, asthma, depression, BLESSING, SLE, fibromyalgia seen for follow up.    She experiences persistent fatigue and weight gain over the past year, despite consistent use of her thyroid medication, levothyroxine 175 mcg daily. Recent TFT done by her rheumatologist was abnormal and would like to discuss her medication.    She experiences occasional shortness of breath, which has been less severe than a few weeks ago when she visited urgent care. The shortness of breath is exacerbated by exertion. She also reports pain around her lower ribs and middle chest, described as 'achy' and similar to previous pain associated with elevated liver enzymes. This pain is now present on both sides of her body.    She has been experiencing increased heartburn, described as 'almost pretty much constant', despite taking omeprazole in the morning. She was previously switched from pantoprazole to omeprazole by her gastroenterologist after endoscopy for Fairchild's esophagus.     She reports episodes of rapid heart rate and dizziness, which had subsided but have recently recurred. She describes a sensation of tightness and pain around her chest and  ribs, which she associates with breathing deeply.    She is experiencing nasal issues, including inflammation and soreness inside her nose for the past two weeks to a month. She has been using bacitracin, which provides some relief. She previously saw an ENT specialist and had a CT scan, which was unremarkable.    Mood has been stable with her current dose of medication.    KATELYNN-7 Scale       Feeling nervous, anxious, or on edge: Several days  Not being able to stop or control worrying: Not at all  Worrying too much about different things   : Several days  Trouble relaxing: Several days  Being so restless that it's hard to sit still: Not at all  Becoming easily annoyed or irritable: Several days  Feeling afraid as if something awful might happen: Not at all    KATELYNN 7 Total Score: 4  If you checked off any problems, how difficult have these made it for you to do your work, take care of things at home, or get along with other people?: Somewhat difficult         PHQ9 Scale     1. Little interest or pleasure in doing things: Not at all  2. Feeling down, depressed, or hopeless: Not at all  3. Trouble falling or staying asleep, or sleeping too much: Not at all  4. Feeling tired or having little energy: Nearly every day  5. Poor appetite or overeating: More than half the days  6. Feeling bad about yourself - or that you are a failure or have let yourself or your family down: Several days  7. Trouble concentrating on things, such as reading the newspaper or watching television: Several days  8. Moving or speaking so slowly that other people could have noticed. Or the opposite - being so fidgety or restless that you have been moving around a lot more than usual: Not at all  9. Thoughts that you would be better off dead, or of hurting yourself in some way: Not at all  PHQ-9 TOTAL SCORE: 7  If you checked off any problems, how difficult have these problems made it for you to do your work, take care of things at home, or get  along with other people?: Somewhat difficult        Saw neuro psych  for initial intake and needs to [facility] for ADD testing.Needs an updated referral.    ALLERGY:     Allergies as of 04/29/2025 - Reviewed 04/07/2025   Allergen Reaction Noted    Penicillins HIVES and RASH 01/14/2014     MEDICATIONS:     Current Medications[1]   Past Medical History[2]   Social History:  Short Social Hx on File[3]     REVIEW OF SYSTEMS:   GENERAL HEALTH: fatigue  SKIN: denies any unusual skin lesions or rashes  RESPIRATORY: + SOB  CARDIOVASCULAR: denies chest pain on exertion  GI: denies abdominal pain and denies heartburn  NEURO: denies headaches  PSYCH: as documented in HPI    EXAM:   /80   Pulse 60   Temp 98.2 °F (36.8 °C) (Temporal)   Resp 18   Ht 5' 2\" (1.575 m)   Wt 279 lb (126.6 kg)   LMP 03/16/2025 (Exact Date)   SpO2 98%   BMI 51.03 kg/m²   GENERAL: morbidly obese,in no apparent distress  SKIN: no rashes,no suspicious lesions  HEENT: atraumatic, normocephalic,ears and throat are clear  NECK: supple,no adenopathy,no bruits  CHEST:mild tenderness bilateral lower chest  LUNGS: clear to auscultation, no wheezing, rhonchi or rales  CARDIO: RRR without murmur  GI: good BS's,no masses, HSM or tenderness  EXTREMITIES: no cyanosis, clubbing or edema  PSYCH: well groomed, appropriate mood and affect.    Encounter took 40 min including taking history, performing physical exam, reviewing external test results, counseling and educating patient, answering patient questions and documenting in EHR.    NOTE TO PATIENT: The 21st Century Cures Act makes clinical notes like these available to patients in the interest of transparency. Clinical notes are medical documents used by physicians and care providers to communicate with each other. These documents include medical language and terminology, abbreviations, and treatment information that may sound technical and at times possibly unfamiliar. In addition, at  times, the verbiage may appear blunt or direct. These documents are one tool providers use to communicate relevant information and clinical opinions of the care providers in a way that allows common understanding of the clinical context.      Julissa Gonzalez MD             [1]   Current Outpatient Medications   Medication Sig Dispense Refill    Omeprazole 40 MG Oral Capsule Delayed Release Take 1 capsule (40 mg total) by mouth daily. 90 capsule 3    albuterol 108 (90 Base) MCG/ACT Inhalation Aero Soln Inhale 2 puffs into the lungs every 4 (four) hours as needed for Wheezing. 1 each 0    albuterol (PROAIR HFA) 108 (90 Base) MCG/ACT Inhalation Aero Soln Inhale 2 puffs into the lungs every 4 (four) hours as needed for Wheezing or Shortness of Breath. 1 each 3    hydroxychloroquine 200 MG Oral Tab Take 1 tablet (200 mg total) by mouth 2 (two) times daily. 180 tablet 1    cyclobenzaprine 10 MG Oral Tab Take 1 tablet (10 mg total) by mouth nightly as needed for Muscle spasms. 90 tablet 0    Cholecalciferol (VITAMIN D) 50 MCG (2000 UT) Oral Cap Take by mouth. Unsure of dose      Calcium 200 MG Oral Tab Take by mouth. Unsure of dose   chewable      topiramate 50 MG Oral Tab Take 1 tablet (50 mg total) by mouth 2 (two) times daily. 180 tablet 1    traMADol 50 MG Oral Tab as needed.      venlafaxine  MG Oral Capsule SR 24 Hr TAKE 1 CAPSULE(150 MG) BY MOUTH DAILY 90 capsule 0    GABAPENTIN 100 MG Oral Cap TAKE 1 CAPSULE(100 MG) BY MOUTH THREE TIMES DAILY 270 capsule 0    ezetimibe 10 MG Oral Tab Take 1 tablet (10 mg total) by mouth daily. 90 tablet 1    LORazepam 0.5 MG Oral Tab Take 1 tablet (0.5 mg total) by mouth nightly as needed. 40 tablet 0    rosuvastatin 10 MG Oral Tab Take 1 tablet (10 mg total) by mouth nightly. 90 tablet 1    doxycycline 100 MG Oral Cap 2 (two) times daily as needed.      levothyroxine 175 MCG Oral Tab Take 1 tablet (175 mcg total) by mouth before breakfast. 90 tablet 3    Meloxicam 7.5 MG  Oral Tab Take 1 tablet (7.5 mg total) by mouth as needed.      traZODone 50 MG Oral Tab Take 1 tablet (50 mg total) by mouth nightly.      clindamycin 1 % External Lotion APPLY TOPICALLY TO THE AFFECTED AREA EVERY DAY BEFORE NOON      fexofenadine 180 MG Oral Tab Take 1 tablet (180 mg total) by mouth daily.      Multiple Vitamin (MULTIVITAMIN ADULT OR) Apply topically.      Nystatin 099907 UNIT/GM External Powder Apply 1 Application topically 4 (four) times daily. 1 Bottle 0    clotrimazole-betamethasone 1-0.05 % External Cream Apply 1 Application topically 2 (two) times daily as needed (rash). 60 g 3    Triamcinolone Acetonide 55 MCG/ACT Nasal Aerosol by Nasal route daily as needed.     [2]   Past Medical History:   Abdominal hernia    Allergic rhinitis    Anxiety    Arthritis    Asthma (HCC)    Back problem    DDD    Blood disorder    anemia    Blood in urine    Notes on tests for dr ellis    Depression    Disorder of thyroid    Esophageal reflux    Fatigue    Fibromayalgia    Fibromyalgia    Headache disorder    Migraines    Heartburn    Hemorrhoids    High cholesterol    Hyperlipidemia    Hypothyroidism    IBS (irritable bowel syndrome)    Indigestion    Lupus (systemic lupus erythematosus) (HCC)    Migraines    Obesity    BLESSING (obstructive sleep apnea)    AHI 19 REM AHI 57 Supine AHI 46 non-supine AHI 11 [place] 71%    Osteoarthritis    Pneumonia due to organism    Sleep apnea    cpap    Sleep disturbance    Sleep apnea with cpap    Thyroid disease    Visual impairment    glasses   [3]   Social History  Socioeconomic History    Marital status: Single   Occupational History    Occupation: [employer]     Employer: [employer]     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     Social Drivers of Health     Food Insecurity: No Food Insecurity (4/29/2025)    NCSS - Food Insecurity     Worried About Running Out of Food in the Last Year: No     Ran Out of Food in the Last Year: No   Transportation Needs: No Transportation Needs (4/29/2025)    NCSS - Transportation     Lack of Transportation: No   Housing Stability: Not At Risk (4/29/2025)    NCSS - Housing/Utilities     Has Housing: Yes     Worried About Losing Housing: No     Unable to Get Utilities: No

## 2025-05-06 ENCOUNTER — OFFICE VISIT (OUTPATIENT)
Facility: CLINIC | Age: 45
End: 2025-05-06
Payer: COMMERCIAL

## 2025-05-06 VITALS
DIASTOLIC BLOOD PRESSURE: 80 MMHG | HEIGHT: 62 IN | BODY MASS INDEX: 52.26 KG/M2 | HEART RATE: 69 BPM | WEIGHT: 284 LBS | TEMPERATURE: 96 F | SYSTOLIC BLOOD PRESSURE: 126 MMHG | RESPIRATION RATE: 16 BRPM | OXYGEN SATURATION: 97 %

## 2025-05-06 DIAGNOSIS — G47.19 DAYTIME HYPERSOMNOLENCE: ICD-10-CM

## 2025-05-06 DIAGNOSIS — E66.813 CLASS 3 SEVERE OBESITY DUE TO EXCESS CALORIES WITHOUT SERIOUS COMORBIDITY WITH BODY MASS INDEX (BMI) OF 40.0 TO 44.9 IN ADULT: ICD-10-CM

## 2025-05-06 DIAGNOSIS — M79.7 FIBROMYALGIA: Primary | ICD-10-CM

## 2025-05-06 DIAGNOSIS — G47.33 OSA (OBSTRUCTIVE SLEEP APNEA): ICD-10-CM

## 2025-05-06 PROCEDURE — 99215 OFFICE O/P EST HI 40 MIN: CPT | Performed by: INTERNAL MEDICINE

## 2025-05-06 PROCEDURE — 3079F DIAST BP 80-89 MM HG: CPT | Performed by: INTERNAL MEDICINE

## 2025-05-06 PROCEDURE — 3074F SYST BP LT 130 MM HG: CPT | Performed by: INTERNAL MEDICINE

## 2025-05-06 PROCEDURE — 3008F BODY MASS INDEX DOCD: CPT | Performed by: INTERNAL MEDICINE

## 2025-05-06 NOTE — PROGRESS NOTES
Pulmonary/Critical Care/Sleep Medicine   Progress Note     PCP: Julissa Gonzalez MD   Phone: [phone]   Fax: [phone]     Ref Provider: Julissa Gonzalez     Chief Complaint   Patient presents with    Obstructive Sleep Apnea (BLESSING)     Pt here for sleep follow up, pt states has been falling asleep before putting cpap on, pt states has sores inside nose and having issues with head gear        HPI  I had the pleasure of seeing Nola De Paz who is a pleasant 45 year old female who presents for follow up of BLESSING after visit on 9/24/2025     Since last visit he patient was diagnosed with BLESSING and CPAP mask was initiated     The patient reports using CPAP daily at night at 9 cmH2O regularly throughout the night for about 4 hours and states that the  PAP machine is quiet and has a heated humidifier.  The patient denies  daytime hypersomnolence or fatigue. She falls asleep outside her bedroom living room so she is non compliant at times.     The patient states that she has noted sore nose since since she has been on CPAP machine  She has been using humidity at 6 on the CPAP machine. Higher humidity causes water in the tubing       The patient admits to still kicking legs at night. Also admits to teeth grinding.       She drinks 1 cups of caffeine coffee daily,  and 1  caffeine cans of soda daily.       She has pets 3 cats  that do sleep in bed.         Hx of tobacco use: She  reports that she has never smoked. She has never used smokeless tobacco.    Past Medical History:    Abdominal hernia    Allergic rhinitis    Anxiety    Arthritis    Asthma (HCC)    Back problem    DDD    Blood disorder    anemia    Blood in urine    Notes on tests for dr ellis    Depression    Disorder of thyroid    Esophageal reflux    Fatigue    Fibromayalgia    Fibromyalgia    Headache disorder    Migraines    Heartburn    Hemorrhoids    High cholesterol    Hyperlipidemia    Hypothyroidism    IBS (irritable bowel syndrome)    Indigestion     Lupus (systemic lupus erythematosus) (HCC)    Migraines    Obesity    BLESSING (obstructive sleep apnea)    AHI 19 REM AHI 57 Supine AHI 46 non-supine AHI 11 [place] 71%    Osteoarthritis    Pneumonia due to organism    Sleep apnea    cpap    Sleep disturbance    Sleep apnea with cpap    Thyroid disease    Visual impairment    glasses      Past Surgical History:   Procedure Laterality Date    Cholecystectomy  2013    Colonoscopy N/A 08/28/2020    Procedure: COLONOSCOPY;  Surgeon: Tone Steiner MD;  Location:  ENDOSCOPY    Gastric bypass,obesity,sb reconstruc  12/21/2020    Did not have bypass but had the sleeve done    Other surgical history  12/21/2020    gastric sleeve    [place]    Surgical bariatrics - internal  12/21/2020    Upper gi endoscopy,exam      [place] teeth removed  2013     Allergies   Allergen Reactions    Penicillins HIVES and RASH     Current Outpatient Medications   Medication Sig Dispense Refill    venlafaxine  MG Oral Capsule SR 24 Hr Take 1 capsule (150 mg total) by mouth daily. 90 capsule 1    ezetimibe 10 MG Oral Tab Take 1 tablet (10 mg total) by mouth daily. 90 tablet 1    rosuvastatin 10 MG Oral Tab Take 1 tablet (10 mg total) by mouth nightly. 90 tablet 1    levothyroxine 200 MCG Oral Tab Take 1 tablet (200 mcg total) by mouth before breakfast. 90 tablet 0    Omeprazole 40 MG Oral Capsule Delayed Release Take 1 capsule (40 mg total) by mouth daily. 90 capsule 3    albuterol 108 (90 Base) MCG/ACT Inhalation Aero Soln Inhale 2 puffs into the lungs every 4 (four) hours as needed for Wheezing. 1 each 0    albuterol (PROAIR HFA) 108 (90 Base) MCG/ACT Inhalation Aero Soln Inhale 2 puffs into the lungs every 4 (four) hours as needed for Wheezing or Shortness of Breath. 1 each 3    hydroxychloroquine 200 MG Oral Tab Take 1 tablet (200 mg total) by mouth 2 (two) times daily. 180 tablet 1    cyclobenzaprine 10 MG Oral Tab Take 1 tablet (10 mg total) by mouth  nightly as needed for Muscle spasms. 90 tablet 0    Cholecalciferol (VITAMIN D) 50 MCG (2000 UT) Oral Cap Take by mouth. Unsure of dose      Calcium 200 MG Oral Tab Take by mouth. Unsure of dose   chewable      topiramate 50 MG Oral Tab Take 1 tablet (50 mg total) by mouth 2 (two) times daily. 180 tablet 1    traMADol 50 MG Oral Tab as needed.      GABAPENTIN 100 MG Oral Cap TAKE 1 CAPSULE(100 MG) BY MOUTH THREE TIMES DAILY 270 capsule 0    LORazepam 0.5 MG Oral Tab Take 1 tablet (0.5 mg total) by mouth nightly as needed. 40 tablet 0    doxycycline 100 MG Oral Cap 2 (two) times daily as needed.      levothyroxine 175 MCG Oral Tab Take 1 tablet (175 mcg total) by mouth before breakfast. 90 tablet 3    Meloxicam 7.5 MG Oral Tab Take 1 tablet (7.5 mg total) by mouth as needed.      traZODone 50 MG Oral Tab Take 1 tablet (50 mg total) by mouth nightly.      clindamycin 1 % External Lotion APPLY TOPICALLY TO THE AFFECTED AREA EVERY DAY BEFORE NOON      fexofenadine 180 MG Oral Tab Take 1 tablet (180 mg total) by mouth daily.      Multiple Vitamin (MULTIVITAMIN ADULT OR) Apply topically.      Nystatin 686677 UNIT/GM External Powder Apply 1 Application topically 4 (four) times daily. 1 Bottle 0    clotrimazole-betamethasone 1-0.05 % External Cream Apply 1 Application topically 2 (two) times daily as needed (rash). 60 g 3    Triamcinolone Acetonide 55 MCG/ACT Nasal Aerosol by Nasal route daily as needed.        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     Social Drivers of Health     Food Insecurity: No Food Insecurity (4/29/2025)    NCSS - Food Insecurity     Worried About Running Out of Food in the Last Year: No     Ran Out of Food in the Last Year: No   Transportation Needs: No Transportation Needs (4/29/2025)    NCSS - Transportation     Lack of Transportation: No   Housing Stability: Not At Risk (4/29/2025)    NCSS - Housing/Utilities     Has Housing: Yes     Worried About Losing Housing: No     Unable to Get Utilities: No      Immunization History   Administered Date(s) Administered    Covid-19 Vaccine Moderna 100 mcg/0.5 ml 01/15/2021, 02/12/2021    FLULAVAL 6 months & older 0.5 ml Prefilled syringe (83003) 12/12/2019, 12/22/2020    FLUZONE 6 months and older PFS 0.5 ml (79100) 12/22/2020    Influenza Vaccine, trivalent (IIV3), PF 0.5mL (92663) 10/19/2024    Pfizer Covid-19 Vaccine 30mcg/0.3ml 12yrs+ 10/19/2024    TDAP 11/08/2012, 07/14/2023      Family History   Problem Relation Age of Onset    Heart Disease Mother     High Cholesterol Mother     Other (DDD) Mother     Anemia Mother     Heart Disorder Mother         Heart disease, high blood pressure, stent, high cholesterol    Hypertension Mother     Obesity Mother     Colon Polyps Mother     Hypertension Father     Heart Disorder Father         Heart murmur, high blood pressure    Obesity Father     Other (lymphoma) Maternal Grandmother     Cancer Maternal Grandmother         Lymphoma    Other (cancer) Paternal Grandmother         liver     Asthma Paternal Grandmother         Asthma and emphysema, copd, non smoker    Cancer Paternal Grandmother         Liver and lung cancer    Diabetes Paternal Grandmother     Prostate Cancer Paternal Grandfather         in 80s    Cancer Paternal Grandfather         Prostate cancer    Stroke Paternal Grandfather      Alcohol and Other Disorders Associated Paternal Uncle         Alcoholic    Mental Disorder Paternal Uncle         Review of Systems   Constitutional:  Positive for fatigue. Negative for fever and unexpected weight change.   HENT:  Positive for congestion. Negative for mouth sores, nosebleeds, postnasal drip, rhinorrhea, sore throat and trouble swallowing.    Eyes:  Negative for visual disturbance.   Respiratory:  Positive for shortness of breath (climbing stairs). Negative for apnea, cough, choking, chest tightness and wheezing.    Cardiovascular:  Negative for chest pain, palpitations and leg swelling.   Gastrointestinal:  Negative for abdominal pain, constipation, diarrhea, nausea and vomiting.   Genitourinary:  Negative for difficulty urinating.   Musculoskeletal:  Positive for myalgias. Negative for arthralgias, back pain and gait problem.   Neurological:  Positive for headaches. Negative for dizziness and weakness.   Psychiatric/Behavioral:  Positive for sleep disturbance.         Vitals:    05/06/25 1335   BP: 126/80   Pulse: 69   Resp: 16   Temp: (!) 96.3 °F (35.7 °C)      SpO2: 97 %  Ht Readings from Last 1 Encounters:   05/06/25 5' 2\" (1.575 m)     Wt Readings from Last 1 Encounters:   05/06/25 284 lb (128.8 kg)     Body mass index is 51.94 kg/m².     Physical Exam  Constitutional:       General: She is not in acute distress.     Appearance: Normal appearance. She is obese. She is not ill-appearing or diaphoretic.   HENT:      Head: Normocephalic and atraumatic.      Nose: Nose normal. No congestion or rhinorrhea.      Comments: Narrow nares bilaterally      Mouth/Throat:      Mouth: Mucous membranes are moist.      Pharynx: Oropharynx is clear. No oropharyngeal exudate or posterior oropharyngeal erythema.      Comments: Mallampati class III palate   Eyes:      Extraocular Movements: Extraocular movements intact.      Pupils: Pupils are equal, round, and reactive to light.   Cardiovascular:      Rate and  Rhythm: Normal rate.      Pulses: Normal pulses.      Heart sounds: Normal heart sounds. No murmur heard.  Pulmonary:      Effort: Pulmonary effort is normal. No respiratory distress.      Breath sounds: Normal breath sounds. No wheezing or rhonchi.   Chest:      Chest wall: No tenderness.   Abdominal:      General: Abdomen is flat. Bowel sounds are normal.      Palpations: Abdomen is soft.   Musculoskeletal:         General: Normal range of motion.   Skin:     General: Skin is warm.   Neurological:      General: No focal deficit present.      Mental Status: She is alert and oriented to person, place, and time.   Psychiatric:         Mood and Affect: Mood normal.         Behavior: Behavior normal.         Thought Content: Thought content normal.         Judgment: Judgment normal.             Labs:  Last BMP  Lab Results   Component Value Date    GLU 87 04/14/2025    BUN 12 04/14/2025    CREATSERUM 0.82 04/14/2025    BUNCREA 29.7 (H) 10/04/2024    ANIONGAP 8 04/14/2025    GFRAA 115 07/16/2022    GFRNAA 100 07/16/2022    CA 9.5 04/14/2025     04/14/2025    K 3.3 (L) 04/14/2025     04/14/2025    CO2 26.0 04/14/2025    OSMOCALC 297 (H) 04/14/2025      Last CBC  Lab Results   Component Value Date    WBC 10.4 04/14/2025    RBC 4.01 04/14/2025    HGB 12.4 04/14/2025    HCT 36.4 04/14/2025    MCV 90.8 04/14/2025    MCH 30.9 04/14/2025    MCHC 34.1 04/14/2025    RDW 12.8 04/14/2025    .0 04/14/2025    MPV 10.7 01/17/2013      Last CMP  Lab Results   Component Value Date    GLU 87 04/14/2025    BUN 12 04/14/2025    BUNCREA 29.7 (H) 10/04/2024    CREATSERUM 0.82 04/14/2025    ANIONGAP 8 04/14/2025    GFRNAA 100 07/16/2022    GFRAA 115 07/16/2022    CA 9.5 04/14/2025    OSMOCALC 297 (H) 04/14/2025    ALKPHO 70 04/14/2025    AST 19 04/14/2025    ALT 24 04/14/2025    BILT 0.6 04/14/2025    TP 7.1 04/14/2025    ALB 4.2 04/14/2025    GLOBULIN 2.9 04/14/2025    AGRATIO 1.2 08/08/2020     04/14/2025    K 3.3  (L) 04/14/2025     04/14/2025    CO2 26.0 04/14/2025      Last Thyroid Function  Lab Results   Component Value Date    T4F 0.8 03/11/2025    TSH 25.684 (H) 03/11/2025    TSHT4 0.91 08/08/2020        Imaging:  No results found.       Study 1/2/2025 Type: CPAP Titration   Procedure: The CPAP Titration was performed with a sleep technologist in attendance at the [facility]. The following parameters were monitored: central, occipital and temporal EEG, EOG, submentalis EMG, nasal flow, nasal pressure, respiratory inductance plethysmography, snore microphone, oxygen saturation via continuous pulse oximetry, with an additional channel for measurement of CPAP flow for the titration of positive airway pressure. Sleep stages, periodic limb movements and EEG arousals were scored in accordance with the American Academy of Sleep Medicine Manual for the Scoring of Sleep and Associated Events. Apnea Hypopnea Index was calculated using the recommended definition of hypopnea for scoring respiratory events. Data acquisition, collection and scoring have been validated and clinically correlated.   Sleep History: STEPHEN RAZO is a 44 year old Female referred by KAYLEEN LISA for a CPAP titration study. A previous sleep study was performed on 11/7/2024, revealed obstructive sleep apnea with an overall AHI of 5, supine AHI of 21.3, non-supine AHI of 1.9 and REM AHI of 9.7.   Past Medical History is pertinent for Asthma, Obstructive Sleep Apnea, Hypersomnia, Obesity, nocturia, anxiety, arthritis, anemia, thyroid disorder, esophageal reflux, fibromylagia, hyperlipidemia, IBS, migraines.   At the time of the study, the patient was prescribed the following medications: miSOPROStol, doxycycline, GABApentin, Zepbound, ezetimibe, hydroxychloroquine, cyclobenzaprine, levothyroxine, Omeprazole, venlafaxine, rosuvastatin, Meloxicam, traZODone, clindamycin, LORaxepam, fexofenadine, ondansetron, Nystatin,  clotrimazole-bethamethasone, Albuterol-Sulfate, Triamcinolone Acetonide.   The patient was studied from 10:06:45 PM to 06:00:28 AM, with a total recording time of 473.7, total sleep time of 442.0 and sleep efficiency of 93.3%. Wake after sleep onset was 32.5 minutes. The percentage of total sleep time by sleep stage is as follows: Stage 1 6.8%, Stage 2 55.3%, Stage 3 37.0% and Stage REM 0.9%.   Respiratory Analysis: Monitoring revealed resolution of respiratory events with CPAP at 9 cm H2O. Supine REM was observed at the final pressure. The Apnea/Hypopnea Index (AHI) was 0 at the final setting. The central sleep apnea index was 0. The oxygen heather was 95.2% and the patient spent 0% of sleep time spent with oxygen levels below 88%.   Snoring Profile: Snoring was resolved at the final pressure setting.   Cardiac Profile: Electrocardiogram demonstrated normal sinus rhythm.   EEG Profile: There was no evidence of epiliptiform activity during sleep. There were 7 spontaneous arousals, with a total arousal index of 1.5.   Periodic Limb Movements: PLM index of 0. PLM Arousal Index of 0.   IMPRESSIONS: This study demonstrates successful CPAP titration.   Diagnosis: Obstructive Sleep Apnea G47.33   RECOMMENDATIONS:   1. The patient should be prescribed CPAP at 9 cm H2O, with humidity at 5 and EPR off.   2. The patient was fitted with a ResMed AirFit N20 mask, size Small.   3. The patient should avoid alcohol and sedative medications, as these may worsen severity of symptoms.   4. The patient should avoid drowsy driving.   5. Patient may follow up with a sleep specialist in clinic.     Thank you for allowing me to participate in this patient's care. Please do not hesitate to contact me with any additional questions.   Dictated by: Dr. Helm   Electronically signed by Frankie Helm MD   (Electronic Signature)   Board Certified in Sleep Medicine                        [facility]       Accredited by the American Academy  of Sleep Medicine ([facility])     PATIENT'S NAME:        STEPHEN RAZO  ATTENDING PHYSICIAN:   Darwin Lawton M.D.  REFERRING PHYSICIAN:   Darwin Lawton M.D.  PATIENT ACCOUNT #:     [id number]        LOCATION:       [facility]  MEDICAL RECORD #:      [id number]        YOB: 1980  DATE OF STUDY:         09/29/2019     SLEEP STUDY REPORT     STUDY TYPE:  CPAP titration.     PATIENT DEMOGRAPHICS:  Age 39.  Gender female.  Height 5 feet 2 inches, weight 363 pounds, BMI of 66.8.  Neck circumference is 17 inches.     BRIEF CLINICAL HISTORY:  The patient is a 39-year-old with history of hypothyroidism, dyslipidemia, GERD, morbid obesity, and recently diagnosed obstructive sleep apnea with a baseline AHI of 19, REM AHI of 57, oxygen saturation heather of 71% who presents now for CPAP titration.     CPAP TITRATION RECORDING PARAMETER:  The patient underwent a formal CPAP titration evaluation at the [facility].  The study was acquired in compliance with the AASM Manual for the Scoring of Sleep and Associated Events.  The following parameters were monitored: EEG, EOG, ECG, chin EMG, left and right tibialis EMG, snore microphone, respiratory inductance plethysmography, and oxygen saturation via continuous pulse oximetry.  In accordance with AASM recommendations, hypopnea events are scored based on an oxygen saturation more than or equal to 4 percent.  Body position is documented via technician notes every 15 minutes.  There is an additional channel for measurement of CPAP flow for the titration of positive airway pressure.     SLEEP ARCHITECTURE:  Total recording time 471 minutes, total sleep time 267 minutes sleep.  Sleep onset latency 6 minutes, REM onset latency 87 minutes.  Wake after sleep onset 20 minutes for a sleep efficiency of 78%.      SLEEP STAGING:  Stage 1, 5%; stage 2, 47%; stage 3, 23%; stage REM 25%.     RESPIRATORY MEASURES:  The patient was initiated on CPAP at 6 cm of water pressure and  titrated up to a final pressure of 10 cm of water.  On this setting, patient was able to achieve prolonged periods of stage REM sleep, however, no REM was achieved in the supine position.  While on this setting, patient had an apnea-hypopnea index of 0.3 and an oxygen saturation heather of 91%.     PERIODIC LIMB MOVEMENTS:  PLM index of 5.4.  PLM arousal index of 2.0.     EKG:  Normal sinus rhythm throughout.     EEG:  Based on limited recording montage, there were no significant EEG abnormalities noted.     IMPRESSION:  Successful CPAP titration with resolution of obstructive apneic and hypopneic episodes as well as oxyhemoglobin desaturations.     RECOMMENDATIONS:    1.       The patient should be initiated on CPAP at 10 cm of water pressure with C-Flex level 2, humidity level 3.  During this study, patient used a Cruz [employer] Eson nasal mask size small.  2.       The patient should avoid alcohol and sedative medications as these may worsen severity of BLESSING.  Patient should avoid drowsy driving.  3.       The patient may follow up in the Pulmonary Sleep Clinic 6 to 8 weeks after initiation of CPAP for ongoing clinical care.     Thank you for allowing me to participate in this patient's care.  Please do not hesitate to call me with any additional questions.     Dictated By Darwin Lawton M.D.  d:10/08/2019 06:28:11      [facility]       Accredited by the American Academy of Sleep Medicine (AASM)     PATIENT'S NAME:        STEPHEN RAZO ARLETH  ATTENDING PHYSICIAN:   Darwin Lawton M.D.  REFERRING PHYSICIAN:   Marika Gaming M.D.  PATIENT ACCOUNT #:     [id number]        LOCATION:       [facility]  MEDICAL RECORD #:      [id number]        YOB: 1980  DATE OF STUDY:         08/23/2019     SLEEP STUDY REPORT     STUDY TYPE:  Diagnostic polysomnogram.     PATIENT DEMOGRAPHICS:  Age 39.  Gender female.  Height 5 feet 2 inches.  Weight 363 pounds.  BMI of 66.8.  Neck circumference of 17 inches.      BRIEF CLINICAL HISTORY:  Patient is a 39-year-old with history of hypothyroidism, dyslipidemia, gastroesophageal reflux disease, morbid obesity, snoring, and fatigue, as well as recurring headaches who presents now for baseline polysomnography.      DIAGNOSTIC RECORDING PARAMETERS:  The patient underwent a formal polysomnographic evaluation at the [facility]. The study was acquired in compliance with the AASM Manual for the Scoring of Sleep and Associated Events. The following parameters were monitored: EOG, EEG, ECG, chin EMG, left and right tibialis EMG, snore microphone, an oronasal thermal sensor, nasal pressure transducer, respiratory inductance plethysmography, and oxygen saturation via continuous pulse oximetry. In accordance with AASM recommendations, hypopnea events are scored based on an oxygen saturation more than or equal to 4 percent.  Body position is documented via technician notes every 15 minutes.      SLEEP ARCHITECTURE:  Total recording time 405 minutes.  Total sleep time 324 minutes.  Sleep onset latency 15 minutes.  REM onset latency 54 minutes.  Wake after sleep onset 67 minutes, for a sleep efficiency of 80%.       SLEEP STAGING:  Stage 1, 2%; stage 2, 83%; stage 3, 1%; stage REM 15%.      RESPIRATORY MEASURES:  Patient had a total of 101 obstructive apneic or hypopneic episodes, yielding an overall apnea-hypopnea index of 19.  Supine AHI was 46.  Lateral AHI was 11.  REM AHI was 57.  Oxygen saturation heather was 71%, and patient spent 5% of sleep time with oxygen levels below 90%.     PERIODIC LIMB MOVEMENTS:  PLM index of 1.5.     EKG:  Normal sinus rhythm throughout.      EEG:  Based on the limited recording montage, there were no significant EEG abnormalities noted.      IMPRESSION:  This study revealed evidence of significant obstructive sleep apnea with associated oxyhemoglobin desaturations.     RECOMMENDATIONS:    1.       Given severity of sleep-disordered breathing, it is  recommend the patient pursue definitive therapy by undergoing CPAP desensitization followed by CPAP titration.  2.       Patient should avoid alcohol and sedative medications, as these may worsen severity of BLESSING.  Patient should avoid drowsy driving.  3.       At the request of the referring physician, patient will be offered formal pulmonary sleep consultation for ongoing clinical care.     Thank you for allowing me to participate in the patient's care.  Please do not hesitate to call me with any additional questions.       Dictated By Darwin Lawton M.D.  d:08/30/2019 15:11:09      [place] Sleepiness Scale: (ESS) score on today's visit is 12  out of 24.     Score total of 1-6    Normal sleep   Score total of 7-8    Average sleepiness   Score total of 9-24    Abnormal (possibly pathologic) sleepiness       Impression:    Obstructive sleep apnea syndrome (OSAS):  Attended sleep study performed on 8/23/2024  showed total of 101 obstructive apneic or hypopneic episodes, yielding an overall apnea-hypopnea index of 19.  Supine AHI was 46.  Lateral AHI was 11.  REM AHI was 57.  Oxygen saturation heather was 71%, and patient spent 5% of sleep time with oxygen levels below 90%. The patient was treated with CPAP at 6 cwp for a year then she had bariatric surgery and felt high pressure on CPAP and stopped using. She is again symptomatic with BLESSING symptoms Treated with CPAP at 9 cwp. Download data on 5/4/2025  for 31  days shows adequate AHI but decreased compliance   Daytime hypersomnolence/fatigue, now mild   Obesity: Class III    ;  Body mass index is 51.94 kg/m².  Allergic Rhinitis   Anxiety   Depression  GERD  Hypothyroidism  Fibromyalgia   IBS                              Plan:    Continue current CPAP 9 cwp as patient is using and benefiting from CPAP use  Perform a Mask Fit in office today   Advised patient to sleep for 7-8 hours   Advised about weight loss   Advised against drowsy driving and to avoid alcoholic beverage  and respiratory depressants as these may worsen sleep apnea        Follow up: 4 months with PA and 8 months with Dr. Helm     Thank you for allowing me to participate in your patient care.    JASMYNE Helm MD, FACP, FCCP, [facility] - Pulmonary/Critical care/Sleep Medicine  Please contact our office if you have any questions or concerns at [phone]    Note to the patient: The 21st Century Cures Act makes medical notes like these available to patients in the interest of transparency. However, be advised that this is a medical document. It is intended as peer to peer communication. It is written in medical language and may contain abbreviations or verbiage that are unfamiliar. It may appear blunt or direct. Medical documents are intended to carry relevant information, facts as evident, and clinical opinion of the practitioner.      Disclaimer: Components of this note were documented using voice recognition system and are subject to errors not corrected at proofreading. Contact the author of this note for any clarifications

## 2025-05-06 NOTE — PATIENT INSTRUCTIONS
Plan:    Continue current CPAP 9 cwp as patient is using and benefiting from CPAP use  Perform a Mask Fit in office today   Advised patient to sleep for 7-8 hours   Advised about weight loss   Advised against drowsy driving and to avoid alcoholic beverage and respiratory depressants as these may worsen sleep apnea        Follow up: 4 months with PA and 8 months with Dr. Volodymyr Helm MD      Obstructive Sleep Apnea  Obstructive sleep apnea is a condition caused by air passages becoming narrowed or blocked during sleep. As a result, breathing stops for short periods. Your body wakes up enough for breathing to start again. But you don't remember it. The cycle of stopped breathing and brief awakenings can repeat dozens of times a night. This prevents the body from getting to the deeper stages of sleep that are needed for good rest.   Signs of sleep apnea include loud snoring, noisy breathing, and gasping sounds during sleep. People with sleep apnea often find they use the bathroom many times during the night. Daytime symptoms include waking up tired after a full night's sleep and waking up with headaches. They can also include feeling very sleepy or falling asleep during the day, and having problems with memory or concentration.   Risk factors for sleep apnea include:  Being overweight  Being assigned male at birth, or being in menopause  Smoking  Using alcohol or sedating medicines  Having enlarged structures in the nose or throat such as enlarged tonsils or adenoids, or extra tissue in the airway  Home care  Lifestyle changes that can help treat snoring and sleep apnea include:   If you're overweight, talk with your healthcare provider about a weight-loss plan for you.  Don't drink alcohol for 3 to 4 hours before bedtime.  Don't take sedating medicines. Ask your healthcare provider about the medicines you take.  If you smoke, talk to your provider about ways to quit. It's important to stay away from  secondhand smoke. Don't use e-cigarettes because of their harmful side effects.  Sleep on your side. This can help prevent gravity from pulling relaxed throat tissues into your breathing passages.  If you have allergies or sinus problems that block your nose, ask your provider for help.  Use positive airway pressure (PAP). Discuss with your provider the benefits of using PAP at home. And talk about the type of PAP that's best for you.  Follow-up care  Follow up with your healthcare provider, or as advised. A diagnosis of sleep apnea is made with a sleep study. Your provider can tell you more about this test.   When to get medical care  See your healthcare provider if you have daytime symptoms of sleep apnea. These include:   Waking up tired after a full night's sleep  Waking up with a headache  Feeling very sleepy or falling asleep during the day  Having problems with memory or concentration  Also talk with your provider if your partner tells you that you snore, gasp for air, or stop breathing while you sleep.   Seeing your provider is important because sleep apnea can make you more likely to have certain health problems. These include high blood pressure, heart attack, stroke, and sexual dysfunction. If you have sleep apnea, talk with your healthcare provider about the best treatments for you.   [employer] last reviewed this educational content on 5/1/2022 © 2000-2023 [employer]. All rights reserved. This information is not intended as a substitute for professional medical care. Always follow your healthcare professional's instructions.        Continuous Positive Air Pressure (CPAP)     A mask over the nose gently directs air into the throat to keep the airway open.     Continuous positive air pressure (CPAP) uses gentle air pressure to hold the airway open. CPAP is often the most effective treatment for sleep apnea. It works very well as a treatment for adults diagnosed with obstructive sleep apnea  with a lot of sleepiness. But keep in mind that it can take several adjustments before the setup is right for you.   How CPAP works  The CPAP machine  is a small portable pump that sits beside the bed. The pump sends air through a hose, which is held over your nose alone, or nose and mouth by a mask. Mild air pressure is gently pushed through your airway. The air pressure nudges sagging tissues aside. This widens the airway so you can breathe better. CPAP may be combined with other kinds of therapy for sleep apnea.   Types of air pressure treatments  There are different types of CPAP. Your doctor or CPAP technician will help you decide which type is best for you:   Basic CPAP keeps the pressure constant all night long.  A bilevel device (BiPAP) provides more pressure when you breathe in and less when you breathe out. A BiPAP machine also may be set to provide automatic breaths to maintain breathing if you stop breathing while sleeping.  An autoCPAP device automatically adjusts pressure throughout the night and in response to changes such as body position, sleep stage, and snoring.  [employer] last reviewed this educational content on 7/1/2019  © 9578-5906 [employer]. All rights reserved. This information is not intended as a substitute for professional medical care. Always follow your healthcare professional's instructions.

## 2025-05-06 NOTE — PROGRESS NOTES
Pt currently using Airfit N30  small wide under the nose mask.  She feels headgear and nasal cushion is too big.  Instructed pt to call the DME and request for smaller headgear. Gave pt a small cushion to try out.   Pt fitted with [employer] Dreamwear wisp small to try out and mask appears to fit well.   She said she's tried nasal masks before from another  , she fills Dreamwear fits better .   Pt was able to demonstrate on how to properly put on  Dreamwear Wisp small nasal  and how to adjust the headgear.  Pt instructed  on proper cleaning and maintenance of Wisp nasal .   Instructed pt to update the DME company with the type of mask pt will be using so that DME will send  the right mask in pt's  next cpap re supply shipment.  Pt verbalized understanding of all instructions.

## 2025-05-22 ENCOUNTER — TELEPHONE (OUTPATIENT)
Dept: OTHER | Facility: HOSPITAL | Age: 45
End: 2025-05-22

## 2025-05-22 NOTE — PROGRESS NOTES
Patient transferred from Central Scheduling.  Patient is trying to schedule the Heart Scan, however, she had the Heart Scan   12/1/2024 with CAC 0.0.  Patient states that she saw Dr. Shanna Goodman and she wants patient to complete.  Patient states that Dr. Goodman is aware that she had the heart scan 12/1/24.  I will reach out to Dr. Goodman and confirm that the Heart Scan is what she wants the patient to have.  I will contact the patient when I hear back from Dr. Goodman.

## 2025-05-23 ENCOUNTER — PATIENT MESSAGE (OUTPATIENT)
Dept: PAIN CLINIC | Facility: CLINIC | Age: 45
End: 2025-05-23

## 2025-06-10 ENCOUNTER — TELEPHONE (OUTPATIENT)
Dept: FAMILY MEDICINE CLINIC | Facility: CLINIC | Age: 45
End: 2025-06-10

## 2025-06-10 DIAGNOSIS — M25.561 PAIN IN BOTH KNEES, UNSPECIFIED CHRONICITY: ICD-10-CM

## 2025-06-10 DIAGNOSIS — M25.511 CHRONIC RIGHT SHOULDER PAIN: Primary | ICD-10-CM

## 2025-06-10 DIAGNOSIS — G89.29 CHRONIC RIGHT SHOULDER PAIN: Primary | ICD-10-CM

## 2025-06-10 DIAGNOSIS — M25.562 PAIN IN BOTH KNEES, UNSPECIFIED CHRONICITY: ICD-10-CM

## 2025-06-10 NOTE — TELEPHONE ENCOUNTER
[facility] BLUE PRECISION [facility]  Pt states she has an appt with the pain clinic tomorrow for injection with Dr. Vick Neal. She was informed that she needs a referral for the visit. Pt states she thinks she will see Dr. Neal at least 3 more times. Referral pended.

## 2025-06-11 ENCOUNTER — OFFICE VISIT (OUTPATIENT)
Dept: PAIN CLINIC | Facility: CLINIC | Age: 45
End: 2025-06-11
Payer: COMMERCIAL

## 2025-06-11 VITALS — OXYGEN SATURATION: 97 % | DIASTOLIC BLOOD PRESSURE: 70 MMHG | HEART RATE: 69 BPM | SYSTOLIC BLOOD PRESSURE: 110 MMHG

## 2025-06-11 DIAGNOSIS — G89.29 CHRONIC RIGHT SHOULDER PAIN: Primary | ICD-10-CM

## 2025-06-11 DIAGNOSIS — M25.511 CHRONIC RIGHT SHOULDER PAIN: Primary | ICD-10-CM

## 2025-06-11 DIAGNOSIS — M17.0 PRIMARY OSTEOARTHRITIS OF BOTH KNEES: ICD-10-CM

## 2025-06-11 PROCEDURE — 3078F DIAST BP <80 MM HG: CPT | Performed by: ANESTHESIOLOGY

## 2025-06-11 PROCEDURE — 3074F SYST BP LT 130 MM HG: CPT | Performed by: ANESTHESIOLOGY

## 2025-06-11 PROCEDURE — G2211 COMPLEX E/M VISIT ADD ON: HCPCS | Performed by: ANESTHESIOLOGY

## 2025-06-11 PROCEDURE — 99214 OFFICE O/P EST MOD 30 MIN: CPT | Performed by: ANESTHESIOLOGY

## 2025-06-11 NOTE — PROGRESS NOTES
Name: Nola De Paz   : 3/16/1980   DOS: 2025     Pain Clinic Follow Up Visit:     Chief Complaint   Patient presents with    Procedure Follow Up     Post Right shoulder injection        Nola De Paz is a 45 year old female with a history of fibromyalgia and joint pain who presents today for follow-up.  From a symptom standpoint, patient planes of returning knee pain.  This is treated in 2024 with bilateral Synvisc injection leading to greater than 80% relief and functional improvement for more than 6 months.  Now that pain has returned, patient interested in repeat procedure.    Patient also complains of pain in the right shoulder.  This was treated with intra-articular shoulder injection but only lead to 25% relief for 1 month.    Pt denies any chills, fever, or weakness. There is no bladder or bowel incontinence associated with the pain.    REVIEW OF SYSTEMS:  A ten point review of systems was performed with pertinent positives and negatives in the HPI.    Allergies[1]    Current Medications[2]      EXAM:   /70 (BP Location: Left arm, Patient Position: Sitting, Cuff Size: adult)   Pulse 69   LMP 2025 (Exact Date)   SpO2 97%   General:  Patient is a(n) 45 year old year old female in no acute distress.  Neurologic:: WNL-Orientation to time, place and person, normal mood & affect, concentration & attention span intact.   Inspection:  Ambulates with well-coordinated, fluid, non-antalgic gait.  Gait is normal.  Neck: Full range of motion  Shoulder: Positive Neer's.  Limited abduction  Cranial nerve: Grossly intact  Back: Gait intact    IMAGES:     X-ray of shoulder reviewed with mild osteoarthritic changes    ASSESSMENT AND PLAN:     1. Chronic right shoulder pain    2. Primary osteoarthritis of both knees      The patient is a 45-year-old female who is here for follow-up.  The patient complains of returning knee pain.  This was treated successfully with Synvisc injection.  This  lasted for more than 6 months and had greater than 85% improvement in pain which also translated functionally improvement.  Given return of knee pain, recommend repeat Synvisc injection.    Patient also complains of pain of the right shoulder.  Intra-articular shoulder injection was not as successful.  Patient also has physical exam findings suggestive of rotator cuff pathology.  Recommend orthopedics referral.  Additionally, we will order an MRI of the shoulder.    Orders:  Orders Placed This Encounter   Procedures    [facility] PAIN NAVIGATOR         Radiology orders and consultations:ORTHOPEDIC - INTERNAL  MRI SHOULDER, RIGHT (CPT=73221)  The patient indicates understanding of these issues and agrees to the plan.  No follow-ups on file.    Vick Neal MD, 6/11/2025, 5:01 PM              [1]   Allergies  Allergen Reactions    Penicillins HIVES and RASH   [2]   Current Outpatient Medications   Medication Sig Dispense Refill    ezetimibe 10 MG Oral Tab Take 1 tablet (10 mg total) by mouth daily. 90 tablet 1    rosuvastatin 10 MG Oral Tab Take 1 tablet (10 mg total) by mouth nightly. 90 tablet 1    levothyroxine 200 MCG Oral Tab Take 1 tablet (200 mcg total) by mouth before breakfast. 90 tablet 0    Omeprazole 40 MG Oral Capsule Delayed Release Take 1 capsule (40 mg total) by mouth daily. 90 capsule 3    albuterol (PROAIR HFA) 108 (90 Base) MCG/ACT Inhalation Aero Soln Inhale 2 puffs into the lungs every 4 (four) hours as needed for Wheezing or Shortness of Breath. 1 each 3    hydroxychloroquine 200 MG Oral Tab Take 1 tablet (200 mg total) by mouth 2 (two) times daily. 180 tablet 1    cyclobenzaprine 10 MG Oral Tab Take 1 tablet (10 mg total) by mouth nightly as needed for Muscle spasms. 90 tablet 0    Cholecalciferol (VITAMIN D) 50 MCG (2000 UT) Oral Cap Take by mouth. Unsure of dose      Calcium 200 MG Oral Tab Take by mouth. Unsure of dose   chewable      topiramate 50 MG Oral Tab Take 1 tablet (50 mg total) by mouth  2 (two) times daily. 180 tablet 1    traMADol 50 MG Oral Tab as needed.      GABAPENTIN 100 MG Oral Cap TAKE 1 CAPSULE(100 MG) BY MOUTH THREE TIMES DAILY 270 capsule 0    LORazepam 0.5 MG Oral Tab Take 1 tablet (0.5 mg total) by mouth nightly as needed. 40 tablet 0    doxycycline 100 MG Oral Cap 2 (two) times daily as needed.      levothyroxine 175 MCG Oral Tab Take 1 tablet (175 mcg total) by mouth before breakfast. 90 tablet 3    Meloxicam 7.5 MG Oral Tab Take 1 tablet (7.5 mg total) by mouth as needed.      traZODone 50 MG Oral Tab Take 1 tablet (50 mg total) by mouth nightly.      clindamycin 1 % External Lotion APPLY TOPICALLY TO THE AFFECTED AREA EVERY DAY BEFORE NOON      fexofenadine 180 MG Oral Tab Take 1 tablet (180 mg total) by mouth daily.      Multiple Vitamin (MULTIVITAMIN ADULT OR) Apply topically.      Nystatin 889187 UNIT/GM External Powder Apply 1 Application topically 4 (four) times daily. 1 Bottle 0    clotrimazole-betamethasone 1-0.05 % External Cream Apply 1 Application topically 2 (two) times daily as needed (rash). 60 g 3    venlafaxine  MG Oral Capsule SR 24 Hr Take 1 capsule (150 mg total) by mouth daily. 90 capsule 1    Triamcinolone Acetonide 55 MCG/ACT Nasal Aerosol by Nasal route daily as needed.

## 2025-06-11 NOTE — PROGRESS NOTES
Last procedure: Right shoulder injection  Date: 4/17/2025  Percentage of relief obtained: 25%  Duration of relief:  for [facility]    Current Pain Score: 6/10

## 2025-06-23 ENCOUNTER — TELEPHONE (OUTPATIENT)
Dept: PAIN CLINIC | Facility: CLINIC | Age: 45
End: 2025-06-23

## 2025-06-23 DIAGNOSIS — M17.0 PRIMARY OSTEOARTHRITIS OF BOTH KNEES: Primary | ICD-10-CM

## 2025-06-24 NOTE — TELEPHONE ENCOUNTER
Referral # [id number]  Referral Notes  Number of Notes: 1  .  Type Date User Summary Attachment   Prior Authorization Confirmed/Denied/NA 06/12/2025  7:59 AM [place] [employer] / Injection performed by in network provider at in network facility / Auth not required by health plan / Approved for tracking only -   Note:  [employer]BS HMO / Injection performed by in network provider at in network facility / Auth not required by health plan / Approved for tracking only     Dr. Neal @ Office

## 2025-06-24 NOTE — TELEPHONE ENCOUNTER
Patient advised of insurance approval to proceed with injections and is agreeable to scheduling. pre-procedure instructions reviewed. Patient prefers Local sedation. Reviewed sedation instructions including No Fasting & No  Required. Patient has no medications to hold prior to procedure. Patient verbalized understanding of instructions, no further needs at this time.         [facility] PAIN CLINIC  PRE-PROCEDURE INSTRUCTIONS WITHOUT SEDATION    Procedure: Bilateral Knee Synvisc Injection      Appointment Date: 06/30/2025  Check-In Time: 07:00 AM    Follow-Up Date/Time: 07/14/2025 @ 03:15 PM    Prior to the procedure:  Please update us prior to the procedure if you are experiencing any symptoms of infection such as cough, fever, chills, urinary symptoms, or have recently been prescribed antibiotics, have open wounds, have recently had surgery or dental procedures.    Day of Procedure:  **Drivers will be required for patients who receive prescriptions for Valium.    NO FASTING REQUIRED  Please bring your Insurance Card, Photo ID, List of Current Medications and Referral (if applicable) to your appointment.  Please park in the [facility] and follow the signs to the [facility].  Check in at [facility] ([address] [place] [address]) outpatient registration in the [facility].  Please note-No prescriptions will be written by Pain Clinic in OR on the day of procedure. If you require a refill of medications, please contact the office 48 hours prior to your procedure.  If you have an implanted Spinal Cord or Peripheral Nerve Stimulator: Please remember to turn device off for procedure.        Medication Hold:    Number of days you need to be off for the following medications:    Aggrenox 10 days   Agrylin (Anagrelide) 10 days  Brilinta (Ticagrelor) 7 days  Imbruvica (Ibrutinib) 3 days   Enbrel (Etanercept) 24 hours   Fragmin (Dalteparin) 24 hours   Pletal (Cilostazol) 7 days  Effient  (Prasugrel) 7 days  Pradaxa 10 days  Trental 7 days  Eliquis (Apixaban) 3 days  Xarelto (Rivaroxaban) 3 days  Lovenox (Enoxaparin) 24 hours  Aspirin  Greater than 81mg but less than 325mg   5 days  325mg and greater                  7 days  Coumadin       5 days  Procedure may be cancelled if INR is elevated.   Excedrin (with aspirin) 7 days  Plavix (Clopidogrel)                            7 days    NSAIDs: 24 hours preferred      Ibuprofen (Motrin, Advil, Vicoprofen), Naproxen (Naprosyn, Aleve), Piroxcam (Feldene), Meloxicam (Mobic), Oxaprozin (Daypro), Diclofenac (Voltaren), Indomethacin (Indocin), Etodolac (Lodine), Nabumetone (Relafen), Celebrex (Celecoxib)           HERBAL SUPPLEMENTS  5 days preferred  Fish oil, krill oil, Omega-3, Vascepa, Vitamin E, Turmeric, Garlic                       Insurance Authorization:   Most insurances are now requiring a preauthorization for all procedures.  In the event that your insurance does not authorize your procedure within 48 hours of the scheduled date, your procedure will be cancelled and rescheduled to a later date.  Please contact your insurance carrier to determine what your financial responsibility will be for the procedure(s).      Cancellation/Rescheduling Appointment:   In the event you need to cancel or reschedule your appointment, you must notify the office 24 hours prior.    Post-procedure instructions:        Please schedule a follow up visit within 2 to 4 weeks after your last procedure date   Please call our office with any questions or concerns before or after your procedure at  [phone].  If you are a diabetic, please increase the frequency of your glucose monitoring after the procedure as this may cause a temporary increase in your blood sugar.  Contact your primary care physician if your blood sugar rises as you may require some medication adjustment.  It is normal to have increased pain at injection site for up to 3-5 days after procedure, you can  use heat or ice (20 minutes on 20 minutes off) for comfort.    **To hear a recorded version of these instructions, please call [phone] and follow the prompts.  **Para escuchar las instrucciones en Español, por favor de llamar el vahe [phone] opción 4.

## 2025-06-30 ENCOUNTER — HOSPITAL ENCOUNTER (OUTPATIENT)
Facility: HOSPITAL | Age: 45
Setting detail: HOSPITAL OUTPATIENT SURGERY
Discharge: HOME | End: 2025-06-30
Attending: ANESTHESIOLOGY | Admitting: ANESTHESIOLOGY
Payer: COMMERCIAL

## 2025-06-30 ENCOUNTER — APPOINTMENT (OUTPATIENT)
Dept: GENERAL RADIOLOGY | Facility: HOSPITAL | Age: 45
End: 2025-06-30
Attending: ANESTHESIOLOGY
Payer: COMMERCIAL

## 2025-06-30 VITALS
BODY MASS INDEX: 52.26 KG/M2 | SYSTOLIC BLOOD PRESSURE: 123 MMHG | HEART RATE: 67 BPM | WEIGHT: 284 LBS | HEIGHT: 62 IN | OXYGEN SATURATION: 100 % | DIASTOLIC BLOOD PRESSURE: 63 MMHG | RESPIRATION RATE: 18 BRPM | TEMPERATURE: 99 F

## 2025-06-30 LAB — B-HCG UR QL: NEGATIVE

## 2025-06-30 PROCEDURE — 77002 NEEDLE LOCALIZATION BY XRAY: CPT | Performed by: ANESTHESIOLOGY

## 2025-06-30 PROCEDURE — 20610 DRAIN/INJ JOINT/BURSA W/O US: CPT | Performed by: ANESTHESIOLOGY

## 2025-06-30 RX ORDER — METHYLPREDNISOLONE ACETATE 40 MG/ML
INJECTION, SUSPENSION INTRA-ARTICULAR; INTRALESIONAL; INTRAMUSCULAR; SOFT TISSUE
Status: DISCONTINUED | OUTPATIENT
Start: 2025-06-30 | End: 2025-06-30

## 2025-06-30 RX ORDER — NALOXONE HYDROCHLORIDE 0.4 MG/ML
0.08 INJECTION, SOLUTION INTRAMUSCULAR; INTRAVENOUS; SUBCUTANEOUS AS NEEDED
Status: DISCONTINUED | OUTPATIENT
Start: 2025-06-30 | End: 2025-06-30

## 2025-06-30 RX ORDER — LIDOCAINE HYDROCHLORIDE 10 MG/ML
INJECTION, SOLUTION EPIDURAL; INFILTRATION; INTRACAUDAL; PERINEURAL
Status: DISCONTINUED | OUTPATIENT
Start: 2025-06-30 | End: 2025-06-30

## 2025-06-30 NOTE — OPERATIVE REPORT
[facility]  Operative Report  2025     Nola De Paz Patient Status:  Hospital Outpatient Surgery    3/16/1980 MRN [id number]   Location [facility] PAIN CENTER Attending Vick Neal MD   Hosp Day # 0 PCP Julissa Gonzalez MD     Indication: Nola is a 45 year old female with a history of primary osteoarthritis of both    Preoperative Diagnosis:  Primary osteoarthritis of both knees [M17.0]    Postoperative Diagnosis: Same as above.    Procedure performed: SYNVISC INJECTION OF BILATERAL KNEE JOINT with local    Anesthesia: Local      EBL: Less than 1 ml.    Procedure Description:   After reviewing the patient's history and performing a focused physical examination, the diagnosis was confirmed and contraindications such as infection and coagulopathy were ruled out.  Following review of potential side effects and complications, including but not necessarily limited to infection, allergic reaction, local tissue breakdown, nerve injury, and paresis, the patient indicated they understood and agreed to proceed. After obtaining the informed consent, the patient was brought to the procedure room and monitored.     The patient was brought to the procedure room and positioned supine with affected knee supported.    Fluoroscopy of the right knee joint was taken in AP view.  The lateral aspect of the patellar tendon was marked.  A skin well was raised with 1% lidocaine.  Subsequently a 22-gauge 3.5 Quincke spinal needle was introduced under direct fluoroscopy under AP view.  The needle's position was confirmed by AP and lateral fluoroscopic view.  Thereafter, 1 mL Omnipaque 180 was injected.  After a good arthrogram was obtained, 6 mL of Synvisc was injected after repeated aspiration.  The needle was then flushed with 1 mL 1% lidocaine.  The similar procedure was repeated on the other side.  The patient tolerated the procedure very well and was discharged home after recovery criteria were met.        Complications: None.    Follow up:  Clinic    Vick Neal MD

## 2025-06-30 NOTE — DISCHARGE INSTRUCTIONS
Home Care Instructions Following Your Pain Procedure     Nola,  It has been a pleasure to have you as our patient. To help you at home, you must follow these general discharge instructions. We will review these with you before you are discharged. It is our hope that you have a complete and uneventful recovery from our procedure.     General Instructions:  What to Expect:  Bandages from your procedure today can be removed when you get home.  Please avoid soaking and/or swimming for 24 hours.  Showering is okay  It is normal to have increased pain symptoms and/or pain at injection site for up to 3-5 days after procedure, you can use heat or ice (20 minutes on 20 minutes off) for comfort.  You may experience some temporary side effects which may include restlessness or insomnia, flushing of the face, or heart palpitations.  Please contact the provider if these symptoms do not resolve within 3-4 days.  Lightheadedness or nausea may occur and should resolve within 24 to 48 hours.  If you develop a headache after treatment, rest, drink fluids (with caffeine, if possible) and take mild over-the-counter pain medication.  If the headache does not improve with the above treatment, contact the physician.  Home Medications:  Resume all previously prescribed medication.  Please avoid taking NSAIDs (Non-Steriodal Anti-Inflammatory Drugs) such as:  Ibuprofen ( Advil, Motrin) Aleve (Naproxen), Diclofenac, Meloxicam for 6 hours after procedure.   If you are on Coumadin (Warfarin) or any other anti-coagulant (or \"blood thinning\") medication such as Plavix (Clopidogrel), Xarelto (Rivaroxaban), Eliquis (Apixaban), Effient (Prasugrel) etc., restart on the following day from the procedure unless otherwise directed by your provider.  If you are a diabetic, please increase the frequency of your glucose monitoring after the procedure as steroids may cause a temporary (2-3 day) increase in your blood sugar.  Contact your primary care  physician if your blood sugar remains elevated as you may require some medication adjustment.  Diet:  Resume your regular diet as tolerated.  Activity:  We recommend that you relax and rest during the rest of your procedure day.  If you feel weakness in your arms or legs do not drive.  Follow-up Appointment  Please schedule a follow-up visit within 3 to 4 weeks after your last procedure date.  Question or Concerns:  Feel free to call our office with any questions or concerns at [phone] (option #2)    Nola  Thank you for coming to [facility] for your procedure.  The nurses try very hard to make sure you receive the best care possible.  Your trust in them as well as us is greatly appreciated.    Thanks so much,   Dr. Vick Neal

## 2025-06-30 NOTE — H&P
History & Physical Examination    Patient Name: Nola De Paz  MRN: [id number]  CSN: [id number]  YOB: 1980    Pre-Operative Diagnosis:  Primary osteoarthritis of both knees [M17.0]    Present Illness: Knee pain    ASA: 2  MP class: 1  Sedation: Local      Prescriptions Prior to Admission[1]  Current Hospital Medications[2]    Allergies: Allergies[3]    Past Medical History[4]  Past Surgical History[5]  Family History[6]  Social History     Tobacco Use    Smoking status: Never    Smokeless tobacco: Never   Substance Use Topics    Alcohol use: Not Currently     Comment: 1-2 a month       SYSTEM Check if Review is Normal Check if Physical Exam is Normal If not normal, please explain:   HEENT [x ] [x ]    NECK & BACK [x ] [x ]    HEART [x ] [x ]    LUNGS [x ] [x ]    ABDOMEN [x ] [x ]    UROGENITAL [x ] [x ]    EXTREMITIES [x ] [x ]    OTHER        [ x ] I have discussed the risks and benefits and alternatives with the patient/family.  They understand and agree to proceed with plan of care.  [ x ] I have reviewed the History and Physical done within the last 30 days.  Any changes noted above.    Vick Neal MD              [1]   Facility-Administered Medications Prior to Admission   Medication Dose Route Frequency Provider Last Rate Last Admin    [COMPLETED] triamcinolone acetonide (Kenalog-40) 40 MG/ML injection 40 mg  40 mg Intramuscular Once Vick Neal MD   40 mg at 04/07/25 1431    [COMPLETED] lidocaine (Xylocaine) 1 % injection  10 mL Intradermal Once Vick Neal MD   10 mL at 04/07/25 1430    [COMPLETED] bupivacaine PF (Marcaine) 0.5% injection  5 mL Other Once Vick Neal MD   5 mL at 04/07/25 1431     Medications Prior to Admission   Medication Sig Dispense Refill Last Dose/Taking    venlafaxine  MG Oral Capsule SR 24 Hr Take 1 capsule (150 mg total) by mouth daily. 90 capsule 1     ezetimibe 10 MG Oral Tab Take 1 tablet (10 mg total) by mouth daily. 90 tablet 1      rosuvastatin 10 MG Oral Tab Take 1 tablet (10 mg total) by mouth nightly. 90 tablet 1     levothyroxine 200 MCG Oral Tab Take 1 tablet (200 mcg total) by mouth before breakfast. 90 tablet 0     Omeprazole 40 MG Oral Capsule Delayed Release Take 1 capsule (40 mg total) by mouth daily. 90 capsule 3     [] albuterol 108 (90 Base) MCG/ACT Inhalation Aero Soln Inhale 2 puffs into the lungs every 4 (four) hours as needed for Wheezing. 1 each 0     albuterol (PROAIR HFA) 108 (90 Base) MCG/ACT Inhalation Aero Soln Inhale 2 puffs into the lungs every 4 (four) hours as needed for Wheezing or Shortness of Breath. 1 each 3     hydroxychloroquine 200 MG Oral Tab Take 1 tablet (200 mg total) by mouth 2 (two) times daily. 180 tablet 1     cyclobenzaprine 10 MG Oral Tab Take 1 tablet (10 mg total) by mouth nightly as needed for Muscle spasms. 90 tablet 0     Cholecalciferol (VITAMIN D) 50 MCG (2000 UT) Oral Cap Take by mouth. Unsure of dose       Calcium 200 MG Oral Tab Take by mouth. Unsure of dose   chewable       topiramate 50 MG Oral Tab Take 1 tablet (50 mg total) by mouth 2 (two) times daily. 180 tablet 1     traMADol 50 MG Oral Tab as needed.       GABAPENTIN 100 MG Oral Cap TAKE 1 CAPSULE(100 MG) BY MOUTH THREE TIMES DAILY 270 capsule 0     LORazepam 0.5 MG Oral Tab Take 1 tablet (0.5 mg total) by mouth nightly as needed. 40 tablet 0     doxycycline 100 MG Oral Cap 2 (two) times daily as needed.       levothyroxine 175 MCG Oral Tab Take 1 tablet (175 mcg total) by mouth before breakfast. 90 tablet 3     Meloxicam 7.5 MG Oral Tab Take 1 tablet (7.5 mg total) by mouth as needed.   More than a month    traZODone 50 MG Oral Tab Take 1 tablet (50 mg total) by mouth nightly.       clindamycin 1 % External Lotion APPLY TOPICALLY TO THE AFFECTED AREA EVERY DAY BEFORE NOON       fexofenadine 180 MG Oral Tab Take 1 tablet (180 mg total) by mouth daily.       Multiple Vitamin (MULTIVITAMIN ADULT OR) Apply topically.        Nystatin [id number] UNIT/GM External Powder Apply 1 Application topically 4 (four) times daily. 1 Bottle 0     clotrimazole-betamethasone 1-0.05 % External Cream Apply 1 Application topically 2 (two) times daily as needed (rash). 60 g 3     Triamcinolone Acetonide 55 MCG/ACT Nasal Aerosol by Nasal route daily as needed.      [2]   Current Facility-Administered Medications   Medication Dose Route Frequency    hylan G-F 20 (Synvisc-One) 48 MG/6ML intra-articular injection 48 mg  6 mL Intra-articular Once    hylan G-F 20 (Synvisc-One) 48 MG/6ML intra-articular injection 48 mg  6 mL Intra-articular Once   [3]   Allergies  Allergen Reactions    Penicillins HIVES and RASH   [4]   Past Medical History:   Abdominal hernia    Allergic rhinitis    Anxiety    Arthritis    Asthma (HCC)    Back problem    DDD    Blood disorder    anemia    Blood in urine    Notes on tests for dr ellis    Depression    Disorder of thyroid    Esophageal reflux    Fatigue    Fibromayalgia    Fibromyalgia    Headache disorder    Migraines    Heartburn    Hemorrhoids    High cholesterol    Hyperlipidemia    Hypothyroidism    IBS (irritable bowel syndrome)    Indigestion    Lupus (systemic lupus erythematosus) (HCC)    Migraines    Obesity    BLESSING (obstructive sleep apnea)    AHI 19 REM AHI 57 Supine AHI 46 non-supine AHI 11 [place] 71%    Osteoarthritis    Pneumonia due to organism    Sleep apnea    cpap    Sleep disturbance    Sleep apnea with cpap    Thyroid disease    Visual impairment    glasses   [5]   Past Surgical History:  Procedure Laterality Date    Cholecystectomy  2013    Colonoscopy N/A 08/28/2020    Procedure: COLONOSCOPY;  Surgeon: Tone Steiner MD;  Location:  ENDOSCOPY    Gastric bypass,obesity,sb reconstruc  12/21/2020    Did not have bypass but had the sleeve done    Other surgical history  12/21/2020    gastric sleeve    Removal gallbladder  2013    [facility]    Surgical bariatrics - internal  12/21/2020    Upper gi  endoscopy,exam      [place] teeth removed  2013   [6]   Family History  Problem Relation Age of Onset    Heart Disease Mother     High Cholesterol Mother     Other (DDD) Mother     Anemia Mother     Heart Disorder Mother         Heart disease, high blood pressure, stent, high cholesterol    Hypertension Mother     Obesity Mother     Colon Polyps Mother     Hypertension Father     Heart Disorder Father         Heart murmur, high blood pressure    Obesity Father     Other (lymphoma) Maternal Grandmother     Cancer Maternal Grandmother         Lymphoma    Other (cancer) Paternal Grandmother         liver     Asthma Paternal Grandmother         Asthma and emphysema, copd, non smoker    Cancer Paternal Grandmother         Liver and lung cancer    Diabetes Paternal Grandmother     Prostate Cancer Paternal Grandfather         in 80s    Cancer Paternal Grandfather         Prostate cancer    Stroke Paternal Grandfather     Alcohol and Other Disorders Associated Paternal Uncle         Alcoholic    Mental Disorder Paternal Uncle

## 2025-07-01 ENCOUNTER — TELEPHONE (OUTPATIENT)
Dept: PAIN CLINIC | Facility: CLINIC | Age: 45
End: 2025-07-01

## 2025-07-01 NOTE — TELEPHONE ENCOUNTER
Follow-up call post pain procedure. Left message informing patient to contact the pain clinic at [phone] option # 3 regarding any questions or concerns about recent pain procedure.      Procedure. Bilateral Synvisc  Date:6/30/2025  Follow up:7/14/2025 with

## 2025-07-07 ENCOUNTER — PATIENT MESSAGE (OUTPATIENT)
Dept: PAIN CLINIC | Facility: CLINIC | Age: 45
End: 2025-07-07

## 2025-07-07 RX ORDER — MELOXICAM 7.5 MG/1
7.5 TABLET ORAL AS NEEDED
Qty: 30 TABLET | Refills: 2 | Status: SHIPPED | OUTPATIENT
Start: 2025-07-07

## 2025-07-07 NOTE — TELEPHONE ENCOUNTER
Vick Neal MD to Me        7/7/25  1:23 PM  If injection not helpful, then would defer to ortho    Attempted to contact pt at this time. Pt did not  the call.Left vm requesting a callback.

## 2025-07-07 NOTE — TELEPHONE ENCOUNTER
Patient called stating that she is having a lot of pain in her right knee. Patient asked that RN call her back.

## 2025-07-09 RX ORDER — TRAMADOL HYDROCHLORIDE 50 MG/1
50 TABLET ORAL 2 TIMES DAILY PRN
Qty: 60 TABLET | Refills: 0 | OUTPATIENT
Start: 2025-07-09

## 2025-07-14 ENCOUNTER — OFFICE VISIT (OUTPATIENT)
Dept: PAIN CLINIC | Facility: CLINIC | Age: 45
End: 2025-07-14
Payer: COMMERCIAL

## 2025-07-14 VITALS
HEART RATE: 81 BPM | DIASTOLIC BLOOD PRESSURE: 64 MMHG | SYSTOLIC BLOOD PRESSURE: 120 MMHG | WEIGHT: 280 LBS | BODY MASS INDEX: 51 KG/M2 | OXYGEN SATURATION: 96 %

## 2025-07-14 DIAGNOSIS — M79.7 FIBROMYALGIA: ICD-10-CM

## 2025-07-14 DIAGNOSIS — M17.0 PRIMARY OSTEOARTHRITIS OF BOTH KNEES: Primary | ICD-10-CM

## 2025-07-14 RX ORDER — TRAMADOL HYDROCHLORIDE 50 MG/1
TABLET ORAL AS NEEDED
Refills: 0 | OUTPATIENT
Start: 2025-07-14

## 2025-07-14 NOTE — PATIENT INSTRUCTIONS
Refill policies:    Allow 2-3 business days for refills; controlled substances may take longer.  Contact your pharmacy at least 5 days prior to running out of medication and have them send an electronic request or submit request through the “request refill” option in your [employer] account.  Refills are not addressed on weekends; covering physicians do not authorize routine medications on weekends.  No narcotics or controlled substances are refilled after noon on Fridays or by on call physicians.  By law, narcotics must be electronically prescribed.  A 30 day supply with no refills is the maximum allowed.  If your prescription is due for a refill, you may be due for a follow up appointment.  To best provide you care, patients receiving routine medications need to be seen at least once a year.  Patients receiving narcotic/controlled substance medications need to be seen at least once every 3 months.  In the event that your preferred pharmacy does not have the requested medication in stock (e.g. Backordered), it is your responsibility to find another pharmacy that has the requested medication available.  We will gladly send a new prescription to that pharmacy at your request.    Scheduling Tests:    If your physician has ordered radiology tests such as MRI or CT scans, please contact Central Scheduling at [phone] right away to schedule the test.  Once scheduled, the [facility] Centralized Referral Team will work with your insurance carrier to obtain pre-certification or prior authorization.  Depending on your insurance carrier, approval may take 3-10 days.  It is highly recommended patients assure they have received an authorization before having a test performed.  If test is done without insurance authorization, patient may be responsible for the entire amount billed.      Precertification and Prior Authorizations:  If your physician has recommended that you have a procedure or additional testing performed the [facility]  Centralized Referral Team will contact your insurance carrier to obtain pre-certification or prior authorization.    You are strongly encouraged to contact your insurance carrier to verify that your procedure/test has been approved and is a COVERED benefit.  Although the [facility] Centralized Referral Team does its due diligence, the insurance carrier gives the disclaimer that \"Although the procedure is authorized, this does not guarantee payment.\"    Ultimately the patient is responsible for payment.   Thank you for your understanding in this matter.  Paperwork Completion:  If you require FMLA or disability paperwork for your recovery, please make sure to either drop it off or have it faxed to our office at [phone]. Be sure the form has your name and date of birth on it.  The form will be faxed to our Forms Department and they will complete it for you.  There is a 25$ fee for all forms that need to be filled out.  Please be aware there is a 10-14 day turnaround time.  You will need to sign a release of information (ZARA) form if your paperwork does not come with one.  You may call the Forms Department with any questions at [phone].  Their fax number is [phone].

## 2025-07-14 NOTE — PROGRESS NOTES
Name: Nola De Paz   : 3/16/1980   DOS: 2025     Pain Clinic Follow Up Visit:     Chief Complaint   Patient presents with    Procedure Follow Up     SYNVISC INJECTION OF BILATERAL KNEE JOINT with local from 25       Nola De Paz is a 45 year old female with a history of fibromyalgia chronic knee pain here for follow-up.  The patient is status post bilateral Synvisc injection with 60 to 70% relief this is current and sustained.  Rates her knee pain is 0 out of 10.    Pt denies any chills, fever, or weakness. There is no bladder or bowel incontinence associated with the pain.    REVIEW OF SYSTEMS:  A ten point review of systems was performed with pertinent positives and negatives in the HPI.    Allergies[1]    Current Medications[2]      EXAM:   /64 (BP Location: Left arm, Patient Position: Sitting, Cuff Size: large)   Pulse 81   Wt 280 lb (127 kg)   LMP 2025 (Exact Date)   SpO2 96%   BMI 51.21 kg/m²   General:  Patient is a(n) 45 year old year old female in no acute distress.  Neurologic:: WNL-Orientation to time, place and person, normal mood & affect, concentration & attention span intact.   Inspection:  Ambulates with well-coordinated, fluid, non-antalgic gait.  Gait is normal.  Neck: Full range of motion  Back: Gait intact  Respiratory: Nonlabored  Cranial nerve: Grossly intact  Knees: Symmetric: Injection site clear      IMAGES:     Intra fluoroscopy reviewed consistent with intra-articular knee injection    ASSESSMENT AND PLAN:     1. Primary osteoarthritis of both knees    2. Fibromyalgia      The patient is a 45-year-old female with history of fibromyalgia and chronic knee pain.  She is following up after intra-articular Synvisc injection.  Has a central resolution of her knee pain symptoms.  Overall doing very well.  Discussed that Synvisc injections can be repeated every 6 months.  Patient to follow-up on as-needed basis.      Radiology orders and consultations:None  The  patient indicates understanding of these issues and agrees to the plan.  No follow-ups on file.    Vick Neal MD, 7/14/2025, 3:39 PM              [1]   Allergies  Allergen Reactions    Penicillins HIVES and RASH   [2]   Current Outpatient Medications   Medication Sig Dispense Refill    levothyroxine 175 MCG Oral Tab Take 1 tablet (175 mcg total) by mouth before breakfast. 30 tablet 0    Meloxicam 7.5 MG Oral Tab Take 1 tablet (7.5 mg total) by mouth as needed. 30 tablet 2    venlafaxine  MG Oral Capsule SR 24 Hr Take 1 capsule (150 mg total) by mouth daily. 90 capsule 1    ezetimibe 10 MG Oral Tab Take 1 tablet (10 mg total) by mouth daily. 90 tablet 1    rosuvastatin 10 MG Oral Tab Take 1 tablet (10 mg total) by mouth nightly. 90 tablet 1    levothyroxine 200 MCG Oral Tab Take 1 tablet (200 mcg total) by mouth before breakfast. 90 tablet 0    Omeprazole 40 MG Oral Capsule Delayed Release Take 1 capsule (40 mg total) by mouth daily. 90 capsule 3    albuterol (PROAIR HFA) 108 (90 Base) MCG/ACT Inhalation Aero Soln Inhale 2 puffs into the lungs every 4 (four) hours as needed for Wheezing or Shortness of Breath. 1 each 3    hydroxychloroquine 200 MG Oral Tab Take 1 tablet (200 mg total) by mouth 2 (two) times daily. 180 tablet 1    cyclobenzaprine 10 MG Oral Tab Take 1 tablet (10 mg total) by mouth nightly as needed for Muscle spasms. 90 tablet 0    Cholecalciferol (VITAMIN D) 50 MCG (2000 UT) Oral Cap Take by mouth. Unsure of dose      Calcium 200 MG Oral Tab Take by mouth. Unsure of dose   chewable      topiramate 50 MG Oral Tab Take 1 tablet (50 mg total) by mouth 2 (two) times daily. 180 tablet 1    GABAPENTIN 100 MG Oral Cap TAKE 1 CAPSULE(100 MG) BY MOUTH THREE TIMES DAILY 270 capsule 0    LORazepam 0.5 MG Oral Tab Take 1 tablet (0.5 mg total) by mouth nightly as needed. 40 tablet 0    doxycycline 100 MG Oral Cap 2 (two) times daily as needed.      traZODone 50 MG Oral Tab Take 1 tablet (50 mg total)  by mouth nightly.      clindamycin 1 % External Lotion APPLY TOPICALLY TO THE AFFECTED AREA EVERY DAY BEFORE NOON      fexofenadine 180 MG Oral Tab Take 1 tablet (180 mg total) by mouth daily.      Multiple Vitamin (MULTIVITAMIN ADULT OR) Apply topically.      Nystatin [id number] UNIT/GM External Powder Apply 1 Application topically 4 (four) times daily. 1 Bottle 0    clotrimazole-betamethasone 1-0.05 % External Cream Apply 1 Application topically 2 (two) times daily as needed (rash). 60 g 3    Triamcinolone Acetonide 55 MCG/ACT Nasal Aerosol by Nasal route daily as needed.      traMADol 50 MG Oral Tab as needed. (Patient not taking: Reported on 7/14/2025)

## 2025-07-14 NOTE — PROGRESS NOTES
Last procedure: SYNVISC INJECTION OF BILATERAL KNEE JOINT with local   Date: 6/30/25  Percentage of relief obtained: 60-70%  Duration of relief: up until now     Current Pain Score: 0/10    Narcotic Contract Exp: n/a

## 2025-07-22 ENCOUNTER — TELEPHONE (OUTPATIENT)
Dept: PAIN CLINIC | Facility: CLINIC | Age: 45
End: 2025-07-22

## 2025-07-22 NOTE — TELEPHONE ENCOUNTER
Completed form faxed back to [facility] stating to Take the Meloxicam 1 tablet daily as needed.

## 2025-07-23 NOTE — TELEPHONE ENCOUNTER
Fax failed, Rn called the pharmacy and advised about the Meloxicam. No further questions at this time.

## 2025-08-13 ENCOUNTER — PATIENT MESSAGE (OUTPATIENT)
Dept: FAMILY MEDICINE CLINIC | Facility: CLINIC | Age: 45
End: 2025-08-13

## 2025-08-23 ENCOUNTER — TELEPHONE (OUTPATIENT)
Age: 45
End: 2025-08-23

## (undated) DIAGNOSIS — G47.33 OSA (OBSTRUCTIVE SLEEP APNEA): Primary | ICD-10-CM

## (undated) DIAGNOSIS — E78.2 MIXED HYPERLIPIDEMIA: ICD-10-CM

## (undated) DIAGNOSIS — R73.03 PREDIABETES: ICD-10-CM

## (undated) DIAGNOSIS — M19.011 ARTHRITIS OF RIGHT SHOULDER REGION: Primary | ICD-10-CM

## (undated) DIAGNOSIS — E66.2 MORBID OBESITY WITH ALVEOLAR HYPOVENTILATION (HCC): ICD-10-CM

## (undated) DEVICE — FORCEP BIOPSY RJ4 LG CAP W/ND

## (undated) DEVICE — REMOVER LOT 4OZ N IRRIG UNSCNT SFT MOIST LIQ

## (undated) DEVICE — PAIN TRAY: Brand: MEDLINE INDUSTRIES, INC.

## (undated) DEVICE — NEEDLE SPNL 22GA L5IN BLK HUB QNCKE BVL DISP

## (undated) DEVICE — TROCAR: Brand: KII FIOS FIRST ENTRY

## (undated) DEVICE — VISIGI 3D®  CALIBRATION SYSTEM  SIZE 40FR STD W/ BULB: Brand: BOEHRINGER® VISIGI 3D™ SLEEVE GASTRECTOMY CALIBRATION SYSTEM, SIZE 40FR W/BULB

## (undated) DEVICE — GLOVE,SURG,SENSICARE,ALOE,LF,PF,7: Brand: MEDLINE

## (undated) DEVICE — [HIGH FLOW INSUFFLATOR,  DO NOT USE IF PACKAGE IS DAMAGED,  KEEP DRY,  KEEP AWAY FROM SUNLIGHT,  PROTECT FROM HEAT AND RADIOACTIVE SOURCES.]: Brand: PNEUMOSURE

## (undated) DEVICE — GIJAW SINGLE-USE BIOPSY FORCEPS WITH NEEDLE: Brand: GIJAW

## (undated) DEVICE — 1200CC GUARDIAN II: Brand: GUARDIAN

## (undated) DEVICE — FORCEP RADIAL JAW 4

## (undated) DEVICE — NEEDLE SPNL 22GA L3.5IN BLK QNCKE STYL DISP

## (undated) DEVICE — GLOVE SUR 7.5 SENSICARE PIP WHT PWD F

## (undated) DEVICE — SEAM GUARD 60 ECHELON

## (undated) DEVICE — ENDOPATH ECHELON ENDOSCOPIC LINEAR CUTTER RELOADS, GREEN, 60MM: Brand: ECHELON ENDOPATH

## (undated) DEVICE — ADULT PROCEDURAL OXYGEN MASK  - MEDIUM CONCENTRATION AND CO2 MONITORING (MICROSTREAM COMPATIBLE): Brand: POM EZ-LITE MSA

## (undated) DEVICE — BITEBLOCK ENDOSCP 60FR MAXI STRP

## (undated) DEVICE — ENDOSCOPY PACK UPPER: Brand: MEDLINE INDUSTRIES, INC.

## (undated) DEVICE — SKIN REG/FINE DUAL MARKER, RULER, LABELS: Brand: MEDLINE

## (undated) DEVICE — 3M™ RED DOT™ MONITORING ELECTRODE WITH FOAM TAPE AND STICKY GEL, 50/BAG, 20/CASE, 72/PLT 2570: Brand: RED DOT™

## (undated) DEVICE — FILTERLINE NASAL ADULT O2/CO2

## (undated) DEVICE — KIT CUSTOM ENDOPROCEDURE STERIS

## (undated) DEVICE — BANDAGE ADH 1INX3IN NAT FAB N ADH PD CURAD

## (undated) DEVICE — HOVERMATT 34IN SINGLE USE

## (undated) DEVICE — TROCAR: Brand: KII® SLEEVE

## (undated) DEVICE — Device: Brand: DEFENDO AIR/WATER/SUCTION AND BIOPSY VALVE

## (undated) DEVICE — BALLOON HEMOSTATIC EUS LINEAR

## (undated) DEVICE — V2 SPECIMEN COLLECTION MANIFOLD KIT: Brand: NEPTUNE

## (undated) DEVICE — 3M™ STERI-DRAPE™ INSTRUMENT POUCH 1018L: Brand: STERI-DRAPE™

## (undated) DEVICE — SOL  .9 1000ML BTL

## (undated) DEVICE — SUTURE PDS II 1 CT-1

## (undated) DEVICE — Device: Brand: JELCO

## (undated) DEVICE — ECHELON FLEX POWERED PLUS LONG ARTICULATING ENDOSCOPIC LINEAR CUTTER, 60MM: Brand: ECHELON FLEX

## (undated) DEVICE — ENDOSCOPY PACK - LOWER: Brand: MEDLINE INDUSTRIES, INC.

## (undated) DEVICE — GAMMEX® PI HYBRID SIZE 7, STERILE POWDER-FREE SURGICAL GLOVE, POLYISOPRENE AND NEOPRENE BLEND: Brand: GAMMEX

## (undated) DEVICE — UNDYED BRAIDED (POLYGLACTIN 910), SYNTHETIC ABSORBABLE SUTURE: Brand: COATED VICRYL

## (undated) DEVICE — BANDAGE,ADHESIVE,FABRIC,1"X3",STRL,LF: Brand: CURAD

## (undated) DEVICE — LAP CHOLE: Brand: MEDLINE INDUSTRIES, INC.

## (undated) DEVICE — 10FT COMBINED O2 DELIVERY/CO2 MONITORING. FILTER WITH MICROSTREAM TYPE LUER: Brand: DUAL ADULT NASAL CANNULA

## (undated) DEVICE — DERMABOND LIQUID ADHESIVE

## (undated) DEVICE — PROXIMATE SKIN STAPLERS (35 WIDE) CONTAINS 35 STAINLESS STEEL STAPLES (FIXED HEAD): Brand: PROXIMATE

## (undated) DEVICE — ENDOPATH ECHELON ENDOSCOPIC LINEAR CUTTER RELOADS, BLACK, 60MM: Brand: ECHELON ENDOPATH

## (undated) DEVICE — ENCORE® LATEX MICRO SIZE 7.5, STERILE LATEX POWDER-FREE SURGICAL GLOVE: Brand: ENCORE

## (undated) DEVICE — DRAPE SHEET LG

## (undated) DEVICE — SOL  .9 3000ML

## (undated) DEVICE — SUTURE PASSOR WITH GUIDE

## (undated) DEVICE — SYRINGE MED 10ML LL CTRL W/ FNGR GRP CLR BRL

## (undated) DEVICE — KIT VLV 5 PC AIR H2O SUCT BX ENDOGATOR CONN

## (undated) NOTE — LETTER
February 18, 2018    Patient: Cathalene Essex   Date of Visit: 2/18/2018       To Whom It May Concern:    Jessica Faust was seen and treated in our emergency department on 2/18/2018. She should not return to work until 2/20/18.     If you have any questions

## (undated) NOTE — LETTER
08/20/19        [facility] [address]  [address] [address]      Dear Mahogany Lui,    Our records indicate that you have outstanding lab work and or testing that was ordered for you and has not yet been completed:  Orders Placed This Encounter

## (undated) NOTE — LETTER
Date & Time: 3/10/2023, 9:11 AM  Patient: Tyron Olszewski  Encounter Provider(s):    Carola Gresham MD       To Whom It May Concern:    Reinier Espinal was seen and treated in our department on 3/10/2023. She should not return to work until 3/11/23.     If you have any questions or concerns, please do not hesitate to call.        _____________________________  Physician/APC Signature

## (undated) NOTE — ED AVS SNAPSHOT
Christina Londono   MRN: [id number]    Department:  [facility] Emergency Department in [place]   Date of Visit:  12/13/2017           Disclosure     Insurance plans vary and the physician(s) referred by the ER may not be covered by your plan.  Please contact tell this physician (or your personal doctor if your instructions are to return to your personal doctor) about any new or lasting problems. The primary care or specialist physician will see patients referred from the [facility] Emergency Department.  Maxine Bishop

## (undated) NOTE — LETTER
Date & Time: 5/16/2024, 3:02 AM  Patient: Nola De Paz  Encounter Provider(s):    Johanny Poe DO       To Whom It May Concern:    Nola De Paz was seen and treated in our department on 5/16/2024. She should not return to work until 5/20/24 . May return sooner if symptoms resolve.    If you have any questions or concerns, please do not hesitate to call.        _____________________________  Physician/APC Signature

## (undated) NOTE — ED AVS SNAPSHOT
Zeke Reyes   MRN: [id number]    Department:  [facility] Emergency Department in [place]   Date of Visit:  10/9/2017           Disclosure     Insurance plans vary and the physician(s) referred by the ER may not be covered by your plan.  Please contact If you have been prescribed any medication(s), please fill your prescription right away and begin taking the medication(s) as directed    If the emergency physician has read X-rays, these will be re-interpreted by a radiologist.  If there is a significant

## (undated) NOTE — ED AVS SNAPSHOT
Gunjan Palacios   MRN: [id number]    Department:  [facility] Emergency Department in [place]   Date of Visit:  6/17/2018           Disclosure     Insurance plans vary and the physician(s) referred by the ER may not be covered by your plan.  Please contact tell this physician (or your personal doctor if your instructions are to return to your personal doctor) about any new or lasting problems. The primary care or specialist physician will see patients referred from the [facility] Emergency Department.  Marilee Romero

## (undated) NOTE — LETTER
08/16/19        [facility] [employer] [facility] [address]  [address] [place] [address]      Dear Vee Hill,    Our records indicate that you have outstanding lab work and or testing that was ordered for you and has not yet been completed:  Orders Placed This Encounter

## (undated) NOTE — MR AVS SNAPSHOT
After Visit Summary   7/2/2021    Rancho Sarabia    MRN: [id number]           Visit Information     Date & Time  7/2/2021 12:30 PM Provider  [address] [address] [address] [place] Dept.  Phone  [phone] needed. Triamcinolone Acetonide 55 MCG/ACT Nasal Aerosol by Nasal route daily.       Diagnoses for This Visit    Well woman exam with routine gynecological exam   [920514]  -  Primary  Encounter for screening for cervical cancer    [[id number]]    Encounter throat, headache and pink eye. The cost for a Video Visit is currently $35.         If you receive a survey from [email or web address], please take a few minutes to complete it and provide feedback.  We strive to deliver the best patient experience and are looking f available by appointment Average cost  $120*     EMERGENCY ROOM Life-threatening emergencies needing immediate intervention at a hospital emergency room.  Average cost  $2,300*   *Cost varies based on your insurance coverage  For more information about hour

## (undated) NOTE — LETTER
April 24, 2020    Patient: Jalyn Fallon   YOB: 1980     Dear Employer,  At [place] [address], we are taking special precautions and doing everything we can to prevent the spread of COVID-19 (coronavirus disease 2019).  During this t immunosuppressed status due to disease or medication, chronic respiratory or heart conditions and diabetes). ?  During the social distancing period imposed by the [place] in March, any employee who can be isolated at home and avoid exposure to th

## (undated) NOTE — LETTER
07/22/24        Nola De Paz  [address] [place] [address]      Dear Nola,    Our records indicate that you have outstanding lab work and or testing that was ordered for you and has not yet been completed:  Orders Placed This Encounter           MRI ABDOMEN AND MRCP W/3D (W+W0)(CPT=74183/35633)    To provide you with the best possible care, please complete these orders at your earliest convenience. If you have recently completed these orders please disregard this letter.     If you have any questions please call the office at Dept: [phone].     Thank you,       Julissa Gonzalez MD

## (undated) NOTE — LETTER
Patient Name: Nola De Paz        : 3/16/1980       Medical Record #: [id number]    CONSENT FOR PROCEDURES/SEDATION    Date: 2025       Time: 8:22 AM        1. I authorize the performance upon Nola De Paz the following:    ______________RIGHT SHOULDER JOINT STEROID INJECTION_________________    2. I authorize Dr. Neal (and whomever is designated as the doctor’s assistant), to perform the above mentioned procedures.    3. If any unforeseen conditions arise during this procedure calling for additional procedures, operations, or medications (including anesthesia and blood transfusion), I  further request and authorize the doctor to do whatever he/she deems advisable in my interest.    4. I consent to the taking and reproduction of any photographs in the course of this procedure for professional purposes.    5. I consent to the administration of such sedation as may be considered necessary or advisable by the physician responsible for this service, with the exception of  _____________________________.    6. I have been informed by my doctor of the nature and purpose of this procedure/sedation, possible alternative methods of treatment, risk involved and possible complications.      Signature of Patient:  ___________________________    Signature of person authorized to consent for patient: Relationship to patient:  ___________________________    ___________________    Witness: ____________________     Date: ______________    Provider: ____________________     Date: ______________

## (undated) NOTE — LETTER
[address] [place] [address]  [phone]          Patient: Angle Pack   YOB: 1980   Date of Visit: 5/18/2020       Dear Employer,         May 18, 2020    At [facility], it is at all feasible for them to do so. COVID-19 is especially risky for high-risk patients (including, but not limited to, age over 61, immunosuppressed status due to disease or medication, chronic respiratory or heart conditions and diabetes).     · Guy Walters

## (undated) NOTE — Clinical Note
Hi Dr. Hong Taylor! I hope you are doing well and had a great holidays/new year. I saw Haritha Yepez today and she has had some worsened fatigue overall. It very well could be  post-covid but I thought I heard a new slight murmur. I recommended she follow with you as well to determine if ECHO required.  Thanks! - Micheal Clifford

## (undated) NOTE — Clinical Note
Dr. Reinaldo Billy! Hope you are doing well and keeping safe/healthy during these crazy times. Thank you for referring Ms. Rancho Sarabia for rheumatologic evaluation. Please see the discussion portion of my note and let me know if you have any questions.  Rosina Torres

## (undated) NOTE — LETTER
03/15/22        [facility] [address]  [address] [place] [address]      Dear Zach Ledezma records indicate that you have outstanding lab work and or testing that was ordered for you and has not yet been completed:  Orders Placed This Encounter      CBC With Differential With Platelet      Lipid Panel      Comp Metabolic Panel (14)    To provide you with the best possible care, please complete these orders at your earliest convenience. If you have recently completed these orders please disregard this letter. If you have any questions please call the office at Dept: [phone].      Thank you,       Dania Ureña MD

## (undated) NOTE — LETTER
ASTHMA ACTION PLAN for Willey Babinski     : 3/16/1980     Date: 2022  Provider:  Guillaume Ling MD  Phone for doctor or clinic: [facility], [address], [address] [facility],[address] [place] [phone]  Anabel Sommer7 can use the colors of a traffic light to help learn about your asthma medicines. 1. Green - Go! % of Personal Best Peak Flow Use controller medicine. Breathing is good  No cough or wheeze  Can work and play Medicine How much to take When to take it    No medications needed because of good control. 2. Yellow - Caution. 50-79% Personal Best Peak  Flow. Use reliever medicine to keep an asthma attack from getting bad. Cough  Wheezing  Tight Chest  Wake up at night Medicine How much to take When to take it    Albuterol inhaler,  2 puffs every four hours as needed. Additional instructions         3. Red - Stop! Danger!  <50% Personal Best Peak  Flow. Take these medications until  Get help from a doctor   Medicine not helping  Breathing is hard and fast  Nose opens wide  Can't walk  Ribs show  Can't talk well Medicine How much to take When to take it    Go to the nearest Emergency Room/Department right now! Additional Instructions If your symptoms do not improve and you cannot contact your doctor, [facility] or call 911 immediately! [x] Asthma Action Plan reviewed with patient (and caregiver if necessary) and a copy of the plan was given to the patient/caregiver. [] Asthma Action Plan reviewed with patient (and caregiver if necessary) on the phone and mailed copy to patient or submitted via [address]      Signatures:  Provider  Guillaume Ling MD   Patient Caretaker

## (undated) NOTE — LETTER
07/09/20    Dear Dr. Zenaida Mg      Thank you for referring your patient, Zeke Reyes to me for an evaluation. Please see my initial consult note enclosed below. Let me know if you have any questions.     Thank you  Rashi Stein MD, Neurology Patient mainly notes pain in muscles and feels like they are swollen. - hands feel swollen at times.    Otherwise, patient denies any recent weight change, fevers, chills, nausea, double vision/ blurry vision / loss of vision, chest pain, palpitations, • Albuterol Sulfate HFA (PROAIR HFA) 108 (90 Base) MCG/ACT Inhalation Aero Soln Inhale 2 puffs into the lungs every 4 (four) hours as needed for Wheezing or Shortness of Breath.  1 Inhaler 3   • Levothyroxine Sodium 200 MCG Oral Tab Take 1 tablet (200 mcg t CARDIOVASCULAR: S1, S2 normal, RRR  LUNGS: clear to auscultation bilaterally  EXTREMITIES: no cyanosis, peripheral pulses intact    Neck: Supple; full range of motion; no carotid bruits    Mental status:  Alert and oriented to time, place, person, and situ Latest Ref Rng & Units 6/21/2020 5/28/2020   SSA 52 (Ro) (DOYLE) Antibody, IgG      0 - 40 AU/mL  2   SSA 60 (Ro) (DOYLE) Ab, IgG      0 - 40 AU/mL  1   Ribonucleic Protein (U1) (DOYLE) Ab, IgG      0 - 40 AU/mL  21   Ellyn-1 (Histidy1-tRNA Synthetase) Ab, IgG SSA 52 (Ro) (DOYLE) Antibody, IgG      0 - 40 AU/mL     SSA 60 (Ro) (DOYLE) Ab, IgG      0 - 40 AU/mL     Ribonucleic Protein (U1) (DOYLE) Ab, IgG      0 - 40 AU/mL     Ellyn-1 (Histidy1-tRNA Synthetase) Ab, IgG      0 - 40 AU/mL     Mi-2 (Nuclear Helicase Protein) Negative    PL-7 (Threonyl-tRNA Synthetase) Ab      Negative    PL-12 (Alanyl-tRNA Synthetase) Ab      Negative    P155/140 (TIF-1 Gamma) Ab      Negative    Ku Antibody      Negative    EJ (Glycyl - tRNA Synthetase) Ab      Negative    SRP (Single Rec process. No pleural effusion or pneumothorax. These changes have developed since the most recent CT scan of the chest from 5/18/2020. THORACIC AORTA:  No thoracic aortic aneurysm. MEDIASTINUM/LAURA:  No pathologically enlarged nodes.      CARDIAC: for nerve conduction study/EMG. In addition, will check CPK and aldolase.       otherwise,  patient was encouraged to continue to follow-up with rheumatology as well as pulmonology, with additional testing, as previously scheduled.     (M79.10) Myalgia  (p

## (undated) NOTE — Clinical Note
Hi!! Patient down about 100 lbs, struggling with fatigue, air leak and cpap compliance. Advised her to reach out to your office as she may need an adjustment, thanks!    Mirella

## (undated) NOTE — LETTER
ASTHMA ACTION PLAN for [facility]     : 3/16/1980     Date: 2023  Provider:  Andrew Montero MD  Phone for doctor or clinic: [facility], [address], [address] [facility],[address] [place] [phone]  Anabel Sommer7 can use the colors of a traffic light to help learn about your asthma medicines. 1. Green - Go! % of Personal Best Peak Flow Use controller medicine. Breathing is good  No cough or wheeze  Can work and play Medicine How much to take When to take it    No medications needed because of good control. 2. Yellow - Caution. 50-79% Personal Best Peak  Flow. Use reliever medicine to keep an asthma attack from getting bad. Cough  Wheezing  Tight Chest  Wake up at night Medicine How much to take When to take it    Albuterol inhaler,  2 puffs every four hours as needed. Additional instructions         3. Red - Stop! Danger!  <50% Personal Best Peak  Flow. Take these medications until  Get help from a doctor   Medicine not helping  Breathing is hard and fast  Nose opens wide  Can't walk  Ribs show  Can't talk well Medicine How much to take When to take it    Go to the nearest Emergency Room/Department right now! Additional Instructions If your symptoms do not improve and you cannot contact your doctor, [facility] or call 911 immediately! [x] Asthma Action Plan reviewed with patient (and caregiver if necessary) and a copy of the plan was given to the patient/caregiver. [] Asthma Action Plan reviewed with patient (and caregiver if necessary) on the phone and mailed copy to patient or submitted via [address]      Signatures:  Provider  Andrew Montero MD   Patient Caretaker

## (undated) NOTE — LETTER
Coronavirus Disease 2019 (COVID-19)     [facility] is committed to the safety and well-being of our patients, members, employees, and communities.  As concerns arise about the new strain of coronavirus that causes COVID-19, [facility] 4. If you have a medical appointment, call the healthcare provider ahead of time and tell them that you have or may have COVID-19.  5. For medical emergencies, call 911 and notify the dispatch personnel that you have or may have COVID-19.   6. Cover your c · At least 10 days have passed since symptoms first appeared OR if asymptomatic patient or date of symptom onset is unclear then use 10 days post COVID test date.    · At least 20 days have passed for severe illness (requiring hospitalization) OR if you are *Some people will be required to have a repeat COVID-19 test in order to be eligible to donate. If you’re instructed by Mark Hooks that a repeat test is required, please contact the [facility] COVID-19 Nurse Triage Line at [phone].     Additional Inf

## (undated) NOTE — LETTER
Juliana Gutierrez, :3/16/1980    CONSENT FOR PROCEDURE/SEDATION    1. I authorize the performance upon Juliana Gutierrez  the following: Endometrial Biopsy    2.  I authorize Dr. Manas Napier MD (and whomever is designated as the doctor’s assistant), to perform Witness: _________________________________________ Date:___________     Physician Signature: _______________________________ Date:___________

## (undated) NOTE — LETTER
Patient Name: Jan Donaldson  YOB: 1980          MRN :  [id number]  Date:  5/25/2021  Referring Physician:  Edmund Siu  Pt has attended 23 visits in Physical Therapy.    Dx: fibromyalgia (worst R shld, back)        Subject will demonstrate 4+/5 CINTIA shoulder and scapular strength to be able to wash her hair and put away dishes with </=3/10 shoulder pain -MET  Pt will be able to complete floor transfers >3x/day with <3/10 knee pain - MET  Pt will be able to complete patient tr

## (undated) NOTE — ED AVS SNAPSHOT
Juliana Gutierrez   MRN: [id number]    Department:  THE [facility] Emergency Department in [place]   Date of Visit:  8/24/2018           Disclosure     Insurance plans vary and the physician(s) referred by the ER may not be covered by your plan.  Please contact tell this physician (or your personal doctor if your instructions are to return to your personal doctor) about any new or lasting problems. The primary care or specialist physician will see patients referred from the [facility] Emergency Department.  Iam Lamar

## (undated) NOTE — LETTER
[address]  [address] [facility] [address]  Dept: [phone]         June 24, 2020    Patient: Rancho Sarabia   YOB: 1980   Date of Visit: 6/21/2020       To Whom It May Concern:    Isabel Cramer was seen and treat

## (undated) NOTE — LETTER
Patient Name: Nola De Paz        : 3/16/1980       Medical Record #: [id number]    CONSENT FOR PROCEDURES/SEDATION    Date: 2024       Time: 7:51 AM        1. I authorize the performance upon Nola De Paz the following:    ______RIGHT SHOULDER INJECTION WITH ULTRASOUND_______    2. I authorize Dr. Neal (and whomever is designated as the doctor’s assistant), to perform the above mentioned procedures.    3. If any unforeseen conditions arise during this procedure calling for additional procedures, operations, or medications (including anesthesia and blood transfusion), I  further request and authorize the doctor to do whatever he/she deems advisable in my interest.    4. I consent to the taking and reproduction of any photographs in the course of this procedure for professional purposes.    5. I consent to the administration of such sedation as may be considered necessary or advisable by the physician responsible for this service, with the exception of  _____________________________.    6. I have been informed by my doctor of the nature and purpose of this procedure/sedation, possible alternative methods of treatment, risk involved and possible complications.      Signature of Patient:  ___________________________    Signature of person authorized to consent for patient: Relationship to patient:  ___________________________    ___________________    Witness: ____________________     Date: ______________    Provider: ____________________     Date: ______________

## (undated) NOTE — ED AVS SNAPSHOT
Mariam Osullivan   MRN: [id number]    Department:  [facility] Emergency Department in [place]   Date of Visit:  2/18/2018           Disclosure     Insurance plans vary and the physician(s) referred by the ER may not be covered by your plan.  Please contact tell this physician (or your personal doctor if your instructions are to return to your personal doctor) about any new or lasting problems. The primary care or specialist physician will see patients referred from the [facility] Emergency Department.  Fang Bolanos

## (undated) NOTE — LETTER
Nola De Paz, :3/16/1980    CONSENT FOR PROCEDURE/SEDATION    1. I authorize the performance upon Nola De Paz  the following: Endometrial biopsy procedure    2. I authorize Dr. Seble Maguire MD (and whomever is designated as the doctor’s assistant), to perform the above-mentioned procedures.    3. If any unforeseen conditions arise during this procedure calling for additional  procedures, operations, or medications (including anesthesia and blood transfusion), I further request and authorize the doctor to do whatever he/she deems advisable in my interest.    4. I consent to the taking and reproduction of any photographs in the course of this procedure for professional purposes.    5. I consent to the administration of such sedation as may be considered necessary or advisable by the physician responsible for this service, with the exception of ______________________________________________________    6. I have been informed by my doctor of the nature and purpose of this procedure sedation, possible alternative methods of treatment, risk involved and possible complications.    7. If I have a Do Not Resuscitate (DNR) order in place, the physician and I (or the individual authorized to consent on my behalf) will discuss and agree as to whether the Do Not Resuscitate (DNR) order will remain in effect or will be discontinued during the performance of the procedure and the applicable recovery period. If the Do Not Resuscitate (DNR) order is discontinued and is to be reinstated following the procedure/recovery period, the physician will determine when the applicable recovery period ends for purposes of reinstating the Do Not Resuscitate (DNR) order.    Signature of Patient:_______________________________________________    Signature of person authorized to consent for patient:  _______________________________________________________________    Relationship to patient:  ____________________________________________    Witness: _________________________________________ Date:___________     Physician Signature: _______________________________ Date:___________

## (undated) NOTE — Clinical Note
HI! This patient completed seminar, sent insurance to surgeons office last month. Can we forward the information, not sure if there is a backlog. Thanks!

## (undated) NOTE — LETTER
07/01/20        [address] [facility] [address]  [address] [place] [address]      Dear Skip Dodge,    Our records indicate that you have outstanding lab work and or testing that was ordered for you and has not yet been completed:  Orders Placed This Encounter

## (undated) NOTE — LETTER
Date: 9/5/2019    Patient Name: Uma Hernandez          To Whom it may concern: The above patient was seen at the [facility] for treatment of a medical condition.  She has an acute infection and needs at least 24 hours on antibiotics to decr

## (undated) NOTE — LETTER
ASTHMA ACTION PLAN for Nola De Paz     : 3/16/1980     Date: 3/1/2024  Provider:  Julissa Gonzalez MD  Phone for doctor or clinic: [facility], [address] [place] [id number] [phone]           You can use the colors of a traffic light to help learn about your asthma medicines.      1. Green - Go! % of Personal Best Peak Flow Use controller medicine.   Breathing is good  No cough or wheeze  Can work and play Medicine How much to take When to take it    No medications needed because of good control.       2. Yellow - Caution. 50-79% Personal Best Peak  Flow.  Use reliever medicine to keep an asthma attack from getting bad.   Cough  Wheezing  Tight Chest  Wake up at night Medicine How much to take When to take it    Proair HFA (albuterol) inhaler 1-2 puffs every 4-6 hours as needed        Additional instructions         3. Red - Stop! Danger!  <50% Personal Best Peak  Flow. Take these medications until  Get help from a doctor   Medicine not helping  Breathing is hard and fast  Nose opens wide  Can't walk  Ribs show  Can't talk well Medicine How much to take When to take it    Go to the nearest Emergency Room/Department right now!      Additional Instructions If your symptoms do not improve and you cannot contact your doctor, [facility] or call 911 immediately!     [x] Asthma Action Plan reviewed with patient (and caregiver if necessary) and a copy of the plan was given to the patient/caregiver.   [] Asthma Action Plan reviewed with patient (and caregiver if necessary) on the phone and mailed copy to patient or submitted via [employer].     Signatures:  Provider  Julissa Gonzalez MD   Patient Caretaker

## (undated) NOTE — ED AVS SNAPSHOT
Mayra Recinos   MRN: [id number]    Department:  [facility] in [place]   Date of Visit:  1/13/2018           Disclosure     Insurance plans vary and the physician(s) referred by the ER may not be covered by your plan.  Please contact tell this physician (or your personal doctor if your instructions are to return to your personal doctor) about any new or lasting problems. The primary care or specialist physician will see patients referred from the [facility] Emergency Department.  Tika Loya

## (undated) NOTE — LETTER
02/25/20        [address] [facility] [address]  [address] [place] [address]      Dear [facility],    Our records indicate that you have outstanding lab work and or testing that was ordered for you and has not yet been completed:  Orders Placed This Encounter

## (undated) NOTE — Clinical Note
Hello,     I saw this patient of yours the other day. 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 I saw in previous notes she was on vyvanse and I also saw stimulant under contraindications. I think it was because it previously caused elevated HR at higher dose of 50mg. I also looked through and saw she had an abnormal stress test prior to surgery, then had CTA and it was normal and was cleared for surgery. I started her on 20mg of Vyvanse, but was wondering if you could please take a look particularly at her cardiac history, or if you remember her.     Thank you,   Danay Kruse

## (undated) NOTE — LETTER
01/26/22          Dear Jonathan Bhagat on your 1 year bariatric surgery anniversary! We hope you are doing well. This is a reminder to schedule your 1 year follow-up visits with your surgeon and dietitian (follow up/body composition).

## (undated) NOTE — LETTER
Date & Time: 6/17/2018, 9:31 AM  Patient: Christina Londono  Encounter Provider(s):    Terance Barthel, MD       To Whom It May Concern:    Iris Iliana was seen and treated in our department on 6/17/2018.  She should not return to work until Tuesday, June

## (undated) NOTE — LETTER
08/26/19        [facility] [address] [facility] [address]  [address] [address] [address]      Dear Efrain Nunes,    Our records indicate that you have outstanding lab work and or testing that was ordered for you and has not yet been completed:  Orders Placed This Encounter

## (undated) NOTE — LETTER
February 18, 2018    Patient: Kulwant Jc   Date of Visit: 2/18/2018       To Whom It May Concern:    Janki Sawant was seen and treated in our emergency department on 2/18/2018. She may be off work tonight, Feb 18, 2018, return tomorrow, Feb 19.     If